# Patient Record
Sex: FEMALE | Race: OTHER | NOT HISPANIC OR LATINO
[De-identification: names, ages, dates, MRNs, and addresses within clinical notes are randomized per-mention and may not be internally consistent; named-entity substitution may affect disease eponyms.]

---

## 2017-07-14 ENCOUNTER — APPOINTMENT (OUTPATIENT)
Dept: ORTHOPEDIC SURGERY | Facility: CLINIC | Age: 62
End: 2017-07-14

## 2017-07-14 VITALS
WEIGHT: 194 LBS | BODY MASS INDEX: 30.45 KG/M2 | SYSTOLIC BLOOD PRESSURE: 106 MMHG | HEART RATE: 112 BPM | DIASTOLIC BLOOD PRESSURE: 69 MMHG | HEIGHT: 67 IN

## 2017-07-14 DIAGNOSIS — Z78.9 OTHER SPECIFIED HEALTH STATUS: ICD-10-CM

## 2017-07-14 DIAGNOSIS — Z87.39 PERSONAL HISTORY OF OTHER DISEASES OF THE MUSCULOSKELETAL SYSTEM AND CONNECTIVE TISSUE: ICD-10-CM

## 2017-07-14 DIAGNOSIS — Z80.9 FAMILY HISTORY OF MALIGNANT NEOPLASM, UNSPECIFIED: ICD-10-CM

## 2017-07-14 DIAGNOSIS — Z86.39 PERSONAL HISTORY OF OTHER ENDOCRINE, NUTRITIONAL AND METABOLIC DISEASE: ICD-10-CM

## 2017-07-14 DIAGNOSIS — Z90.5 ACQUIRED ABSENCE OF KIDNEY: ICD-10-CM

## 2017-07-14 DIAGNOSIS — Z86.79 PERSONAL HISTORY OF OTHER DISEASES OF THE CIRCULATORY SYSTEM: ICD-10-CM

## 2017-07-14 RX ORDER — ERGOCALCIFEROL 1.25 MG/1
1.25 MG CAPSULE, LIQUID FILLED ORAL
Qty: 4 | Refills: 0 | Status: ACTIVE | COMMUNITY
Start: 2017-03-31

## 2017-07-14 RX ORDER — VALSARTAN AND HYDROCHLOROTHIAZIDE 160; 12.5 MG/1; MG/1
160-12.5 TABLET, FILM COATED ORAL
Qty: 30 | Refills: 0 | Status: ACTIVE | COMMUNITY
Start: 2017-03-28

## 2017-07-14 RX ORDER — METHOCARBAMOL 500 MG/1
500 TABLET, FILM COATED ORAL
Qty: 60 | Refills: 0 | Status: ACTIVE | COMMUNITY
Start: 2017-02-03

## 2017-07-14 RX ORDER — VALSARTAN 40 MG/1
TABLET ORAL
Refills: 0 | Status: ACTIVE | COMMUNITY

## 2017-08-08 ENCOUNTER — OUTPATIENT (OUTPATIENT)
Dept: OUTPATIENT SERVICES | Facility: HOSPITAL | Age: 62
LOS: 1 days | End: 2017-08-08
Payer: MEDICAID

## 2017-08-08 VITALS
SYSTOLIC BLOOD PRESSURE: 128 MMHG | HEIGHT: 67 IN | WEIGHT: 198.42 LBS | RESPIRATION RATE: 16 BRPM | DIASTOLIC BLOOD PRESSURE: 70 MMHG | TEMPERATURE: 98 F | HEART RATE: 84 BPM

## 2017-08-08 DIAGNOSIS — C50.919 MALIGNANT NEOPLASM OF UNSPECIFIED SITE OF UNSPECIFIED FEMALE BREAST: ICD-10-CM

## 2017-08-08 DIAGNOSIS — E78.00 PURE HYPERCHOLESTEROLEMIA, UNSPECIFIED: ICD-10-CM

## 2017-08-08 DIAGNOSIS — Z98.890 OTHER SPECIFIED POSTPROCEDURAL STATES: Chronic | ICD-10-CM

## 2017-08-08 DIAGNOSIS — I10 ESSENTIAL (PRIMARY) HYPERTENSION: ICD-10-CM

## 2017-08-08 DIAGNOSIS — M16.11 UNILATERAL PRIMARY OSTEOARTHRITIS, RIGHT HIP: ICD-10-CM

## 2017-08-08 DIAGNOSIS — D17.9 BENIGN LIPOMATOUS NEOPLASM, UNSPECIFIED: Chronic | ICD-10-CM

## 2017-08-08 DIAGNOSIS — Z90.12 ACQUIRED ABSENCE OF LEFT BREAST AND NIPPLE: Chronic | ICD-10-CM

## 2017-08-08 DIAGNOSIS — Z01.818 ENCOUNTER FOR OTHER PREPROCEDURAL EXAMINATION: ICD-10-CM

## 2017-08-08 LAB
ALBUMIN SERPL ELPH-MCNC: 3.9 G/DL — SIGNIFICANT CHANGE UP (ref 3.3–5.2)
ALP SERPL-CCNC: 131 U/L — HIGH (ref 40–120)
ALT FLD-CCNC: 42 U/L — HIGH
ANION GAP SERPL CALC-SCNC: 13 MMOL/L — SIGNIFICANT CHANGE UP (ref 5–17)
APTT BLD: 27.7 SEC — SIGNIFICANT CHANGE UP (ref 27.5–37.4)
AST SERPL-CCNC: 41 U/L — HIGH
BASOPHILS # BLD AUTO: 0 K/UL — SIGNIFICANT CHANGE UP (ref 0–0.2)
BASOPHILS NFR BLD AUTO: 0.3 % — SIGNIFICANT CHANGE UP (ref 0–2)
BILIRUB SERPL-MCNC: 0.2 MG/DL — LOW (ref 0.4–2)
BLD GP AB SCN SERPL QL: SIGNIFICANT CHANGE UP
BUN SERPL-MCNC: 15 MG/DL — SIGNIFICANT CHANGE UP (ref 8–20)
CALCIUM SERPL-MCNC: 9.2 MG/DL — SIGNIFICANT CHANGE UP (ref 8.6–10.2)
CHLORIDE SERPL-SCNC: 102 MMOL/L — SIGNIFICANT CHANGE UP (ref 98–107)
CO2 SERPL-SCNC: 26 MMOL/L — SIGNIFICANT CHANGE UP (ref 22–29)
CREAT SERPL-MCNC: 0.57 MG/DL — SIGNIFICANT CHANGE UP (ref 0.5–1.3)
EOSINOPHIL # BLD AUTO: 0.4 K/UL — SIGNIFICANT CHANGE UP (ref 0–0.5)
EOSINOPHIL NFR BLD AUTO: 6 % — SIGNIFICANT CHANGE UP (ref 0–6)
GLUCOSE SERPL-MCNC: 110 MG/DL — SIGNIFICANT CHANGE UP (ref 70–115)
HCT VFR BLD CALC: 38 % — SIGNIFICANT CHANGE UP (ref 37–47)
HGB BLD-MCNC: 12.4 G/DL — SIGNIFICANT CHANGE UP (ref 12–16)
INR BLD: 1.17 RATIO — HIGH (ref 0.88–1.16)
LYMPHOCYTES # BLD AUTO: 1.8 K/UL — SIGNIFICANT CHANGE UP (ref 1–4.8)
LYMPHOCYTES # BLD AUTO: 27.6 % — SIGNIFICANT CHANGE UP (ref 20–55)
MCHC RBC-ENTMCNC: 30.7 PG — SIGNIFICANT CHANGE UP (ref 27–31)
MCHC RBC-ENTMCNC: 32.6 G/DL — SIGNIFICANT CHANGE UP (ref 32–36)
MCV RBC AUTO: 94.1 FL — SIGNIFICANT CHANGE UP (ref 81–99)
MONOCYTES # BLD AUTO: 0.6 K/UL — SIGNIFICANT CHANGE UP (ref 0–0.8)
MONOCYTES NFR BLD AUTO: 9.6 % — SIGNIFICANT CHANGE UP (ref 3–10)
MRSA PCR RESULT.: SIGNIFICANT CHANGE UP
NEUTROPHILS # BLD AUTO: 3.6 K/UL — SIGNIFICANT CHANGE UP (ref 1.8–8)
NEUTROPHILS NFR BLD AUTO: 56.3 % — SIGNIFICANT CHANGE UP (ref 37–73)
PLATELET # BLD AUTO: 299 K/UL — SIGNIFICANT CHANGE UP (ref 150–400)
POTASSIUM SERPL-MCNC: 4 MMOL/L — SIGNIFICANT CHANGE UP (ref 3.5–5.3)
POTASSIUM SERPL-SCNC: 4 MMOL/L — SIGNIFICANT CHANGE UP (ref 3.5–5.3)
PROT SERPL-MCNC: 7.1 G/DL — SIGNIFICANT CHANGE UP (ref 6.6–8.7)
PROTHROM AB SERPL-ACNC: 12.9 SEC — HIGH (ref 9.8–12.7)
RBC # BLD: 4.04 M/UL — LOW (ref 4.4–5.2)
RBC # FLD: 13.7 % — SIGNIFICANT CHANGE UP (ref 11–15.6)
S AUREUS DNA NOSE QL NAA+PROBE: SIGNIFICANT CHANGE UP
SODIUM SERPL-SCNC: 141 MMOL/L — SIGNIFICANT CHANGE UP (ref 135–145)
TYPE + AB SCN PNL BLD: SIGNIFICANT CHANGE UP
WBC # BLD: 6.5 K/UL — SIGNIFICANT CHANGE UP (ref 4.8–10.8)
WBC # FLD AUTO: 6.5 K/UL — SIGNIFICANT CHANGE UP (ref 4.8–10.8)

## 2017-08-08 PROCEDURE — 93005 ELECTROCARDIOGRAM TRACING: CPT

## 2017-08-08 PROCEDURE — 86900 BLOOD TYPING SEROLOGIC ABO: CPT

## 2017-08-08 PROCEDURE — G0463: CPT

## 2017-08-08 PROCEDURE — 93010 ELECTROCARDIOGRAM REPORT: CPT

## 2017-08-08 PROCEDURE — 85610 PROTHROMBIN TIME: CPT

## 2017-08-08 PROCEDURE — 36415 COLL VENOUS BLD VENIPUNCTURE: CPT

## 2017-08-08 PROCEDURE — 87640 STAPH A DNA AMP PROBE: CPT

## 2017-08-08 PROCEDURE — 87641 MR-STAPH DNA AMP PROBE: CPT

## 2017-08-08 PROCEDURE — 86850 RBC ANTIBODY SCREEN: CPT

## 2017-08-08 PROCEDURE — 86901 BLOOD TYPING SEROLOGIC RH(D): CPT

## 2017-08-08 PROCEDURE — 85730 THROMBOPLASTIN TIME PARTIAL: CPT

## 2017-08-08 PROCEDURE — 80053 COMPREHEN METABOLIC PANEL: CPT

## 2017-08-08 PROCEDURE — 85027 COMPLETE CBC AUTOMATED: CPT

## 2017-08-08 NOTE — H&P PST ADULT - NSANTHOSAYNRD_GEN_A_CORE
No. WILI screening performed.  STOP BANG Legend: 0-2 = LOW Risk; 3-4 = INTERMEDIATE Risk; 5-8 = HIGH Risk

## 2017-08-08 NOTE — H&P PST ADULT - HISTORY OF PRESENT ILLNESS
62 year old female who states that she has right hip pain for the last few years but for the last one year its really unbearable

## 2017-08-08 NOTE — H&P PST ADULT - PSH
Atypical lipoma of soft tissue  in left groin 2008  H/O foot surgery  ankle left 2004  History of lumpectomy of left breast  2017

## 2017-08-08 NOTE — PATIENT PROFILE ADULT. - FAMILY HISTORY
<<-----Click on this checkbox to enter Family History Family history of hypertension in mother     Sibling  Still living? Yes, Estimated age: Age Unknown  Family history of breast cancer in sister, Age at diagnosis: Age Unknown

## 2017-08-29 ENCOUNTER — APPOINTMENT (OUTPATIENT)
Dept: ORTHOPEDIC SURGERY | Facility: HOSPITAL | Age: 62
End: 2017-08-29

## 2017-09-13 ENCOUNTER — APPOINTMENT (OUTPATIENT)
Dept: ORTHOPEDIC SURGERY | Facility: CLINIC | Age: 62
End: 2017-09-13
Payer: MEDICAID

## 2017-09-13 VITALS
HEIGHT: 67 IN | WEIGHT: 194 LBS | SYSTOLIC BLOOD PRESSURE: 135 MMHG | DIASTOLIC BLOOD PRESSURE: 75 MMHG | HEART RATE: 94 BPM | BODY MASS INDEX: 30.45 KG/M2

## 2017-09-13 DIAGNOSIS — M16.11 UNILATERAL PRIMARY OSTEOARTHRITIS, RIGHT HIP: ICD-10-CM

## 2017-09-13 PROCEDURE — 99214 OFFICE O/P EST MOD 30 MIN: CPT

## 2017-09-13 RX ORDER — FENOPROFEN CALCIUM 400 MG/1
400 CAPSULE ORAL
Qty: 90 | Refills: 0 | Status: DISCONTINUED | COMMUNITY
Start: 2017-04-26 | End: 2017-09-13

## 2017-09-13 RX ORDER — ATORVASTATIN CALCIUM 20 MG/1
20 TABLET, FILM COATED ORAL
Qty: 30 | Refills: 0 | Status: DISCONTINUED | COMMUNITY
Start: 2017-03-28 | End: 2017-09-13

## 2017-09-13 RX ORDER — TAMOXIFEN CITRATE 20 MG/1
20 TABLET, FILM COATED ORAL
Qty: 30 | Refills: 0 | Status: ACTIVE | COMMUNITY
Start: 2017-08-02

## 2017-10-17 ENCOUNTER — APPOINTMENT (OUTPATIENT)
Dept: ORTHOPEDIC SURGERY | Facility: CLINIC | Age: 62
End: 2017-10-17

## 2017-11-08 ENCOUNTER — OUTPATIENT (OUTPATIENT)
Dept: OUTPATIENT SERVICES | Facility: HOSPITAL | Age: 62
LOS: 1 days | End: 2017-11-08
Payer: MEDICAID

## 2017-11-08 VITALS
HEART RATE: 96 BPM | TEMPERATURE: 99 F | WEIGHT: 191.8 LBS | HEIGHT: 67 IN | DIASTOLIC BLOOD PRESSURE: 68 MMHG | SYSTOLIC BLOOD PRESSURE: 132 MMHG | RESPIRATION RATE: 16 BRPM

## 2017-11-08 DIAGNOSIS — I10 ESSENTIAL (PRIMARY) HYPERTENSION: ICD-10-CM

## 2017-11-08 DIAGNOSIS — M16.11 UNILATERAL PRIMARY OSTEOARTHRITIS, RIGHT HIP: ICD-10-CM

## 2017-11-08 DIAGNOSIS — Z01.818 ENCOUNTER FOR OTHER PREPROCEDURAL EXAMINATION: ICD-10-CM

## 2017-11-08 DIAGNOSIS — Z90.12 ACQUIRED ABSENCE OF LEFT BREAST AND NIPPLE: Chronic | ICD-10-CM

## 2017-11-08 DIAGNOSIS — D17.9 BENIGN LIPOMATOUS NEOPLASM, UNSPECIFIED: Chronic | ICD-10-CM

## 2017-11-08 DIAGNOSIS — Z98.890 OTHER SPECIFIED POSTPROCEDURAL STATES: Chronic | ICD-10-CM

## 2017-11-08 LAB
ALBUMIN SERPL ELPH-MCNC: 3.7 G/DL — SIGNIFICANT CHANGE UP (ref 3.3–5.2)
ALP SERPL-CCNC: 72 U/L — SIGNIFICANT CHANGE UP (ref 40–120)
ALT FLD-CCNC: 21 U/L — SIGNIFICANT CHANGE UP
ANION GAP SERPL CALC-SCNC: 13 MMOL/L — SIGNIFICANT CHANGE UP (ref 5–17)
APTT BLD: 26.2 SEC — LOW (ref 27.5–37.4)
AST SERPL-CCNC: 26 U/L — SIGNIFICANT CHANGE UP
BASOPHILS # BLD AUTO: 0 K/UL — SIGNIFICANT CHANGE UP (ref 0–0.2)
BASOPHILS NFR BLD AUTO: 0.1 % — SIGNIFICANT CHANGE UP (ref 0–2)
BILIRUB SERPL-MCNC: 0.4 MG/DL — SIGNIFICANT CHANGE UP (ref 0.4–2)
BLD GP AB SCN SERPL QL: SIGNIFICANT CHANGE UP
BUN SERPL-MCNC: 13 MG/DL — SIGNIFICANT CHANGE UP (ref 8–20)
CALCIUM SERPL-MCNC: 9.2 MG/DL — SIGNIFICANT CHANGE UP (ref 8.6–10.2)
CHLORIDE SERPL-SCNC: 102 MMOL/L — SIGNIFICANT CHANGE UP (ref 98–107)
CO2 SERPL-SCNC: 26 MMOL/L — SIGNIFICANT CHANGE UP (ref 22–29)
CREAT SERPL-MCNC: 0.66 MG/DL — SIGNIFICANT CHANGE UP (ref 0.5–1.3)
EOSINOPHIL # BLD AUTO: 0.1 K/UL — SIGNIFICANT CHANGE UP (ref 0–0.5)
EOSINOPHIL NFR BLD AUTO: 1.8 % — SIGNIFICANT CHANGE UP (ref 0–6)
GLUCOSE SERPL-MCNC: 96 MG/DL — SIGNIFICANT CHANGE UP (ref 70–115)
HCT VFR BLD CALC: 37 % — SIGNIFICANT CHANGE UP (ref 37–47)
HGB BLD-MCNC: 12.3 G/DL — SIGNIFICANT CHANGE UP (ref 12–16)
INR BLD: 1.09 RATIO — SIGNIFICANT CHANGE UP (ref 0.88–1.16)
LYMPHOCYTES # BLD AUTO: 2.7 K/UL — SIGNIFICANT CHANGE UP (ref 1–4.8)
LYMPHOCYTES # BLD AUTO: 39.7 % — SIGNIFICANT CHANGE UP (ref 20–55)
MCHC RBC-ENTMCNC: 31.6 PG — HIGH (ref 27–31)
MCHC RBC-ENTMCNC: 33.2 G/DL — SIGNIFICANT CHANGE UP (ref 32–36)
MCV RBC AUTO: 95.1 FL — SIGNIFICANT CHANGE UP (ref 81–99)
MONOCYTES # BLD AUTO: 0.6 K/UL — SIGNIFICANT CHANGE UP (ref 0–0.8)
MONOCYTES NFR BLD AUTO: 9.4 % — SIGNIFICANT CHANGE UP (ref 3–10)
MRSA PCR RESULT.: SIGNIFICANT CHANGE UP
NEUTROPHILS # BLD AUTO: 3.3 K/UL — SIGNIFICANT CHANGE UP (ref 1.8–8)
NEUTROPHILS NFR BLD AUTO: 48.7 % — SIGNIFICANT CHANGE UP (ref 37–73)
PLATELET # BLD AUTO: 265 K/UL — SIGNIFICANT CHANGE UP (ref 150–400)
POTASSIUM SERPL-MCNC: 4.3 MMOL/L — SIGNIFICANT CHANGE UP (ref 3.5–5.3)
POTASSIUM SERPL-SCNC: 4.3 MMOL/L — SIGNIFICANT CHANGE UP (ref 3.5–5.3)
PROT SERPL-MCNC: 7.4 G/DL — SIGNIFICANT CHANGE UP (ref 6.6–8.7)
PROTHROM AB SERPL-ACNC: 12 SEC — SIGNIFICANT CHANGE UP (ref 9.8–12.7)
RBC # BLD: 3.89 M/UL — LOW (ref 4.4–5.2)
RBC # FLD: 13.7 % — SIGNIFICANT CHANGE UP (ref 11–15.6)
S AUREUS DNA NOSE QL NAA+PROBE: SIGNIFICANT CHANGE UP
SODIUM SERPL-SCNC: 141 MMOL/L — SIGNIFICANT CHANGE UP (ref 135–145)
TYPE + AB SCN PNL BLD: SIGNIFICANT CHANGE UP
WBC # BLD: 6.8 K/UL — SIGNIFICANT CHANGE UP (ref 4.8–10.8)
WBC # FLD AUTO: 6.8 K/UL — SIGNIFICANT CHANGE UP (ref 4.8–10.8)

## 2017-11-08 PROCEDURE — 86850 RBC ANTIBODY SCREEN: CPT

## 2017-11-08 PROCEDURE — 80053 COMPREHEN METABOLIC PANEL: CPT

## 2017-11-08 PROCEDURE — 87640 STAPH A DNA AMP PROBE: CPT

## 2017-11-08 PROCEDURE — G0463: CPT

## 2017-11-08 PROCEDURE — 85027 COMPLETE CBC AUTOMATED: CPT

## 2017-11-08 PROCEDURE — 86901 BLOOD TYPING SEROLOGIC RH(D): CPT

## 2017-11-08 PROCEDURE — 85730 THROMBOPLASTIN TIME PARTIAL: CPT

## 2017-11-08 PROCEDURE — 93005 ELECTROCARDIOGRAM TRACING: CPT

## 2017-11-08 PROCEDURE — 85610 PROTHROMBIN TIME: CPT

## 2017-11-08 PROCEDURE — 86900 BLOOD TYPING SEROLOGIC ABO: CPT

## 2017-11-08 PROCEDURE — 93010 ELECTROCARDIOGRAM REPORT: CPT

## 2017-11-08 PROCEDURE — 36415 COLL VENOUS BLD VENIPUNCTURE: CPT

## 2017-11-08 RX ORDER — MULTIVIT-MIN/FERROUS GLUCONATE 9 MG/15 ML
1 LIQUID (ML) ORAL
Qty: 0 | Refills: 0 | COMMUNITY

## 2017-11-08 NOTE — H&P PST ADULT - FAMILY HISTORY
Family history of hypertension in mother     Sibling  Still living? Yes, Estimated age: Age Unknown  Family history of breast cancer in sister, Age at diagnosis: Age Unknown

## 2017-11-22 ENCOUNTER — APPOINTMENT (OUTPATIENT)
Dept: ORTHOPEDIC SURGERY | Facility: CLINIC | Age: 62
End: 2017-11-22

## 2017-11-28 RX ORDER — GABAPENTIN 400 MG/1
600 CAPSULE ORAL ONCE
Qty: 0 | Refills: 0 | Status: COMPLETED | OUTPATIENT
Start: 2017-11-30 | End: 2017-11-30

## 2017-11-28 RX ORDER — CELECOXIB 200 MG/1
400 CAPSULE ORAL ONCE
Qty: 0 | Refills: 0 | Status: COMPLETED | OUTPATIENT
Start: 2017-11-30 | End: 2017-11-30

## 2017-11-28 RX ORDER — OXYCODONE HYDROCHLORIDE 5 MG/1
20 TABLET ORAL ONCE
Qty: 0 | Refills: 0 | Status: DISCONTINUED | OUTPATIENT
Start: 2017-11-30 | End: 2017-11-30

## 2017-11-30 ENCOUNTER — RESULT REVIEW (OUTPATIENT)
Age: 62
End: 2017-11-30

## 2017-11-30 ENCOUNTER — TRANSCRIPTION ENCOUNTER (OUTPATIENT)
Age: 62
End: 2017-11-30

## 2017-11-30 ENCOUNTER — APPOINTMENT (OUTPATIENT)
Dept: ORTHOPEDIC SURGERY | Facility: HOSPITAL | Age: 62
End: 2017-11-30

## 2017-11-30 ENCOUNTER — INPATIENT (INPATIENT)
Facility: HOSPITAL | Age: 62
LOS: 3 days | Discharge: HOME | DRG: 470 | End: 2017-12-04
Attending: ORTHOPAEDIC SURGERY | Admitting: ORTHOPAEDIC SURGERY
Payer: MEDICAID

## 2017-11-30 VITALS
HEIGHT: 67 IN | RESPIRATION RATE: 16 BRPM | OXYGEN SATURATION: 97 % | WEIGHT: 191.8 LBS | TEMPERATURE: 98 F | DIASTOLIC BLOOD PRESSURE: 64 MMHG | SYSTOLIC BLOOD PRESSURE: 103 MMHG | HEART RATE: 94 BPM

## 2017-11-30 DIAGNOSIS — D17.9 BENIGN LIPOMATOUS NEOPLASM, UNSPECIFIED: Chronic | ICD-10-CM

## 2017-11-30 DIAGNOSIS — M16.11 UNILATERAL PRIMARY OSTEOARTHRITIS, RIGHT HIP: ICD-10-CM

## 2017-11-30 DIAGNOSIS — Z90.12 ACQUIRED ABSENCE OF LEFT BREAST AND NIPPLE: Chronic | ICD-10-CM

## 2017-11-30 DIAGNOSIS — Z98.890 OTHER SPECIFIED POSTPROCEDURAL STATES: Chronic | ICD-10-CM

## 2017-11-30 LAB
BLD GP AB SCN SERPL QL: SIGNIFICANT CHANGE UP
TYPE + AB SCN PNL BLD: SIGNIFICANT CHANGE UP

## 2017-11-30 PROCEDURE — 88305 TISSUE EXAM BY PATHOLOGIST: CPT | Mod: 26

## 2017-11-30 PROCEDURE — 27130 TOTAL HIP ARTHROPLASTY: CPT | Mod: AS,RT

## 2017-11-30 PROCEDURE — 99222 1ST HOSP IP/OBS MODERATE 55: CPT

## 2017-11-30 PROCEDURE — 27130 TOTAL HIP ARTHROPLASTY: CPT | Mod: RT

## 2017-11-30 PROCEDURE — 73501 X-RAY EXAM HIP UNI 1 VIEW: CPT | Mod: 26,RT

## 2017-11-30 PROCEDURE — 88311 DECALCIFY TISSUE: CPT | Mod: 26

## 2017-11-30 RX ORDER — HYDROMORPHONE HYDROCHLORIDE 2 MG/ML
2 INJECTION INTRAMUSCULAR; INTRAVENOUS; SUBCUTANEOUS
Qty: 0 | Refills: 0 | Status: DISCONTINUED | OUTPATIENT
Start: 2017-11-30 | End: 2017-12-04

## 2017-11-30 RX ORDER — ACETAMINOPHEN 500 MG
650 TABLET ORAL EVERY 6 HOURS
Qty: 0 | Refills: 0 | Status: DISCONTINUED | OUTPATIENT
Start: 2017-11-30 | End: 2017-12-04

## 2017-11-30 RX ORDER — KETOROLAC TROMETHAMINE 30 MG/ML
15 SYRINGE (ML) INJECTION EVERY 6 HOURS
Qty: 0 | Refills: 0 | Status: DISCONTINUED | OUTPATIENT
Start: 2017-11-30 | End: 2017-12-01

## 2017-11-30 RX ORDER — TRANEXAMIC ACID 100 MG/ML
850 INJECTION, SOLUTION INTRAVENOUS ONCE
Qty: 0 | Refills: 0 | Status: DISCONTINUED | OUTPATIENT
Start: 2017-11-30 | End: 2017-11-30

## 2017-11-30 RX ORDER — CELECOXIB 200 MG/1
200 CAPSULE ORAL
Qty: 0 | Refills: 0 | Status: DISCONTINUED | OUTPATIENT
Start: 2017-11-30 | End: 2017-12-04

## 2017-11-30 RX ORDER — DOCUSATE SODIUM 100 MG
100 CAPSULE ORAL THREE TIMES A DAY
Qty: 0 | Refills: 0 | Status: DISCONTINUED | OUTPATIENT
Start: 2017-11-30 | End: 2017-12-04

## 2017-11-30 RX ORDER — VANCOMYCIN HCL 1 G
1250 VIAL (EA) INTRAVENOUS
Qty: 0 | Refills: 0 | Status: COMPLETED | OUTPATIENT
Start: 2017-11-30 | End: 2017-11-30

## 2017-11-30 RX ORDER — FENTANYL CITRATE 50 UG/ML
50 INJECTION INTRAVENOUS
Qty: 0 | Refills: 0 | Status: DISCONTINUED | OUTPATIENT
Start: 2017-11-30 | End: 2017-11-30

## 2017-11-30 RX ORDER — SODIUM CHLORIDE 9 MG/ML
1000 INJECTION, SOLUTION INTRAVENOUS
Qty: 0 | Refills: 0 | Status: DISCONTINUED | OUTPATIENT
Start: 2017-11-30 | End: 2017-11-30

## 2017-11-30 RX ORDER — CEFAZOLIN SODIUM 1 G
2000 VIAL (EA) INJECTION
Qty: 0 | Refills: 0 | Status: COMPLETED | OUTPATIENT
Start: 2017-11-30 | End: 2017-11-30

## 2017-11-30 RX ORDER — SODIUM CHLORIDE 9 MG/ML
1000 INJECTION, SOLUTION INTRAVENOUS
Qty: 0 | Refills: 0 | Status: DISCONTINUED | OUTPATIENT
Start: 2017-11-30 | End: 2017-12-01

## 2017-11-30 RX ORDER — SENNA PLUS 8.6 MG/1
2 TABLET ORAL AT BEDTIME
Qty: 0 | Refills: 0 | Status: DISCONTINUED | OUTPATIENT
Start: 2017-11-30 | End: 2017-12-04

## 2017-11-30 RX ORDER — ACETAMINOPHEN 500 MG
975 TABLET ORAL EVERY 8 HOURS
Qty: 0 | Refills: 0 | Status: COMPLETED | OUTPATIENT
Start: 2017-11-30 | End: 2017-12-03

## 2017-11-30 RX ORDER — TAMOXIFEN CITRATE 20 MG/1
20 TABLET, FILM COATED ORAL DAILY
Qty: 0 | Refills: 0 | Status: DISCONTINUED | OUTPATIENT
Start: 2017-11-30 | End: 2017-12-04

## 2017-11-30 RX ORDER — OXYCODONE HYDROCHLORIDE 5 MG/1
5 TABLET ORAL
Qty: 0 | Refills: 0 | Status: DISCONTINUED | OUTPATIENT
Start: 2017-11-30 | End: 2017-12-04

## 2017-11-30 RX ORDER — HYDROCHLOROTHIAZIDE 25 MG
12.5 TABLET ORAL DAILY
Qty: 0 | Refills: 0 | Status: DISCONTINUED | OUTPATIENT
Start: 2017-12-01 | End: 2017-12-01

## 2017-11-30 RX ORDER — SODIUM CHLORIDE 9 MG/ML
3 INJECTION INTRAMUSCULAR; INTRAVENOUS; SUBCUTANEOUS EVERY 8 HOURS
Qty: 0 | Refills: 0 | Status: DISCONTINUED | OUTPATIENT
Start: 2017-11-30 | End: 2017-11-30

## 2017-11-30 RX ORDER — ENOXAPARIN SODIUM 100 MG/ML
40 INJECTION SUBCUTANEOUS DAILY
Qty: 0 | Refills: 0 | Status: DISCONTINUED | OUTPATIENT
Start: 2017-12-01 | End: 2017-12-04

## 2017-11-30 RX ORDER — ONDANSETRON 8 MG/1
4 TABLET, FILM COATED ORAL ONCE
Qty: 0 | Refills: 0 | Status: COMPLETED | OUTPATIENT
Start: 2017-11-30 | End: 2017-11-30

## 2017-11-30 RX ORDER — VANCOMYCIN HCL 1 G
1250 VIAL (EA) INTRAVENOUS ONCE
Qty: 0 | Refills: 0 | Status: COMPLETED | OUTPATIENT
Start: 2017-11-30 | End: 2017-11-30

## 2017-11-30 RX ORDER — ACETAMINOPHEN 500 MG
1000 TABLET ORAL ONCE
Qty: 0 | Refills: 0 | Status: DISCONTINUED | OUTPATIENT
Start: 2017-11-30 | End: 2017-11-30

## 2017-11-30 RX ORDER — MAGNESIUM HYDROXIDE 400 MG/1
30 TABLET, CHEWABLE ORAL DAILY
Qty: 0 | Refills: 0 | Status: DISCONTINUED | OUTPATIENT
Start: 2017-11-30 | End: 2017-12-04

## 2017-11-30 RX ORDER — OXYCODONE HYDROCHLORIDE 5 MG/1
10 TABLET ORAL
Qty: 0 | Refills: 0 | Status: DISCONTINUED | OUTPATIENT
Start: 2017-11-30 | End: 2017-12-04

## 2017-11-30 RX ORDER — OXYCODONE HYDROCHLORIDE 5 MG/1
20 TABLET ORAL EVERY 12 HOURS
Qty: 0 | Refills: 0 | Status: DISCONTINUED | OUTPATIENT
Start: 2017-11-30 | End: 2017-12-04

## 2017-11-30 RX ORDER — VALSARTAN 80 MG/1
160 TABLET ORAL DAILY
Qty: 0 | Refills: 0 | Status: DISCONTINUED | OUTPATIENT
Start: 2017-12-01 | End: 2017-12-04

## 2017-11-30 RX ORDER — HYDROMORPHONE HYDROCHLORIDE 2 MG/ML
0.5 INJECTION INTRAMUSCULAR; INTRAVENOUS; SUBCUTANEOUS EVERY 4 HOURS
Qty: 0 | Refills: 0 | Status: DISCONTINUED | OUTPATIENT
Start: 2017-11-30 | End: 2017-12-04

## 2017-11-30 RX ORDER — POLYETHYLENE GLYCOL 3350 17 G/17G
17 POWDER, FOR SOLUTION ORAL DAILY
Qty: 0 | Refills: 0 | Status: DISCONTINUED | OUTPATIENT
Start: 2017-11-30 | End: 2017-12-04

## 2017-11-30 RX ORDER — ONDANSETRON 8 MG/1
4 TABLET, FILM COATED ORAL EVERY 6 HOURS
Qty: 0 | Refills: 0 | Status: DISCONTINUED | OUTPATIENT
Start: 2017-11-30 | End: 2017-12-04

## 2017-11-30 RX ORDER — ATORVASTATIN CALCIUM 80 MG/1
20 TABLET, FILM COATED ORAL AT BEDTIME
Qty: 0 | Refills: 0 | Status: DISCONTINUED | OUTPATIENT
Start: 2017-11-30 | End: 2017-12-04

## 2017-11-30 RX ORDER — CEFAZOLIN SODIUM 1 G
2000 VIAL (EA) INJECTION ONCE
Qty: 0 | Refills: 0 | Status: DISCONTINUED | OUTPATIENT
Start: 2017-11-30 | End: 2017-11-30

## 2017-11-30 RX ADMIN — Medication 166.67 MILLIGRAM(S): at 23:31

## 2017-11-30 RX ADMIN — OXYCODONE HYDROCHLORIDE 20 MILLIGRAM(S): 5 TABLET ORAL at 11:04

## 2017-11-30 RX ADMIN — OXYCODONE HYDROCHLORIDE 20 MILLIGRAM(S): 5 TABLET ORAL at 11:05

## 2017-11-30 RX ADMIN — ONDANSETRON 4 MILLIGRAM(S): 8 TABLET, FILM COATED ORAL at 17:27

## 2017-11-30 RX ADMIN — Medication 6.25 MILLIGRAM(S): at 17:57

## 2017-11-30 RX ADMIN — CELECOXIB 400 MILLIGRAM(S): 200 CAPSULE ORAL at 11:04

## 2017-11-30 RX ADMIN — Medication 975 MILLIGRAM(S): at 21:41

## 2017-11-30 RX ADMIN — Medication 15 MILLIGRAM(S): at 23:32

## 2017-11-30 RX ADMIN — Medication 166.67 MILLIGRAM(S): at 12:05

## 2017-11-30 RX ADMIN — Medication 100 MILLIGRAM(S): at 21:41

## 2017-11-30 RX ADMIN — CELECOXIB 400 MILLIGRAM(S): 200 CAPSULE ORAL at 11:05

## 2017-11-30 RX ADMIN — ATORVASTATIN CALCIUM 20 MILLIGRAM(S): 80 TABLET, FILM COATED ORAL at 21:40

## 2017-11-30 RX ADMIN — GABAPENTIN 600 MILLIGRAM(S): 400 CAPSULE ORAL at 11:04

## 2017-11-30 NOTE — DISCHARGE NOTE ADULT - CARE PLAN
Principal Discharge DX:	Primary osteoarthritis of left hip  Goal:	Decrease Pain, Improve Ambulation and Activities of Daily Living  Instructions for follow-up, activity and diet:	The patient will be seen in the office between 2-3 weeks for wound check. Tape will be removed at that time. Patient may shower after post-op day #5. The dressing is to be removed on day #10 (12/11/2017).  The patient will contact the office if the wound becomes red, has increasing pain, develops bleeding or discharge, an injury occurs, or has other concerns. The patient will continue PT consistent with posterior total hip replacement protocol. The patient will continue to practice posterior total hip precautions for a minimum of 6 week. The patient will continue LOVENOX for 2 weeks and then begin ASPIRIN for DVTP. The patient will take OXYCODONE and TYLENOL for pain control and titrate according to prescription and patient needs. The patient is FULL weight bearing. Principal Discharge DX:	Primary osteoarthritis of left hip  Goal:	Decrease Pain, Improve Ambulation and Activities of Daily Living  Instructions for follow-up, activity and diet:	The patient will be seen in the office between 2-3 weeks for wound check. Tape will be removed at that time. Patient may shower after post-op day #5. The dressing is to be removed on day #10 (12/11/2017).  The patient will contact the office if the wound becomes red, has increasing pain, develops bleeding or discharge, an injury occurs, or has other concerns. The patient will continue PT consistent with posterior total hip replacement protocol. The patient will continue to practice posterior total hip precautions for a minimum of 6 week. The patient will continue LOVENOX for 2 weeks and then begin ASPIRIN 81 mg twice daily x 4 weeks for DVTP. The patient will take OXYCODONE and TYLENOL for pain control and titrate according to prescription and patient needs. The patient is FULL weight bearing.

## 2017-11-30 NOTE — PHYSICAL THERAPY INITIAL EVALUATION ADULT - CRITERIA FOR SKILLED THERAPEUTIC INTERVENTIONS
Home with home PT, RW/anticipated equipment needs at discharge/impairments found/anticipated discharge recommendation

## 2017-11-30 NOTE — PHYSICAL THERAPY INITIAL EVALUATION ADULT - RANGE OF MOTION EXAMINATION, REHAB EVAL
Right hip flexion not tested > 90 degrees flexion/bilateral lower extremity ROM was WFL (within functional limits)/bilateral upper extremity ROM was WFL (within functional limits)

## 2017-11-30 NOTE — DISCHARGE NOTE ADULT - HOSPITAL COURSE
The patient underwent a RIGHT POSTERIOR TOTAL HIP REPLACEMENT on 11/30/2017. The patient received antibiotics consistent with SCIP guidelines. The patient underwent the procedure and had no intra-operative complications. Post-operatively, the patient was seen by medicine and PT. The patient received LOVENOX for DVTP. The patient received pain medications per orthopedic pain management protocol and the pain was appropriately controlled. Patient was instructed on posterior total hip precautions by PT. The patient did not have any post-operative medical complications. The patient was discharged in stable condition.

## 2017-11-30 NOTE — DISCHARGE NOTE ADULT - PLAN OF CARE
Decrease Pain, Improve Ambulation and Activities of Daily Living The patient will be seen in the office between 2-3 weeks for wound check. Tape will be removed at that time. Patient may shower after post-op day #5. The dressing is to be removed on day #10 (12/11/2017).  The patient will contact the office if the wound becomes red, has increasing pain, develops bleeding or discharge, an injury occurs, or has other concerns. The patient will continue PT consistent with posterior total hip replacement protocol. The patient will continue to practice posterior total hip precautions for a minimum of 6 week. The patient will continue LOVENOX for 2 weeks and then begin ASPIRIN for DVTP. The patient will take OXYCODONE and TYLENOL for pain control and titrate according to prescription and patient needs. The patient is FULL weight bearing. The patient will be seen in the office between 2-3 weeks for wound check. Tape will be removed at that time. Patient may shower after post-op day #5. The dressing is to be removed on day #10 (12/11/2017).  The patient will contact the office if the wound becomes red, has increasing pain, develops bleeding or discharge, an injury occurs, or has other concerns. The patient will continue PT consistent with posterior total hip replacement protocol. The patient will continue to practice posterior total hip precautions for a minimum of 6 week. The patient will continue LOVENOX for 2 weeks and then begin ASPIRIN 81 mg twice daily x 4 weeks for DVTP. The patient will take OXYCODONE and TYLENOL for pain control and titrate according to prescription and patient needs. The patient is FULL weight bearing.

## 2017-11-30 NOTE — PHYSICAL THERAPY INITIAL EVALUATION ADULT - ADDITIONAL COMMENTS
Pt lives in a private home with her sons, 6 steps to enter with handrails, 12 steps inside with handrails. Pt was independent PTA with RW. Pt owns RW, Pt lives in a private home with her 2 sons, both work day time hours, 6 steps to enter with handrails, 12 steps inside with handrails. Pt was independent PTA with RW. Pt owns RW, SAC, Shower chair and commode.

## 2017-11-30 NOTE — PHYSICAL THERAPY INITIAL EVALUATION ADULT - GENERAL OBSERVATIONS, REHAB EVAL
Pt received supine on stretcher in PACU, + telemetry + IV + hip abduction wedge, + Venous Compression Boots, no c/o pain, pt agreeable to PT

## 2017-11-30 NOTE — DISCHARGE NOTE ADULT - PATIENT PORTAL LINK FT
“You can access the [facility] Patient Portal, offered by [facility], by registering with the following website: [email or web address]”

## 2017-11-30 NOTE — DISCHARGE NOTE ADULT - MEDICATION SUMMARY - MEDICATIONS TO TAKE
I will START or STAY ON the medications listed below when I get home from the hospital:    oxyCODONE 10 mg oral tablet  -- 0.5-1 tab(s) by mouth every 4 to 6 hours, As Needed MDD:6  -- Indication: For Pain    Aspirin Enteric Coated 81 mg oral delayed release tablet  -- 1 tab(s) by mouth 2 times a day x 4 weeks, begin after completion of lovenox  -- Swallow whole.  Do not crush.  Take with food or milk.    -- Indication: For DVTP    Lovenox 40 mg/0.4 mL injectable solution  -- 40 milligram(s) injectable once a day  -- Indication: For DVTP    atorvastatin 20 mg oral tablet  -- 1 tab(s) by mouth once a day (at bedtime)  -- Indication: For Home med    valsartan-hydrochlorothiazide 160mg-12.5mg oral tablet  -- 1 tab(s) by mouth once a day  -- Indication: For Home med    tamoxifen 20 mg oral tablet  -- 1 tab(s) by mouth once a day  -- Indication: For Home med    Senna S 50 mg-8.6 mg oral tablet  -- 2 tab(s) by mouth once a day (at bedtime)   -- Medication should be taken with plenty of water.    -- Indication: For constipation

## 2017-11-30 NOTE — DISCHARGE NOTE ADULT - CARE PROVIDER_API CALL
Jerman Wilson), Orthopaedic Surgery  [address] [place] [address]  Phone: [phone]  Fax: [phone]

## 2017-11-30 NOTE — DISCHARGE NOTE ADULT - NS AS ACTIVITY OBS
Do not make important decisions/Do not drive or operate machinery/Stairs allowed/Showering allowed/No Heavy lifting/straining/Walking-Outdoors allowed/Walking-Indoors allowed

## 2017-12-01 ENCOUNTER — TRANSCRIPTION ENCOUNTER (OUTPATIENT)
Age: 62
End: 2017-12-01

## 2017-12-01 DIAGNOSIS — Z29.9 ENCOUNTER FOR PROPHYLACTIC MEASURES, UNSPECIFIED: ICD-10-CM

## 2017-12-01 DIAGNOSIS — I10 ESSENTIAL (PRIMARY) HYPERTENSION: ICD-10-CM

## 2017-12-01 DIAGNOSIS — C50.919 MALIGNANT NEOPLASM OF UNSPECIFIED SITE OF UNSPECIFIED FEMALE BREAST: ICD-10-CM

## 2017-12-01 DIAGNOSIS — M16.11 UNILATERAL PRIMARY OSTEOARTHRITIS, RIGHT HIP: ICD-10-CM

## 2017-12-01 DIAGNOSIS — E78.00 PURE HYPERCHOLESTEROLEMIA, UNSPECIFIED: ICD-10-CM

## 2017-12-01 DIAGNOSIS — D62 ACUTE POSTHEMORRHAGIC ANEMIA: ICD-10-CM

## 2017-12-01 LAB
ANION GAP SERPL CALC-SCNC: 11 MMOL/L — SIGNIFICANT CHANGE UP (ref 5–17)
BUN SERPL-MCNC: 18 MG/DL — SIGNIFICANT CHANGE UP (ref 8–20)
CALCIUM SERPL-MCNC: 8.4 MG/DL — LOW (ref 8.6–10.2)
CHLORIDE SERPL-SCNC: 100 MMOL/L — SIGNIFICANT CHANGE UP (ref 98–107)
CO2 SERPL-SCNC: 25 MMOL/L — SIGNIFICANT CHANGE UP (ref 22–29)
CREAT SERPL-MCNC: 0.68 MG/DL — SIGNIFICANT CHANGE UP (ref 0.5–1.3)
GLUCOSE SERPL-MCNC: 127 MG/DL — HIGH (ref 70–115)
HCT VFR BLD CALC: 25.7 % — LOW (ref 37–47)
HCT VFR BLD CALC: 25.9 % — LOW (ref 37–47)
HGB BLD-MCNC: 8.1 G/DL — LOW (ref 12–16)
HGB BLD-MCNC: 8.6 G/DL — LOW (ref 12–16)
MCHC RBC-ENTMCNC: 32.1 PG — HIGH (ref 27–31)
MCHC RBC-ENTMCNC: 33.5 G/DL — SIGNIFICANT CHANGE UP (ref 32–36)
MCV RBC AUTO: 95.9 FL — SIGNIFICANT CHANGE UP (ref 81–99)
PLATELET # BLD AUTO: 202 K/UL — SIGNIFICANT CHANGE UP (ref 150–400)
POTASSIUM SERPL-MCNC: 4.4 MMOL/L — SIGNIFICANT CHANGE UP (ref 3.5–5.3)
POTASSIUM SERPL-SCNC: 4.4 MMOL/L — SIGNIFICANT CHANGE UP (ref 3.5–5.3)
RBC # BLD: 2.68 M/UL — LOW (ref 4.4–5.2)
RBC # FLD: 13.7 % — SIGNIFICANT CHANGE UP (ref 11–15.6)
SODIUM SERPL-SCNC: 136 MMOL/L — SIGNIFICANT CHANGE UP (ref 135–145)
WBC # BLD: 8.5 K/UL — SIGNIFICANT CHANGE UP (ref 4.8–10.8)
WBC # FLD AUTO: 8.5 K/UL — SIGNIFICANT CHANGE UP (ref 4.8–10.8)

## 2017-12-01 PROCEDURE — 93010 ELECTROCARDIOGRAM REPORT: CPT

## 2017-12-01 PROCEDURE — 99233 SBSQ HOSP IP/OBS HIGH 50: CPT

## 2017-12-01 RX ORDER — SODIUM CHLORIDE 9 MG/ML
1000 INJECTION INTRAMUSCULAR; INTRAVENOUS; SUBCUTANEOUS ONCE
Qty: 0 | Refills: 0 | Status: COMPLETED | OUTPATIENT
Start: 2017-12-01 | End: 2017-12-01

## 2017-12-01 RX ORDER — SODIUM CHLORIDE 9 MG/ML
1000 INJECTION, SOLUTION INTRAVENOUS
Qty: 0 | Refills: 0 | Status: DISCONTINUED | OUTPATIENT
Start: 2017-12-01 | End: 2017-12-01

## 2017-12-01 RX ORDER — ACETAMINOPHEN 500 MG
3 TABLET ORAL
Qty: 0 | Refills: 0 | COMMUNITY
Start: 2017-12-01

## 2017-12-01 RX ORDER — METHOCARBAMOL 500 MG/1
2 TABLET, FILM COATED ORAL
Qty: 0 | Refills: 0 | COMMUNITY

## 2017-12-01 RX ORDER — TAMOXIFEN CITRATE 20 MG/1
1 TABLET, FILM COATED ORAL
Qty: 0 | Refills: 0 | COMMUNITY

## 2017-12-01 RX ORDER — HYDROCHLOROTHIAZIDE 25 MG
12.5 TABLET ORAL DAILY
Qty: 0 | Refills: 0 | Status: DISCONTINUED | OUTPATIENT
Start: 2017-12-02 | End: 2017-12-04

## 2017-12-01 RX ORDER — SODIUM CHLORIDE 9 MG/ML
500 INJECTION INTRAMUSCULAR; INTRAVENOUS; SUBCUTANEOUS ONCE
Qty: 0 | Refills: 0 | Status: COMPLETED | OUTPATIENT
Start: 2017-12-01 | End: 2017-12-01

## 2017-12-01 RX ORDER — ENOXAPARIN SODIUM 100 MG/ML
40 INJECTION SUBCUTANEOUS
Qty: 13 | Refills: 0 | OUTPATIENT
Start: 2017-12-01 | End: 2017-12-14

## 2017-12-01 RX ORDER — SODIUM CHLORIDE 9 MG/ML
1000 INJECTION INTRAMUSCULAR; INTRAVENOUS; SUBCUTANEOUS
Qty: 0 | Refills: 0 | Status: DISCONTINUED | OUTPATIENT
Start: 2017-12-01 | End: 2017-12-04

## 2017-12-01 RX ADMIN — CELECOXIB 200 MILLIGRAM(S): 200 CAPSULE ORAL at 09:42

## 2017-12-01 RX ADMIN — CELECOXIB 200 MILLIGRAM(S): 200 CAPSULE ORAL at 17:18

## 2017-12-01 RX ADMIN — Medication 100 MILLIGRAM(S): at 13:15

## 2017-12-01 RX ADMIN — Medication 100 MILLIGRAM(S): at 21:25

## 2017-12-01 RX ADMIN — Medication 100 MILLIGRAM(S): at 06:01

## 2017-12-01 RX ADMIN — SODIUM CHLORIDE 125 MILLILITER(S): 9 INJECTION INTRAMUSCULAR; INTRAVENOUS; SUBCUTANEOUS at 17:17

## 2017-12-01 RX ADMIN — Medication 15 MILLIGRAM(S): at 06:02

## 2017-12-01 RX ADMIN — SODIUM CHLORIDE 1000 MILLILITER(S): 9 INJECTION INTRAMUSCULAR; INTRAVENOUS; SUBCUTANEOUS at 10:00

## 2017-12-01 RX ADMIN — TAMOXIFEN CITRATE 20 MILLIGRAM(S): 20 TABLET, FILM COATED ORAL at 17:18

## 2017-12-01 RX ADMIN — SODIUM CHLORIDE 500 MILLILITER(S): 9 INJECTION INTRAMUSCULAR; INTRAVENOUS; SUBCUTANEOUS at 17:17

## 2017-12-01 RX ADMIN — Medication 975 MILLIGRAM(S): at 06:01

## 2017-12-01 RX ADMIN — SODIUM CHLORIDE 1000 MILLILITER(S): 9 INJECTION INTRAMUSCULAR; INTRAVENOUS; SUBCUTANEOUS at 22:58

## 2017-12-01 RX ADMIN — ENOXAPARIN SODIUM 40 MILLIGRAM(S): 100 INJECTION SUBCUTANEOUS at 13:13

## 2017-12-01 RX ADMIN — Medication 975 MILLIGRAM(S): at 21:25

## 2017-12-01 RX ADMIN — CELECOXIB 200 MILLIGRAM(S): 200 CAPSULE ORAL at 18:02

## 2017-12-01 RX ADMIN — POLYETHYLENE GLYCOL 3350 17 GRAM(S): 17 POWDER, FOR SOLUTION ORAL at 13:14

## 2017-12-01 RX ADMIN — ATORVASTATIN CALCIUM 20 MILLIGRAM(S): 80 TABLET, FILM COATED ORAL at 21:25

## 2017-12-01 RX ADMIN — CELECOXIB 200 MILLIGRAM(S): 200 CAPSULE ORAL at 09:12

## 2017-12-01 RX ADMIN — SODIUM CHLORIDE 100 MILLILITER(S): 9 INJECTION, SOLUTION INTRAVENOUS at 09:10

## 2017-12-01 RX ADMIN — Medication 15 MILLIGRAM(S): at 00:02

## 2017-12-01 RX ADMIN — Medication 15 MILLIGRAM(S): at 06:01

## 2017-12-01 RX ADMIN — Medication 975 MILLIGRAM(S): at 13:14

## 2017-12-01 NOTE — CONSULT NOTE ADULT - PROBLEM SELECTOR RECOMMENDATION 2
BP on the lower side this am.  Asymptomatic while lying in bed.   Diovan and HCTZ held this am.  Will continue to hold HCTZ for now.    Continue IVF.  Monitor CBC given post-op anemia. Now with hypotensive episode - asymptomatic while laying in the bed , will give NS bolus , will increase ivf , will repeat CBC and transfuse if needed   Diovan and HCTZ on holsd for SBP < 110   Will continue to hold HCTZ for now.    Continue IVF.  Monitor CBC given post-op anemia.

## 2017-12-01 NOTE — CONSULT NOTE ADULT - PROBLEM SELECTOR RECOMMENDATION 5
Asymptomatic while lying in bed.  Will monitor CBC and send Type and screen in am. Asymptomatic while lying in bed.  350cc blood loss during surgery.  Will monitor CBC and send Type and screen in am. Asymptomatic while lying in bed. Will repeat CBC , transfuse if needed   Will monitor CBC

## 2017-12-01 NOTE — CONSULT NOTE ADULT - PROBLEM SELECTOR RECOMMENDATION 9
s/p right DEMARIO POD #1  Pain control.  PT eval.  Antibiotics, wound care, and DVT prophylaxis as per Ortho.  Incentive spirometry.  Avoid opioid induced constipation.

## 2017-12-01 NOTE — CONSULT NOTE ADULT - ASSESSMENT
63y/o female with PMH HTN, HLD, breast cancer s/p left lumpectomy and RT, iron deficiency anemia, spinal stenosis, presents with worsening bilateral hip pain x1year R>L, now on disability secondary to difficulty ambulating, now s/p right DEMARIO POD#1.  Patient seen and examined on medical floor.  Patient reports that she had nausea and vomiting post-op yesterday, now resolved.

## 2017-12-01 NOTE — CONSULT NOTE ADULT - SUBJECTIVE AND OBJECTIVE BOX
PMD: Dr. Avalos    CC: Right hip pain    HPI:      PAST MEDICAL & SURGICAL HISTORY:  Breast cancer: left 2017, had surgery and radiation  High cholesterol  High blood pressure  History of lumpectomy of left breast: 2017  Atypical lipoma of soft tissue: in left groin 2008  H/O foot surgery: ankle left 2004    FAMILY HISTORY:  Family history of breast cancer in sister (Sibling)  Family history of hypertension in mother    SOCIAL HISTORY:  Lives with  Tobacco -   ETOH -   Drug use -     ALLERGIES:  Shellfish (swelling, Rash)    HOME MEDICATIONS:  Valsartan/HCTZ 160/12.5mg PO daily  Lipitor 20mg PO daily  Methocarbamol 1000mg PO BID  Tamoxifen 20mg PO daily    MEDICATIONS  (STANDING):  acetaminophen   Tablet 975 milliGRAM(s) Oral every 8 hours  atorvastatin 20 milliGRAM(s) Oral at bedtime  celecoxib 200 milliGRAM(s) Oral two times a day with meals  docusate sodium 100 milliGRAM(s) Oral three times a day  enoxaparin Injectable 40 milliGRAM(s) SubCutaneous daily  hydrochlorothiazide 12.5 milliGRAM(s) Oral daily  lactated ringers. 1000 milliLiter(s) (100 mL/Hr) IV Continuous <Continuous>  oxyCODONE  ER Tablet 20 milliGRAM(s) Oral every 12 hours  polyethylene glycol 3350 17 Gram(s) Oral daily  tamoxifen 20 milliGRAM(s) Oral daily  valsartan 160 milliGRAM(s) Oral daily    MEDICATIONS  (PRN):  acetaminophen   Tablet 650 milliGRAM(s) Oral every 6 hours PRN For Temp over 38.3 C (100.94 F)  aluminum hydroxide/magnesium hydroxide/simethicone Suspension 30 milliLiter(s) Oral four times a day PRN Indigestion  HYDROmorphone   Tablet 2 milliGRAM(s) Oral every 3 hours PRN Severe Pain (7 - 10)  HYDROmorphone  Injectable 0.5 milliGRAM(s) IV Push every 4 hours PRN breakthrough  magnesium hydroxide Suspension 30 milliLiter(s) Oral daily PRN Constipation  ondansetron Injectable 4 milliGRAM(s) IV Push every 6 hours PRN Nausea and/or Vomiting  oxyCODONE    IR 5 milliGRAM(s) Oral every 3 hours PRN Mild Pain  oxyCODONE    IR 10 milliGRAM(s) Oral every 3 hours PRN Moderate Pain  senna 2 Tablet(s) Oral at bedtime PRN Constipation      REVIEW OF SYSTEMS      General:	    Skin/Breast:  	  Ophthalmologic:  	  ENMT:	    Respiratory and Thorax:  	  Cardiovascular:	    Gastrointestinal:	    Genitourinary:	    Musculoskeletal:	    Neurological:	    Psychiatric:	    Hematology/Lymphatics:	    Endocrine:	    Allergic/Immunologic:	    Vital Signs Last 24 Hrs  T(C): 37.7 (01 Dec 2017 05:15), Max: 37.7 (01 Dec 2017 05:15)  T(F): 99.9 (01 Dec 2017 05:15), Max: 99.9 (01 Dec 2017 05:15)  HR: 104 (01 Dec 2017 06:09) (79 - 106)  BP: 91/48 (01 Dec 2017 06:09) (91/48 - 128/69)  BP(mean): --  RR: 18 (01 Dec 2017 05:15) (12 - 18)  SpO2: 93% (01 Dec 2017 05:15) (93% - 100%)    PHYSICAL EXAM:      Constitutional:    Eyes:    ENMT:    Neck:    Breasts:    Back:    Respiratory:    Cardiovascular:    Gastrointestinal:    Genitourinary:    Rectal:    Extremities:    Vascular:    Neurological:    Skin:    Lymph Nodes:    Musculoskeletal:    Psychiatric:        LABS:                        8.6    8.5   )-----------( 202      ( 01 Dec 2017 06:31 )             25.7     12-01    136  |  100  |  18.0  ----------------------------<  127<H>  4.4   |  25.0  |  0.68    Ca    8.4<L>      01 Dec 2017 06:31            RADIOLOGY & ADDITIONAL STUDIES:  < from: Xray Hip w/ Pelvis 1 View, Right (11.30.17 @ 16:29) >   EXAM:  XR HIP WITH PELV 1V RT                          PROCEDURE DATE:  11/30/2017          INTERPRETATION:  TECHNIQUE: Two views right hip and pelvis    COMPARISON: None    CLINICAL HISTORY: Or case    FINDINGS:    AP and frogleg views of the right hip shows no evidence for acute   fracture, subluxation or dislocation. No appreciable soft tissue   abnormality. The patient is status post total right hip arthroplasty.   Mild postoperative soft tissue air and edema. Hardware is in satisfactory   position. Moderate to severe degenerative changes of the left hip.   Osseous structures are osteopenic.    IMPRESSION: Total right hip arthroplasty.                VANNESSA EMERSON M.D., ATTENDING RADIOLOGIST  This document has been electronically signed. Nov 30 2017  5:15PM              < end of copied text > PMD: Dr. Avalos    CC: Right hip pain    HPI: 63y/o female with PMH HTN, HLD, breast cancer s/p left lumpectomy and RT, iron deficiency anemia, spinal stenosis, presents with worsening bilateral hip pain x1year R>L, now on disability secondary to difficulty ambulating, now s/p right DEMARIO POD#1.  Patient seen and examined on medical floor.  Patient reports that she had nausea and vomiting post-op yesterday, now resolved.       PAST MEDICAL & SURGICAL HISTORY:  Breast cancer: left 2017, had surgery and radiation  High cholesterol  High blood pressure  History of lumpectomy of left breast: 2017  Atypical lipoma of soft tissue: in left groin 2008  H/O foot surgery: ankle left 2004    FAMILY HISTORY:  Family history of breast cancer in sister (Sibling)  Family history of hypertension in mother    SOCIAL HISTORY:  Lives with son  Tobacco - Denies  ETOH - Denies  Drug use - Denies    ALLERGIES:  Shellfish (Hives)    HOME MEDICATIONS:  Valsartan/HCTZ 160/12.5mg PO daily  Lipitor 20mg PO daily  Methocarbamol 1000mg PO BID  Tamoxifen 20mg PO daily  Tylenol prn    MEDICATIONS  (STANDING):  acetaminophen   Tablet 975 milliGRAM(s) Oral every 8 hours  atorvastatin 20 milliGRAM(s) Oral at bedtime  celecoxib 200 milliGRAM(s) Oral two times a day with meals  docusate sodium 100 milliGRAM(s) Oral three times a day  enoxaparin Injectable 40 milliGRAM(s) SubCutaneous daily  hydrochlorothiazide 12.5 milliGRAM(s) Oral daily  lactated ringers. 1000 milliLiter(s) (100 mL/Hr) IV Continuous <Continuous>  oxyCODONE  ER Tablet 20 milliGRAM(s) Oral every 12 hours  polyethylene glycol 3350 17 Gram(s) Oral daily  tamoxifen 20 milliGRAM(s) Oral daily  valsartan 160 milliGRAM(s) Oral daily    MEDICATIONS  (PRN):  acetaminophen   Tablet 650 milliGRAM(s) Oral every 6 hours PRN For Temp over 38.3 C (100.94 F)  aluminum hydroxide/magnesium hydroxide/simethicone Suspension 30 milliLiter(s) Oral four times a day PRN Indigestion  HYDROmorphone   Tablet 2 milliGRAM(s) Oral every 3 hours PRN Severe Pain (7 - 10)  HYDROmorphone  Injectable 0.5 milliGRAM(s) IV Push every 4 hours PRN breakthrough  magnesium hydroxide Suspension 30 milliLiter(s) Oral daily PRN Constipation  ondansetron Injectable 4 milliGRAM(s) IV Push every 6 hours PRN Nausea and/or Vomiting  oxyCODONE    IR 5 milliGRAM(s) Oral every 3 hours PRN Mild Pain  oxyCODONE    IR 10 milliGRAM(s) Oral every 3 hours PRN Moderate Pain  senna 2 Tablet(s) Oral at bedtime PRN Constipation    REVIEW OF SYSTEMS:  CONSTITUTIONAL: No fever, weight loss, or fatigue  RESPIRATORY: No cough, wheezing, or chills; No shortness of breath  CARDIOVASCULAR: No chest pain, palpitations, dizziness, or leg swelling  GASTROINTESTINAL: No abdominal or epigastric pain. (+) Nausea and vomiting post-op now resolved; No diarrhea or constipation.   GENITOURINARY: No dysuria, frequency, hematuria, or incontinence  NEUROLOGICAL: No headaches, memory loss, loss of strength, numbness, or tremors  SKIN: No itching, burning, rashes, or lesions   MUSCULOSKELETAL: No joint swelling; No muscle or back pain; Right hip pain  PSYCHIATRIC: No depression, anxiety, mood swings, or difficulty sleeping        Vital Signs Last 24 Hrs  T(C): 37.7 (01 Dec 2017 05:15), Max: 37.7 (01 Dec 2017 05:15)  T(F): 99.9 (01 Dec 2017 05:15), Max: 99.9 (01 Dec 2017 05:15)  HR: 104 (01 Dec 2017 06:09) (79 - 106)  BP: 91/48 (01 Dec 2017 06:09) (91/48 - 128/69)  BP(mean): --  RR: 18 (01 Dec 2017 05:15) (12 - 18)  SpO2: 93% (01 Dec 2017 05:15) (93% - 100%)    PHYSICAL EXAM:  GENERAL: NAD, well-groomed, well-developed  HEAD:  Atraumatic, Normocephalic  NECK: Supple, No JVD, Normal thyroid  NERVOUS SYSTEM:  Alert & Oriented X3, Good concentration; Motor Strength 5/5 B/L upper and lower extremities  CHEST/LUNG: Clear to auscultation bilaterally; No rales, rhonchi, wheezing, or rubs  HEART: Regular rate and rhythm; No murmurs, rubs, or gallops  ABDOMEN: Soft, Nontender, Nondistended; Bowel sounds present  EXTREMITIES:  2+ Peripheral Pulses, No clubbing, cyanosis, or edema; Right hip with clean dressing          LABS:                        8.6    8.5   )-----------( 202      ( 01 Dec 2017 06:31 )             25.7     12-01    136  |  100  |  18.0  ----------------------------<  127<H>  4.4   |  25.0  |  0.68    Ca    8.4<L>      01 Dec 2017 06:31            RADIOLOGY & ADDITIONAL STUDIES:  < from: Xray Hip w/ Pelvis 1 View, Right (11.30.17 @ 16:29) >   EXAM:  XR HIP WITH PELV 1V RT                          PROCEDURE DATE:  11/30/2017          INTERPRETATION:  TECHNIQUE: Two views right hip and pelvis    COMPARISON: None    CLINICAL HISTORY: Or case    FINDINGS:    AP and frogleg views of the right hip shows no evidence for acute   fracture, subluxation or dislocation. No appreciable soft tissue   abnormality. The patient is status post total right hip arthroplasty.   Mild postoperative soft tissue air and edema. Hardware is in satisfactory   position. Moderate to severe degenerative changes of the left hip.   Osseous structures are osteopenic.    IMPRESSION: Total right hip arthroplasty.                VANNESSA EMERSON M.D., ATTENDING RADIOLOGIST  This document has been electronically signed. Nov 30 2017  5:15PM              < end of copied text > PMD: Dr. Avalos    CC: Right hip pain    HPI: 63y/o female with PMH HTN, HLD, breast cancer s/p left lumpectomy and RT, iron deficiency anemia, spinal stenosis, presents with worsening bilateral hip pain x1year R>L, now on disability secondary to difficulty ambulating, now s/p right DEMARIO POD#1.  Patient seen and examined on medical floor.  Patient reports that she had nausea and vomiting post-op yesterday, now resolved.       PAST MEDICAL & SURGICAL HISTORY:  Breast cancer: left 2017, had surgery and radiation  High cholesterol  High blood pressure  History of lumpectomy of left breast: 2017  Atypical lipoma of soft tissue: in left groin 2008  H/O foot surgery: ankle left 2004    FAMILY HISTORY:  Family history of breast cancer in sister (Sibling)  Family history of hypertension in mother    SOCIAL HISTORY:  Lives with son  Tobacco - Denies  ETOH - Denies  Drug use - Denies    ALLERGIES:  Shellfish (Hives)    HOME MEDICATIONS:  Valsartan/HCTZ 160/12.5mg PO daily  Lipitor 20mg PO daily  Methocarbamol 1000mg PO BID  Tamoxifen 20mg PO daily  Tylenol prn    MEDICATIONS  (STANDING):  acetaminophen   Tablet 975 milliGRAM(s) Oral every 8 hours  atorvastatin 20 milliGRAM(s) Oral at bedtime  celecoxib 200 milliGRAM(s) Oral two times a day with meals  docusate sodium 100 milliGRAM(s) Oral three times a day  enoxaparin Injectable 40 milliGRAM(s) SubCutaneous daily  hydrochlorothiazide 12.5 milliGRAM(s) Oral daily  lactated ringers. 1000 milliLiter(s) (100 mL/Hr) IV Continuous <Continuous>  oxyCODONE  ER Tablet 20 milliGRAM(s) Oral every 12 hours  polyethylene glycol 3350 17 Gram(s) Oral daily  tamoxifen 20 milliGRAM(s) Oral daily  valsartan 160 milliGRAM(s) Oral daily    MEDICATIONS  (PRN):  acetaminophen   Tablet 650 milliGRAM(s) Oral every 6 hours PRN For Temp over 38.3 C (100.94 F)  aluminum hydroxide/magnesium hydroxide/simethicone Suspension 30 milliLiter(s) Oral four times a day PRN Indigestion  HYDROmorphone   Tablet 2 milliGRAM(s) Oral every 3 hours PRN Severe Pain (7 - 10)  HYDROmorphone  Injectable 0.5 milliGRAM(s) IV Push every 4 hours PRN breakthrough  magnesium hydroxide Suspension 30 milliLiter(s) Oral daily PRN Constipation  ondansetron Injectable 4 milliGRAM(s) IV Push every 6 hours PRN Nausea and/or Vomiting  oxyCODONE    IR 5 milliGRAM(s) Oral every 3 hours PRN Mild Pain  oxyCODONE    IR 10 milliGRAM(s) Oral every 3 hours PRN Moderate Pain  senna 2 Tablet(s) Oral at bedtime PRN Constipation    REVIEW OF SYSTEMS:  CONSTITUTIONAL: No fever, weight loss, or fatigue  RESPIRATORY: No cough, wheezing, or chills; No shortness of breath  CARDIOVASCULAR: No chest pain, palpitations, dizziness, or leg swelling  GASTROINTESTINAL: No abdominal or epigastric pain. (+) Nausea and vomiting post-op now resolved; No diarrhea or constipation.   GENITOURINARY: No dysuria, frequency, hematuria, or incontinence  NEUROLOGICAL: No headaches, memory loss, loss of strength, numbness, or tremors  SKIN: No itching, burning, rashes, or lesions   MUSCULOSKELETAL: No joint swelling; No muscle or back pain; Right hip pain  PSYCHIATRIC: No depression, anxiety, mood swings, or difficulty sleeping        Vital Signs Last 24 Hrs  T(C): 37.7 (01 Dec 2017 05:15), Max: 37.7 (01 Dec 2017 05:15)  T(F): 99.9 (01 Dec 2017 05:15), Max: 99.9 (01 Dec 2017 05:15)  HR: 104 (01 Dec 2017 06:09) (79 - 106)  BP: 91/48 (01 Dec 2017 06:09) (91/48 - 128/69) , now SBP - 76   BP(mean): --  RR: 18 (01 Dec 2017 05:15) (12 - 18)  SpO2: 93% (01 Dec 2017 05:15) (93% - 100%)    PHYSICAL EXAM:  GENERAL: NAD, well-groomed, well-developed  HEAD:  Atraumatic, Normocephalic  NECK: Supple, No JVD, Normal thyroid  NERVOUS SYSTEM:  Alert & Oriented X3, Good concentration; Motor Strength 5/5 B/L upper and lower extremities  CHEST/LUNG: Clear to auscultation bilaterally; No rales, rhonchi, wheezing, or rubs  HEART: Regular rate and rhythm; No murmurs, rubs, or gallops  ABDOMEN: Soft, Nontender, Nondistended; Bowel sounds present  EXTREMITIES:  2+ Peripheral Pulses, No clubbing, cyanosis, or edema; Right hip with clean dressing          LABS:                        8.6    8.5   )-----------( 202      ( 01 Dec 2017 06:31 )             25.7     12-01    136  |  100  |  18.0  ----------------------------<  127<H>  4.4   |  25.0  |  0.68    Ca    8.4<L>      01 Dec 2017 06:31            RADIOLOGY & ADDITIONAL STUDIES:  < from: Xray Hip w/ Pelvis 1 View, Right (11.30.17 @ 16:29) >   EXAM:  XR HIP WITH PELV 1V RT                          PROCEDURE DATE:  11/30/2017          INTERPRETATION:  TECHNIQUE: Two views right hip and pelvis    COMPARISON: None    CLINICAL HISTORY: Or case    FINDINGS:    AP and frogleg views of the right hip shows no evidence for acute   fracture, subluxation or dislocation. No appreciable soft tissue   abnormality. The patient is status post total right hip arthroplasty.   Mild postoperative soft tissue air and edema. Hardware is in satisfactory   position. Moderate to severe degenerative changes of the left hip.   Osseous structures are osteopenic.    IMPRESSION: Total right hip arthroplasty.                VANNESSA EMERSON M.D., ATTENDING RADIOLOGIST  This document has been electronically signed. Nov 30 2017  5:15PM              < end of copied text >

## 2017-12-02 DIAGNOSIS — I95.81 POSTPROCEDURAL HYPOTENSION: ICD-10-CM

## 2017-12-02 LAB
ANION GAP SERPL CALC-SCNC: 11 MMOL/L — SIGNIFICANT CHANGE UP (ref 5–17)
BUN SERPL-MCNC: 17 MG/DL — SIGNIFICANT CHANGE UP (ref 8–20)
CALCIUM SERPL-MCNC: 8.1 MG/DL — LOW (ref 8.6–10.2)
CHLORIDE SERPL-SCNC: 105 MMOL/L — SIGNIFICANT CHANGE UP (ref 98–107)
CO2 SERPL-SCNC: 23 MMOL/L — SIGNIFICANT CHANGE UP (ref 22–29)
CREAT SERPL-MCNC: 0.65 MG/DL — SIGNIFICANT CHANGE UP (ref 0.5–1.3)
GLUCOSE SERPL-MCNC: 111 MG/DL — SIGNIFICANT CHANGE UP (ref 70–115)
HCT VFR BLD CALC: 28.5 % — LOW (ref 37–47)
HCT VFR BLD CALC: 29.9 % — LOW (ref 37–47)
HGB BLD-MCNC: 9.5 G/DL — LOW (ref 12–16)
HGB BLD-MCNC: 9.8 G/DL — LOW (ref 12–16)
MCHC RBC-ENTMCNC: 30.6 PG — SIGNIFICANT CHANGE UP (ref 27–31)
MCHC RBC-ENTMCNC: 30.9 PG — SIGNIFICANT CHANGE UP (ref 27–31)
MCHC RBC-ENTMCNC: 32.8 G/DL — SIGNIFICANT CHANGE UP (ref 32–36)
MCHC RBC-ENTMCNC: 33.3 G/DL — SIGNIFICANT CHANGE UP (ref 32–36)
MCV RBC AUTO: 92.8 FL — SIGNIFICANT CHANGE UP (ref 81–99)
MCV RBC AUTO: 93.4 FL — SIGNIFICANT CHANGE UP (ref 81–99)
PLATELET # BLD AUTO: 177 K/UL — SIGNIFICANT CHANGE UP (ref 150–400)
PLATELET # BLD AUTO: 180 K/UL — SIGNIFICANT CHANGE UP (ref 150–400)
POTASSIUM SERPL-MCNC: 4.8 MMOL/L — SIGNIFICANT CHANGE UP (ref 3.5–5.3)
POTASSIUM SERPL-SCNC: 4.8 MMOL/L — SIGNIFICANT CHANGE UP (ref 3.5–5.3)
RBC # BLD: 3.07 M/UL — LOW (ref 4.4–5.2)
RBC # BLD: 3.2 M/UL — LOW (ref 4.4–5.2)
RBC # FLD: 16.5 % — HIGH (ref 11–15.6)
RBC # FLD: 16.5 % — HIGH (ref 11–15.6)
SODIUM SERPL-SCNC: 139 MMOL/L — SIGNIFICANT CHANGE UP (ref 135–145)
WBC # BLD: 9.8 K/UL — SIGNIFICANT CHANGE UP (ref 4.8–10.8)
WBC # BLD: 9.9 K/UL — SIGNIFICANT CHANGE UP (ref 4.8–10.8)
WBC # FLD AUTO: 9.8 K/UL — SIGNIFICANT CHANGE UP (ref 4.8–10.8)
WBC # FLD AUTO: 9.9 K/UL — SIGNIFICANT CHANGE UP (ref 4.8–10.8)

## 2017-12-02 PROCEDURE — 99233 SBSQ HOSP IP/OBS HIGH 50: CPT

## 2017-12-02 RX ORDER — IRON SUCROSE 20 MG/ML
250 INJECTION, SOLUTION INTRAVENOUS DAILY
Qty: 0 | Refills: 0 | Status: COMPLETED | OUTPATIENT
Start: 2017-12-02 | End: 2017-12-03

## 2017-12-02 RX ADMIN — Medication 975 MILLIGRAM(S): at 20:28

## 2017-12-02 RX ADMIN — Medication 975 MILLIGRAM(S): at 13:05

## 2017-12-02 RX ADMIN — ENOXAPARIN SODIUM 40 MILLIGRAM(S): 100 INJECTION SUBCUTANEOUS at 12:21

## 2017-12-02 RX ADMIN — Medication 100 MILLIGRAM(S): at 13:05

## 2017-12-02 RX ADMIN — POLYETHYLENE GLYCOL 3350 17 GRAM(S): 17 POWDER, FOR SOLUTION ORAL at 12:21

## 2017-12-02 RX ADMIN — SODIUM CHLORIDE 125 MILLILITER(S): 9 INJECTION INTRAMUSCULAR; INTRAVENOUS; SUBCUTANEOUS at 20:30

## 2017-12-02 RX ADMIN — CELECOXIB 200 MILLIGRAM(S): 200 CAPSULE ORAL at 18:13

## 2017-12-02 RX ADMIN — ATORVASTATIN CALCIUM 20 MILLIGRAM(S): 80 TABLET, FILM COATED ORAL at 20:28

## 2017-12-02 RX ADMIN — CELECOXIB 200 MILLIGRAM(S): 200 CAPSULE ORAL at 09:50

## 2017-12-02 RX ADMIN — CELECOXIB 200 MILLIGRAM(S): 200 CAPSULE ORAL at 08:50

## 2017-12-02 RX ADMIN — Medication 975 MILLIGRAM(S): at 05:21

## 2017-12-02 RX ADMIN — IRON SUCROSE 112.5 MILLIGRAM(S): 20 INJECTION, SOLUTION INTRAVENOUS at 18:13

## 2017-12-02 RX ADMIN — Medication 100 MILLIGRAM(S): at 20:28

## 2017-12-02 RX ADMIN — Medication 100 MILLIGRAM(S): at 05:20

## 2017-12-02 RX ADMIN — TAMOXIFEN CITRATE 20 MILLIGRAM(S): 20 TABLET, FILM COATED ORAL at 12:21

## 2017-12-03 LAB — TSH SERPL-MCNC: 3.84 UIU/ML — SIGNIFICANT CHANGE UP (ref 0.27–4.2)

## 2017-12-03 PROCEDURE — 99233 SBSQ HOSP IP/OBS HIGH 50: CPT

## 2017-12-03 RX ORDER — ASPIRIN/CALCIUM CARB/MAGNESIUM 324 MG
1 TABLET ORAL
Qty: 60 | Refills: 0 | OUTPATIENT
Start: 2017-12-03

## 2017-12-03 RX ORDER — TAMOXIFEN CITRATE 20 MG/1
1 TABLET, FILM COATED ORAL
Qty: 0 | Refills: 0 | COMMUNITY
Start: 2017-12-03

## 2017-12-03 RX ORDER — ATORVASTATIN CALCIUM 80 MG/1
1 TABLET, FILM COATED ORAL
Qty: 0 | Refills: 0 | COMMUNITY
Start: 2017-12-03

## 2017-12-03 RX ORDER — OXYCODONE HYDROCHLORIDE 5 MG/1
1 TABLET ORAL
Qty: 42 | Refills: 0 | OUTPATIENT
Start: 2017-12-03

## 2017-12-03 RX ORDER — SENNOSIDES/DOCUSATE SODIUM 8.6MG-50MG
2 TABLET ORAL
Qty: 14 | Refills: 0 | OUTPATIENT
Start: 2017-12-03

## 2017-12-03 RX ADMIN — Medication 100 MILLIGRAM(S): at 13:22

## 2017-12-03 RX ADMIN — TAMOXIFEN CITRATE 20 MILLIGRAM(S): 20 TABLET, FILM COATED ORAL at 13:23

## 2017-12-03 RX ADMIN — Medication 100 MILLIGRAM(S): at 05:01

## 2017-12-03 RX ADMIN — CELECOXIB 200 MILLIGRAM(S): 200 CAPSULE ORAL at 09:21

## 2017-12-03 RX ADMIN — CELECOXIB 200 MILLIGRAM(S): 200 CAPSULE ORAL at 17:39

## 2017-12-03 RX ADMIN — IRON SUCROSE 112.5 MILLIGRAM(S): 20 INJECTION, SOLUTION INTRAVENOUS at 13:23

## 2017-12-03 RX ADMIN — Medication 100 MILLIGRAM(S): at 22:11

## 2017-12-03 RX ADMIN — SODIUM CHLORIDE 125 MILLILITER(S): 9 INJECTION INTRAMUSCULAR; INTRAVENOUS; SUBCUTANEOUS at 05:01

## 2017-12-03 RX ADMIN — ATORVASTATIN CALCIUM 20 MILLIGRAM(S): 80 TABLET, FILM COATED ORAL at 22:11

## 2017-12-03 RX ADMIN — CELECOXIB 200 MILLIGRAM(S): 200 CAPSULE ORAL at 18:58

## 2017-12-03 RX ADMIN — Medication 975 MILLIGRAM(S): at 05:01

## 2017-12-03 RX ADMIN — ENOXAPARIN SODIUM 40 MILLIGRAM(S): 100 INJECTION SUBCUTANEOUS at 13:23

## 2017-12-03 RX ADMIN — Medication 975 MILLIGRAM(S): at 13:23

## 2017-12-03 RX ADMIN — CELECOXIB 200 MILLIGRAM(S): 200 CAPSULE ORAL at 11:52

## 2017-12-03 NOTE — PROGRESS NOTE ADULT - PROBLEM SELECTOR PLAN 1
improved repeat PT evaluation if tolerates may go home   DC per orthopedics  stop iv fluid when discharged

## 2017-12-04 VITALS
RESPIRATION RATE: 18 BRPM | DIASTOLIC BLOOD PRESSURE: 64 MMHG | TEMPERATURE: 99 F | SYSTOLIC BLOOD PRESSURE: 117 MMHG | HEART RATE: 101 BPM | OXYGEN SATURATION: 97 %

## 2017-12-04 LAB
ANION GAP SERPL CALC-SCNC: 11 MMOL/L — SIGNIFICANT CHANGE UP (ref 5–17)
BUN SERPL-MCNC: 11 MG/DL — SIGNIFICANT CHANGE UP (ref 8–20)
CALCIUM SERPL-MCNC: 8.3 MG/DL — LOW (ref 8.6–10.2)
CHLORIDE SERPL-SCNC: 107 MMOL/L — SIGNIFICANT CHANGE UP (ref 98–107)
CO2 SERPL-SCNC: 24 MMOL/L — SIGNIFICANT CHANGE UP (ref 22–29)
CREAT SERPL-MCNC: 0.63 MG/DL — SIGNIFICANT CHANGE UP (ref 0.5–1.3)
GLUCOSE SERPL-MCNC: 103 MG/DL — SIGNIFICANT CHANGE UP (ref 70–115)
POTASSIUM SERPL-MCNC: 4 MMOL/L — SIGNIFICANT CHANGE UP (ref 3.5–5.3)
POTASSIUM SERPL-SCNC: 4 MMOL/L — SIGNIFICANT CHANGE UP (ref 3.5–5.3)
SODIUM SERPL-SCNC: 142 MMOL/L — SIGNIFICANT CHANGE UP (ref 135–145)

## 2017-12-04 PROCEDURE — 86901 BLOOD TYPING SEROLOGIC RH(D): CPT

## 2017-12-04 PROCEDURE — 36430 TRANSFUSION BLD/BLD COMPNT: CPT

## 2017-12-04 PROCEDURE — 73501 X-RAY EXAM HIP UNI 1 VIEW: CPT

## 2017-12-04 PROCEDURE — 86900 BLOOD TYPING SEROLOGIC ABO: CPT

## 2017-12-04 PROCEDURE — 88311 DECALCIFY TISSUE: CPT

## 2017-12-04 PROCEDURE — 36415 COLL VENOUS BLD VENIPUNCTURE: CPT

## 2017-12-04 PROCEDURE — P9016: CPT

## 2017-12-04 PROCEDURE — 97163 PT EVAL HIGH COMPLEX 45 MIN: CPT

## 2017-12-04 PROCEDURE — 93005 ELECTROCARDIOGRAM TRACING: CPT

## 2017-12-04 PROCEDURE — 85027 COMPLETE CBC AUTOMATED: CPT

## 2017-12-04 PROCEDURE — 88305 TISSUE EXAM BY PATHOLOGIST: CPT

## 2017-12-04 PROCEDURE — 86850 RBC ANTIBODY SCREEN: CPT

## 2017-12-04 PROCEDURE — 84443 ASSAY THYROID STIM HORMONE: CPT

## 2017-12-04 PROCEDURE — 97116 GAIT TRAINING THERAPY: CPT

## 2017-12-04 PROCEDURE — 80048 BASIC METABOLIC PNL TOTAL CA: CPT

## 2017-12-04 PROCEDURE — 97110 THERAPEUTIC EXERCISES: CPT

## 2017-12-04 PROCEDURE — 86920 COMPATIBILITY TEST SPIN: CPT

## 2017-12-04 PROCEDURE — 99232 SBSQ HOSP IP/OBS MODERATE 35: CPT

## 2017-12-04 PROCEDURE — 85018 HEMOGLOBIN: CPT

## 2017-12-04 PROCEDURE — C1713: CPT

## 2017-12-04 PROCEDURE — C1776: CPT

## 2017-12-04 RX ORDER — HYDROCHLOROTHIAZIDE 25 MG
12.5 TABLET ORAL ONCE
Qty: 0 | Refills: 0 | Status: COMPLETED | OUTPATIENT
Start: 2017-12-04 | End: 2017-12-04

## 2017-12-04 RX ADMIN — Medication 650 MILLIGRAM(S): at 05:40

## 2017-12-04 RX ADMIN — Medication 12.5 MILLIGRAM(S): at 11:07

## 2017-12-04 RX ADMIN — TAMOXIFEN CITRATE 20 MILLIGRAM(S): 20 TABLET, FILM COATED ORAL at 14:20

## 2017-12-04 RX ADMIN — VALSARTAN 160 MILLIGRAM(S): 80 TABLET ORAL at 05:40

## 2017-12-04 RX ADMIN — CELECOXIB 200 MILLIGRAM(S): 200 CAPSULE ORAL at 11:06

## 2017-12-04 RX ADMIN — ENOXAPARIN SODIUM 40 MILLIGRAM(S): 100 INJECTION SUBCUTANEOUS at 14:20

## 2017-12-04 RX ADMIN — Medication 12.5 MILLIGRAM(S): at 05:40

## 2017-12-04 RX ADMIN — Medication 100 MILLIGRAM(S): at 05:40

## 2017-12-04 RX ADMIN — CELECOXIB 200 MILLIGRAM(S): 200 CAPSULE ORAL at 12:06

## 2017-12-04 NOTE — PROGRESS NOTE ADULT - ASSESSMENT
s/p right hip arthroplasty post op day # 4 , post op hypotension , improved , s/p 1 unit PRBC infusion for  blood loss anemia      Problem/Plan - 1:  ·  Problem: Postprocedural hypotension.  Plan: improved with ivf     Problem/Plan - 2:  ·  Problem: Acute blood loss as cause of postoperative anemia.  Plan: s/p blood tx ,      Problem/Plan - 3:  ·  Problem: Primary osteoarthritis of right hip.  Plan: s/p hip arthoplasty, pain control, PT   incentive spirometry.      Problem/Plan - 4:  ·  Problem: Essential hypertension.  Plan: BP meds with holding parameters , will restart HCTZ secondary to b/l LE mild edema      Problem/Plan - 5:  ·  Problem: Prophylactic measure.  Plan: aspirin per orthopedics   stool softener for opiate induced constipation prophylaxis.     Problem / Plan - 6: Sinus tachy - asymptomatic , EKG reviewed - no acute changes - likely secondary to pain.    Problem / Plan - Mild b/l LE edema - will restart diuretics .
s/p right hip arthroplasty post op day # 2 , post op hypotension , blood loss anemia
s/p right hip arthroplasty post op day # 3 , post op hypotension , improvied , s/p 1 unit PRBC infusion for  blood loss anemia

## 2017-12-04 NOTE — OCCUPATIONAL THERAPY INITIAL EVALUATION ADULT - PLANNED THERAPY INTERVENTIONS, OT EVAL
ADL retraining/bed mobility training/IADL retraining/strengthening/transfer training/balance training

## 2017-12-04 NOTE — PROGRESS NOTE ADULT - PROVIDER SPECIALTY LIST ADULT
Hospitalist
Hospitalist
Orthopedics
Hospitalist

## 2017-12-04 NOTE — PROGRESS NOTE ADULT - SUBJECTIVE AND OBJECTIVE BOX
Patient seen and examined . S/p R DEMARIO  , POD # 4    CC : R Hip pain better controlled this am , no other complaints , no sob , no dizziness , no cp .      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 1000 milliLiter(s) (125 mL/Hr) IV Continuous <Continuous>  tamoxifen 20 milliGRAM(s) Oral daily  valsartan 160 milliGRAM(s) Oral daily    MEDICATIONS  (PRN):  acetaminophen   Tablet 650 milliGRAM(s) Oral every 6 hours PRN For Temp over 38.3 C (100.94 F)  aluminum hydroxide/magnesium hydroxide/simethicone Suspension 30 milliLiter(s) Oral four times a day PRN Indigestion  bisacodyl Suppository 10 milliGRAM(s) Rectal daily PRN If no bowel movement by POD#2  HYDROmorphone   Tablet 2 milliGRAM(s) Oral every 3 hours PRN Severe Pain (7 - 10)  HYDROmorphone  Injectable 0.5 milliGRAM(s) IV Push every 4 hours PRN breakthrough  magnesium hydroxide Suspension 30 milliLiter(s) Oral daily PRN Constipation  ondansetron Injectable 4 milliGRAM(s) IV Push every 6 hours PRN Nausea and/or Vomiting  oxyCODONE    IR 5 milliGRAM(s) Oral every 3 hours PRN Mild Pain  oxyCODONE    IR 10 milliGRAM(s) Oral every 3 hours PRN Moderate Pain  senna 2 Tablet(s) Oral at bedtime PRN Constipation      LABS:      12-04    142  |  107  |  11.0  ----------------------------<  103  4.0   |  24.0  |  0.63    Ca    8.3<L>      04 Dec 2017 05:5      REVIEW OF SYSTEMS:    CONSTITUTIONAL: No fever, weight loss, or fatigue  EYES: No eye pain, visual disturbances, or discharge  ENMT:  No difficulty hearing, tinnitus, vertigo; No sinus or throat pain  NECK: No pain or stiffness  RESPIRATORY: No cough, wheezing, chills or hemoptysis; No shortness of breath  CARDIOVASCULAR: No chest pain, palpitations, dizziness, or leg swelling  GASTROINTESTINAL: No abdominal or epigastric pain. No nausea, vomiting, or hematemesis; No diarrhea or constipation. No melena or hematochezia.  GENITOURINARY: No dysuria, frequency, hematuria, or incontinence  NEUROLOGICAL: No headaches, memory loss, loss of strength, numbness, or tremors  SKIN: No itching, burning, rashes, or lesions   LYMPH NODES: No enlarged glands  ENDOCRINE: No heat or cold intolerance; No hair loss  MUSCULOSKELETAL: R hip pain well controlled   PSYCHIATRIC: No depression, anxiety, mood swings, or difficulty sleeping  HEME/LYMPH: No easy bruising, or bleeding gums  ALLERGY AND IMMUNOLOGIC: No hives or eczema    Vital Signs Last 24 Hrs  T(C): 37.1 (04 Dec 2017 08:32), Max: 37.3 (03 Dec 2017 19:44)  T(F): 98.8 (04 Dec 2017 08:32), Max: 99.2 (03 Dec 2017 19:44)  HR: 101 (04 Dec 2017 08:32) (80 - 106) , 92 checked by me   BP: 117/64 (04 Dec 2017 08:32) (117/64 - 152/77)  BP(mean): --  RR: 18 (04 Dec 2017 08:32) (18 - 18)  SpO2: 97% (04 Dec 2017 08:32) (94% - 100%)  PHYSICAL EXAM:    GENERAL: NAD, well-groomed, well-developed  HEAD:  Atraumatic, Normocephalic  EYES: EOMI, PERRLA, conjunctiva and sclera clear  NECK: Supple, No JVD, Normal thyroid  NERVOUS SYSTEM:  Alert & Oriented X3, no focal deficit  CHEST/LUNG: CTA b/l ,  no  rales, rhonchi, wheezing, or rubs  HEART: Regular rate and rhythm; No murmurs, rubs, or gallops  ABDOMEN: Soft, Nontender, Nondistended; Bowel sounds present  EXTREMITIES:  2+ Peripheral Pulses, No clubbing, cyanosis, b/l edema 1 +   LYMPH: No lymphadenopathy noted  SKIN: No rashes or lesions    EKG ( 12/1/17 ) - sinus tachy at 104
Called By Shiv QIU - states that pt completed 1 unit blood transfusion, BP=88/49 and pt is non-symptomatic at this time.    D/w Dr. Zambrano- recommends giving 1000cc NaCl bolus, recheck h/h and reassess pt.
Hip pain controlled, hypotension better, vitals reviewed + tachycardia  , denies feeling dizzy, no shortness of breath or chest pain  tolerated PT yesterday afternoon limited     Allergies    No Known Drug Allergies  shellfish (Swelling; Rash)    Intolerances        REVIEW OF SYSTEMS:    as above otherwise negative     Vital Signs Last 24 Hrs  T(C): 36.7 (03 Dec 2017 09:21), Max: 37.2 (02 Dec 2017 19:48)  T(F): 98.1 (03 Dec 2017 09:21), Max: 99 (02 Dec 2017 19:48)  HR: 106 (03 Dec 2017 09:21) (93 - 106)  BP: 127/71 (03 Dec 2017 09:21) (102/63 - 128/76)  BP(mean): --  RR: 18 (03 Dec 2017 09:21) (18 - 18)  SpO2: 94% (03 Dec 2017 09:21) (94% - 98%)  PHYSICAL EXAM:    GENERAL: NAD, well-groomed, well-developed  CHEST/LUNG: Clear to percussion bilaterally; No rales, rhonchi, wheezing, or rubs  HEART: Regular rate and rhythm; No murmurs, rubs, or gallops  ABDOMEN: Soft, Nontender, Nondistended; Bowel sounds present  EXTREMITIES:  2+ Peripheral Pulses, No clubbing, cyanosis, or edema  SKIN: No rashes or lesions, color pale       LABS:                        9.8    9.8   )-----------( 180      ( 02 Dec 2017 06:49 )             29.9     12-02    139  |  105  |  17.0  ----------------------------<  111  4.8   |  23.0  |  0.65    Ca    8.1<L>      02 Dec 2017 06:49    no labs today         RADIOLOGY & ADDITIONAL TESTS:
JOSEY SAMAYOA    [id number]    History: 62y Female is status post R total hip arthroplasty POD # 1. Patient is doing well and is comfortable. The patient's pain is controlled using the prescribed pain medications. The patient is participating in physical therapy. Denies nausea, vomiting, chest pain, shortness of breath, abdominal pain or fever. No new complaints. The patient is hypotensive this morning but the patient is asymptomatic.                            8.6    8.5   )-----------( 202      ( 01 Dec 2017 06:31 )             25.7     12-01    136  |  100  |  18.0  ----------------------------<  127<H>  4.4   |  25.0  |  0.68    Ca    8.4<L>      01 Dec 2017 06:31        MEDICATIONS  (STANDING):  acetaminophen   Tablet 975 milliGRAM(s) Oral every 8 hours  atorvastatin 20 milliGRAM(s) Oral at bedtime  celecoxib 200 milliGRAM(s) Oral two times a day with meals  docusate sodium 100 milliGRAM(s) Oral three times a day  enoxaparin Injectable 40 milliGRAM(s) SubCutaneous daily  oxyCODONE  ER Tablet 20 milliGRAM(s) Oral every 12 hours  polyethylene glycol 3350 17 Gram(s) Oral daily  sodium chloride 0.9% Bolus 1000 milliLiter(s) IV Bolus once  sodium chloride 0.9%. 1000 milliLiter(s) (125 mL/Hr) IV Continuous <Continuous>  tamoxifen 20 milliGRAM(s) Oral daily  valsartan 160 milliGRAM(s) Oral daily    MEDICATIONS  (PRN):  acetaminophen   Tablet 650 milliGRAM(s) Oral every 6 hours PRN For Temp over 38.3 C (100.94 F)  aluminum hydroxide/magnesium hydroxide/simethicone Suspension 30 milliLiter(s) Oral four times a day PRN Indigestion  HYDROmorphone   Tablet 2 milliGRAM(s) Oral every 3 hours PRN Severe Pain (7 - 10)  HYDROmorphone  Injectable 0.5 milliGRAM(s) IV Push every 4 hours PRN breakthrough  magnesium hydroxide Suspension 30 milliLiter(s) Oral daily PRN Constipation  ondansetron Injectable 4 milliGRAM(s) IV Push every 6 hours PRN Nausea and/or Vomiting  oxyCODONE    IR 5 milliGRAM(s) Oral every 3 hours PRN Mild Pain  oxyCODONE    IR 10 milliGRAM(s) Oral every 3 hours PRN Moderate Pain  senna 2 Tablet(s) Oral at bedtime PRN Constipation      Physical exam: The dressing is clean, dry and intact. No drainage or discharge. No erythema is noted. No blistering. No ecchymosis. The calf is supple nontender. Passive range of motion is acceptable to due postoperative pain. Sensation to light touch is grossly intact distally. Extensor hallucis longus and flexor hallucis longus are intact. No foot drop. 2+ dorsalis pedis pulse. Capillary refill is less than 2 seconds. No cyanosis.    Primary Orthopedic Assessment:  •  62y Female is status post R total hip arthroplasty POD # 1    Secondary  Orthopedic Assessment(s):   •     Secondary  Medical Assessment(s):   •     Plan:   • DVT prophylaxis as prescribed, including use of compression devices and ankle pumps  • Continue physical therapy  • Weightbearing as tolerated of the right lower extremity with assistance of a walker  • Incentive spirometry encouraged  • Pain control as clinically indicated  • Hypotension: bolus as per Medicine  • Discharge planning for home
JOSEY SAMAYOA    [id number]    Patient seen and examined status post right posterior total hip arthroplasty POD# 2. Patient is doing well. The patient's pain is controlled using the prescribed pain medications. Has not been ambulating with physical therapy due to hypotension. Denies nausea, vomiting, chest pain, shortness of breath, abdominal pain, LE N/T, Dizziness. No new complaints.    ICU Vital Signs Last 24 Hrs  T(C): 37.1 (02 Dec 2017 07:43), Max: 37.3 (01 Dec 2017 15:40)  T(F): 98.8 (02 Dec 2017 07:43), Max: 99.2 (01 Dec 2017 15:40)  HR: 96 (02 Dec 2017 07:43) (85 - 100)  BP: 110/61 (02 Dec 2017 07:43) (80/48 - 110/61)  BP(mean): --  ABP: --  ABP(mean): --  RR: 18 (02 Dec 2017 07:43) (18 - 18)  SpO2: 96% (02 Dec 2017 07:43) (96% - 98%)                                9.8    9.8   )-----------( 180      ( 02 Dec 2017 06:49 )             29.9     12-02    139  |  105  |  17.0  ----------------------------<  111  4.8   |  23.0  |  0.65    Ca    8.1<L>      02 Dec 2017 06:49        MEDICATIONS  (STANDING):  acetaminophen   Tablet 975 milliGRAM(s) Oral every 8 hours  atorvastatin 20 milliGRAM(s) Oral at bedtime  celecoxib 200 milliGRAM(s) Oral two times a day with meals  docusate sodium 100 milliGRAM(s) Oral three times a day  enoxaparin Injectable 40 milliGRAM(s) SubCutaneous daily  hydrochlorothiazide 12.5 milliGRAM(s) Oral daily  oxyCODONE  ER Tablet 20 milliGRAM(s) Oral every 12 hours  polyethylene glycol 3350 17 Gram(s) Oral daily  sodium chloride 0.9%. 1000 milliLiter(s) (125 mL/Hr) IV Continuous <Continuous>  tamoxifen 20 milliGRAM(s) Oral daily  valsartan 160 milliGRAM(s) Oral daily    MEDICATIONS  (PRN):  acetaminophen   Tablet 650 milliGRAM(s) Oral every 6 hours PRN For Temp over 38.3 C (100.94 F)  aluminum hydroxide/magnesium hydroxide/simethicone Suspension 30 milliLiter(s) Oral four times a day PRN Indigestion  bisacodyl Suppository 10 milliGRAM(s) Rectal daily PRN If no bowel movement by POD#2  HYDROmorphone   Tablet 2 milliGRAM(s) Oral every 3 hours PRN Severe Pain (7 - 10)  HYDROmorphone  Injectable 0.5 milliGRAM(s) IV Push every 4 hours PRN breakthrough  magnesium hydroxide Suspension 30 milliLiter(s) Oral daily PRN Constipation  ondansetron Injectable 4 milliGRAM(s) IV Push every 6 hours PRN Nausea and/or Vomiting  oxyCODONE    IR 5 milliGRAM(s) Oral every 3 hours PRN Mild Pain  oxyCODONE    IR 10 milliGRAM(s) Oral every 3 hours PRN Moderate Pain  senna 2 Tablet(s) Oral at bedtime PRN Constipation      Physical exam: The right hip dressing changed, incision is clean, dry and intact. No drainage or discharge. No erythema is noted. No blistering. No ecchymosis. The calf is supple nontender. Passive range of motion is acceptable to due postoperative pain. No calf tenderness. Sensation to light touch is grossly intact distally. Motor function distally is 5/5. No foot drop. 2+ dorsalis pedis pulse. Capillary refill is less than 2 seconds. No cyanosis.    Primary Orthopedic Assessment:  • s/p RIGHT POSTERIOR total hip replacement. post-op hypotension improved    Secondary  Orthopedic Assessment(s):   •     Secondary  Medical Assessment(s):   •     Plan:   • DVT prophylaxis: lovenox 40 qd,  use of compression devices and ankle pumps  • Continue physical therapy, encourage OOB, ambulation  • Weightbearing as tolerated of the right lower extremity with assistance of a walker  • Incentive spirometry encouraged  • Pain control as clinically indicated  • Posterior hip precautions reviewed with patient  • Discharge planning – anticipated discharge is Home when clears PT
PA - Note    Ortho Post Op Check    Name: JOSEY SAMAYOA    MR #: [id number]    Procedure: Right Total Hip Arthroplasty  Surgeon: Jerman Wilson    Pt comfortable without complaints, pain controlled, found asleep with family member at bedside, Able to wake without difficulty  Denies CP, SOB, N/V, numbness/tingling     General Exam:  Vital Signs Last 24 Hrs  T(C): 35.9 (11-30-17 @ 18:31), Max: 36.2 (11-30-17 @ 16:25)  T(F): 96.6 (11-30-17 @ 18:31), Max: 97.2 (11-30-17 @ 16:25)  HR: 87 (11-30-17 @ 18:31) (79 - 88)  BP: 128/69 (11-30-17 @ 18:31) (94/57 - 128/69)  BP(mean): --  RR: 18 (11-30-17 @ 18:31) (12 - 18)  SpO2: 98% (11-30-17 @ 18:31) (98% - 100%)    General: Pt Alert and oriented, NAD, controlled pain.  Dressings C/D/I. No bleeding.  Pulses: 2+ dorsalis pedis pulse. Cap refill < 2 sec.  Sensation: Grossly intact to light touch without deficit.  Motor: + ROM of toes, + Dorsal/Plantar Flexion    Post-op X-Ray:  EXAM:  XR HIP WITH PELV 1V RT                          PROCEDURE DATE:  11/30/2017          INTERPRETATION:  TECHNIQUE: Two views right hip and pelvis    COMPARISON: None    CLINICAL HISTORY: Or case    FINDINGS:    AP and frogleg views of the right hip shows no evidence for acute   fracture, subluxation or dislocation. No appreciable soft tissue   abnormality. The patient is status post total right hip arthroplasty.   Mild postoperative soft tissue air and edema. Hardware is in satisfactory   position. Moderate to severe degenerative changes of the left hip.   Osseous structures are osteopenic.    IMPRESSION: Total right hip arthroplasty.                AVNNESSA EMERSON M.D., ATTENDING RADIOLOGIST  This document has been electronically signed. Nov 30 2017  5:15PM      A/P: 62yFemale POD#0 s/p Right Total Hip Arthroplasty  - Stable  - Pain Control  - DVT ppx: Lovenox 40mg, SCDs  - Post op abx: Ancef, Vancomycin  - PT eval pending  - Weight bearing status: WBAT  - D/C Planning (Home)
Patient is a 62y old  Female who presents with a chief complaint of hip pain due to DJD s/p hip arthroplasty  seen examined , chart reviewed   Hip pain controlled, had  symptomatic hypotension post op, lying in the bed feels well no dizziness at rest   She denies chest pain , palpitation or headache       Allergies    No Known Drug Allergies  shellfish (Swelling; Rash)    Intolerances        REVIEW OF SYSTEMS:    as above otherwise negative     Vital Signs Last 24 Hrs  T(C): 37.1 (02 Dec 2017 07:43), Max: 37.3 (01 Dec 2017 15:40)  T(F): 98.8 (02 Dec 2017 07:43), Max: 99.2 (01 Dec 2017 15:40)  HR: 96 (02 Dec 2017 07:43) (85 - 100)  BP: 110/61 (02 Dec 2017 07:43) (80/48 - 110/61)  BP(mean): --  RR: 18 (02 Dec 2017 07:43) (18 - 18)  SpO2: 96% (02 Dec 2017 07:43) (96% - 98%)    PHYSICAL EXAM:    GENERAL: NAD, well-groomed, well-developed  CHEST/LUNG: Clear to percussion bilaterally; No rales, rhonchi, wheezing, or rubs  HEART: Regular rate and rhythm; No murmurs, rubs, or gallops  ABDOMEN: Soft, Nontender, Nondistended; Bowel sounds present  EXTREMITIES:  2+ Peripheral Pulses, No clubbing, cyanosis, or edema  SKIN: No rashes or lesions      LABS:                        9.8    9.8   )-----------( 180      ( 02 Dec 2017 06:49 )             29.9     12-02    139  |  105  |  17.0  ----------------------------<  111  4.8   |  23.0  |  0.65    Ca    8.1<L>      02 Dec 2017 06:49            RADIOLOGY & ADDITIONAL TESTS:
Patient seen and examined at bedside. States she is doing well, pain controlled. Scheduled for discharge home today. Denies fever/chills, SOB/chest pain, abdominal pain, numbness/tingling. no complaints.    Vital Signs Last 24 Hrs  T(C): 37.1 (04 Dec 2017 08:32), Max: 37.3 (03 Dec 2017 19:44)  T(F): 98.8 (04 Dec 2017 08:32), Max: 99.2 (03 Dec 2017 19:44)  HR: 101 (04 Dec 2017 08:32) (80 - 104)  BP: 117/64 (04 Dec 2017 08:32) (117/64 - 152/77)  BP(mean): --  RR: 18 (04 Dec 2017 08:32) (18 - 18)  SpO2: 97% (04 Dec 2017 08:32) (97% - 100%)    RLE: Dressing C/D/I. Dressing removed. Prineo off. Incision C/D/I- healing well, small wound healing well to anterior proximal aspect of wound.  No active drainage, erythema, or wound dehiscence. Dermabond/ op site dressing applied. Sensation intact to light touch. + dorsi/ plantar flexion. DP 2+. Calf soft NT B/L.    A/P: 62 y.o F s/p right DEMARIO POD #4  - pain control as clinically indicated  - WBAT, posterior hip precautions  - DVTP as prescribed with use of compression devices  - DC planning - home today once cleared by PT/ Medicine
Patient seen and examined at bedside. comfortable in bed, pain controlled. denies fever/chills, SOB/chest pain, abdominal pain, numbness/tingling. no complaints.    Vital Signs Last 24 Hrs  T(C): 36.8 (03 Dec 2017 05:05), Max: 37.2 (02 Dec 2017 19:48)  T(F): 98.3 (03 Dec 2017 05:05), Max: 99 (02 Dec 2017 19:48)  HR: 102 (03 Dec 2017 05:05) (93 - 105)  BP: 128/76 (03 Dec 2017 05:05) (102/56 - 128/76)  BP(mean): --  RR: 18 (03 Dec 2017 05:05) (18 - 18)  SpO2: 98% (03 Dec 2017 05:05) (96% - 98%)    RLE: Dressing C/D/I. Sensation in tact to light touch. + dorsi/plantarflexion. DP 2+. Calf soft NT B/L.                          9.8    9.8   )-----------( 180      ( 02 Dec 2017 06:49 )             29.9   12-02    139  |  105  |  17.0  ----------------------------<  111  4.8   |  23.0  |  0.65    Ca    8.1<L>      02 Dec 2017 06:49    A/P: 62 y.o F s/p right DEMARIO POD #3  - WBAT, posterior hip precautions  - DVTP  - d/c planning - home today if cleared by PT/medicine
post op xray reviewed. patient may WBAT with posterior hip precautions

## 2017-12-05 ENCOUNTER — INPATIENT (INPATIENT)
Facility: HOSPITAL | Age: 62
LOS: 7 days | Discharge: HOME | DRG: 189 | End: 2017-12-13
Attending: INTERNAL MEDICINE | Admitting: INTERNAL MEDICINE
Payer: MEDICAID

## 2017-12-05 VITALS
WEIGHT: 195.11 LBS | DIASTOLIC BLOOD PRESSURE: 75 MMHG | HEART RATE: 93 BPM | OXYGEN SATURATION: 99 % | RESPIRATION RATE: 18 BRPM | HEIGHT: 67 IN | SYSTOLIC BLOOD PRESSURE: 142 MMHG | TEMPERATURE: 99 F

## 2017-12-05 DIAGNOSIS — Z85.3 PERSONAL HISTORY OF MALIGNANT NEOPLASM OF BREAST: ICD-10-CM

## 2017-12-05 DIAGNOSIS — I21.4 NON-ST ELEVATION (NSTEMI) MYOCARDIAL INFARCTION: ICD-10-CM

## 2017-12-05 DIAGNOSIS — Z96.641 PRESENCE OF RIGHT ARTIFICIAL HIP JOINT: Chronic | ICD-10-CM

## 2017-12-05 DIAGNOSIS — I10 ESSENTIAL (PRIMARY) HYPERTENSION: ICD-10-CM

## 2017-12-05 DIAGNOSIS — Z90.12 ACQUIRED ABSENCE OF LEFT BREAST AND NIPPLE: Chronic | ICD-10-CM

## 2017-12-05 DIAGNOSIS — R06.09 OTHER FORMS OF DYSPNEA: ICD-10-CM

## 2017-12-05 DIAGNOSIS — Z98.890 OTHER SPECIFIED POSTPROCEDURAL STATES: Chronic | ICD-10-CM

## 2017-12-05 DIAGNOSIS — D17.9 BENIGN LIPOMATOUS NEOPLASM, UNSPECIFIED: Chronic | ICD-10-CM

## 2017-12-05 LAB
ALBUMIN SERPL ELPH-MCNC: 2.6 G/DL — LOW (ref 3.3–5.2)
ALP SERPL-CCNC: 107 U/L — SIGNIFICANT CHANGE UP (ref 40–120)
ALT FLD-CCNC: 38 U/L — HIGH
ANION GAP SERPL CALC-SCNC: 12 MMOL/L — SIGNIFICANT CHANGE UP (ref 5–17)
APTT BLD: 22.6 SEC — LOW (ref 27.5–37.4)
AST SERPL-CCNC: 48 U/L — HIGH
BASOPHILS # BLD AUTO: 0 K/UL — SIGNIFICANT CHANGE UP (ref 0–0.2)
BASOPHILS NFR BLD AUTO: 0.1 % — SIGNIFICANT CHANGE UP (ref 0–2)
BILIRUB SERPL-MCNC: 0.4 MG/DL — SIGNIFICANT CHANGE UP (ref 0.4–2)
BUN SERPL-MCNC: 10 MG/DL — SIGNIFICANT CHANGE UP (ref 8–20)
CALCIUM SERPL-MCNC: 8.5 MG/DL — LOW (ref 8.6–10.2)
CHLORIDE SERPL-SCNC: 105 MMOL/L — SIGNIFICANT CHANGE UP (ref 98–107)
CO2 SERPL-SCNC: 24 MMOL/L — SIGNIFICANT CHANGE UP (ref 22–29)
CREAT SERPL-MCNC: 0.59 MG/DL — SIGNIFICANT CHANGE UP (ref 0.5–1.3)
EOSINOPHIL # BLD AUTO: 0.3 K/UL — SIGNIFICANT CHANGE UP (ref 0–0.5)
EOSINOPHIL NFR BLD AUTO: 3.7 % — SIGNIFICANT CHANGE UP (ref 0–6)
GLUCOSE SERPL-MCNC: 113 MG/DL — SIGNIFICANT CHANGE UP (ref 70–115)
HCT VFR BLD CALC: 32.2 % — LOW (ref 37–47)
HGB BLD-MCNC: 10.4 G/DL — LOW (ref 12–16)
INR BLD: 1.08 RATIO — SIGNIFICANT CHANGE UP (ref 0.88–1.16)
LYMPHOCYTES # BLD AUTO: 1.9 K/UL — SIGNIFICANT CHANGE UP (ref 1–4.8)
LYMPHOCYTES # BLD AUTO: 21.9 % — SIGNIFICANT CHANGE UP (ref 20–55)
MCHC RBC-ENTMCNC: 30.7 PG — SIGNIFICANT CHANGE UP (ref 27–31)
MCHC RBC-ENTMCNC: 32.3 G/DL — SIGNIFICANT CHANGE UP (ref 32–36)
MCV RBC AUTO: 95 FL — SIGNIFICANT CHANGE UP (ref 81–99)
MONOCYTES # BLD AUTO: 1 K/UL — HIGH (ref 0–0.8)
MONOCYTES NFR BLD AUTO: 11.5 % — HIGH (ref 3–10)
NEUTROPHILS # BLD AUTO: 5.4 K/UL — SIGNIFICANT CHANGE UP (ref 1.8–8)
NEUTROPHILS NFR BLD AUTO: 62.3 % — SIGNIFICANT CHANGE UP (ref 37–73)
NT-PROBNP SERPL-SCNC: 12 PG/ML — SIGNIFICANT CHANGE UP (ref 0–300)
PLATELET # BLD AUTO: 254 K/UL — SIGNIFICANT CHANGE UP (ref 150–400)
POTASSIUM SERPL-MCNC: 4.4 MMOL/L — SIGNIFICANT CHANGE UP (ref 3.5–5.3)
POTASSIUM SERPL-SCNC: 4.4 MMOL/L — SIGNIFICANT CHANGE UP (ref 3.5–5.3)
PROT SERPL-MCNC: 6.3 G/DL — LOW (ref 6.6–8.7)
PROTHROM AB SERPL-ACNC: 11.9 SEC — SIGNIFICANT CHANGE UP (ref 9.8–12.7)
RBC # BLD: 3.39 M/UL — LOW (ref 4.4–5.2)
RBC # FLD: 15.2 % — SIGNIFICANT CHANGE UP (ref 11–15.6)
SODIUM SERPL-SCNC: 141 MMOL/L — SIGNIFICANT CHANGE UP (ref 135–145)
TROPONIN T SERPL-MCNC: 0.23 NG/ML — HIGH (ref 0–0.06)
WBC # BLD: 8.6 K/UL — SIGNIFICANT CHANGE UP (ref 4.8–10.8)
WBC # FLD AUTO: 8.6 K/UL — SIGNIFICANT CHANGE UP (ref 4.8–10.8)

## 2017-12-05 PROCEDURE — 71275 CT ANGIOGRAPHY CHEST: CPT | Mod: 26

## 2017-12-05 PROCEDURE — 99223 1ST HOSP IP/OBS HIGH 75: CPT

## 2017-12-05 PROCEDURE — 93970 EXTREMITY STUDY: CPT | Mod: 26

## 2017-12-05 PROCEDURE — 71010: CPT | Mod: 26

## 2017-12-05 PROCEDURE — 99285 EMERGENCY DEPT VISIT HI MDM: CPT

## 2017-12-05 PROCEDURE — 93010 ELECTROCARDIOGRAM REPORT: CPT

## 2017-12-05 PROCEDURE — 93306 TTE W/DOPPLER COMPLETE: CPT | Mod: 26

## 2017-12-05 RX ORDER — ASPIRIN/CALCIUM CARB/MAGNESIUM 324 MG
325 TABLET ORAL DAILY
Qty: 0 | Refills: 0 | Status: DISCONTINUED | OUTPATIENT
Start: 2017-12-06 | End: 2017-12-07

## 2017-12-05 RX ORDER — ENOXAPARIN SODIUM 100 MG/ML
90 INJECTION SUBCUTANEOUS ONCE
Qty: 0 | Refills: 0 | Status: COMPLETED | OUTPATIENT
Start: 2017-12-05 | End: 2017-12-05

## 2017-12-05 RX ORDER — FUROSEMIDE 40 MG
20 TABLET ORAL DAILY
Qty: 0 | Refills: 0 | Status: DISCONTINUED | OUTPATIENT
Start: 2017-12-05 | End: 2017-12-13

## 2017-12-05 RX ORDER — NITROGLYCERIN 6.5 MG
0.4 CAPSULE, EXTENDED RELEASE ORAL
Qty: 0 | Refills: 0 | Status: DISCONTINUED | OUTPATIENT
Start: 2017-12-05 | End: 2017-12-13

## 2017-12-05 RX ORDER — METOPROLOL TARTRATE 50 MG
12.5 TABLET ORAL
Qty: 0 | Refills: 0 | Status: DISCONTINUED | OUTPATIENT
Start: 2017-12-05 | End: 2017-12-07

## 2017-12-05 RX ORDER — ATORVASTATIN CALCIUM 80 MG/1
20 TABLET, FILM COATED ORAL AT BEDTIME
Qty: 0 | Refills: 0 | Status: DISCONTINUED | OUTPATIENT
Start: 2017-12-05 | End: 2017-12-13

## 2017-12-05 RX ORDER — IPRATROPIUM/ALBUTEROL SULFATE 18-103MCG
3 AEROSOL WITH ADAPTER (GRAM) INHALATION EVERY 6 HOURS
Qty: 0 | Refills: 0 | Status: DISCONTINUED | OUTPATIENT
Start: 2017-12-05 | End: 2017-12-13

## 2017-12-05 RX ORDER — TAMOXIFEN CITRATE 20 MG/1
20 TABLET, FILM COATED ORAL DAILY
Qty: 0 | Refills: 0 | Status: DISCONTINUED | OUTPATIENT
Start: 2017-12-05 | End: 2017-12-13

## 2017-12-05 RX ORDER — ALBUTEROL 90 UG/1
1 AEROSOL, METERED ORAL EVERY 4 HOURS
Qty: 0 | Refills: 0 | Status: DISCONTINUED | OUTPATIENT
Start: 2017-12-05 | End: 2017-12-13

## 2017-12-05 RX ORDER — NITROGLYCERIN 6.5 MG
1 CAPSULE, EXTENDED RELEASE ORAL ONCE
Qty: 0 | Refills: 0 | Status: COMPLETED | OUTPATIENT
Start: 2017-12-05 | End: 2017-12-05

## 2017-12-05 RX ORDER — TIOTROPIUM BROMIDE 18 UG/1
1 CAPSULE ORAL; RESPIRATORY (INHALATION) DAILY
Qty: 0 | Refills: 0 | Status: DISCONTINUED | OUTPATIENT
Start: 2017-12-05 | End: 2017-12-13

## 2017-12-05 RX ORDER — ASPIRIN/CALCIUM CARB/MAGNESIUM 324 MG
325 TABLET ORAL ONCE
Qty: 0 | Refills: 0 | Status: COMPLETED | OUTPATIENT
Start: 2017-12-05 | End: 2017-12-05

## 2017-12-05 RX ADMIN — Medication 1 INCH(S): at 20:34

## 2017-12-05 RX ADMIN — ATORVASTATIN CALCIUM 20 MILLIGRAM(S): 80 TABLET, FILM COATED ORAL at 22:21

## 2017-12-05 RX ADMIN — ENOXAPARIN SODIUM 90 MILLIGRAM(S): 100 INJECTION SUBCUTANEOUS at 22:21

## 2017-12-05 RX ADMIN — Medication 325 MILLIGRAM(S): at 20:34

## 2017-12-05 NOTE — ED PROVIDER NOTE - CHPI ED SYMPTOMS NEG
no wheezing/no body aches/no headache/no cough/no hemoptysis/no edema/no chills/no fever/no chest pain/no diaphoresis

## 2017-12-05 NOTE — H&P ADULT - NSHPLABSRESULTS_GEN_ALL_CORE
ekg is sinus tach 101, low voltage qrs noted, no st elevation noted.   cxr reviewed and also read by radiologist, no acute findings are reported.

## 2017-12-05 NOTE — ED ADULT NURSE NOTE - PSH
Atypical lipoma of soft tissue  in left groin 2008  H/O foot surgery  ankle left 2004  History of hip replacement, total, right  12/2017  History of lumpectomy of left breast  2017

## 2017-12-05 NOTE — ED ADULT TRIAGE NOTE - CHIEF COMPLAINT QUOTE
BIBA, recent right hip replacement 5 days ago here at [facility]. patient reports d/c yesterday and feels SOB when ambulating, NOT at rest. denies chest pain.

## 2017-12-05 NOTE — ED ADULT NURSE NOTE - OBJECTIVE STATEMENT
pt states that she had a right hip replacement 5 days ago. pt was discharged last night woke up this am very sob. pt has right leg swelling present dsd intact to surgical area. good pulses felt.

## 2017-12-05 NOTE — H&P ADULT - PROBLEM SELECTOR PLAN 2
pt's dyspnea might be related to demand ischemia, had low bp and Hb. dropped during recent hospital stay. pt. maintaining o2. will keep on prn neb. tx. echo to follow. close f/u of Hb.

## 2017-12-05 NOTE — H&P ADULT - SKIN
detailed exam warm and dry/rt. hip post -op area is intact. no soaking or oozing of any discharge or blood noted in the involved area.  mild pain in the involved area.

## 2017-12-05 NOTE — ED PROVIDER NOTE - MEDICAL DECISION MAKING DETAILS
The patient presents with SOB after hip surgery, CT angio done no central PE, but PE cannot be excluded. Troponin. Will admit for further evaluation

## 2017-12-05 NOTE — H&P ADULT - HISTORY OF PRESENT ILLNESS
63 y/o female who had RT. DEMARIO 5 days ago and was discharged home yesterday came to ER as she was getting sob with minimal exertion. Rest improved sob. Pt. reports no cp or chest pressure. pt. has mild rt. hip area pain at surgical site. no cough. no fever. no abd. pain. no n/v. no dizziness reported.  pt. reports that she had noted water in her legs during the hospital stay. Pos -opeartive pt's bp and Hb dropped which got better with iv fluids and got one unit of prbc. At the time of admission pt. has no sig. complaints and her o2 sat is 98 to 99 % on RA.   In ER today pt's doppler of lower ext is nrg. for dvt. pts ct angio chest is limited study but no PE is reported in main pulmonary artery. pt. seen by cardiologist dr. Moise who has recommended trend trop ( first one mildly elevated ), full dose of lovenox for now,  echo and V/Q scan as ct angio chest is limited study. 63 y/o female who had RT. DEMARIO 5 days ago and was discharged home yesterday came to ER as she was getting sob with minimal exertion. Rest improves sob. Pt. reports no cp or chest pressure. pt. has mild rt. hip area pain at surgical site. no cough. no fever. no abd. pain. no n/v. no dizziness reported.  pt. reports that she had noted water in her legs during the hospital stay. Pos -opeartive pt's bp and Hb dropped which got better with iv fluids and got one unit of prbc. At the time of admission pt. has no sig. complaints and her o2 sat is 98 to 99 % on RA. pt. was sent home on lovenox 40 mg SQ daily and aspirin 81 mg daily.   In ER today pt's doppler of lower ext is nrg. for dvt. pts ct angio chest is limited study but no PE is reported in main pulmonary artery. pt. seen by cardiologist dr. Moise who has recommended trend trop ( first one mildly elevated ), full dose of lovenox for now,  echo and V/Q scan as ct angio chest is limited study.

## 2017-12-05 NOTE — ED ADULT NURSE NOTE - CHIEF COMPLAINT QUOTE
BIBA, recent right hip replacement 5 days ago here at [facility]. patient reports d/c yesterday and feels SOB when ambulating, NOT at rest. denies chest pain.

## 2017-12-05 NOTE — ED PROVIDER NOTE - OBJECTIVE STATEMENT
The patient is a 62 year old female presents with SOB.  The patient recently had hip surgery 5 days ago by Dr Wilson here by our orthopedic surgeon.  No fever, no abd pain

## 2017-12-05 NOTE — PROGRESS NOTE ADULT - SUBJECTIVE AND OBJECTIVE BOX
full consult dictated.  pt s/p right hip surgery  d/c home yesterday with leg edema and sob.  Pt came to ED with SOB  CT chest not conclusive  needs v/q scan. EKG with diffuse T wave inversion, new.   mild elevation of troponin, CPK added.  Recycle enzymes  Echo stat  Lovenox x1 for now.

## 2017-12-06 LAB
ANION GAP SERPL CALC-SCNC: 14 MMOL/L — SIGNIFICANT CHANGE UP (ref 5–17)
BASOPHILS # BLD AUTO: 0 K/UL — SIGNIFICANT CHANGE UP (ref 0–0.2)
BASOPHILS NFR BLD AUTO: 0.3 % — SIGNIFICANT CHANGE UP (ref 0–2)
BUN SERPL-MCNC: 10 MG/DL — SIGNIFICANT CHANGE UP (ref 8–20)
CALCIUM SERPL-MCNC: 8.7 MG/DL — SIGNIFICANT CHANGE UP (ref 8.6–10.2)
CHLORIDE SERPL-SCNC: 105 MMOL/L — SIGNIFICANT CHANGE UP (ref 98–107)
CK MB CFR SERPL CALC: 2.4 NG/ML — SIGNIFICANT CHANGE UP (ref 0–6.7)
CK MB CFR SERPL CALC: 4.6 NG/ML — SIGNIFICANT CHANGE UP (ref 0–6.7)
CK SERPL-CCNC: 186 U/L — HIGH (ref 25–170)
CK SERPL-CCNC: 408 U/L — HIGH (ref 25–170)
CO2 SERPL-SCNC: 23 MMOL/L — SIGNIFICANT CHANGE UP (ref 22–29)
CREAT SERPL-MCNC: 0.54 MG/DL — SIGNIFICANT CHANGE UP (ref 0.5–1.3)
EOSINOPHIL # BLD AUTO: 0.2 K/UL — SIGNIFICANT CHANGE UP (ref 0–0.5)
EOSINOPHIL NFR BLD AUTO: 2.8 % — SIGNIFICANT CHANGE UP (ref 0–6)
GLUCOSE SERPL-MCNC: 98 MG/DL — SIGNIFICANT CHANGE UP (ref 70–115)
HCT VFR BLD CALC: 27.9 % — LOW (ref 37–47)
HGB BLD-MCNC: 9.2 G/DL — LOW (ref 12–16)
LYMPHOCYTES # BLD AUTO: 2 K/UL — SIGNIFICANT CHANGE UP (ref 1–4.8)
LYMPHOCYTES # BLD AUTO: 23.3 % — SIGNIFICANT CHANGE UP (ref 20–55)
MCHC RBC-ENTMCNC: 31 PG — SIGNIFICANT CHANGE UP (ref 27–31)
MCHC RBC-ENTMCNC: 33 G/DL — SIGNIFICANT CHANGE UP (ref 32–36)
MCV RBC AUTO: 93.9 FL — SIGNIFICANT CHANGE UP (ref 81–99)
MONOCYTES # BLD AUTO: 1 K/UL — HIGH (ref 0–0.8)
MONOCYTES NFR BLD AUTO: 11.7 % — HIGH (ref 3–10)
NEUTROPHILS # BLD AUTO: 5.3 K/UL — SIGNIFICANT CHANGE UP (ref 1.8–8)
NEUTROPHILS NFR BLD AUTO: 61.6 % — SIGNIFICANT CHANGE UP (ref 37–73)
PLATELET # BLD AUTO: 263 K/UL — SIGNIFICANT CHANGE UP (ref 150–400)
POTASSIUM SERPL-MCNC: 4.2 MMOL/L — SIGNIFICANT CHANGE UP (ref 3.5–5.3)
POTASSIUM SERPL-SCNC: 4.2 MMOL/L — SIGNIFICANT CHANGE UP (ref 3.5–5.3)
RBC # BLD: 2.97 M/UL — LOW (ref 4.4–5.2)
RBC # FLD: 14.9 % — SIGNIFICANT CHANGE UP (ref 11–15.6)
SODIUM SERPL-SCNC: 142 MMOL/L — SIGNIFICANT CHANGE UP (ref 135–145)
SURGICAL PATHOLOGY FINAL REPORT - CH: SIGNIFICANT CHANGE UP
TROPONIN T SERPL-MCNC: 0.15 NG/ML — HIGH (ref 0–0.06)
TROPONIN T SERPL-MCNC: 0.15 NG/ML — HIGH (ref 0–0.06)
WBC # BLD: 8.6 K/UL — SIGNIFICANT CHANGE UP (ref 4.8–10.8)
WBC # FLD AUTO: 8.6 K/UL — SIGNIFICANT CHANGE UP (ref 4.8–10.8)

## 2017-12-06 PROCEDURE — 78582 LUNG VENTILAT&PERFUS IMAGING: CPT | Mod: 26

## 2017-12-06 PROCEDURE — 99233 SBSQ HOSP IP/OBS HIGH 50: CPT

## 2017-12-06 PROCEDURE — 99232 SBSQ HOSP IP/OBS MODERATE 35: CPT

## 2017-12-06 RX ORDER — ACETAMINOPHEN 500 MG
650 TABLET ORAL ONCE
Qty: 0 | Refills: 0 | Status: COMPLETED | OUTPATIENT
Start: 2017-12-06 | End: 2017-12-06

## 2017-12-06 RX ORDER — ENOXAPARIN SODIUM 100 MG/ML
40 INJECTION SUBCUTANEOUS DAILY
Qty: 0 | Refills: 0 | Status: DISCONTINUED | OUTPATIENT
Start: 2017-12-06 | End: 2017-12-13

## 2017-12-06 RX ORDER — INFLUENZA VIRUS VACCINE 15; 15; 15; 15 UG/.5ML; UG/.5ML; UG/.5ML; UG/.5ML
0.5 SUSPENSION INTRAMUSCULAR ONCE
Qty: 0 | Refills: 0 | Status: COMPLETED | OUTPATIENT
Start: 2017-12-06 | End: 2017-12-11

## 2017-12-06 RX ORDER — ACETAMINOPHEN 500 MG
650 TABLET ORAL EVERY 6 HOURS
Qty: 0 | Refills: 0 | Status: DISCONTINUED | OUTPATIENT
Start: 2017-12-06 | End: 2017-12-13

## 2017-12-06 RX ADMIN — Medication 325 MILLIGRAM(S): at 13:08

## 2017-12-06 RX ADMIN — TAMOXIFEN CITRATE 20 MILLIGRAM(S): 20 TABLET, FILM COATED ORAL at 13:08

## 2017-12-06 RX ADMIN — Medication 650 MILLIGRAM(S): at 08:56

## 2017-12-06 RX ADMIN — Medication 650 MILLIGRAM(S): at 23:32

## 2017-12-06 RX ADMIN — ATORVASTATIN CALCIUM 20 MILLIGRAM(S): 80 TABLET, FILM COATED ORAL at 23:17

## 2017-12-06 RX ADMIN — ENOXAPARIN SODIUM 40 MILLIGRAM(S): 100 INJECTION SUBCUTANEOUS at 23:18

## 2017-12-06 RX ADMIN — Medication 12.5 MILLIGRAM(S): at 17:16

## 2017-12-06 RX ADMIN — Medication 20 MILLIGRAM(S): at 05:44

## 2017-12-06 RX ADMIN — Medication 1 INCH(S): at 08:53

## 2017-12-06 RX ADMIN — Medication 12.5 MILLIGRAM(S): at 05:44

## 2017-12-06 NOTE — PROGRESS NOTE ADULT - PROBLEM SELECTOR PLAN 2
continue furosemide 20 Mg oral daily.   continue metoprolol 12.5 oral two times daily.  low Na, low cholesterol diet. Exercise daily as tolerated.

## 2017-12-06 NOTE — PROGRESS NOTE ADULT - ASSESSMENT
This is a 62 year old female patient who was found lying in her bed eating breakfast in [facility], Patient states she no acute problems compared to  when she first was seen in [facility] ED. Pt denies headache, chest pain, shortness of breath, dypsnea, orthopnea, arm pain, neck pain or back pain. Patient is expecting to be discharged to home today.

## 2017-12-06 NOTE — PROGRESS NOTE ADULT - SUBJECTIVE AND OBJECTIVE BOX
CHIEF COMPLAINT:Patient is a 62y old  Female who presents with a chief complaint of SOB.  Feeling better, does need O2.   Pox 98%, no events on tele  No CP.   VQ scan with low probability for PE    Allergies  No Known Drug Allergies  shellfish (Swelling; Rash)  	  MEDICATIONS:  furosemide    Tablet 20 milliGRAM(s) Oral daily  metoprolol     tartrate 12.5 milliGRAM(s) Oral two times a day  nitroglycerin     SubLingual 0.4 milliGRAM(s) SubLingual every 5 minutes PRN  ALBUTerol    90 MICROgram(s) HFA Inhaler 1 Puff(s) Inhalation every 4 hours  ALBUTerol/ipratropium for Nebulization 3 milliLiter(s) Nebulizer every 6 hours PRN  tiotropium 18 MICROgram(s) Capsule 1 Capsule(s) Inhalation daily  atorvastatin 20 milliGRAM(s) Oral at bedtime  aspirin enteric coated 325 milliGRAM(s) Oral daily  tamoxifen 20 milliGRAM(s) Oral daily    PHYSICAL EXAM:  T(C): 36.7 (12-06-17 @ 08:15), Max: 36.8 (12-06-17 @ 02:23)  HR: 96 (12-06-17 @ 08:15) (95 - 106)  BP: 119/59 (12-06-17 @ 08:15) (112/55 - 138/60)  RR: 20 (12-06-17 @ 08:15) (2 - 20)  SpO2: 99% (12-06-17 @ 08:15) (98% - 100%)  Appearance: Normal	  HEENT:   NC/AT  Eye: Pink Conjunctiva  Lungs: CTA B/L  CVS: RRR, Normal S1 and S2, 1+ right > left leg edema   Pulses: Normal distal pulses.  Neuro: A&O x3    LABS:	 	                       9.2    8.6   )-----------( 263      ( 06 Dec 2017 07:59 )             27.9     12-06    142  |  105  |  10.0  ----------------------------<  98  4.2   |  23.0  |  0.54    Ca    8.7      06 Dec 2017 07:59    TPro  6.3<L>  /  Alb  2.6<L>  /  TBili  0.4  /  DBili  x   /  AST  48<H>  /  ALT  38<H>  /  AlkPhos  107  12-05    proBNP: Serum Pro-Brain Natriuretic Peptide: 12 pg/mL (12-05 @ 18:32)    Lipid Profile:   HgA1c:   TSH:     ASSESSMENT/PLAN:

## 2017-12-06 NOTE — ED ADULT NURSE REASSESSMENT NOTE - NS ED NURSE REASSESS COMMENT FT1
pt returned from ct scan awake and alert resp even and unlabored a rest CAMPUZANO present. awaiting ct scan results.
unable to do CTA secondary to non functioning iv. unable to find any access. Dr Alfonso
Pt resting calmly on monitor. Coffee given.
pt resting in bed, monitor on, no distress noted. pt denies pain/sob at this time, pt given box lunch. will continue to monitor.

## 2017-12-06 NOTE — PROGRESS NOTE ADULT - SUBJECTIVE AND OBJECTIVE BOX
JOSEY SAMAYOA    [id number]    History: Patient is status post right posterior total hip arthroplasty on 11/30/17. Patient was discharged home on 12/4/17. Admitted to Medicine for Dyspnea on exertion 12/5/17. Cardiology following- Stress test ordered for AM. The patient's pain is controlled using the prescribed pain medications. Patient states shortness of breath has improved. Denies nausea, vomiting, chest pain, abdominal pain or fever. No Orthopedic complaints.                          9.2    8.6   )-----------( 263      ( 06 Dec 2017 07:59 )             27.9     12-06    142  |  105  |  10.0  ----------------------------<  98  4.2   |  23.0  |  0.54    Ca    8.7      06 Dec 2017 07:59    TPro  6.3<L>  /  Alb  2.6<L>  /  TBili  0.4  /  DBili  x   /  AST  48<H>  /  ALT  38<H>  /  AlkPhos  107  12-05      MEDICATIONS  (STANDING):  ALBUTerol    90 MICROgram(s) HFA Inhaler 1 Puff(s) Inhalation every 4 hours  aspirin enteric coated 325 milliGRAM(s) Oral daily  atorvastatin 20 milliGRAM(s) Oral at bedtime  enoxaparin Injectable 40 milliGRAM(s) SubCutaneous daily  furosemide    Tablet 20 milliGRAM(s) Oral daily  metoprolol     tartrate 12.5 milliGRAM(s) Oral two times a day  tamoxifen 20 milliGRAM(s) Oral daily  tiotropium 18 MICROgram(s) Capsule 1 Capsule(s) Inhalation daily    MEDICATIONS  (PRN):  ALBUTerol/ipratropium for Nebulization 3 milliLiter(s) Nebulizer every 6 hours PRN Shortness of Breath and/or Wheezing  nitroglycerin     SubLingual 0.4 milliGRAM(s) SubLingual every 5 minutes PRN Chest Pain    Physical exam: The right hip dressing is clean, dry and intact. No drainage or discharge. No erythema is noted. No blistering. No ecchymosis. The calf is supple nontender. Passive range of motion is acceptable to due postoperative pain. Sensation to light touch is grossly intact distally. Motor function distally is 5/5. No foot drop. 2+ dorsalis pedis pulse. Capillary refill is less than 2 seconds. No cyanosis.    Primary Orthopedic Assessment:  • s/p RIGHT POSTERIOR total hip replacement 11/30/17    Secondary  Medical Assessment(s):   • SOB    Plan:   • DVT prophylaxis as prescribed- Lovenox 40 QD, including use of compression devices and ankle pumps  • Medicine/ Cardiology notes appreciated  • Weightbearing as tolerated of the right lower extremity with assistance of a walker  • Pain control as clinically indicated  • Posterior hip precautions   • Care per primary team

## 2017-12-06 NOTE — PROGRESS NOTE ADULT - ASSESSMENT
1. SOB improved.   2. Elevated troponin, CPK-MD is normal. possible rhabdo from surgery.   3. Plan stress test in am  4. Echo result d/w pt  5. spoke to orthopedic PA to give us guidance for AC post surgery  6. Use lovenox for now for DVT prophylaxis  7. NPO post midnight except for meds

## 2017-12-06 NOTE — PROGRESS NOTE ADULT - PROBLEM SELECTOR PLAN 3
Pt was seen and evaluated by cardiologist. EKG, chest X-ray, and all other lab tests reviewed for abnormalities. Troponin slightly elevated on first exam. repeat troponin and CK also done. results 186U/L

## 2017-12-06 NOTE — PROGRESS NOTE ADULT - SUBJECTIVE AND OBJECTIVE BOX
JOSEY SAMAYOA:  Patient is a 62y old  Female who presents with a chief complaint of SOB (05 Dec 2017 21:36)     HPI:  61 y/o female who had RT. DEMARIO 5 days ago and was discharged home yesterday came to ER as she was getting sob with minimal exertion. Rest improves sob. Pt. reports no cp or chest pressure. pt. has mild rt. hip area pain at surgical site. no cough. no fever. no abd. pain. no n/v. no dizziness reported.  pt. reports that she had noted water in her legs during the hospital stay. Pos -opeartive pt's bp and Hb dropped which got better with iv fluids and got one unit of prbc. At the time of admission pt. has no sig. complaints and her o2 sat is 98 to 99 % on RA. pt. was sent home on lovenox 40 mg SQ daily and aspirin 81 mg daily.   In ER yesterday pt's doppler of lower ext is negative for DVT.  Pts CT angio chest is limited study but no PE is reported in main pulmonary artery. pt. seen by cardiologist Dr. Moise who has recommended trend trop ( first one mildly elevated ), full dose of lovenox for now,  echo and V/Q scan as ct angio chest is limited study. (05 Dec 2017 21:36)    The patient was seen and evaluated   The patient is in no acute distress.  Denied any fever chest pain, palpitations, shortness of breath, abdominal pain, fever, dysuria, cough, edema             HEALTH ISSUES - PROBLEM Dx:  History of breast cancer: History of breast cancer  Essential hypertension: Essential hypertension  Dyspnea on exertion: Dyspnea on exertion  NSTEMI (non-ST elevated myocardial infarction): NSTEMI (non-ST elevated myocardial infarction)        PAST MEDICAL & SURGICAL HISTORY:  Breast cancer: left 2017, had surgery and radiation  High cholesterol  High blood pressure  History of hip replacement, total, right: 12/2017  History of lumpectomy of left breast: 2017  Atypical lipoma of soft tissue: in left groin 2008  H/O foot surgery: ankle left 2004          Vital Signs Last 24 Hrs  T(C): 36.7 (06 Dec 2017 08:15), Max: 36.8 (06 Dec 2017 02:23)  T(F): 98 (06 Dec 2017 08:15), Max: 98.2 (06 Dec 2017 02:23)  HR: 96 (06 Dec 2017 08:15) (91 - 106)  BP: 119/59 (06 Dec 2017 08:15) (112/55 - 138/60)  BP(mean): --  RR: 20 (06 Dec 2017 08:15) (2 - 20)  SpO2: 99% (06 Dec 2017 08:15) (98% - 100%)T(C): 36.7 (12-06-17 @ 08:15), Max: 36.8 (12-06-17 @ 02:23)  HR: 96 (12-06-17 @ 08:15) (91 - 106)  BP: 119/59 (12-06-17 @ 08:15) (112/55 - 138/60)  RR: 20 (12-06-17 @ 08:15) (2 - 20)  SpO2: 99% (12-06-17 @ 08:15) (98% - 100%)  Wt(kg): --    PHYSICAL EXAM:    GENERAL: NAD, well-groomed, well-developed. Pt is alert and oriented X4  HEAD:  Atraumatic, Normocephalic  EYES: EOMI, PERRLA, conjunctiva and sclera clear  ENMT:  Moist mucous membranes,  No lesions  NECK: Supple, No JVD, Normal thyroid  NERVOUS SYSTEM:  Alert & Oriented X3,  Moves upper and lower extremities; DTRs 2+ intact and symmetric  CHEST/LUNG: Clear to auscultation bilaterally; No rales, rhonchi, wheezing,   HEART: Regular rate and rhythm; No murmurs,   ABDOMEN: Soft, Nontender, Nondistended; Bowel sounds present  EXTREMITIES:  Peripheral Pulses, No  cyanosis, or edema  SKIN: No rashes or lesions        LABS:                          9.2    8.6   )-----------( 263      ( 06 Dec 2017 07:59 )             27.9     12-06    142  |  105  |  10.0  ----------------------------<  98  4.2   |  23.0  |  0.54    Ca    8.7      06 Dec 2017 07:59    TPro  6.3<L>  /  Alb  2.6<L>  /  TBili  0.4  /  DBili  x   /  AST  48<H>  /  ALT  38<H>  /  AlkPhos  107  12-05    LIVER FUNCTIONS - ( 05 Dec 2017 09:50 )  Alb: 2.6 g/dL / Pro: 6.3 g/dL / ALK PHOS: 107 U/L / ALT: 38 U/L / AST: 48 U/L / GGT: x           PT/INR - ( 05 Dec 2017 09:50 )   PT: 11.9 sec;   INR: 1.08 ratio         PTT - ( 05 Dec 2017 09:50 )  PTT:22.6 sec  CARDIAC MARKERS ( 06 Dec 2017 07:59 )  x     / 0.15 ng/mL / 186 U/L / x     / 2.4 ng/mL  CARDIAC MARKERS ( 05 Dec 2017 20:19 )  x     / x     / 219 U/L / x     / 2.6 ng/mL  CARDIAC MARKERS ( 05 Dec 2017 18:32 )  x     / 0.23 ng/mL / x     / x     / x JOSEY SAMAYOA:  Patient is a 62y old  Female who presents with a chief complaint of SOB (05 Dec 2017 21:36)     HPI:  63 y/o female who had RT. DEMARIO 5 days ago and was discharged home yesterday came to ER as she was getting sob with minimal exertion. Rest improves sob. Pt. reports no cp or chest pressure. pt. has mild rt. hip area pain at surgical site. no cough. no fever. no abd. pain. no n/v. no dizziness reported.  pt. reports that she had noted water in her legs during the hospital stay. Pos -opeartive pt's bp and Hb dropped which got better with iv fluids and got one unit of prbc. At the time of admission pt. has no sig. complaints and her o2 sat is 98 to 99 % on RA. pt. was sent home on lovenox 40 mg SQ daily and aspirin 81 mg daily.   In ER yesterday pt's doppler of lower ext is negative for DVT.  Pts CT angio chest is limited study but no PE is reported in main pulmonary artery. pt. seen by cardiologist Dr. Moise who has recommended trend trop ( first one mildly elevated ), full dose of lovenox for now,  echo and V/Q scan as ct angio chest is limited study. (05 Dec 2017 21:36)      The patient was seen and evaluated   The patient is in no acute distress.  Denied any fever chest pain, palpitations, shortness of breath, abdominal pain, fever, dysuria, cough, edema         HEALTH ISSUES - PROBLEM Dx:  History of breast cancer: History of breast cancer  Essential hypertension: Essential hypertension  Dyspnea on exertion: Dyspnea on exertion  NSTEMI (non-ST elevated myocardial infarction): NSTEMI (non-ST elevated myocardial infarction)        PAST MEDICAL & SURGICAL HISTORY:  Breast cancer: left 2017, had surgery and radiation  High cholesterol  High blood pressure  History of hip replacement, total, right: 12/2017  History of lumpectomy of left breast: 2017  Atypical lipoma of soft tissue: in left groin 2008  H/O foot surgery: ankle left 2004          Vital Signs Last 24 Hrs  T(C): 36.7 (06 Dec 2017 08:15), Max: 36.8 (06 Dec 2017 02:23)  T(F): 98 (06 Dec 2017 08:15), Max: 98.2 (06 Dec 2017 02:23)  HR: 96 (06 Dec 2017 08:15) (91 - 106)  BP: 119/59 (06 Dec 2017 08:15) (112/55 - 138/60)  BP(mean): --  RR: 20 (06 Dec 2017 08:15) (2 - 20)  SpO2: 99% (06 Dec 2017 08:15) (98% - 100%)T(C): 36.7 (12-06-17 @ 08:15), Max: 36.8 (12-06-17 @ 02:23)  HR: 96 (12-06-17 @ 08:15) (91 - 106)  BP: 119/59 (12-06-17 @ 08:15) (112/55 - 138/60)  RR: 20 (12-06-17 @ 08:15) (2 - 20)  SpO2: 99% (12-06-17 @ 08:15) (98% - 100%)  Wt(kg): --    PHYSICAL EXAM:    GENERAL: NAD, well-groomed, well-developed. Pt is alert and oriented X4  HEAD:  Atraumatic, Normocephalic  EYES: EOMI, PERRLA, conjunctiva and sclera clear  ENMT:  Moist mucous membranes,  No lesions  NECK: Supple, No JVD, Normal thyroid  NERVOUS SYSTEM:  Alert & Oriented X3,  Moves upper and lower extremities; DTRs 2+ intact and symmetric  CHEST/LUNG: Clear to auscultation bilaterally; No rales, rhonchi, wheezing,   HEART: Regular rate and rhythm; No murmurs,   ABDOMEN: Soft, Nontender, Nondistended; Bowel sounds present  EXTREMITIES:  Peripheral Pulses, No  cyanosis, or edema  SKIN: No rashes or lesions        LABS:                          9.2    8.6   )-----------( 263      ( 06 Dec 2017 07:59 )             27.9     12-06    142  |  105  |  10.0  ----------------------------<  98  4.2   |  23.0  |  0.54    Ca    8.7      06 Dec 2017 07:59    TPro  6.3<L>  /  Alb  2.6<L>  /  TBili  0.4  /  DBili  x   /  AST  48<H>  /  ALT  38<H>  /  AlkPhos  107  12-05    LIVER FUNCTIONS - ( 05 Dec 2017 09:50 )  Alb: 2.6 g/dL / Pro: 6.3 g/dL / ALK PHOS: 107 U/L / ALT: 38 U/L / AST: 48 U/L / GGT: x           PT/INR - ( 05 Dec 2017 09:50 )   PT: 11.9 sec;   INR: 1.08 ratio         PTT - ( 05 Dec 2017 09:50 )  PTT:22.6 sec  CARDIAC MARKERS ( 06 Dec 2017 07:59 )  x     / 0.15 ng/mL / 186 U/L / x     / 2.4 ng/mL  CARDIAC MARKERS ( 05 Dec 2017 20:19 )  x     / x     / 219 U/L / x     / 2.6 ng/mL  CARDIAC MARKERS ( 05 Dec 2017 18:32 )  x     / 0.23 ng/mL / x     / x     / x

## 2017-12-07 PROCEDURE — 99232 SBSQ HOSP IP/OBS MODERATE 35: CPT

## 2017-12-07 PROCEDURE — 99233 SBSQ HOSP IP/OBS HIGH 50: CPT

## 2017-12-07 PROCEDURE — 93308 TTE F-UP OR LMTD: CPT | Mod: 26

## 2017-12-07 RX ORDER — ASPIRIN/CALCIUM CARB/MAGNESIUM 324 MG
81 TABLET ORAL DAILY
Qty: 0 | Refills: 0 | Status: DISCONTINUED | OUTPATIENT
Start: 2017-12-07 | End: 2017-12-13

## 2017-12-07 RX ORDER — CLOPIDOGREL BISULFATE 75 MG/1
75 TABLET, FILM COATED ORAL DAILY
Qty: 0 | Refills: 0 | Status: DISCONTINUED | OUTPATIENT
Start: 2017-12-07 | End: 2017-12-08

## 2017-12-07 RX ORDER — METOPROLOL TARTRATE 50 MG
25 TABLET ORAL
Qty: 0 | Refills: 0 | Status: DISCONTINUED | OUTPATIENT
Start: 2017-12-07 | End: 2017-12-13

## 2017-12-07 RX ADMIN — Medication 650 MILLIGRAM(S): at 06:25

## 2017-12-07 RX ADMIN — ENOXAPARIN SODIUM 40 MILLIGRAM(S): 100 INJECTION SUBCUTANEOUS at 13:07

## 2017-12-07 RX ADMIN — Medication 650 MILLIGRAM(S): at 07:39

## 2017-12-07 RX ADMIN — Medication 650 MILLIGRAM(S): at 00:25

## 2017-12-07 RX ADMIN — TAMOXIFEN CITRATE 20 MILLIGRAM(S): 20 TABLET, FILM COATED ORAL at 13:07

## 2017-12-07 RX ADMIN — Medication 25 MILLIGRAM(S): at 18:19

## 2017-12-07 RX ADMIN — Medication 650 MILLIGRAM(S): at 22:20

## 2017-12-07 RX ADMIN — Medication 650 MILLIGRAM(S): at 23:11

## 2017-12-07 RX ADMIN — Medication 20 MILLIGRAM(S): at 06:23

## 2017-12-07 RX ADMIN — ATORVASTATIN CALCIUM 20 MILLIGRAM(S): 80 TABLET, FILM COATED ORAL at 22:20

## 2017-12-07 RX ADMIN — CLOPIDOGREL BISULFATE 75 MILLIGRAM(S): 75 TABLET, FILM COATED ORAL at 14:29

## 2017-12-07 RX ADMIN — Medication 81 MILLIGRAM(S): at 13:07

## 2017-12-07 RX ADMIN — Medication 12.5 MILLIGRAM(S): at 06:23

## 2017-12-07 NOTE — PROGRESS NOTE ADULT - SUBJECTIVE AND OBJECTIVE BOX
CHIEF COMPLAINT:Patient is a 62y old  Female who presents with a chief complaint of SOB.   She feels better,   v/q scan low prob for pe  Pt has elevated cpk, from hip surgery, healing, mild elevation of troponin   no cp    Allergies  No Known Drug Allergies  shellfish (Swelling; Rash)  	  MEDICATIONS:  furosemide    Tablet 20 milliGRAM(s) Oral daily  metoprolol     tartrate 12.5 milliGRAM(s) Oral two times a day  nitroglycerin     SubLingual 0.4 milliGRAM(s) SubLingual every 5 minutes PRN  ALBUTerol    90 MICROgram(s) HFA Inhaler 1 Puff(s) Inhalation every 4 hours  ALBUTerol/ipratropium for Nebulization 3 milliLiter(s) Nebulizer every 6 hours PRN  tiotropium 18 MICROgram(s) Capsule 1 Capsule(s) Inhalation daily  acetaminophen   Tablet. 650 milliGRAM(s) Oral every 6 hours PRN  atorvastatin 20 milliGRAM(s) Oral at bedtime  aspirin enteric coated 81 milliGRAM(s) Oral daily  clopidogrel Tablet 75 milliGRAM(s) Oral daily  enoxaparin Injectable 40 milliGRAM(s) SubCutaneous daily  influenza   Vaccine 0.5 milliLiter(s) IntraMuscular once  tamoxifen 20 milliGRAM(s) Oral daily    PHYSICAL EXAM:  T(C): 37.3 (12-07-17 @ 06:08), Max: 37.5 (12-06-17 @ 20:09)  HR: 96 (12-07-17 @ 06:08) (90 - 96)  BP: 118/66 (12-07-17 @ 06:08) (118/66 - 130/62)  RR: 18 (12-07-17 @ 06:08) (18 - 20)  SpO2: 96% (12-07-17 @ 06:08) (96% - 100%)  Appearance: Normal	  HEENT:   NC/AT  Eye: Pink Conjunctiva  Lungs: CTA B/L  CVS: RRR, Normal S1 and S2, No Edema  Pulses: Normal distal pulses.  Neuro: A&O x3    LABS:	   CARDIAC MARKERS:                        9.2    8.6   )-----------( 263      ( 06 Dec 2017 07:59 )             27.9     12-06    142  |  105  |  10.0  ----------------------------<  98  4.2   |  23.0  |  0.54    Ca    8.7      06 Dec 2017 07:59    TPro  6.3<L>  /  Alb  2.6<L>  /  TBili  0.4  /  DBili  x   /  AST  48<H>  /  ALT  38<H>  /  AlkPhos  107  12-05    proBNP:   Lipid Profile:   HgA1c:   TSH:     ASSESSMENT/PLAN:

## 2017-12-07 NOTE — PROGRESS NOTE ADULT - SUBJECTIVE AND OBJECTIVE BOX
CC:   HPI:  61 y/o female who had RT. DEMARIO 5 days ago and was discharged home yesterday came to ER as she was getting sob with minimal exertion. Rest improves sob. Pt. reports no cp or chest pressure. pt. has mild rt. hip area pain at surgical site. no cough. no fever. no abd. pain. no n/v. no dizziness reported.  pt. reports that she had noted water in her legs during the hospital stay. Pos -opeartive pt's bp and Hb dropped which got better with iv fluids and got one unit of prbc. At the time of admission pt. has no sig. complaints and her o2 sat is 98 to 99 % on RA. pt. was sent home on lovenox 40 mg SQ daily and aspirin 81 mg daily.   In ER today pt's doppler of lower ext is nrg. for dvt. pts ct angio chest is limited study but no PE is reported in main pulmonary artery. pt. seen by cardiologist dr. Moise who has recommended trend trop ( first one mildly elevated ), full dose of lovenox for now,  echo and V/Q scan as ct angio chest is limited study. (05 Dec 2017 21:36)    REVIEW OF SYSTEMS:    Patient denied fever, chills, abdominal pain, nausea, vomiting, cough, shortness of breath, chest pain or palpitations    Vital Signs Last 24 Hrs  T(C): 36.6 (07 Dec 2017 10:09), Max: 37.5 (06 Dec 2017 20:09)  T(F): 97.9 (07 Dec 2017 10:09), Max: 99.5 (06 Dec 2017 20:09)  HR: 78 (07 Dec 2017 10:09) (78 - 96)  BP: 127/57 (07 Dec 2017 10:09) (118/66 - 130/62)  BP(mean): --  RR: 18 (07 Dec 2017 10:09) (18 - 18)  SpO2: 96% (07 Dec 2017 06:08) (96% - 98%)I&O's Summary    07 Dec 2017 07:01  -  07 Dec 2017 13:02  --------------------------------------------------------  IN: 240 mL / OUT: 200 mL / NET: 40 mL      PHYSICAL EXAM:  GENERAL: NAD, Extreme obese  HEENT: PERRL, +EOMI, anicteric, no [place]  NECK: Supple, No JVD   CHEST/LUNG: CTA bilaterally; Normal effort  HEART: S1S2 Normal intensity, no murmurs, gallops or rubs noted  ABDOMEN: Soft, BS Normoactive, NT, ND, no HSM noted  EXTREMITIES:  2+ radial and DP pulses noted, no clubbing, cyanosis.  Pedal edema noted. Limitation of movement.    SKIN: No rashes or lesions noted  NEURO: A&Ox3, no focal deficits noted, CN II-XII intact  PSYCH: normal mood and affect; insight/judgement appropriate  LABS:                        9.2    8.6   )-----------( 263      ( 06 Dec 2017 07:59 )             27.9     12-06    142  |  105  |  10.0  ----------------------------<  98  4.2   |  23.0  |  0.54    Ca    8.7      06 Dec 2017 07:59          RADIOLOGY & ADDITIONAL TESTS:    MEDICATIONS:  MEDICATIONS  (STANDING):  ALBUTerol    90 MICROgram(s) HFA Inhaler 1 Puff(s) Inhalation every 4 hours  aspirin enteric coated 81 milliGRAM(s) Oral daily  atorvastatin 20 milliGRAM(s) Oral at bedtime  clopidogrel Tablet 75 milliGRAM(s) Oral daily  enoxaparin Injectable 40 milliGRAM(s) SubCutaneous daily  furosemide    Tablet 20 milliGRAM(s) Oral daily  influenza   Vaccine 0.5 milliLiter(s) IntraMuscular once  metoprolol     tartrate 25 milliGRAM(s) Oral two times a day  tamoxifen 20 milliGRAM(s) Oral daily  tiotropium 18 MICROgram(s) Capsule 1 Capsule(s) Inhalation daily    MEDICATIONS  (PRN):  acetaminophen   Tablet. 650 milliGRAM(s) Oral every 6 hours PRN Moderate Pain (4 - 6)  ALBUTerol/ipratropium for Nebulization 3 milliLiter(s) Nebulizer every 6 hours PRN Shortness of Breath and/or Wheezing  nitroglycerin     SubLingual 0.4 milliGRAM(s) SubLingual every 5 minutes PRN Chest Pain

## 2017-12-07 NOTE — PROGRESS NOTE ADULT - SUBJECTIVE AND OBJECTIVE BOX
Orthopedic PA Note  Patient was admitted for NSTEMI, scheduled for Stress test today  Patient is s/p   Right total hip arthroplasty on 11/30/17    right leg   Dressing C/D/I, no discharge  calf soft NT   Dorsi/Plantar flexion intact     Vital Signs Last 24 Hrs  T(C): 37.3 (07 Dec 2017 06:08), Max: 37.5 (06 Dec 2017 20:09)  T(F): 99.2 (07 Dec 2017 06:08), Max: 99.5 (06 Dec 2017 20:09)  HR: 96 (07 Dec 2017 06:08) (90 - 96)  BP: 118/66 (07 Dec 2017 06:08) (118/66 - 130/62)  BP(mean): --  RR: 18 (07 Dec 2017 06:08) (18 - 20)  SpO2: 96% (07 Dec 2017 06:08) (96% - 100%)                          9.2    8.6   )-----------( 263      ( 06 Dec 2017 07:59 )             27.9   12-06    142  |  105  |  10.0  ----------------------------<  98  4.2   |  23.0  |  0.54    Ca    8.7      06 Dec 2017 07:59    TPro  6.3<L>  /  Alb  2.6<L>  /  TBili  0.4  /  DBili  x   /  AST  48<H>  /  ALT  38<H>  /  AlkPhos  107  12-05    A/P S/P Right total hip arthroplasty, admitted for NSTEMI  1. Will continue to follow patient   2. Orthopedically stable at this point

## 2017-12-07 NOTE — PROGRESS NOTE ADULT - ASSESSMENT
1. s/p radiation with breast cancer  2. sob is improved, cont with low dose lasix  3. Increase metoprolol   4. repeat echo to evaluate out flow tract obstruction   5. stress test tomorrow.   6. plavix added.   7. DVT prophylaxis

## 2017-12-07 NOTE — PROGRESS NOTE ADULT - SUBJECTIVE AND OBJECTIVE BOX
Pt Name: JOSEY SAMAYOA    MRN: [id number]      Patient is a being followed for s/p right posterior total hip arthroplasty admitted for cardiac work-up. She reports that her hip is doing well.         PAST MEDICAL & SURGICAL HISTORY:  Breast cancer: left , had surgery and radiation  High cholesterol  High blood pressure  History of hip replacement, total, right: 2017  History of lumpectomy of left breast:   Atypical lipoma of soft tissue: in left groin   H/O foot surgery: ankle left       Allergies: No Known Drug Allergies  shellfish (Swelling; Rash)      Medications: acetaminophen   Tablet. 650 milliGRAM(s) Oral every 6 hours PRN  ALBUTerol    90 MICROgram(s) HFA Inhaler 1 Puff(s) Inhalation every 4 hours  ALBUTerol/ipratropium for Nebulization 3 milliLiter(s) Nebulizer every 6 hours PRN  aspirin enteric coated 325 milliGRAM(s) Oral daily  atorvastatin 20 milliGRAM(s) Oral at bedtime  enoxaparin Injectable 40 milliGRAM(s) SubCutaneous daily  furosemide    Tablet 20 milliGRAM(s) Oral daily  influenza   Vaccine 0.5 milliLiter(s) IntraMuscular once  metoprolol     tartrate 12.5 milliGRAM(s) Oral two times a day  nitroglycerin     SubLingual 0.4 milliGRAM(s) SubLingual every 5 minutes PRN  tamoxifen 20 milliGRAM(s) Oral daily  tiotropium 18 MICROgram(s) Capsule 1 Capsule(s) Inhalation daily        Ambulation: Walking independently [ ] With Cane [ ] With Walker [x]  Bedbound [ ]                           9.2    8.6   )-----------( 263      ( 06 Dec 2017 07:59 )             27.9     12    142  |  105  |  10.0  ----------------------------<  98  4.2   |  23.0  |  0.54    Ca    8.7      06 Dec 2017 07:59    TPro  6.3<L>  /  Alb  2.6<L>  /  TBili  0.4  /  DBili  x   /  AST  48<H>  /  ALT  38<H>  /  AlkPhos  107  12-05      PHYSICAL EXAM:    Vital Signs Last 24 Hrs  T(C): 37.3 (07 Dec 2017 06:08), Max: 37.5 (06 Dec 2017 20:09)  T(F): 99.2 (07 Dec 2017 06:08), Max: 99.5 (06 Dec 2017 20:09)  HR: 96 (07 Dec 2017 06:08) (90 - 96)  BP: 118/66 (07 Dec 2017 06:08) (118/66 - 130/62)  BP(mean): --  RR: 18 (07 Dec 2017 06:08) (18 - 20)  SpO2: 96% (07 Dec 2017 06:08) (96% - 100%)  Daily     Daily Weight in k.6 (07 Dec 2017 06:08)        Appearance: Alert, responsive, in no acute distress.    Neurological: Sensation is grossly intact to light touch. 5/5 motor function of all extremities. No focal deficits or weaknesses found.    Skin: no rash on visible skin. Skin is clean, dry and intact. No bleeding. No abrasions. No ulcerations.    Vascular: 2+ distal pulses. Cap refill < 2 sec. No signs of venous   insufficiency   or stasis. No extremity ulcerations. No cyanosis.    Musculoskeletal:         Left Upper Extremity:       Right Upper Extremity:       Left Lower Extremity:       Right Lower Extremity:      A/P:  Pt is a  62y Female with s/p right posterior total hip arthroplasty on 2017  admitted for cardiac work-up     PLAN:   * continue current dressing.  * posterior hip precautions.  * WBAT of the right LE  * activity as permitted by medical / cardiac team

## 2017-12-07 NOTE — PROGRESS NOTE ADULT - ASSESSMENT
This is a 62 year old female patient who was found lying in her bed eating breakfast in [facility], Patient states she no acute problems compared to  when she first was seen in [facility] ED. Pt denies headache, chest pain, shortness of breath, dyspnea, orthopnea, arm pain, neck pain or back pain.      Problem/Plan - 1:  ·  Problem: Dyspnea on exertion.  Plan: Shortness of breath is resolving. Considered angina equivalent for positive troponin trending down.  Echo to eval outflow track obstruction per cardiology.      Problem/Plan - 2:  ·  Problem: Essential hypertension.  Plan: continue furosemide 20 Mg oral daily.   continue metoprolol 25mg oral two times daily.     Problem/Plan - 3:  ·  Problem: NSTEMI (non-ST elevated myocardial infarction).  Plan: Positive troponin. Cardiology plan stress testing tomorrow. Continue plavix, aspirin b-blocker.       Problem/Plan - 4:  ·  Problem: History of breast cancer.  Plan: continue tamoxifen as ordered. 20Mg orally daily.

## 2017-12-08 LAB
ANION GAP SERPL CALC-SCNC: 11 MMOL/L — SIGNIFICANT CHANGE UP (ref 5–17)
BUN SERPL-MCNC: 8 MG/DL — SIGNIFICANT CHANGE UP (ref 8–20)
CALCIUM SERPL-MCNC: 8.3 MG/DL — LOW (ref 8.6–10.2)
CHLORIDE SERPL-SCNC: 105 MMOL/L — SIGNIFICANT CHANGE UP (ref 98–107)
CO2 SERPL-SCNC: 25 MMOL/L — SIGNIFICANT CHANGE UP (ref 22–29)
CREAT SERPL-MCNC: 0.53 MG/DL — SIGNIFICANT CHANGE UP (ref 0.5–1.3)
GLUCOSE SERPL-MCNC: 106 MG/DL — SIGNIFICANT CHANGE UP (ref 70–115)
HCT VFR BLD CALC: 27.9 % — LOW (ref 37–47)
HGB BLD-MCNC: 9.2 G/DL — LOW (ref 12–16)
MCHC RBC-ENTMCNC: 31.4 PG — HIGH (ref 27–31)
MCHC RBC-ENTMCNC: 33 G/DL — SIGNIFICANT CHANGE UP (ref 32–36)
MCV RBC AUTO: 95.2 FL — SIGNIFICANT CHANGE UP (ref 81–99)
PLATELET # BLD AUTO: 255 K/UL — SIGNIFICANT CHANGE UP (ref 150–400)
POTASSIUM SERPL-MCNC: 4 MMOL/L — SIGNIFICANT CHANGE UP (ref 3.5–5.3)
POTASSIUM SERPL-SCNC: 4 MMOL/L — SIGNIFICANT CHANGE UP (ref 3.5–5.3)
RBC # BLD: 2.93 M/UL — LOW (ref 4.4–5.2)
RBC # FLD: 14.8 % — SIGNIFICANT CHANGE UP (ref 11–15.6)
SODIUM SERPL-SCNC: 141 MMOL/L — SIGNIFICANT CHANGE UP (ref 135–145)
WBC # BLD: 8.5 K/UL — SIGNIFICANT CHANGE UP (ref 4.8–10.8)
WBC # FLD AUTO: 8.5 K/UL — SIGNIFICANT CHANGE UP (ref 4.8–10.8)

## 2017-12-08 PROCEDURE — 99233 SBSQ HOSP IP/OBS HIGH 50: CPT

## 2017-12-08 PROCEDURE — 93018 CV STRESS TEST I&R ONLY: CPT

## 2017-12-08 PROCEDURE — 93016 CV STRESS TEST SUPVJ ONLY: CPT

## 2017-12-08 PROCEDURE — 78452 HT MUSCLE IMAGE SPECT MULT: CPT | Mod: 26

## 2017-12-08 PROCEDURE — 99232 SBSQ HOSP IP/OBS MODERATE 35: CPT | Mod: 25

## 2017-12-08 RX ADMIN — Medication 81 MILLIGRAM(S): at 12:21

## 2017-12-08 RX ADMIN — CLOPIDOGREL BISULFATE 75 MILLIGRAM(S): 75 TABLET, FILM COATED ORAL at 12:21

## 2017-12-08 RX ADMIN — Medication 650 MILLIGRAM(S): at 13:20

## 2017-12-08 RX ADMIN — Medication 650 MILLIGRAM(S): at 12:24

## 2017-12-08 RX ADMIN — Medication 25 MILLIGRAM(S): at 06:17

## 2017-12-08 RX ADMIN — ENOXAPARIN SODIUM 40 MILLIGRAM(S): 100 INJECTION SUBCUTANEOUS at 12:21

## 2017-12-08 RX ADMIN — ATORVASTATIN CALCIUM 20 MILLIGRAM(S): 80 TABLET, FILM COATED ORAL at 22:20

## 2017-12-08 RX ADMIN — Medication 25 MILLIGRAM(S): at 17:44

## 2017-12-08 RX ADMIN — Medication 650 MILLIGRAM(S): at 06:17

## 2017-12-08 RX ADMIN — Medication 650 MILLIGRAM(S): at 22:45

## 2017-12-08 RX ADMIN — Medication 650 MILLIGRAM(S): at 22:22

## 2017-12-08 RX ADMIN — Medication 20 MILLIGRAM(S): at 06:17

## 2017-12-08 RX ADMIN — TAMOXIFEN CITRATE 20 MILLIGRAM(S): 20 TABLET, FILM COATED ORAL at 14:32

## 2017-12-08 NOTE — PROGRESS NOTE ADULT - SUBJECTIVE AND OBJECTIVE BOX
CC: SOB with positive troponin.  S/p nuclear stress test with normal myocardial perfusion, no infarction, no evident inducible ischemia.  Recent,  8 days ago,  left hip arthroplasty for advanced degenerative arthritis. Patient said she will like to go to rehab as not able to cope with ADLs alone at home in present state.   HPI:  61 y/o female who had RT. DEMARIO 5 days ago and was discharged home yesterday came to ER as she was getting sob with minimal exertion. Rest improves sob. Pt. reports no cp or chest pressure. pt. has mild rt. hip area pain at surgical site. no cough. no fever. no abd. pain. no n/v. no dizziness reported.  pt. reports that she had noted water in her legs during the hospital stay. Pos -opeartive pt's bp and Hb dropped which got better with iv fluids and got one unit of prbc. At the time of admission pt. has no sig. complaints and her o2 sat is 98 to 99 % on RA. pt. was sent home on lovenox 40 mg SQ daily and aspirin 81 mg daily.   In ER today pt's doppler of lower ext is nrg. for dvt. pts ct angio chest is limited study but no PE is reported in main pulmonary artery. pt. seen by cardiologist dr. Moise who has recommended trend trop ( first one mildly elevated ), full dose of lovenox for now,  echo and V/Q scan as ct angio chest is limited study. (05 Dec 2017 21:36)    REVIEW OF SYSTEMS:    Patient denied fever, chills, abdominal pain, nausea, vomiting, cough, shortness of breath, chest pain or palpitations    Vital Signs Last 24 Hrs  T(C): 36.7 (08 Dec 2017 13:04), Max: 37.3 (08 Dec 2017 06:12)  T(F): 98 (08 Dec 2017 13:04), Max: 99.1 (08 Dec 2017 06:12)  HR: 98 (08 Dec 2017 13:04) (84 - 104)  BP: 122/63 (08 Dec 2017 13:04) (122/60 - 130/58)  BP(mean): --  RR: 18 (08 Dec 2017 13:04) (17 - 18)  SpO2: 97% (08 Dec 2017 13:04) (97% - 99%)I&O's Summary    07 Dec 2017 07:01  -  08 Dec 2017 07:00  --------------------------------------------------------  IN: 480 mL / OUT: 500 mL / NET: -20 mL    08 Dec 2017 07:01  -  08 Dec 2017 14:02  --------------------------------------------------------  IN: 240 mL / OUT: 0 mL / NET: 240 mL      PHYSICAL EXAM:  GENERAL: Extreme obese  HEENT: PERRL, +EOMI, anicteric, no [place]  NECK: Supple, No JVD   CHEST/LUNG: CTA bilaterally; Normal effort  HEART: S1S2 Normal intensity, no murmurs, gallops or rubs noted  ABDOMEN: Soft, BS Normoactive, NT, ND, no HSM noted  EXTREMITIES:  2+ radial and DP pulses noted, no clubbing, cyanosis, or edema noted. Limitation of movement  SKIN: No rashes or lesions noted  NEURO: A&Ox3, no focal deficits noted, CN II-XII intact  PSYCH: depressed mood and affect; insight/judgement appropriate  LABS:                        9.2    8.5   )-----------( 255      ( 08 Dec 2017 11:17 )             27.9     12-08    141  |  105  |  8.0  ----------------------------<  106  4.0   |  25.0  |  0.53    Ca    8.3<L>      08 Dec 2017 11:17          RADIOLOGY & ADDITIONAL TESTS:    MEDICATIONS:  MEDICATIONS  (STANDING):  ALBUTerol    90 MICROgram(s) HFA Inhaler 1 Puff(s) Inhalation every 4 hours  aspirin enteric coated 81 milliGRAM(s) Oral daily  atorvastatin 20 milliGRAM(s) Oral at bedtime  clopidogrel Tablet 75 milliGRAM(s) Oral daily  enoxaparin Injectable 40 milliGRAM(s) SubCutaneous daily  furosemide    Tablet 20 milliGRAM(s) Oral daily  influenza   Vaccine 0.5 milliLiter(s) IntraMuscular once  metoprolol     tartrate 25 milliGRAM(s) Oral two times a day  tamoxifen 20 milliGRAM(s) Oral daily  tiotropium 18 MICROgram(s) Capsule 1 Capsule(s) Inhalation daily    MEDICATIONS  (PRN):  acetaminophen   Tablet. 650 milliGRAM(s) Oral every 6 hours PRN Moderate Pain (4 - 6)  ALBUTerol/ipratropium for Nebulization 3 milliLiter(s) Nebulizer every 6 hours PRN Shortness of Breath and/or Wheezing  nitroglycerin     SubLingual 0.4 milliGRAM(s) SubLingual every 5 minutes PRN Chest Pain

## 2017-12-08 NOTE — PROGRESS NOTE ADULT - ASSESSMENT
This is a 62 year old female patient who was found lying in her bed eating breakfast in [facility], Patient states she no acute problems compared to  when she first was seen in [facility] ED.       Problem/Plan - 1:  ·  Problem: Dyspnea on exertion.  Plan: Shortness of breath is resolving. Considered angina equivalent for positive troponin trending down.  Echo to eval outflow track obstruction showing LVEF >75% no valvular abnormality.        Problem/Plan - 2:  ·  Problem: Essential hypertension.  Plan: continue furosemide 20 Mg oral daily.   continue metoprolol 25mg oral two times daily.     Problem/Plan - 3:  ·  Problem: NSTEMI (non-ST elevated myocardial infarction).  Plan: Positive troponin.  Normal nuclear stress test. Continue plavix, aspirin, metoprolol.       Problem/Plan - 4:  ·  Problem: History of breast cancer.  Plan: continue tamoxifen as ordered. 20Mg orally daily.     Disposition: Rehab eval.  PT

## 2017-12-08 NOTE — PROGRESS NOTE ADULT - SUBJECTIVE AND OBJECTIVE BOX
CHIEF COMPLAINT: Patient is a 62y old  Female who presents with a chief complaint of SOB.   Low probability for PE  No more SOB.     Allergies  No Known Drug Allergies  shellfish (Swelling; Rash)    MEDICATIONS:  furosemide    Tablet 20 milliGRAM(s) Oral daily  metoprolol     tartrate 25 milliGRAM(s) Oral two times a day  nitroglycerin     SubLingual 0.4 milliGRAM(s) SubLingual every 5 minutes PRN  ALBUTerol    90 MICROgram(s) HFA Inhaler 1 Puff(s) Inhalation every 4 hours  ALBUTerol/ipratropium for Nebulization 3 milliLiter(s) Nebulizer every 6 hours PRN  tiotropium 18 MICROgram(s) Capsule 1 Capsule(s) Inhalation daily  acetaminophen   Tablet. 650 milliGRAM(s) Oral every 6 hours PRN  atorvastatin 20 milliGRAM(s) Oral at bedtime  aspirin enteric coated 81 milliGRAM(s) Oral daily  clopidogrel Tablet 75 milliGRAM(s) Oral daily  enoxaparin Injectable 40 milliGRAM(s) SubCutaneous daily  influenza   Vaccine 0.5 milliLiter(s) IntraMuscular once  tamoxifen 20 milliGRAM(s) Oral daily    PHYSICAL EXAM:  T(C): 36.7 (12-08-17 @ 13:04), Max: 37.3 (12-08-17 @ 06:12)  HR: 98 (12-08-17 @ 13:04) (84 - 104)  BP: 122/63 (12-08-17 @ 13:04) (122/60 - 130/58)  RR: 18 (12-08-17 @ 13:04) (17 - 18)  SpO2: 97% (12-08-17 @ 13:04) (97% - 99%)  I&O's Summary    07 Dec 2017 07:01  -  08 Dec 2017 07:00  --------------------------------------------------------  IN: 480 mL / OUT: 500 mL / NET: -20 mL    08 Dec 2017 07:01  -  08 Dec 2017 14:19  --------------------------------------------------------  IN: 240 mL / OUT: 0 mL / NET: 240 mL    Appearance: Normal	  HEENT:   NC/AT  Eye: Pink Conjunctiva  Lungs: CTA B/L  CVS: RRR, Normal S1 and S2, No Edema  Pulses: Normal distal pulses.  Neuro: A&O x3    TELEMETRY: No events    LABS:	 	                    9.2    8.5   )-----------( 255      ( 08 Dec 2017 11:17 )             27.9     12-08    141  |  105  |  8.0  ----------------------------<  106  4.0   |  25.0  |  0.53    Ca    8.3<L>      08 Dec 2017 11:17      ASSESSMENT/PLAN:

## 2017-12-08 NOTE — PROGRESS NOTE ADULT - ASSESSMENT
1. SOB resolved.  2. Stress test negative, no cp  3. Elevated Trop secondary to rhabdo  4. DC plavix  5. Cont with ASA  6. Ortho input appreciated. Lovenox for DVT prophylaxis.

## 2017-12-09 PROCEDURE — 99222 1ST HOSP IP/OBS MODERATE 55: CPT | Mod: GC

## 2017-12-09 PROCEDURE — 99233 SBSQ HOSP IP/OBS HIGH 50: CPT

## 2017-12-09 RX ADMIN — ENOXAPARIN SODIUM 40 MILLIGRAM(S): 100 INJECTION SUBCUTANEOUS at 11:27

## 2017-12-09 RX ADMIN — Medication 650 MILLIGRAM(S): at 22:45

## 2017-12-09 RX ADMIN — Medication 25 MILLIGRAM(S): at 05:55

## 2017-12-09 RX ADMIN — ATORVASTATIN CALCIUM 20 MILLIGRAM(S): 80 TABLET, FILM COATED ORAL at 22:45

## 2017-12-09 RX ADMIN — TAMOXIFEN CITRATE 20 MILLIGRAM(S): 20 TABLET, FILM COATED ORAL at 11:27

## 2017-12-09 RX ADMIN — Medication 25 MILLIGRAM(S): at 17:43

## 2017-12-09 RX ADMIN — Medication 20 MILLIGRAM(S): at 05:55

## 2017-12-09 RX ADMIN — Medication 650 MILLIGRAM(S): at 23:35

## 2017-12-09 RX ADMIN — Medication 81 MILLIGRAM(S): at 11:27

## 2017-12-09 NOTE — CONSULT NOTE ADULT - SUBJECTIVE AND OBJECTIVE BOX
HPI:  61 y/o female who had RT. DEMARIO 5 days ago and was discharged home yesterday came to ER as she was getting sob with minimal exertion. Rest improves sob. Pt. reports no cp or chest pressure. pt. has mild rt. hip area pain at surgical site. no cough. no fever. no abd. pain. no n/v. no dizziness reported.  pt. reports that she had noted water in her legs during the hospital stay. Pos -opeartive pt's bp and Hb dropped which got better with iv fluids and got one unit of prbc. At the time of admission pt. has no sig. complaints and her o2 sat is 98 to 99 % on RA. pt. was sent home on lovenox 40 mg SQ daily and aspirin 81 mg daily.   In ER today pt's doppler of lower ext is nrg. for dvt. pts ct angio chest is limited study but no PE is reported in main pulmonary artery. pt. seen by cardiologist dr. Moise who has recommended trend trop ( first one mildly elevated ), full dose of lovenox for now,  echo and V/Q scan as ct angio chest is limited study. (05 Dec 2017 21:36)      REVIEW OF SYSTEMS  Constitutional - No fever, No fatigue  HEENT - No visual disturbances, No difficulty hearing,  No neck pain  Respiratory - No cough, No wheezing, No shortness of breath  Cardiovascular - No chest pain, No palpitations  Gastrointestinal - No abdominal pain, No nausea, No vomiting, No diarrhea, No constipation  Genitourinary - No dysuria, No frequency, No hematuria, No incontinence  Neurological - No headaches, No loss of strength, No numbness  Skin - No itching, No rashes  Endocrine - No temperature intolerance  Musculoskeletal - No joint pain, No joint swelling, No muscle pain  Psychiatric - No depression, No anxiety  All other review of systems negative    PAST MEDICAL & SURGICAL HISTORY  Non-ST elevation (NSTEMI) myocardial infarction  No h/o HF  Family history of breast cancer in sister (Sibling)  Family history of hypertension in mother  Handoff  MEWS Score  Breast cancer  High cholesterol  High blood pressure  NSTEMI (non-ST elevated myocardial infarction)  History of breast cancer  Essential hypertension  Dyspnea on exertion  NSTEMI (non-ST elevated myocardial infarction)  History of hip replacement, total, right  History of lumpectomy of left breast  Atypical lipoma of soft tissue  H/O foot surgery  No significant past surgical history  SOB  0      SOCIAL HISTORY  Smoking - Denied  EtOH - Denied   Drugs - Denied    FUNCTIONAL HISTORY  _______ hand dominant  Lives with ______ in a ______ with ___ DIMITRI + HR and ___ STI + HR  Independent in ambulation, ADL's, transfers prior to hospitalization    CURRENT FUNCTIONAL STATUS  Bed mobility -   Transfers -   Gait -   ADL's -    FAMILY HISTORY   Reviewed and non-contributory    ALLERGIES  No Known Drug Allergies  shellfish (Swelling; Rash)    VITALS  T(C): 37.3 (12-09-17 @ 05:46)  T(F): 99.2 (12-09-17 @ 05:46), Max: 99.2 (12-09-17 @ 05:46)  HR: 97 (12-09-17 @ 05:46) (80 - 98)  BP: 110/62 (12-09-17 @ 05:46) (110/62 - 130/66)  RR:  (18 - 20)  SpO2:  (97% - 98%)  Wt(kg): --    PHYSICAL EXAM  Constitutional - NAD, Comfortable  HEENT - NCAT, EOMI  Neck - Supple  Chest - CTA bilaterally  Cardiovascular - RRR, S1S2  Abdomen - BS+, Soft, NTND  Extremities - No C/C/E, No calf tenderness   Neurologic Exam -                    Cognitive - Awake, Alert, AAO to self, place, date, year, situation     Communication - Fluent, No dysarthria     Attention - Intact, able to recite days of week backwards     Memory - Intact, able to recall 3/3 items after 3 minutes     Cranial Nerves - CN 2-12 grossly intact     Motor -                     LEFT    UE - ShAB 5/5, EF 5/5, EE 5/5, WE 5/5,  5/5                    RIGHT UE - ShAB 5/5, EF 5/5, EE 5/5, WE 5/5,  5/5                    LEFT    LE - HF 5/5, KE 5/5, DF 5/5, PF 5/5                    RIGHT LE - HF 5/5, KE 5/5, DF 5/5, PF 5/5        Sensory - Intact to light touch     Reflexes - DTR intact and symmetrical, Negative Guthrie's bilaterally, Negative Babinski's bilaterally      Coordination - Finger-to-nose intact bilaterally   Psychiatric - Affect WNL    RECENT LABS/IMAGING                        9.2    8.5   )-----------( 255      ( 08 Dec 2017 11:17 )             27.9     12-08    141  |  105  |  8.0  ----------------------------<  106  4.0   |  25.0  |  0.53    Ca    8.3<L>      08 Dec 2017 11:17            HbA1C -   TSH -   CHL -     MEDICATIONS   MEDICATIONS  (STANDING):  ALBUTerol    90 MICROgram(s) HFA Inhaler 1 Puff(s) Inhalation every 4 hours  aspirin enteric coated 81 milliGRAM(s) Oral daily  atorvastatin 20 milliGRAM(s) Oral at bedtime  enoxaparin Injectable 40 milliGRAM(s) SubCutaneous daily  furosemide    Tablet 20 milliGRAM(s) Oral daily  influenza   Vaccine 0.5 milliLiter(s) IntraMuscular once  metoprolol     tartrate 25 milliGRAM(s) Oral two times a day  tamoxifen 20 milliGRAM(s) Oral daily  tiotropium 18 MICROgram(s) Capsule 1 Capsule(s) Inhalation daily    MEDICATIONS  (PRN):  acetaminophen   Tablet. 650 milliGRAM(s) Oral every 6 hours PRN Moderate Pain (4 - 6)  ALBUTerol/ipratropium for Nebulization 3 milliLiter(s) Nebulizer every 6 hours PRN Shortness of Breath and/or Wheezing  nitroglycerin     SubLingual 0.4 milliGRAM(s) SubLingual every 5 minutes PRN Chest Pain      ASSESSMENT/PLAN  62F s/p right DEMARIO with CP with functional, gait, ADL impairments.    Disposition -  PT - ROM, Bed mobility, Transfers, Ambulation with assistive device  OT - ADLs, ROM  SLP - Dysphagia eval and treat  Precautions - Falls, Cardiac    DVT Prophylaxis -  Weight bearing status -  Skin - Turn Q2hrs  Diet - HPI:  62F PMHx HTN, breast CA (on tamoxifen), OA, elective right DEMARIO 11/30 and discharged home without complication on 12/4, presented to [facility] 12/5 with dyspnea with exertion. She was admitted for cardiac work-up, as troponins were mildly elevated on admission, and to rule out PE. Lower extremity dopplers showed no evidence of DVT. CTA of the chest was a limited study, but showed no evidence of central or upper lobe PE. VQ scan showed very low probability of PE. TTE was a normal study with EF of 75%, nuclear stress test was also normal. Cardiology has been following and elevated troponins on admission have since trended down and were thought to be secondary to rhabdomyolysis post-op.       REVIEW OF SYSTEMS  Constitutional - No fever, No fatigue  HEENT - No visual disturbances, No difficulty hearing,  No neck pain  Respiratory - No cough, No wheezing, No shortness of breath  Cardiovascular - No chest pain, No palpitations  Gastrointestinal - No abdominal pain, No nausea, No vomiting, No diarrhea, No constipation  Genitourinary - No dysuria, No frequency, No hematuria, No incontinence  Neurological - No headaches, No loss of strength, No numbness  Skin - No itching, No rashes  Endocrine - No temperature intolerance  Musculoskeletal - No joint pain, No joint swelling, No muscle pain  Psychiatric - No depression, No anxiety  All other review of systems negative    PAST MEDICAL & SURGICAL HISTORY  Breast cancer  High cholesterol  High blood pressure  Atypical lipoma of soft tissue  OA  History of hip replacement, total, right  History of lumpectomy of left breast  H/O foot surgery      SOCIAL HISTORY  Smoking - Denied  EtOH - Denied   Drugs - Denied    FUNCTIONAL HISTORY  _______ hand dominant  [place] with ___ DIMITRI + HR and 0 STI  Independent in ambulation, ADL's, transfers prior to hospitalization    CURRENT FUNCTIONAL STATUS  Bed mobility - min assist  Transfers - contact guard  Gait - Amb 50' RW contact guard    FAMILY HISTORY   Family history of breast cancer in sister (Sibling)  Family history of hypertension in mother      ALLERGIES  No Known Drug Allergies  shellfish (Swelling; Rash)    VITALS  T(C): 37.3 (09 Dec 2017 05:46), Max: 37.3 (09 Dec 2017 05:46)  T(F): 99.2 (09 Dec 2017 05:46), Max: 99.2 (09 Dec 2017 05:46)  HR: 97 (09 Dec 2017 05:46) (80 - 98)  BP: 110/62 (09 Dec 2017 05:46) (110/62 - 130/66)  RR: 20 (09 Dec 2017 08:00) (18 - 20)  SpO2: 98% (09 Dec 2017 08:00) (97% - 98%)      PHYSICAL EXAM  Constitutional - NAD, Comfortable  HEENT - NCAT, EOMI  Neck - Supple  Chest - CTA bilaterally  Cardiovascular - RRR, S1S2  Abdomen - BS+, Soft, NTND  Extremities - No C/C/E, No calf tenderness   Neurologic Exam -                    Cognitive - Awake, Alert, AAO to self, place, date, year, situation     Communication - Fluent, No dysarthria     Attention - Intact, able to recite days of week backwards     Memory - Intact, able to recall 3/3 items after 3 minutes     Cranial Nerves - CN 2-12 grossly intact     Motor -                     LEFT    UE - ShAB 5/5, EF 5/5, EE 5/5, WE 5/5,  5/5                    RIGHT UE - ShAB 5/5, EF 5/5, EE 5/5, WE 5/5,  5/5                    LEFT    LE - HF 5/5, KE 5/5, DF 5/5, PF 5/5                    RIGHT LE - HF 5/5, KE 5/5, DF 5/5, PF 5/5        Sensory - Intact to light touch     Reflexes - DTR intact and symmetrical, Negative Guthrie's bilaterally, Negative Babinski's bilaterally      Coordination - Finger-to-nose intact bilaterally   Psychiatric - Affect WNL    RECENT LABS/IMAGING                        9.2    8.5   )-----------( 255      ( 08 Dec 2017 11:17 )             27.9     12-08    141  |  105  |  8.0  ----------------------------<  106  4.0   |  25.0  |  0.53    Ca    8.3<L>      08 Dec 2017 11:17        MEDICATIONS   MEDICATIONS  (STANDING):  ALBUTerol    90 MICROgram(s) HFA Inhaler 1 Puff(s) Inhalation every 4 hours  aspirin enteric coated 81 milliGRAM(s) Oral daily  atorvastatin 20 milliGRAM(s) Oral at bedtime  enoxaparin Injectable 40 milliGRAM(s) SubCutaneous daily  furosemide    Tablet 20 milliGRAM(s) Oral daily  metoprolol     tartrate 25 milliGRAM(s) Oral two times a day  tamoxifen 20 milliGRAM(s) Oral daily  tiotropium 18 MICROgram(s) Capsule 1 Capsule(s) Inhalation daily    MEDICATIONS  (PRN):  acetaminophen   Tablet. 650 milliGRAM(s) Oral every 6 hours PRN Moderate Pain (4 - 6)  ALBUTerol/ipratropium for Nebulization 3 milliLiter(s) Nebulizer every 6 hours PRN Shortness of Breath and/or Wheezing  nitroglycerin     SubLingual 0.4 milliGRAM(s) SubLingual every 5 minutes PRN Chest Pain      ASSESSMENT/PLAN  62F s/p right DEMARIO secondary to OA complicated dyspnea on exertion, now resolved and negative cardiac work-up, now with functional, gait, ADL impairments.    Disposition -  PT - ROM, Bed mobility, Transfers, Ambulation with assistive device  OT - ADLs, ROM  Precautions - Falls, Cardiac,Right posterior hip   DVT Prophylaxis - Lovenox  Weight bearing status - WBAT right LE  Skin - Turn Q2hrs  Diet -DASH/TLC HPI:  62F PMHx HTN, breast CA (on tamoxifen), OA, elective right DEMARIO 11/30 and discharged home without complication on 12/4, presented to [facility] 12/5 with dyspnea with exertion. She was admitted for cardiac work-up, as troponins were mildly elevated on admission, and to rule out PE. Lower extremity dopplers showed no evidence of DVT. CTA of the chest was a limited study, but showed no evidence of central or upper lobe PE. VQ scan showed very low probability of PE. TTE was a normal study with EF of 75%, nuclear stress test was also normal. Cardiology has been following and elevated troponins on admission have since trended down and were thought to be secondary to rhabdomyolysis post-op. Dyspnea with exertion has greatly improved since admission.      REVIEW OF SYSTEMS  Constitutional - No fever, No fatigue  HEENT - No visual disturbances, No difficulty hearing,  No neck pain  Respiratory - Mild SOB while working with PT, but improved since admission  Cardiovascular - No chest pain, No palpitations  Gastrointestinal - No abdominal pain, No nausea, No vomiting, No diarrhea, No constipation  Genitourinary - No dysuria, No frequency, No hematuria, No incontinence  Neurological - No headaches, No loss of strength, No numbness  Skin - No itching, No rashes  Endocrine - No temperature intolerance  Musculoskeletal - Chronic lower back and left hip pain secondary to OA and spinal stenosis, right hip pain controlled with Tylenol  Psychiatric - Anxious about going home  All other review of systems negative    PAST MEDICAL & SURGICAL HISTORY  Breast cancer  High cholesterol  High blood pressure  Atypical lipoma of soft tissue  OA  Spinal stenosis  History of hip replacement, total, right  History of lumpectomy of left breast  H/O foot surgery      SOCIAL HISTORY  Smoking - Denied  EtOH - Denied   Drugs - Denied    FUNCTIONAL HISTORY  Right hand dominant  [place], no steps inside  Independent in ambulation with RW, Required assist for IADLs prior to hospitalization  Does not drive, on disability secondary to chronic back and hip pain    CURRENT FUNCTIONAL STATUS  Bed mobility - min assist  Transfers - contact guard  Gait - Amb 50' RW contact guard    FAMILY HISTORY   Family history of breast cancer in sister (Sibling)  Family history of hypertension in mother      ALLERGIES  No Known Drug Allergies  shellfish (Swelling; Rash)    VITALS  T(C): 37.3 (09 Dec 2017 05:46), Max: 37.3 (09 Dec 2017 05:46)  T(F): 99.2 (09 Dec 2017 05:46), Max: 99.2 (09 Dec 2017 05:46)  HR: 97 (09 Dec 2017 05:46) (80 - 98)  BP: 110/62 (09 Dec 2017 05:46) (110/62 - 130/66)  RR: 20 (09 Dec 2017 08:00) (18 - 20)  SpO2: 98% (09 Dec 2017 08:00) (97% - 98%)      PHYSICAL EXAM  Constitutional - NAD, Comfortable resting in bed  HEENT - NCAT, EOMI  Neck - Supple  Chest - CTA bilaterally  Cardiovascular - RRR, S1S2  Abdomen - BS+, Soft, NTND  Extremities - No C/C/E, No calf tenderness   Skin - Dressing right hip c/d/i  Neurologic Exam -                    Cognitive - Awake, Alert, AAO to self, place, date, year, situation     Cranial Nerves - CN 2-12 grossly intact     Motor -                     LEFT    UE - ShAB 5/5, EF 5/5, EE 5/5, WE 5/5,  5/5                    RIGHT UE - ShAB 5/5, EF 5/5, EE 5/5, WE 5/5,  5/5                    LEFT    LE - HF 5/5, KE 5/5, DF 5/5, PF 5/5                    RIGHT LE - HF 3/5, KE 3/5, DF 5/5, PF 5/5 (limited secondary to pain)        Sensory - Intact to light touch     Reflexes - DTR intact and symmetrical     Psychiatric - Affect WNL    RECENT LABS/IMAGING                        9.2    8.5   )-----------( 255      ( 08 Dec 2017 11:17 )             27.9     12-08    141  |  105  |  8.0  ----------------------------<  106  4.0   |  25.0  |  0.53    Ca    8.3<L>      08 Dec 2017 11:17        MEDICATIONS   MEDICATIONS  (STANDING):  ALBUTerol    90 MICROgram(s) HFA Inhaler 1 Puff(s) Inhalation every 4 hours  aspirin enteric coated 81 milliGRAM(s) Oral daily  atorvastatin 20 milliGRAM(s) Oral at bedtime  enoxaparin Injectable 40 milliGRAM(s) SubCutaneous daily  furosemide    Tablet 20 milliGRAM(s) Oral daily  metoprolol     tartrate 25 milliGRAM(s) Oral two times a day  tamoxifen 20 milliGRAM(s) Oral daily  tiotropium 18 MICROgram(s) Capsule 1 Capsule(s) Inhalation daily    MEDICATIONS  (PRN):  acetaminophen   Tablet. 650 milliGRAM(s) Oral every 6 hours PRN Moderate Pain (4 - 6)  ALBUTerol/ipratropium for Nebulization 3 milliLiter(s) Nebulizer every 6 hours PRN Shortness of Breath and/or Wheezing  nitroglycerin     SubLingual 0.4 milliGRAM(s) SubLingual every 5 minutes PRN Chest Pain      ASSESSMENT/PLAN  62F s/p right DEMARIO secondary to OA complicated dyspnea on exertion, now resolved and negative cardiac work-up, now with functional, gait, ADL impairments.    Disposition - Recommend subacute rehab  PT - ROM, Bed mobility, Transfers, Ambulation with assistive device  OT - ADLs, ROM  Precautions - Falls, Cardiac, Right posterior hip   DVT Prophylaxis - Lovenox  Weight bearing status - WBAT right LE  Skin - Turn Q2hrs  Diet -DASH/TLC HPI:  62F PMHx HTN, breast CA (on tamoxifen), OA, elective right DEMARIO 11/30 and discharged home without complication on 12/4, presented to [facility] 12/5 with dyspnea with exertion. She was admitted for cardiac work-up, as troponins were mildly elevated on admission, and to rule out PE. Lower extremity dopplers showed no evidence of DVT. CTA of the chest was a limited study, but showed no evidence of central or upper lobe PE. VQ scan showed very low probability of PE. TTE was a normal study with EF of 75%, nuclear stress test was also normal. Cardiology has been following and elevated troponins on admission have since trended down and were thought to be secondary to rhabdomyolysis post-op. Dyspnea with exertion has greatly improved since admission.      REVIEW OF SYSTEMS  Constitutional - No fever, No fatigue  HEENT - No visual disturbances, No difficulty hearing,  No neck pain  Respiratory - Mild SOB while working with PT, but improved since admission  Cardiovascular - No chest pain, No palpitations  Gastrointestinal - No abdominal pain, No nausea, No vomiting, No diarrhea, No constipation  Genitourinary - No dysuria, No frequency, No hematuria, No incontinence  Neurological - No headaches, No loss of strength, No numbness  Skin - No itching, No rashes  Endocrine - No temperature intolerance  Musculoskeletal - Chronic lower back and left hip pain secondary to OA and spinal stenosis, right hip pain controlled with Tylenol  Psychiatric - Anxious about going home  All other review of systems negative    PAST MEDICAL & SURGICAL HISTORY  Breast cancer  High cholesterol  High blood pressure  Atypical lipoma of soft tissue  OA  Spinal stenosis  History of hip replacement, total, right  History of lumpectomy of left breast  H/O foot surgery      SOCIAL HISTORY  Smoking - Denied  EtOH - Denied   Drugs - Denied    FUNCTIONAL HISTORY  Right hand dominant  Lives with [place], no steps inside  Independent in ambulation with RW, Required assist for IADLs prior to hospitalization  Does not drive, on disability secondary to chronic back and hip pain    CURRENT FUNCTIONAL STATUS  Bed mobility - min assist  Transfers - contact guard  Gait - Amb 50' RW contact guard    FAMILY HISTORY   Family history of breast cancer in sister (Sibling)  Family history of hypertension in mother      ALLERGIES  No Known Drug Allergies  shellfish (Swelling; Rash)    VITALS  T(C): 37.3 (09 Dec 2017 05:46), Max: 37.3 (09 Dec 2017 05:46)  T(F): 99.2 (09 Dec 2017 05:46), Max: 99.2 (09 Dec 2017 05:46)  HR: 97 (09 Dec 2017 05:46) (80 - 98)  BP: 110/62 (09 Dec 2017 05:46) (110/62 - 130/66)  RR: 20 (09 Dec 2017 08:00) (18 - 20)  SpO2: 98% (09 Dec 2017 08:00) (97% - 98%)      PHYSICAL EXAM  Constitutional - NAD, Comfortable resting in bed  HEENT - NCAT, EOMI  Neck - Supple  Chest - CTA bilaterally  Cardiovascular - RRR, S1S2  Abdomen - BS+, Soft, NTND  Extremities - Trace edema right LE, No calf tenderness   Skin - Dressing right hip c/d/i  Neurologic Exam -                    Cognitive - Awake, Alert, AAO to self, place, date, year, situation     Cranial Nerves - CN 2-12 grossly intact     Motor -                     LEFT    UE - ShAB 5/5, EF 5/5, EE 5/5, WE 5/5,  5/5                    RIGHT UE - ShAB 5/5, EF 5/5, EE 5/5, WE 5/5,  5/5                    LEFT    LE - HF 5/5, KE 5/5, DF 5/5, PF 5/5                    RIGHT LE - HF 3/5, KE 3/5, DF 5/5, PF 5/5 (limited secondary to pain)        Sensory - Intact to light touch     Reflexes - DTR intact and symmetrical     Psychiatric - Affect WNL    RECENT LABS/IMAGING                        9.2    8.5   )-----------( 255      ( 08 Dec 2017 11:17 )             27.9     12-08    141  |  105  |  8.0  ----------------------------<  106  4.0   |  25.0  |  0.53    Ca    8.3<L>      08 Dec 2017 11:17        MEDICATIONS   MEDICATIONS  (STANDING):  ALBUTerol    90 MICROgram(s) HFA Inhaler 1 Puff(s) Inhalation every 4 hours  aspirin enteric coated 81 milliGRAM(s) Oral daily  atorvastatin 20 milliGRAM(s) Oral at bedtime  enoxaparin Injectable 40 milliGRAM(s) SubCutaneous daily  furosemide    Tablet 20 milliGRAM(s) Oral daily  metoprolol     tartrate 25 milliGRAM(s) Oral two times a day  tamoxifen 20 milliGRAM(s) Oral daily  tiotropium 18 MICROgram(s) Capsule 1 Capsule(s) Inhalation daily    MEDICATIONS  (PRN):  acetaminophen   Tablet. 650 milliGRAM(s) Oral every 6 hours PRN Moderate Pain (4 - 6)  ALBUTerol/ipratropium for Nebulization 3 milliLiter(s) Nebulizer every 6 hours PRN Shortness of Breath and/or Wheezing  nitroglycerin     SubLingual 0.4 milliGRAM(s) SubLingual every 5 minutes PRN Chest Pain      ASSESSMENT/PLAN  62F s/p right DEMARIO secondary to OA complicated dyspnea on exertion, now resolved and negative cardiac work-up, now with functional, gait, ADL impairments.    Disposition - Recommend subacute rehab  PT - ROM, Bed mobility, Transfers, Ambulation with assistive device  OT - ADLs, ROM  Precautions - Falls, Cardiac, Right posterior hip   Right LE edema - May consider BAM stockings for LE edema  DVT Prophylaxis - Lovenox  Weight bearing status - WBAT right LE  Skin - Turn Q2hrs  Diet -DASH/TLC

## 2017-12-09 NOTE — PROGRESS NOTE ADULT - SUBJECTIVE AND OBJECTIVE BOX
JOSEY SAMAYOA    [id number]    Patient being followed status post right posterior total hip arthroplasty on 11/30/17. readmitted for SOB, undergoing cardiac workup. Orthopedically Patient is doing well. The patient's pain is controlled using the prescribed pain medications.  Denies nausea, vomiting, chest pain, shortness of breath, abdominal pain, LE N/T. No new complaints.                              9.2    8.5   )-----------( 255      ( 08 Dec 2017 11:17 )             27.9     12-08    141  |  105  |  8.0  ----------------------------<  106  4.0   |  25.0  |  0.53    Ca    8.3<L>      08 Dec 2017 11:17        MEDICATIONS  (STANDING):  ALBUTerol    90 MICROgram(s) HFA Inhaler 1 Puff(s) Inhalation every 4 hours  aspirin enteric coated 81 milliGRAM(s) Oral daily  atorvastatin 20 milliGRAM(s) Oral at bedtime  enoxaparin Injectable 40 milliGRAM(s) SubCutaneous daily  furosemide    Tablet 20 milliGRAM(s) Oral daily  influenza   Vaccine 0.5 milliLiter(s) IntraMuscular once  metoprolol     tartrate 25 milliGRAM(s) Oral two times a day  tamoxifen 20 milliGRAM(s) Oral daily  tiotropium 18 MICROgram(s) Capsule 1 Capsule(s) Inhalation daily    MEDICATIONS  (PRN):  acetaminophen   Tablet. 650 milliGRAM(s) Oral every 6 hours PRN Moderate Pain (4 - 6)  ALBUTerol/ipratropium for Nebulization 3 milliLiter(s) Nebulizer every 6 hours PRN Shortness of Breath and/or Wheezing  nitroglycerin     SubLingual 0.4 milliGRAM(s) SubLingual every 5 minutes PRN Chest Pain      Physical exam: The right hip dressing is clean, dry and intact. No drainage or discharge. No erythema is noted. No blistering. No ecchymosis. The calf is supple nontender. Passive range of motion is acceptable to due postoperative pain. No calf tenderness. Sensation to light touch is grossly intact distally. Motor function distally is 5/5. No foot drop. 2+ dorsalis pedis pulse. Capillary refill is less than 2 seconds. No cyanosis.    Primary Orthopedic Assessment:  • s/p RIGHT POSTERIOR total hip replacement 11/30/17.  ortho stable. readmitted to medicine for dyspnea on exertions, undergoing cardio workup, neg for PE, SOB now resolved    Secondary  Orthopedic Assessment(s):   •     Secondary  Medical Assessment(s):   •     Plan:   • Continue DVT prophylaxis: Lovenox 40 qd for 2 weeks post op, then ASA 325mg BID X 4 weeks,, including use of compression devices and ankle pumps  • Continue physical therapy  • Weightbearing as tolerated of the right lower extremity with assistance of a walker  • Incentive spirometry encouraged  • Pain control as clinically indicated  • Posterior hip precautions again reviewed with patient  • Discharge planning – as per medicine team  Ortho stable  continue care with  primary team, f/u with Dr. Wilson 3 weeks post operative

## 2017-12-09 NOTE — PROGRESS NOTE ADULT - SUBJECTIVE AND OBJECTIVE BOX
JOSEY SAMAYOA  ----------------------------------------  The patient was seen and evaluated for   The patient is in no acute distress.  Denied any chest pain, palpitations, dyspnea, or abdominal pain.    Vital Signs Last 24 Hrs  T(C): 37.3 (09 Dec 2017 05:46), Max: 37.3 (09 Dec 2017 05:46)  T(F): 99.2 (09 Dec 2017 05:46), Max: 99.2 (09 Dec 2017 05:46)  HR: 97 (09 Dec 2017 05:46) (80 - 98)  BP: 110/62 (09 Dec 2017 05:46) (110/62 - 130/66)  BP(mean): --  RR: 18 (09 Dec 2017 05:46) (18 - 19)  SpO2: 98% (09 Dec 2017 05:46) (97% - 98%)    CAPILLARY BLOOD GLUCOSE        PHYSICAL EXAMINATION:  ----------------------------------------    General appearance: NAD, Awake, Alert  HEENT: NCAT, Conjunctiva clear, EOMI  Neck: Supple, No JVD  Lungs: Clear to auscultation, Breath sound equal bilaterally  Cardiovascular: S1S2, Regular rhythm  Abdomen: Soft, Nontender, Positive bowel sounds  Extremities: No clubbing, No cyanosis, No edema  CNS: alert      LABORATORY STUDIES:  ----------------------------------------                        9.2    8.5   )-----------( 255      ( 08 Dec 2017 11:17 )             27.9     12-08    141  |  105  |  8.0  ----------------------------<  106  4.0   |  25.0  |  0.53    Ca    8.3<L>      08 Dec 2017 11:17        MEDICATIONS  (STANDING):    ALBUTerol    90 MICROgram(s) HFA Inhaler 1 Puff(s) Inhalation every 4 hours  aspirin enteric coated 81 milliGRAM(s) Oral daily  atorvastatin 20 milliGRAM(s) Oral at bedtime  enoxaparin Injectable 40 milliGRAM(s) SubCutaneous daily  furosemide    Tablet 20 milliGRAM(s) Oral daily  influenza   Vaccine 0.5 milliLiter(s) IntraMuscular once  metoprolol     tartrate 25 milliGRAM(s) Oral two times a day  tamoxifen 20 milliGRAM(s) Oral daily  tiotropium 18 MICROgram(s) Capsule 1 Capsule(s) Inhalation daily    MEDICATIONS  (PRN):  acetaminophen   Tablet. 650 milliGRAM(s) Oral every 6 hours PRN Moderate Pain (4 - 6)  ALBUTerol/ipratropium for Nebulization 3 milliLiter(s) Nebulizer every 6 hours PRN Shortness of Breath and/or Wheezing  nitroglycerin     SubLingual 0.4 milliGRAM(s) SubLingual every 5 minutes PRN Chest Pain      ASSESSMENT / PLAN:  ---------------------------------------- JOSEY ASMAYOA  ----------------------------------------    CC :  Follow up visit for shortness of breath with recent Hip surgery    Pain well controlled  No SOB  PT saw this morning, and she did good.   No CP    Vital Signs Last 24 Hrs    T(C): 37.3 (09 Dec 2017 05:46), Max: 37.3 (09 Dec 2017 05:46)  T(F): 99.2 (09 Dec 2017 05:46), Max: 99.2 (09 Dec 2017 05:46)  HR: 97 (09 Dec 2017 05:46) (80 - 98)  BP: 110/62 (09 Dec 2017 05:46) (110/62 - 130/66)  BP(mean): --  RR: 18 (09 Dec 2017 05:46) (18 - 19)  SpO2: 98% (09 Dec 2017 05:46) (97% - 98%)    CAPILLARY BLOOD GLUCOSE        PHYSICAL EXAMINATION:  ----------------------------------------    General appearance: NAD, Awake, Alert  HEENT: NCAT, Conjunctiva clear, EOMI  Neck: Supple, No JVD  Lungs: decreased  Breath sound equal bilaterally  Cardiovascular: S1S2, Regular rhythm  Abdomen: Soft, Nontender, Positive bowel sounds  Extremities: No clubbing, No cyanosis,   + edema feet both legs  + Rt Hip surgery covered with dressing. No oozing  CNS: alert and oriented times 3      LABORATORY STUDIES:  ----------------------------------------                        9.2    8.5   )-----------( 255      ( 08 Dec 2017 11:17 )             27.9     12-08    141  |  105  |  8.0  ----------------------------<  106  4.0   |  25.0  |  0.53    Ca    8.3<L>      08 Dec 2017 11:17      MEDICATIONS  (STANDING):    ALBUTerol    90 MICROgram(s) HFA Inhaler 1 Puff(s) Inhalation every 4 hours  aspirin enteric coated 81 milliGRAM(s) Oral daily  atorvastatin 20 milliGRAM(s) Oral at bedtime  enoxaparin Injectable 40 milliGRAM(s) SubCutaneous daily  furosemide    Tablet 20 milliGRAM(s) Oral daily  influenza   Vaccine 0.5 milliLiter(s) IntraMuscular once  metoprolol     tartrate 25 milliGRAM(s) Oral two times a day  tamoxifen 20 milliGRAM(s) Oral daily  tiotropium 18 MICROgram(s) Capsule 1 Capsule(s) Inhalation daily    MEDICATIONS  (PRN):  acetaminophen   Tablet. 650 milliGRAM(s) Oral every 6 hours PRN Moderate Pain (4 - 6)  ALBUTerol/ipratropium for Nebulization 3 milliLiter(s) Nebulizer every 6 hours PRN Shortness of Breath and/or Wheezing  nitroglycerin     SubLingual 0.4 milliGRAM(s) SubLingual every 5 minutes PRN Chest Pain      ASSESSMENT / PLAN:  ----------------------------------------

## 2017-12-09 NOTE — PROGRESS NOTE ADULT - ASSESSMENT
61 y/o female who had RT. DEMARIO 5 days ago and was discharged home yesterday came to ER as she was getting sob with minimal exertion. Rest improves sob. Pt. reports no cp or chest pressure. pt. has mild rt. hip area pain at surgical site. no cough. no fever. no abd. pain. no n/v. no dizziness reported.  pt. reports that she had noted water in her legs during the hospital stay. Pos -opeartive pt's bp and Hb dropped which got better with iv fluids and got one unit of prbc. At the time of admission pt. has no sig. complaints and her o2 sat is 98 to 99 % on RA. pt. was sent home on lovenox 40 mg SQ daily and aspirin 81 mg daily.   In ER today pt's doppler of lower ext is nrg. for dvt. pts ct angio chest is limited study but no PE is reported in main pulmonary artery. pt. seen by cardiologist dr. Moise who has recommended trend trop ( first one mildly elevated ),    ·  Problem: Dyspnea on exertion.  Plan: Shortness of breath is resolving. Considered angina equivalent for positive troponin trending down.  Echo to eval outflow track obstruction showing LVEF >75% no valvular abnormality.       ·  Problem: Essential hypertension.  Plan: continue furosemide 20 Mg oral daily.   continue metoprolol 25mg oral two times daily.    ·  Problem: NSTEMI (non-ST elevated myocardial infarction).  Plan: Positive troponin.  Normal nuclear stress test. Continue plavix, aspirin, metoprolol.       Problem/Plan - 4:  ·  Problem: History of breast cancer.  Plan: continue tamoxifen as ordered. 20Mg orally daily.     Disposition: Rehab eval.  PT 63 y/o female who had Rt sided Total Hip Arthroplasty 5 days ago and was discharged home  came to ER as she was getting sob with minimal exertion. Rest improves sob.  pt. reports that she had noted water in her legs during the hospital stay. Pos -opeartive pt's bp and Hb dropped which got better with iv fluids and got one unit of prbc. At the time of admission pt. has no sig. complaints and her o2 sat is 98 to 99 % on RA. pt. was sent home on lovenox 40 mg SQ daily and aspirin 81 mg daily.   In ER today pt's doppler of lower ext is nrg. for dvt. pts ct angio chest is limited study but no PE is reported in main pulmonary artery. pt. seen by cardiologist dr. Moise who has recommended trend trop ( first one mildly elevated ),     Dyspnea on exertion.   CT chest neg for PE and venous doppler neg for DVT   Echo showing LVEF >75% no valvular abnormality.     Mild elevated troponin: trended down  currently saturating well on RA  Cardiology following    ? NSTEMI (non-ST elevated myocardial infarction).   Normal nuclear stress test. Continue aspirin, metoprolol.    Cardiology following    Echo showing LVEF >75% no valvular abnormality.    Pulmonary edema : from recent aggressive fluid resuscitation blood transfusion from recent hip surgery  continue furosemide 20 Mg oral daily.    Echo showing LVEF >75% no valvular abnormality.        History of breast cancer.  continue tamoxifen as ordered. 20Mg orally daily.      Essential hypertension.   continue metoprolol 25mg oral two times daily.    DVT Prophylaxis : continue SQ Lovenox    Disposition: Rehab evaluated.  Recommended sub acute Rehab. consulted social work and case management 61 y/o female who had Rt sided Total Hip Arthroplasty 5 days ago and was discharged home  came to ER as she was getting sob with minimal exertion. Rest improves sob.  pt. reports that she had noted water in her legs during the hospital stay. Pos -opeartive pt's bp and Hb dropped which got better with iv fluids and got one unit of prbc. At the time of admission pt. has no sig. complaints and her o2 sat is 98 to 99 % on RA. pt. was sent home on lovenox 40 mg SQ daily and aspirin 81 mg daily.   In ER today pt's doppler of lower ext is nrg. for dvt. pts ct angio chest is limited study but no PE is reported in main pulmonary artery. pt. seen by cardiologist dr. Moise who has recommended trend trop ( first one mildly elevated ),     Dyspnea on exertion. likely from pulmonary edema  V/Q Scan low probability for PE and venous doppler neg for DVT   Echo showing LVEF >75% no valvular abnormality.     Mild elevated troponin: trended down, [facility], felt secondary to mild rhabdomyolysis and not from NSTEMI  currently saturating well on RA  Cardiology following  continue lasix 20 mg po daily    ? NSTEMI (non-ST elevated myocardial infarction).   Normal nuclear stress test. Continue aspirin, metoprolol.    Cardiology following    Echo showing LVEF >75% no valvular abnormality.    Pulmonary edema : from recent aggressive fluid resuscitation blood transfusion from recent hip surgery  continue furosemide 20 Mg oral daily.    Echo showing LVEF >75% no valvular abnormality.        History of breast cancer.  continue tamoxifen as ordered. 20Mg orally daily.     Rt Hip Arthroplasty 11/30/17 :  DVT prophylaxis: Lovenox 40 qd for 2 weeks post op, then ASA 325mg BID X 4 weeks,, including use of compression devices and ankle pumps  • Weightbearing as tolerated of the right lower extremity with assistance of a walker  f/u with Dr. Wilson 3 weeks post operative     Essential hypertension.   continue metoprolol 25mg oral two times daily.    DVT Prophylaxis : continue SQ Lovenox    Disposition: Rehab evaluated.  Recommended sub acute Rehab. consulted social work and case management

## 2017-12-09 NOTE — CONSULT NOTE ADULT - ATTENDING COMMENTS
Agree with resident. Patient does not meet acute rehab criteria.   Although patient was at home prior, she will be distressed with return to home.  Recommend [facility].

## 2017-12-10 PROCEDURE — 99232 SBSQ HOSP IP/OBS MODERATE 35: CPT

## 2017-12-10 RX ADMIN — Medication 81 MILLIGRAM(S): at 12:18

## 2017-12-10 RX ADMIN — ATORVASTATIN CALCIUM 20 MILLIGRAM(S): 80 TABLET, FILM COATED ORAL at 23:38

## 2017-12-10 RX ADMIN — ENOXAPARIN SODIUM 40 MILLIGRAM(S): 100 INJECTION SUBCUTANEOUS at 23:37

## 2017-12-10 RX ADMIN — Medication 20 MILLIGRAM(S): at 06:37

## 2017-12-10 RX ADMIN — Medication 25 MILLIGRAM(S): at 17:33

## 2017-12-10 RX ADMIN — Medication 650 MILLIGRAM(S): at 13:05

## 2017-12-10 RX ADMIN — Medication 25 MILLIGRAM(S): at 06:37

## 2017-12-10 RX ADMIN — Medication 650 MILLIGRAM(S): at 12:32

## 2017-12-10 RX ADMIN — TAMOXIFEN CITRATE 20 MILLIGRAM(S): 20 TABLET, FILM COATED ORAL at 12:18

## 2017-12-10 NOTE — PROGRESS NOTE ADULT - SUBJECTIVE AND OBJECTIVE BOX
JOSEY SAMAYOA  ----------------------------------------  The patient was seen and evaluated for   The patient is in no acute distress.  Denied any chest pain, palpitations, dyspnea, or abdominal pain.    Vital Signs Last 24 Hrs  T(C): 37.1 (10 Dec 2017 06:33), Max: 37.3 (09 Dec 2017 10:09)  T(F): 98.8 (10 Dec 2017 06:33), Max: 99.2 (09 Dec 2017 10:09)  HR: 88 (10 Dec 2017 06:33) (74 - 148)  BP: 108/58 (10 Dec 2017 06:33) (108/58 - 148/57)  BP(mean): --  RR: 18 (10 Dec 2017 06:33) (18 - 20)  SpO2: 100% (10 Dec 2017 06:33) (97% - 100%)    CAPILLARY BLOOD GLUCOSE        PHYSICAL EXAMINATION:  ----------------------------------------      LABORATORY STUDIES:  ----------------------------------------                        9.2    8.5   )-----------( 255      ( 08 Dec 2017 11:17 )             27.9     12-08    141  |  105  |  8.0  ----------------------------<  106  4.0   |  25.0  |  0.53    Ca    8.3<L>      08 Dec 2017 11:17                        MEDICATIONS  (STANDING):  ALBUTerol    90 MICROgram(s) HFA Inhaler 1 Puff(s) Inhalation every 4 hours  aspirin enteric coated 81 milliGRAM(s) Oral daily  atorvastatin 20 milliGRAM(s) Oral at bedtime  enoxaparin Injectable 40 milliGRAM(s) SubCutaneous daily  furosemide    Tablet 20 milliGRAM(s) Oral daily  influenza   Vaccine 0.5 milliLiter(s) IntraMuscular once  metoprolol     tartrate 25 milliGRAM(s) Oral two times a day  tamoxifen 20 milliGRAM(s) Oral daily  tiotropium 18 MICROgram(s) Capsule 1 Capsule(s) Inhalation daily    MEDICATIONS  (PRN):  acetaminophen   Tablet. 650 milliGRAM(s) Oral every 6 hours PRN Moderate Pain (4 - 6)  ALBUTerol/ipratropium for Nebulization 3 milliLiter(s) Nebulizer every 6 hours PRN Shortness of Breath and/or Wheezing  nitroglycerin     SubLingual 0.4 milliGRAM(s) SubLingual every 5 minutes PRN Chest Pain      ASSESSMENT / PLAN:  ---------------------------------------- JOSEY SAMAYOA  ----------------------------------------    CC :  Follow up visit for shortness of breath with recent Hip surgery    Pain well controlled  No SOB  waiting for [facility] placement    Vital Signs Last 24 Hrs    T(C): 37.1 (10 Dec 2017 06:33), Max: 37.3 (09 Dec 2017 10:09)  T(F): 98.8 (10 Dec 2017 06:33), Max: 99.2 (09 Dec 2017 10:09)  HR: 88 (10 Dec 2017 06:33) (74 - 148)  BP: 108/58 (10 Dec 2017 06:33) (108/58 - 148/57)  BP(mean): --  RR: 18 (10 Dec 2017 06:33) (18 - 20)  SpO2: 100% (10 Dec 2017 06:33) (97% - 100%)    CAPILLARY BLOOD GLUCOSE      PHYSICAL EXAMINATION:  ----------------------------------------      HEENT: NCAT, Conjunctiva clear, EOMI  Neck: Supple, No JVD  Lungs: decreased  Breath sound equal bilaterally  Cardiovascular: S1S2, Regular rhythm  Abdomen: Soft, Nontender, Positive bowel sounds  Extremities: No clubbing, No cyanosis,   + edema feet both legs  + Rt Hip surgery covered with dressing. No oozing      LABORATORY STUDIES:  ----------------------------------------                        9.2    8.5   )-----------( 255      ( 08 Dec 2017 11:17 )             27.9     12-08    141  |  105  |  8.0  ----------------------------<  106  4.0   |  25.0  |  0.53    Ca    8.3<L>      08 Dec 2017 11:17                        MEDICATIONS  (STANDING):  ALBUTerol    90 MICROgram(s) HFA Inhaler 1 Puff(s) Inhalation every 4 hours  aspirin enteric coated 81 milliGRAM(s) Oral daily  atorvastatin 20 milliGRAM(s) Oral at bedtime  enoxaparin Injectable 40 milliGRAM(s) SubCutaneous daily  furosemide    Tablet 20 milliGRAM(s) Oral daily  influenza   Vaccine 0.5 milliLiter(s) IntraMuscular once  metoprolol     tartrate 25 milliGRAM(s) Oral two times a day  tamoxifen 20 milliGRAM(s) Oral daily  tiotropium 18 MICROgram(s) Capsule 1 Capsule(s) Inhalation daily    MEDICATIONS  (PRN):  acetaminophen   Tablet. 650 milliGRAM(s) Oral every 6 hours PRN Moderate Pain (4 - 6)  ALBUTerol/ipratropium for Nebulization 3 milliLiter(s) Nebulizer every 6 hours PRN Shortness of Breath and/or Wheezing  nitroglycerin     SubLingual 0.4 milliGRAM(s) SubLingual every 5 minutes PRN Chest Pain      ASSESSMENT / PLAN:  ----------------------------------------

## 2017-12-10 NOTE — PROGRESS NOTE ADULT - ASSESSMENT
61 y/o female who had Rt sided Total Hip Arthroplasty 5 days ago and was discharged home  came to ER as she was getting sob with minimal exertion. Rest improves sob.  pt. reports that she had noted water in her legs during the hospital stay. Pos -opeartive pt's bp and Hb dropped which got better with iv fluids and got one unit of prbc. At the time of admission pt. has no sig. complaints and her o2 sat is 98 to 99 % on RA. pt. was sent home on lovenox 40 mg SQ daily and aspirin 81 mg daily.   In ER today pt's doppler of lower ext is nrg. for dvt. pts ct angio chest is limited study but no PE is reported in main pulmonary artery. pt. seen by cardiologist dr. Moise who has recommended trend trop ( first one mildly elevated ),     Dyspnea on exertion. likely from pulmonary edema  V/Q Scan low probability for PE and venous doppler neg for DVT   Echo showing LVEF >75% no valvular abnormality.     Mild elevated troponin: trended down, [facility] was [facility], felt secondary to mild rhabdomyolysis and not from NSTEMI  currently saturating well on RA  Cardiology following  continue lasix 20 mg po daily    ? NSTEMI (non-ST elevated myocardial infarction).   Normal nuclear stress test. Continue aspirin, metoprolol.    Cardiology following    Echo showing LVEF >75% no valvular abnormality.    Pulmonary edema : from recent aggressive fluid resuscitation blood transfusion from recent hip surgery  continue furosemide 20 Mg oral daily.    Echo showing LVEF >75% no valvular abnormality.        History of breast cancer.  continue tamoxifen as ordered. 20Mg orally daily.     Rt Hip Arthroplasty 11/30/17 :  DVT prophylaxis: Lovenox 40 qd for 2 weeks post op, then ASA 325mg BID X 4 weeks,, including use of compression devices and ankle pumps  • Weightbearing as tolerated of the right lower extremity with assistance of a walker  f/u with Dr. Wilson 3 weeks post operative     Essential hypertension.   continue metoprolol 25mg oral two times daily.    DVT Prophylaxis : continue SQ Lovenox    Disposition: Rehab evaluated.  Recommended sub acute Rehab. consulted social work and case management 63 y/o female who had Rt sided Total Hip Arthroplasty 5 days ago and was discharged home  came to ER as she was getting sob with minimal exertion. Rest improves sob.  pt. reports that she had noted water in her legs during the hospital stay. Pos -opeartive pt's bp and Hb dropped which got better with iv fluids and got one unit of prbc. At the time of admission pt. has no sig. complaints and her o2 sat is 98 to 99 % on RA. pt. was sent home on lovenox 40 mg SQ daily and aspirin 81 mg daily.   In ER today pt's doppler of lower ext is nrg. for dvt. pts ct angio chest is limited study but no PE is reported in main pulmonary artery. pt. seen by cardiologist dr. Moise who has recommended trend trop ( first one mildly elevated ),     Dyspnea on exertion. likely from pulmonary edema  V/Q Scan low probability for PE and venous doppler neg for DVT   Echo showing LVEF >75% no valvular abnormality.     Mild elevated troponin: trended down, [facility], felt secondary to mild rhabdomyolysis and not from NSTEMI  currently saturating well on RA  Cardiology following  continue lasix 20 mg po daily    Mild elevated troponin: from Mild Rhabdomyolysis and not NSTEMI    Normal nuclear stress test. Continue aspirin, metoprolol.    Cardiology following. Appreciate recommendations   Echo showing LVEF >75% no valvular abnormality.    Pulmonary edema : from recent aggressive fluid resuscitation blood transfusion from recent hip surgery  continue furosemide 20 Mg oral daily.    Echo showing LVEF >75% no valvular abnormality.        History of breast cancer.  continue tamoxifen as ordered. 20Mg orally daily.     Rt Hip Arthroplasty 11/30/17 :  DVT prophylaxis: Lovenox 40 qd for 2 weeks post op, then ASA 325mg BID X 4 weeks,, including use of compression devices and ankle pumps  • Weightbearing as tolerated of the right lower extremity with assistance of a walker  f/u with Dr. Wilson 3 weeks post operative     Essential hypertension.   continue metoprolol 25mg oral two times daily.    DVT Prophylaxis : continue SQ Lovenox    Disposition: Rehab evaluated.  Recommended sub acute Rehab. consulted social work and case management

## 2017-12-11 PROCEDURE — 99232 SBSQ HOSP IP/OBS MODERATE 35: CPT

## 2017-12-11 RX ADMIN — Medication 650 MILLIGRAM(S): at 05:15

## 2017-12-11 RX ADMIN — INFLUENZA VIRUS VACCINE 0.5 MILLILITER(S): 15; 15; 15; 15 SUSPENSION INTRAMUSCULAR at 14:41

## 2017-12-11 RX ADMIN — Medication 20 MILLIGRAM(S): at 04:44

## 2017-12-11 RX ADMIN — Medication 25 MILLIGRAM(S): at 17:06

## 2017-12-11 RX ADMIN — ATORVASTATIN CALCIUM 20 MILLIGRAM(S): 80 TABLET, FILM COATED ORAL at 21:28

## 2017-12-11 RX ADMIN — Medication 25 MILLIGRAM(S): at 04:44

## 2017-12-11 RX ADMIN — ENOXAPARIN SODIUM 40 MILLIGRAM(S): 100 INJECTION SUBCUTANEOUS at 11:17

## 2017-12-11 RX ADMIN — TAMOXIFEN CITRATE 20 MILLIGRAM(S): 20 TABLET, FILM COATED ORAL at 11:16

## 2017-12-11 RX ADMIN — Medication 81 MILLIGRAM(S): at 11:16

## 2017-12-11 RX ADMIN — Medication 650 MILLIGRAM(S): at 04:44

## 2017-12-11 NOTE — PROGRESS NOTE ADULT - SUBJECTIVE AND OBJECTIVE BOX
JOSEY SAMAYOA    [id number]    History: 62y Female is status post R total hip arthroplasty 11/30/17, admitted with dyspnea on exertion. Patient is doing well and is comfortable. The patient's pain is controlled using the prescribed pain medications. The patient is participating in physical therapy. Denies nausea, vomiting, chest pain, shortness of breath, abdominal pain or fever. No new complaints.      MEDICATIONS  (STANDING):  ALBUTerol    90 MICROgram(s) HFA Inhaler 1 Puff(s) Inhalation every 4 hours  aspirin enteric coated 81 milliGRAM(s) Oral daily  atorvastatin 20 milliGRAM(s) Oral at bedtime  enoxaparin Injectable 40 milliGRAM(s) SubCutaneous daily  furosemide    Tablet 20 milliGRAM(s) Oral daily  influenza   Vaccine 0.5 milliLiter(s) IntraMuscular once  metoprolol     tartrate 25 milliGRAM(s) Oral two times a day  tamoxifen 20 milliGRAM(s) Oral daily  tiotropium 18 MICROgram(s) Capsule 1 Capsule(s) Inhalation daily    MEDICATIONS  (PRN):  acetaminophen   Tablet. 650 milliGRAM(s) Oral every 6 hours PRN Moderate Pain (4 - 6)  ALBUTerol/ipratropium for Nebulization 3 milliLiter(s) Nebulizer every 6 hours PRN Shortness of Breath and/or Wheezing  nitroglycerin     SubLingual 0.4 milliGRAM(s) SubLingual every 5 minutes PRN Chest Pain      Physical exam: The dressing is clean, dry and intact. No drainage or discharge. No erythema is noted. No blistering. No ecchymosis. The calf is supple nontender. Passive range of motion is acceptable to due postoperative pain. Sensation to light touch is grossly intact distally. Extensor hallucis longus and flexor hallucis longus are intact. No foot drop. 2+ dorsalis pedis pulse. Capillary refill is less than 2 seconds. No cyanosis.    Primary Orthopedic Assessment:  • 62y Female is status post R total hip arthroplasty 11/30/17, admitted with dyspnea on exertion    Secondary  Orthopedic Assessment(s):   •     Secondary  Medical Assessment(s):   •     Plan:   • continue medical management  • DVT prophylaxis as prescribed, including use of compression devices and ankle pumps  • Continue physical therapy  • Weightbearing as tolerated of the right lower extremity with assistance of a walker  • Incentive spirometry encouraged  • Pain control as clinically indicated  • Discharge planning – anticipated discharge subacute rehabilitation when medically stable

## 2017-12-11 NOTE — PROGRESS NOTE ADULT - SUBJECTIVE AND OBJECTIVE BOX
Patient doing well this AM.   Having some mild discomfort in the hip with associated swelling.  Awaiting dispo planning.  Patient having BMs and is needing assistance to use bedside commode.     FUNCTIONAL PROGRESS  Cassie 50 feet RW    REVIEW OF SYSTEMS  Constitutional - No fever,  +fatigue  HEENT - No vertigo, No neck pain  Neurological - +loss of strength, No numbness, No tremors  Skin - No rashes, No lesions   Musculoskeletal - +joint pain, +joint swelling, +muscle pain  Psychiatric - No depression, No anxiety    VITALS  T(C): 36.8 (12-11-17 @ 00:34), Max: 37.3 (12-10-17 @ 12:16)  HR: 85 (12-11-17 @ 00:34) (85 - 94)  BP: 128/75 (12-11-17 @ 00:34) (108/48 - 130/68)  RR: 18 (12-11-17 @ 00:34) (18 - 20)  SpO2: 98% (12-11-17 @ 00:34) (98% - 100%)  Wt(kg): --    MEDICATIONS   acetaminophen   Tablet. 650 milliGRAM(s) every 6 hours PRN  ALBUTerol    90 MICROgram(s) HFA Inhaler 1 Puff(s) every 4 hours  ALBUTerol/ipratropium for Nebulization 3 milliLiter(s) every 6 hours PRN  aspirin enteric coated 81 milliGRAM(s) daily  atorvastatin 20 milliGRAM(s) at bedtime  enoxaparin Injectable 40 milliGRAM(s) daily  furosemide    Tablet 20 milliGRAM(s) daily  influenza   Vaccine 0.5 milliLiter(s) once  metoprolol     tartrate 25 milliGRAM(s) two times a day  nitroglycerin     SubLingual 0.4 milliGRAM(s) every 5 minutes PRN  tamoxifen 20 milliGRAM(s) daily  tiotropium 18 MICROgram(s) Capsule 1 Capsule(s) daily    PHYSICAL EXAM  Constitutional - NAD, Comfortable  Extremities - Right LE edema - nonpitting, No calf tenderness   Neurologic Exam -                    Cognitive - Awake, Alert, AAO to self, place, date, year, situation     Motor -                    RIGHT LE - HF 2/5, KE 2/5, DF 5/5, PF 5/5 (limited secondary to pain)        Sensory - Intact to light touch     Psychiatric - Affect WNL      ASSESSMENT/PLAN  62F s/p right DEMARIO secondary to OA complicated dyspnea on exertion, now resolved and negative cardiac work-up, now with functional, gait, ADL impairments.    Disposition - Patient overly anxious regarding return home. Patient would best be served with supervised program in [facility]. Continue bedside therapy as well as OOB thorughout the day with mobilization throughout the day with staff to maintain cardiopulmonary function and prevention of secondary complications related to debility.   Precautions - Falls, Cardiac, Right posterior hip   Right LE edema - Recommend TEDS  DVT Prophylaxis - Lovenox  Weight bearing status - WBAT right LE  Skin - Turn Q2hrs  Diet -DASH/TLC

## 2017-12-11 NOTE — PROGRESS NOTE ADULT - ASSESSMENT
61 y/o female who had Rt sided Total Hip Arthroplasty 5 days ago and was discharged home  came to ER as she was getting sob with minimal exertion. Rest improves sob.  pt. reports that she had noted water in her legs during the hospital stay. Pos -opeartive pt's bp and Hb dropped which got better with iv fluids and got one unit of prbc. At the time of admission pt. has no sig. complaints and her o2 sat is 98 to 99 % on RA. pt. was sent home on lovenox 40 mg SQ daily and aspirin 81 mg daily.   In ER today pt's doppler of lower ext is nrg. for dvt. pts ct angio chest is limited study but no PE is reported in main pulmonary artery. pt. seen by cardiologist dr. Moise who has recommended trend trop ( first one mildly elevated ),     Dyspnea on exertion. likely from pulmonary edema  V/Q Scan low probability for PE and venous doppler neg for DVT   Echo showing LVEF >75% no valvular abnormality.     Mild elevated troponin: trended down, [facility], felt secondary to mild rhabdomyolysis and not from NSTEMI  currently saturating well on RA  Cardiology following  continue lasix 20 mg po daily for 3 more days and then stop    Mild elevated troponin: from Mild Rhabdomyolysis and not NSTEMI    Normal nuclear stress test. Continue aspirin, metoprolol.    Cardiology following. Appreciate recommendations   Echo showing LVEF >75% no valvular abnormality.    Pulmonary edema : from recent aggressive fluid resuscitation blood transfusion from recent hip surgery  continue furosemide 20 Mg oral daily.    Echo showing LVEF >75% no valvular abnormality.        History of breast cancer.  continue tamoxifen as ordered. 20Mg orally daily.     Rt Hip Arthroplasty 11/30/17 :  DVT prophylaxis: Lovenox 40 qd for 2 weeks post op, then ASA 325mg BID X 4 weeks,, including use of compression devices and ankle pumps  • Weightbearing as tolerated of the right lower extremity with assistance of a walker  f/u with Dr. Wilson 3 weeks post operative     Essential hypertension.   continue metoprolol 25mg oral two times daily.    DVT Prophylaxis : continue SQ Lovenox    Disposition: Rehab evaluated.  Recommended sub acute Rehab. consulted social work and case management, and currently placement pending

## 2017-12-11 NOTE — PROGRESS NOTE ADULT - SUBJECTIVE AND OBJECTIVE BOX
JOSEY SAMAYOA  ----------------------------------------    CC :  Follow up visit for shortness of breath with recent Hip surgery    Urinating very frequently all night   waiting for [facility] placement  no new complaints       Vital Signs Last 24 Hrs    T(C): 37.3 (11 Dec 2017 08:30), Max: 37.3 (11 Dec 2017 08:30)  T(F): 99.2 (11 Dec 2017 08:30), Max: 99.2 (11 Dec 2017 08:30)  HR: 85 (11 Dec 2017 08:30) (85 - 94)  BP: 109/56 (11 Dec 2017 08:30) (109/56 - 130/68)  BP(mean): --  RR: 18 (11 Dec 2017 08:30) (18 - 20)  SpO2: 99% (11 Dec 2017 08:30) (98% - 100%)    CAPILLARY BLOOD GLUCOSE        PHYSICAL EXAMINATION:  ----------------------------------------    HEENT: NCAT, Conjunctiva clear, EOMI  Neck: Supple, No JVD  Lungs: decreased  Breath sound equal bilaterally  Cardiovascular: S1S2, Regular rhythm  Abdomen: Soft, Nontender, Positive bowel sounds  Extremities: No clubbing, No cyanosis,   + edema feet both legs  + Rt Hip surgery covered with dressing. No oozing      LABORATORY STUDIES:  --------------------------------------    MEDICATIONS  (STANDING):  ALBUTerol    90 MICROgram(s) HFA Inhaler 1 Puff(s) Inhalation every 4 hours  aspirin enteric coated 81 milliGRAM(s) Oral daily  atorvastatin 20 milliGRAM(s) Oral at bedtime  enoxaparin Injectable 40 milliGRAM(s) SubCutaneous daily  furosemide    Tablet 20 milliGRAM(s) Oral daily  influenza   Vaccine 0.5 milliLiter(s) IntraMuscular once  metoprolol     tartrate 25 milliGRAM(s) Oral two times a day  tamoxifen 20 milliGRAM(s) Oral daily  tiotropium 18 MICROgram(s) Capsule 1 Capsule(s) Inhalation daily    MEDICATIONS  (PRN):  acetaminophen   Tablet. 650 milliGRAM(s) Oral every 6 hours PRN Moderate Pain (4 - 6)  ALBUTerol/ipratropium for Nebulization 3 milliLiter(s) Nebulizer every 6 hours PRN Shortness of Breath and/or Wheezing  nitroglycerin     SubLingual 0.4 milliGRAM(s) SubLingual every 5 minutes PRN Chest Pain      ASSESSMENT / PLAN:  ----------------------------------------

## 2017-12-11 NOTE — DIETITIAN INITIAL EVALUATION ADULT. - PROBLEM SELECTOR PLAN 1
clinically pt. has no active cp. will trend trop, echo, one full dose of lovenox ordered. pt. got aspirin 325 mg which will be continued. Dr. Moise has ordered V/Q scan. SL NTG prn.

## 2017-12-12 LAB
ANION GAP SERPL CALC-SCNC: 12 MMOL/L — SIGNIFICANT CHANGE UP (ref 5–17)
BUN SERPL-MCNC: 8 MG/DL — SIGNIFICANT CHANGE UP (ref 8–20)
CALCIUM SERPL-MCNC: 8.7 MG/DL — SIGNIFICANT CHANGE UP (ref 8.6–10.2)
CHLORIDE SERPL-SCNC: 104 MMOL/L — SIGNIFICANT CHANGE UP (ref 98–107)
CO2 SERPL-SCNC: 26 MMOL/L — SIGNIFICANT CHANGE UP (ref 22–29)
CREAT SERPL-MCNC: 0.54 MG/DL — SIGNIFICANT CHANGE UP (ref 0.5–1.3)
GLUCOSE SERPL-MCNC: 111 MG/DL — SIGNIFICANT CHANGE UP (ref 70–115)
POTASSIUM SERPL-MCNC: 4.2 MMOL/L — SIGNIFICANT CHANGE UP (ref 3.5–5.3)
POTASSIUM SERPL-SCNC: 4.2 MMOL/L — SIGNIFICANT CHANGE UP (ref 3.5–5.3)
SODIUM SERPL-SCNC: 142 MMOL/L — SIGNIFICANT CHANGE UP (ref 135–145)

## 2017-12-12 PROCEDURE — 99232 SBSQ HOSP IP/OBS MODERATE 35: CPT

## 2017-12-12 RX ADMIN — ATORVASTATIN CALCIUM 20 MILLIGRAM(S): 80 TABLET, FILM COATED ORAL at 21:09

## 2017-12-12 RX ADMIN — Medication 20 MILLIGRAM(S): at 06:00

## 2017-12-12 RX ADMIN — Medication 25 MILLIGRAM(S): at 06:00

## 2017-12-12 RX ADMIN — Medication 650 MILLIGRAM(S): at 17:54

## 2017-12-12 RX ADMIN — Medication 650 MILLIGRAM(S): at 18:30

## 2017-12-12 RX ADMIN — TAMOXIFEN CITRATE 20 MILLIGRAM(S): 20 TABLET, FILM COATED ORAL at 10:49

## 2017-12-12 RX ADMIN — Medication 25 MILLIGRAM(S): at 17:53

## 2017-12-12 RX ADMIN — Medication 81 MILLIGRAM(S): at 10:49

## 2017-12-12 RX ADMIN — ENOXAPARIN SODIUM 40 MILLIGRAM(S): 100 INJECTION SUBCUTANEOUS at 10:50

## 2017-12-12 NOTE — PROGRESS NOTE ADULT - SUBJECTIVE AND OBJECTIVE BOX
JOSEY SAMAYOA  ----------------------------------------    CC :  Follow up visit for shortness of breath with recent Hip surgery    waiting for [facility] placement  no new complaints   Normal electrolytes       Vital Signs Last 24 Hrs  T(C): 37.4 (12 Dec 2017 12:36), Max: 37.4 (12 Dec 2017 12:36)  T(F): 99.4 (12 Dec 2017 12:36), Max: 99.4 (12 Dec 2017 12:36)  HR: 92 (12 Dec 2017 12:36) (87 - 103)  BP: 115/59 (12 Dec 2017 12:36) (113/67 - 125/64)  BP(mean): --  RR: 18 (12 Dec 2017 12:36) (18 - 18)  SpO2: 96% (12 Dec 2017 12:36) (96% - 97%)    CAPILLARY BLOOD GLUCOSE        PHYSICAL EXAMINATION:  ----------------------------------------    General appearance: NAD, Awake, Alert  HEENT: NCAT, Conjunctiva clear, EOMI  Neck: Supple, No JVD  Lungs: Clear to auscultation, Breath sound equal bilaterally  Cardiovascular: S1S2, Regular rhythm  Abdomen: Soft, Nontender, Positive bowel sounds  Extremities: 1+ pedal edema B/L Lower limbs   CNS: alert and oriented times 3    LABORATORY STUDIES:  ----------------------------------------    12-12    142  |  104  |  8.0  ----------------------------<  111  4.2   |  26.0  |  0.54    Ca    8.7      12 Dec 2017 08:38      MEDICATIONS  (STANDING):  ALBUTerol    90 MICROgram(s) HFA Inhaler 1 Puff(s) Inhalation every 4 hours  aspirin enteric coated 81 milliGRAM(s) Oral daily  atorvastatin 20 milliGRAM(s) Oral at bedtime  enoxaparin Injectable 40 milliGRAM(s) SubCutaneous daily  furosemide    Tablet 20 milliGRAM(s) Oral daily  metoprolol     tartrate 25 milliGRAM(s) Oral two times a day  tamoxifen 20 milliGRAM(s) Oral daily  tiotropium 18 MICROgram(s) Capsule 1 Capsule(s) Inhalation daily    MEDICATIONS  (PRN):  acetaminophen   Tablet. 650 milliGRAM(s) Oral every 6 hours PRN Moderate Pain (4 - 6)  ALBUTerol/ipratropium for Nebulization 3 milliLiter(s) Nebulizer every 6 hours PRN Shortness of Breath and/or Wheezing  nitroglycerin     SubLingual 0.4 milliGRAM(s) SubLingual every 5 minutes PRN Chest Pain      ASSESSMENT / PLAN:  ----------------------------------------

## 2017-12-12 NOTE — PROGRESS NOTE ADULT - ASSESSMENT
63 y/o female who had Rt sided Total Hip Arthroplasty 5 days ago and was discharged home  came to ER as she was getting sob with minimal exertion. Rest improves sob.  pt. reports that she had noted water in her legs during the hospital stay. Pos -opeartive pt's bp and Hb dropped which got better with iv fluids and got one unit of prbc. At the time of admission pt. has no sig. complaints and her o2 sat is 98 to 99 % on RA. pt. was sent home on lovenox 40 mg SQ daily and aspirin 81 mg daily.   In ER today pt's doppler of lower ext is nrg. for dvt. pts ct angio chest is limited study but no PE is reported in main pulmonary artery. pt. seen by cardiologist dr. Moise who has recommended trend trop ( first one mildly elevated ),     Dyspnea on exertion. likely from pulmonary edema  V/Q Scan low probability for PE and venous doppler neg for DVT   Echo showing LVEF >75% no valvular abnormality.     Mild elevated troponin: trended down, [facility] was [facility], felt secondary to mild rhabdomyolysis and not from NSTEMI  currently saturating well on RA  Cardiology following  continue lasix 20 mg po daily for 2 more days and then stop    Mild elevated troponin: from Mild Rhabdomyolysis and not NSTEMI    Normal nuclear stress test. Continue aspirin, metoprolol.    Cardiology following. Appreciate recommendations   Echo showing LVEF >75% no valvular abnormality.    Pulmonary edema : from recent aggressive fluid resuscitation blood transfusion from recent hip surgery  continue furosemide 20 Mg oral daily.    Echo showing LVEF >75% no valvular abnormality.        History of breast cancer.  continue tamoxifen as ordered. 20Mg orally daily.     Rt Hip Arthroplasty 11/30/17 :  DVT prophylaxis: Lovenox 40 qd for 2 weeks post op, then ASA 325mg BID X 4 weeks,, including use of compression devices and ankle pumps  • Weightbearing as tolerated of the right lower extremity with assistance of a walker  f/u with Dr. Wilson 3 weeks post operative     Essential hypertension.   continue metoprolol 25mg oral two times daily.    DVT Prophylaxis : continue SQ Lovenox    Disposition: Rehab evaluated.  Recommended sub acute Rehab. consulted social work and case management, and currently placement pending

## 2017-12-12 NOTE — PROGRESS NOTE ADULT - NSHPATTENDINGPLANDISCUSS_GEN_ALL_CORE
nursing staff, social work, case management, and patient.
social work and case management and nursing staff
patient, and Nursing staff
patient, Nursing staff, and physical therapy

## 2017-12-12 NOTE — PROGRESS NOTE ADULT - SUBJECTIVE AND OBJECTIVE BOX
Patient dong well.  Swelling is improved.  Currently has no pain.  Was able to mobilize OOB yesterday with PT.     FUNCTIONAL PROGRESS  12/11  Bed Mobility  Bed Mobility Training Supine-to-Sit: supervsion;  supervision;  bed rails  Bed Mobility Training Limitations: decreased ability to use legs for bridging/pushing;  decreased ROM;  pain    Sit-Stand Transfer Training  Transfer Training Sit-to-Stand Transfer: contact guard;  verbal cues;  weight-bearing as tolerated   1 person assist;  rolling walker  Transfer Training Stand-to-Sit Transfer: supervsion;  supervision;  weight-bearing as tolerated   rolling walker  Sit-to-Stand Transfer Training Transfer Safety Analysis: decreased step length;  decreased weight-shifting ability;  decreased flexibility;  decreased ROM;  pain;  rolling walker    Gait Training  Gait Training: supervsion;  verbal cues;  weight-bearing as tolerated   rolling walker;  25 feet;  Pt ambulates with flexed posture and with limited heel strike bilaterally.  Gait Analysis: 3-point gait   increased time in double stance;  decreased hip/knee flexion;  decreased step length;  decreased weight-shifting ability;  decreased ROM;  pain;  decreased flexibility;  25 feet;  rolling walker    REVIEW OF SYSTEMS  Constitutional - No fever,  No fatigue  HEENT - No vertigo, No neck pain  Neurological - No headaches, No memory loss, +loss of strength, No numbness, No tremors  Skin - No rashes, No lesions   Musculoskeletal - +joint pain, +joint swelling, +muscle pain  Psychiatric - No depression, No anxiety    VITALS  T(C): 37.4 (12-12-17 @ 15:20), Max: 37.4 (12-12-17 @ 12:36)  HR: 92 (12-12-17 @ 15:20) (87 - 103)  BP: 111/63 (12-12-17 @ 15:20) (111/63 - 125/64)  RR: 18 (12-12-17 @ 15:20) (18 - 18)  SpO2: 97% (12-12-17 @ 15:20) (96% - 97%)  Wt(kg): --    MEDICATIONS   acetaminophen   Tablet. 650 milliGRAM(s) every 6 hours PRN  ALBUTerol    90 MICROgram(s) HFA Inhaler 1 Puff(s) every 4 hours  ALBUTerol/ipratropium for Nebulization 3 milliLiter(s) every 6 hours PRN  aspirin enteric coated 81 milliGRAM(s) daily  atorvastatin 20 milliGRAM(s) at bedtime  enoxaparin Injectable 40 milliGRAM(s) daily  furosemide    Tablet 20 milliGRAM(s) daily  metoprolol     tartrate 25 milliGRAM(s) two times a day  nitroglycerin     SubLingual 0.4 milliGRAM(s) every 5 minutes PRN  tamoxifen 20 milliGRAM(s) daily  tiotropium 18 MICROgram(s) Capsule 1 Capsule(s) daily      RECENT LABS/IMAGING    12-12    142  |  104  |  8.0  ----------------------------<  111  4.2   |  26.0  |  0.54    Ca    8.7      12 Dec 2017 08:38      PHYSICAL EXAM  Constitutional - NAD, Comfortable  Extremities - Right LE edema - nonpitting, No calf tenderness   Neurologic Exam -                    Cognitive - Awake, Alert, AAO to self, place, date, year, situation     Motor -                    RIGHT LE - HF 2/5, KE 2/5, DF 5/5, PF 5/5 (limited secondary to pain)        Sensory - Intact to light touch     Psychiatric - Affect WNL      ASSESSMENT/PLAN  62F s/p right DEMARIO secondary to OA complicated dyspnea on exertion, now resolved and negative cardiac work-up, now with functional, gait, ADL impairments.    Rehab - Patient overly anxious regarding return home. Patient would best be served with supervised program in [facility]. Continue bedside therapy as well as OOB throughout the day with mobilization throughout the day with staff to maintain cardiopulmonary function and prevention of secondary complications related to debility.   Pain - Tylenol   Right LE edema - Recommend TEDS  DVT Prophylaxis - Lovenox

## 2017-12-13 ENCOUNTER — TRANSCRIPTION ENCOUNTER (OUTPATIENT)
Age: 62
End: 2017-12-13

## 2017-12-13 VITALS
HEART RATE: 95 BPM | RESPIRATION RATE: 18 BRPM | DIASTOLIC BLOOD PRESSURE: 59 MMHG | SYSTOLIC BLOOD PRESSURE: 114 MMHG | TEMPERATURE: 98 F | OXYGEN SATURATION: 97 %

## 2017-12-13 PROCEDURE — 97530 THERAPEUTIC ACTIVITIES: CPT

## 2017-12-13 PROCEDURE — 85027 COMPLETE CBC AUTOMATED: CPT

## 2017-12-13 PROCEDURE — 84484 ASSAY OF TROPONIN QUANT: CPT

## 2017-12-13 PROCEDURE — 85610 PROTHROMBIN TIME: CPT

## 2017-12-13 PROCEDURE — 99285 EMERGENCY DEPT VISIT HI MDM: CPT | Mod: 25

## 2017-12-13 PROCEDURE — 82550 ASSAY OF CK (CPK): CPT

## 2017-12-13 PROCEDURE — 71275 CT ANGIOGRAPHY CHEST: CPT

## 2017-12-13 PROCEDURE — 36415 COLL VENOUS BLD VENIPUNCTURE: CPT

## 2017-12-13 PROCEDURE — 78582 LUNG VENTILAT&PERFUS IMAGING: CPT

## 2017-12-13 PROCEDURE — 71045 X-RAY EXAM CHEST 1 VIEW: CPT

## 2017-12-13 PROCEDURE — 90686 IIV4 VACC NO PRSV 0.5 ML IM: CPT

## 2017-12-13 PROCEDURE — 99239 HOSP IP/OBS DSCHRG MGMT >30: CPT

## 2017-12-13 PROCEDURE — 82553 CREATINE MB FRACTION: CPT

## 2017-12-13 PROCEDURE — 78452 HT MUSCLE IMAGE SPECT MULT: CPT

## 2017-12-13 PROCEDURE — A9540: CPT

## 2017-12-13 PROCEDURE — A9500: CPT

## 2017-12-13 PROCEDURE — 93306 TTE W/DOPPLER COMPLETE: CPT

## 2017-12-13 PROCEDURE — 97116 GAIT TRAINING THERAPY: CPT

## 2017-12-13 PROCEDURE — 99232 SBSQ HOSP IP/OBS MODERATE 35: CPT

## 2017-12-13 PROCEDURE — 85730 THROMBOPLASTIN TIME PARTIAL: CPT

## 2017-12-13 PROCEDURE — 80048 BASIC METABOLIC PNL TOTAL CA: CPT

## 2017-12-13 PROCEDURE — A9567: CPT

## 2017-12-13 PROCEDURE — 93017 CV STRESS TEST TRACING ONLY: CPT

## 2017-12-13 PROCEDURE — 93005 ELECTROCARDIOGRAM TRACING: CPT

## 2017-12-13 PROCEDURE — 80053 COMPREHEN METABOLIC PANEL: CPT

## 2017-12-13 PROCEDURE — 83880 ASSAY OF NATRIURETIC PEPTIDE: CPT

## 2017-12-13 PROCEDURE — 93970 EXTREMITY STUDY: CPT

## 2017-12-13 PROCEDURE — 93308 TTE F-UP OR LMTD: CPT

## 2017-12-13 PROCEDURE — 97163 PT EVAL HIGH COMPLEX 45 MIN: CPT

## 2017-12-13 RX ORDER — FUROSEMIDE 40 MG
1 TABLET ORAL
Qty: 7 | Refills: 0 | OUTPATIENT
Start: 2017-12-13 | End: 2017-12-19

## 2017-12-13 RX ORDER — ASPIRIN/CALCIUM CARB/MAGNESIUM 324 MG
1 TABLET ORAL
Qty: 60 | Refills: 0 | OUTPATIENT
Start: 2017-12-13 | End: 2018-01-11

## 2017-12-13 RX ORDER — IPRATROPIUM/ALBUTEROL SULFATE 18-103MCG
3 AEROSOL WITH ADAPTER (GRAM) INHALATION
Qty: 0 | Refills: 0 | COMMUNITY
Start: 2017-12-13

## 2017-12-13 RX ORDER — METOPROLOL TARTRATE 50 MG
1 TABLET ORAL
Qty: 0 | Refills: 0 | COMMUNITY
Start: 2017-12-13

## 2017-12-13 RX ORDER — OXYCODONE HYDROCHLORIDE 5 MG/1
1 TABLET ORAL
Qty: 28 | Refills: 0 | OUTPATIENT
Start: 2017-12-13 | End: 2017-12-19

## 2017-12-13 RX ADMIN — Medication 25 MILLIGRAM(S): at 05:02

## 2017-12-13 RX ADMIN — Medication 20 MILLIGRAM(S): at 05:02

## 2017-12-13 RX ADMIN — ENOXAPARIN SODIUM 40 MILLIGRAM(S): 100 INJECTION SUBCUTANEOUS at 11:24

## 2017-12-13 RX ADMIN — Medication 81 MILLIGRAM(S): at 11:24

## 2017-12-13 NOTE — DISCHARGE NOTE ADULT - PLAN OF CARE
DECREASE LEG EDEMA CONTINUE lasix once a day for 1 more week and then stop follow up with Orthopedics, Dr Wilson in 2 weeks. continue ASPIRIN 325 MG PO BID for 4 weeks hold lipitor for 2 weeks. Check CPK levels in 2 weeks and if they are completely normal, resume statin

## 2017-12-13 NOTE — DISCHARGE NOTE ADULT - MEDICATION SUMMARY - MEDICATIONS TO STOP TAKING
I will STOP taking the medications listed below when I get home from the hospital:    Lovenox 40 mg/0.4 mL injectable solution  -- 40 milligram(s) injectable once a day

## 2017-12-13 NOTE — DISCHARGE NOTE ADULT - MEDICATION SUMMARY - MEDICATIONS TO TAKE
I will START or STAY ON the medications listed below when I get home from the hospital:    oxyCODONE 10 mg oral tablet  -- 1 tab(s) by mouth every 6 hours, As Needed -for moderate pain  0.5- MDD:4   -- Indication: For pain    aspirin 325 mg oral tablet  -- 1 tab(s) by mouth 2 times a day   -- Take with food or milk.    -- Indication: For prophylaxis     atorvastatin 20 mg oral tablet  -- 1 tab(s) by mouth once a day (at bedtime)  -- Indication: For Dyslipidemia    tamoxifen 20 mg oral tablet  -- 1 tab(s) by mouth once a day  -- Indication: For H/O BREAST CANCER     metoprolol tartrate 25 mg oral tablet  -- 1 tab(s) by mouth 2 times a day  -- Indication: For HTN    ipratropium-albuterol 0.5 mg-2.5 mg/3 mLinhalation solution  -- 3 milliliter(s) inhaled every 6 hours, As needed, Shortness of Breath and/or Wheezing  -- Indication: For PULMONARY EDEMA    furosemide 20 mg oral tablet  -- 1 tab(s) by mouth once a day  -- Indication: For PULMONARY EDEMA    Senna S 50 mg-8.6 mg oral tablet  -- 2 tab(s) by mouth once a day (at bedtime)   -- Medication should be taken with plenty of water.    -- Indication: For CONSTIPATION

## 2017-12-13 NOTE — DISCHARGE NOTE ADULT - OTHER SIGNIFICANT FINDINGS
Total time spent 45 minutes in discharge. more than 50 % time spent in counseling, education and coordination of patient's care

## 2017-12-13 NOTE — PROGRESS NOTE ADULT - SUBJECTIVE AND OBJECTIVE BOX
Patient OOB on commode.   Has not been able to make it to the bathroom as she has not been provided a walker for the room.  Patient has ongoing decreased edema of the right leg and hip.  Patient is also not complaining of pain.   Has mild discomfort.    FUNCTIONAL PROGRESS  Supervision/Min A for ambulation/transfers    REVIEW OF SYSTEMS  Constitutional - No fever,  No fatigue  HEENT - No vertigo, No neck pain  Neurological - No headaches, No memory loss, +loss of strength, No numbness, No tremors  Skin - No rashes, No lesions   Musculoskeletal - No joint pain, No joint swelling, +muscle pain  Psychiatric - No depression, No anxiety    VITALS  T(C): 37.1 (12-13-17 @ 07:56), Max: 37.4 (12-12-17 @ 12:36)  HR: 90 (12-13-17 @ 07:56) (90 - 92)  BP: 121/63 (12-13-17 @ 07:56) (102/63 - 121/63)  RR: 18 (12-13-17 @ 07:56) (18 - 18)  SpO2: 96% (12-13-17 @ 07:56) (96% - 97%)  Wt(kg): --    MEDICATIONS   acetaminophen   Tablet. 650 milliGRAM(s) every 6 hours PRN  ALBUTerol    90 MICROgram(s) HFA Inhaler 1 Puff(s) every 4 hours  ALBUTerol/ipratropium for Nebulization 3 milliLiter(s) every 6 hours PRN  aspirin enteric coated 81 milliGRAM(s) daily  atorvastatin 20 milliGRAM(s) at bedtime  enoxaparin Injectable 40 milliGRAM(s) daily  furosemide    Tablet 20 milliGRAM(s) daily  metoprolol     tartrate 25 milliGRAM(s) two times a day  nitroglycerin     SubLingual 0.4 milliGRAM(s) every 5 minutes PRN  tamoxifen 20 milliGRAM(s) daily  tiotropium 18 MICROgram(s) Capsule 1 Capsule(s) daily      RECENT LABS/IMAGING    12-12    142  |  104  |  8.0  ----------------------------<  111  4.2   |  26.0  |  0.54    Ca    8.7      12 Dec 2017 08:38            PHYSICAL EXAM  Constitutional - NAD, Comfortable  Extremities - Right LE edema - significantly improved, No calf tenderness   Neurologic Exam -                    Cognitive - Awake, Alert, AAO to self, place, date, year, situation     Motor -                    RIGHT LE - HF 2/5, KE 3/5, DF 5/5, PF 5/5 (limited secondary to pain)        Sensory - Intact to light touch     Psychiatric - Affect WNL      ASSESSMENT/PLAN  62F s/p right DEMARIO secondary to OA complicated dyspnea on exertion, now resolved and negative cardiac work-up, now with functional, gait, ADL impairments.  Rehab - Continue bedside therapy as well as OOB throughout the day with mobilization throughout the day with staff to maintain cardiopulmonary function and prevention of secondary complications related to debility. Patient does not meet criteria for acute rehab. Recommend [facility]AY.   Pain - Tylenol   DVT Prophylaxis - Lovenox

## 2017-12-13 NOTE — DISCHARGE NOTE ADULT - CARE PROVIDERS DIRECT ADDRESSES
[email or web address],[email or web address]_Unknown

## 2017-12-13 NOTE — PROGRESS NOTE ADULT - PROVIDER SPECIALTY LIST ADULT
Cardiology
Hospitalist
Orthopedics
Rehab Medicine
Hospitalist
Hospitalist

## 2017-12-13 NOTE — DISCHARGE NOTE ADULT - ADDITIONAL INSTRUCTIONS
1) follow up with Orthopedics, Dr Wilson in 2 weeks. continue ASPIRIN 325 MG PO BID for 4 weeks  2) Hold Lipitor for 2 weeks. Check CPK levels in 2 weeks and if they are completely normal, resume statin in consultation with your Cardiologist  3) Please check CBC, CPK and BMP in 1 week at rehab facility where you are staying. Please follow up with Primary Physician in 1 week  4)

## 2017-12-13 NOTE — DISCHARGE NOTE ADULT - CARE PLAN
Principal Discharge DX:	Pulmonary edema without heart failure  Goal:	DECREASE LEG EDEMA  Instructions for follow-up, activity and diet:	CONTINUE lasix once a day for 1 more week and then stop  Secondary Diagnosis:	History of hip replacement, total, right  Instructions for follow-up, activity and diet:	follow up with Orthopedics, Dr Wilson in 2 weeks. continue ASPIRIN 325 MG PO BID for 4 weeks  Secondary Diagnosis:	Essential hypertension  Secondary Diagnosis:	Dyspnea on exertion  Secondary Diagnosis:	Non-traumatic rhabdomyolysis  Instructions for follow-up, activity and diet:	hold lipitor for 2 weeks. Check CPK levels in 2 weeks and if they are completely normal, resume statin  Secondary Diagnosis:	History of breast cancer

## 2017-12-13 NOTE — DISCHARGE NOTE ADULT - MEDICATION SUMMARY - MEDICATIONS TO CHANGE
I will SWITCH the dose or number of times a day I take the medications listed below when I get home from the hospital:    Aspirin Enteric Coated 81 mg oral delayed release tablet  -- 1 tab(s) by mouth 2 times a day x 4 weeks, begin after completion of lovenox  -- Swallow whole.  Do not crush.  Take with food or milk.

## 2017-12-13 NOTE — DISCHARGE NOTE ADULT - SECONDARY DIAGNOSIS.
History of hip replacement, total, right Essential hypertension Dyspnea on exertion Non-traumatic rhabdomyolysis History of breast cancer

## 2017-12-13 NOTE — DISCHARGE NOTE ADULT - PROVIDER TOKENS
TOKEN:'[id number]',TOKEN:'[id number]',FREE:[LAST:[PCP],PHONE:[(   )    -],FAX:[(   )    -],ADDRESS:[1 WEEK. Check CBC, CMP and CPK levels]]

## 2017-12-13 NOTE — DISCHARGE NOTE ADULT - CARE PROVIDER_API CALL
Jerman Wilson), Orthopaedic Surgery  [address] [place] [address]  Phone: [phone]  Fax: [phone]    Vern Moise), Cardiovascular Disease  [address]  [place] [address]  Phone: [phone]  Fax: [phone]    PCP,   1 WEEK. Check CBC, CMP and CPK levels  Phone: (   )    -  Fax: (   )    -

## 2017-12-13 NOTE — DISCHARGE NOTE ADULT - HOSPITAL COURSE
63 y/o female who had Rt sided Total Hip Arthroplasty 5 days ago and was discharged home  came to ER as she was getting sob with minimal exertion. Rest improves sob.  pt. reports that she had noted water in her legs during the hospital stay. Pos operative pt's bp and Hb dropped which got better with iv fluids and got one unit of prbc. At the time of admission pt. has no sig. complaints and her o2 sat is 98 to 99 % on RA. pt. was sent home on Lovenox 40 mg SQ daily and aspirin 81 mg daily.   In ER today pt's doppler of lower ext is Neg for dvt. pts ct angio chest is limited study but no PE is reported in main pulmonary artery. pt. seen by cardiologist dr. Moise who has recommended trend trop ( first one mildly elevated ), 0.15     Dyspnea on exertion. likely from pulmonary edema with out CHF   V/Q Scan low probability for PE and venous doppler neg for DVT   Echo showing LVEF >75% no valvular abnormality.     Mild elevated troponin: trended down, [facility], felt secondary to mild rhabdomyolysis and not from NSTEMI  currently saturating well on RA  RECOMMENDED follow up with [facility] in 2 weeks post discharge  continue lasix 20 mg po daily for 7 more days and then stop  Her Lower extremity edema improved significantly     Mild elevated troponin: from Mild Rhabdomyolysis and not NSTEMI    Normal nuclear stress test. Continue aspirin, metoprolol.    [facility] consulted,  Appreciate recommendations   Echo showing LVEF >75% no valvular abnormality.  recommended hold statin for 2 weeks, check CPK levels and accordingly resume statin in consultation with Cardiologist    Pulmonary edema with out CHF: from recent aggressive fluid resuscitation and blood transfusion from recent hip surgery  continue furosemide 20 Mg oral daily, for 7 more days    Echo showing LVEF >75% no valvular abnormality.        History of breast cancer. Currently in remission.   continue tamoxifen as ordered. 20Mg orally daily.     Rt Hip Arthroplasty 11/30/17 :  DVT prophylaxis: Lovenox 40 qd for 2 weeks post op, then ASA 325mg BID X 4 weeks,, including use of compression devices and ankle pumps  • Weightbearing as tolerated of the right lower extremity with assistance of a walker  f/u with Dr. Wilson 3 weeks post operative  she has completed Lovenox 14 days and so D/C ED on ASA regimen as above per ortho recommendations     Essential hypertension.   continue metoprolol 25mg oral two times daily.    DVT Prophylaxis : continue SQ Lovenox    Disposition: Rehab evaluated.  Recommended sub acute Rehab. consulted social work and case management, and accordingly with their assistance, Discharged to sub acute rehab [place]

## 2017-12-22 ENCOUNTER — APPOINTMENT (OUTPATIENT)
Dept: ORTHOPEDIC SURGERY | Facility: CLINIC | Age: 62
End: 2017-12-22
Payer: MEDICAID

## 2017-12-22 VITALS
BODY MASS INDEX: 30.45 KG/M2 | DIASTOLIC BLOOD PRESSURE: 71 MMHG | WEIGHT: 194 LBS | HEART RATE: 98 BPM | HEIGHT: 67 IN | TEMPERATURE: 97.4 F | SYSTOLIC BLOOD PRESSURE: 133 MMHG

## 2017-12-22 PROCEDURE — 73502 X-RAY EXAM HIP UNI 2-3 VIEWS: CPT | Mod: RT

## 2017-12-22 PROCEDURE — 99024 POSTOP FOLLOW-UP VISIT: CPT

## 2017-12-22 RX ORDER — ENOXAPARIN SODIUM 100 MG/ML
40 INJECTION SUBCUTANEOUS
Qty: 5 | Refills: 0 | Status: ACTIVE | COMMUNITY
Start: 2017-12-01

## 2017-12-22 RX ORDER — IPRATROPIUM BROMIDE AND ALBUTEROL SULFATE 2.5; .5 MG/3ML; MG/3ML
0.5-2.5 (3) SOLUTION RESPIRATORY (INHALATION)
Qty: 180 | Refills: 0 | Status: ACTIVE | COMMUNITY
Start: 2017-12-13

## 2017-12-22 RX ORDER — CEFADROXIL 500 MG/1
500 CAPSULE ORAL
Qty: 22 | Refills: 0 | Status: ACTIVE | COMMUNITY
Start: 2017-07-19

## 2017-12-22 RX ORDER — METOPROLOL TARTRATE 25 MG/1
25 TABLET, FILM COATED ORAL
Qty: 60 | Refills: 0 | Status: ACTIVE | COMMUNITY
Start: 2017-12-13

## 2017-12-22 RX ORDER — FUROSEMIDE 20 MG/1
20 TABLET ORAL
Qty: 7 | Refills: 0 | Status: ACTIVE | COMMUNITY
Start: 2017-12-13

## 2017-12-22 RX ORDER — OXYCODONE AND ACETAMINOPHEN 5; 325 MG/1; MG/1
5-325 TABLET ORAL
Qty: 15 | Refills: 0 | Status: ACTIVE | COMMUNITY
Start: 2017-07-19

## 2017-12-22 RX ORDER — OXYCODONE 10 MG/1
10 TABLET ORAL
Qty: 40 | Refills: 0 | Status: ACTIVE | COMMUNITY
Start: 2017-12-13

## 2017-12-22 RX ORDER — SILVER SULFADIAZINE 10 MG/G
1 CREAM TOPICAL
Qty: 85 | Refills: 0 | Status: ACTIVE | COMMUNITY
Start: 2017-12-14

## 2018-01-16 ENCOUNTER — APPOINTMENT (OUTPATIENT)
Dept: ORTHOPEDIC SURGERY | Facility: CLINIC | Age: 63
End: 2018-01-16
Payer: MEDICAID

## 2018-01-16 VITALS
HEART RATE: 92 BPM | WEIGHT: 194 LBS | BODY MASS INDEX: 30.45 KG/M2 | SYSTOLIC BLOOD PRESSURE: 146 MMHG | DIASTOLIC BLOOD PRESSURE: 63 MMHG | HEIGHT: 67 IN

## 2018-01-16 PROCEDURE — 73502 X-RAY EXAM HIP UNI 2-3 VIEWS: CPT | Mod: RT

## 2018-01-16 PROCEDURE — 99214 OFFICE O/P EST MOD 30 MIN: CPT | Mod: 24

## 2018-01-22 ENCOUNTER — OUTPATIENT (OUTPATIENT)
Dept: OUTPATIENT SERVICES | Facility: HOSPITAL | Age: 63
LOS: 1 days | End: 2018-01-22
Payer: MEDICAID

## 2018-01-22 DIAGNOSIS — Z98.890 OTHER SPECIFIED POSTPROCEDURAL STATES: Chronic | ICD-10-CM

## 2018-01-22 DIAGNOSIS — D17.9 BENIGN LIPOMATOUS NEOPLASM, UNSPECIFIED: Chronic | ICD-10-CM

## 2018-01-22 DIAGNOSIS — Z51.89 ENCOUNTER FOR OTHER SPECIFIED AFTERCARE: ICD-10-CM

## 2018-01-22 DIAGNOSIS — Z96.641 PRESENCE OF RIGHT ARTIFICIAL HIP JOINT: ICD-10-CM

## 2018-01-22 DIAGNOSIS — Z96.641 PRESENCE OF RIGHT ARTIFICIAL HIP JOINT: Chronic | ICD-10-CM

## 2018-01-22 DIAGNOSIS — Z90.12 ACQUIRED ABSENCE OF LEFT BREAST AND NIPPLE: Chronic | ICD-10-CM

## 2018-02-12 PROCEDURE — 97010 HOT OR COLD PACKS THERAPY: CPT

## 2018-02-12 PROCEDURE — 97110 THERAPEUTIC EXERCISES: CPT

## 2018-02-12 PROCEDURE — 97163 PT EVAL HIGH COMPLEX 45 MIN: CPT

## 2018-02-12 PROCEDURE — 97140 MANUAL THERAPY 1/> REGIONS: CPT

## 2018-02-23 ENCOUNTER — APPOINTMENT (OUTPATIENT)
Dept: ORTHOPEDIC SURGERY | Facility: CLINIC | Age: 63
End: 2018-02-23
Payer: MEDICAID

## 2018-02-23 VITALS
BODY MASS INDEX: 30.45 KG/M2 | HEART RATE: 88 BPM | DIASTOLIC BLOOD PRESSURE: 73 MMHG | HEIGHT: 67 IN | WEIGHT: 194 LBS | SYSTOLIC BLOOD PRESSURE: 120 MMHG

## 2018-02-23 DIAGNOSIS — Z47.1 AFTERCARE FOLLOWING JOINT REPLACEMENT SURGERY: ICD-10-CM

## 2018-02-23 DIAGNOSIS — Z96.641 PRESENCE OF RIGHT ARTIFICIAL HIP JOINT: ICD-10-CM

## 2018-02-23 DIAGNOSIS — Z96.641 AFTERCARE FOLLOWING JOINT REPLACEMENT SURGERY: ICD-10-CM

## 2018-02-23 DIAGNOSIS — M16.12 UNILATERAL PRIMARY OSTEOARTHRITIS, LEFT HIP: ICD-10-CM

## 2018-02-23 PROCEDURE — 99214 OFFICE O/P EST MOD 30 MIN: CPT | Mod: 24

## 2018-02-23 PROCEDURE — 73502 X-RAY EXAM HIP UNI 2-3 VIEWS: CPT | Mod: RT

## 2018-02-23 RX ORDER — CHLORHEXIDINE GLUCONATE 4 %
325 (65 FE) LIQUID (ML) TOPICAL
Qty: 90 | Refills: 0 | Status: ACTIVE | COMMUNITY
Start: 2018-01-18

## 2018-02-23 RX ORDER — ADHESIVE TAPE 3"X 2.3 YD
50 MCG TAPE, NON-MEDICATED TOPICAL
Qty: 30 | Refills: 0 | Status: ACTIVE | COMMUNITY
Start: 2018-01-19

## 2018-03-12 ENCOUNTER — OUTPATIENT (OUTPATIENT)
Dept: OUTPATIENT SERVICES | Facility: HOSPITAL | Age: 63
LOS: 1 days | End: 2018-03-12
Payer: MEDICARE

## 2018-03-12 VITALS
SYSTOLIC BLOOD PRESSURE: 107 MMHG | HEART RATE: 81 BPM | DIASTOLIC BLOOD PRESSURE: 68 MMHG | TEMPERATURE: 98 F | RESPIRATION RATE: 18 BRPM | WEIGHT: 182.98 LBS | HEIGHT: 67 IN

## 2018-03-12 DIAGNOSIS — M16.12 UNILATERAL PRIMARY OSTEOARTHRITIS, LEFT HIP: ICD-10-CM

## 2018-03-12 DIAGNOSIS — Z01.818 ENCOUNTER FOR OTHER PREPROCEDURAL EXAMINATION: ICD-10-CM

## 2018-03-12 DIAGNOSIS — Z98.890 OTHER SPECIFIED POSTPROCEDURAL STATES: Chronic | ICD-10-CM

## 2018-03-12 DIAGNOSIS — D17.9 BENIGN LIPOMATOUS NEOPLASM, UNSPECIFIED: Chronic | ICD-10-CM

## 2018-03-12 DIAGNOSIS — Z90.12 ACQUIRED ABSENCE OF LEFT BREAST AND NIPPLE: Chronic | ICD-10-CM

## 2018-03-12 DIAGNOSIS — Z96.641 PRESENCE OF RIGHT ARTIFICIAL HIP JOINT: Chronic | ICD-10-CM

## 2018-03-12 LAB
ALBUMIN SERPL ELPH-MCNC: 3.6 G/DL — SIGNIFICANT CHANGE UP (ref 3.3–5.2)
ALP SERPL-CCNC: 68 U/L — SIGNIFICANT CHANGE UP (ref 40–120)
ALT FLD-CCNC: 14 U/L — SIGNIFICANT CHANGE UP
ANION GAP SERPL CALC-SCNC: 14 MMOL/L — SIGNIFICANT CHANGE UP (ref 5–17)
APTT BLD: 28.6 SEC — SIGNIFICANT CHANGE UP (ref 27.5–37.4)
AST SERPL-CCNC: 18 U/L — SIGNIFICANT CHANGE UP
BILIRUB SERPL-MCNC: 0.6 MG/DL — SIGNIFICANT CHANGE UP (ref 0.4–2)
BLD GP AB SCN SERPL QL: SIGNIFICANT CHANGE UP
BUN SERPL-MCNC: 10 MG/DL — SIGNIFICANT CHANGE UP (ref 8–20)
CALCIUM SERPL-MCNC: 9.3 MG/DL — SIGNIFICANT CHANGE UP (ref 8.6–10.2)
CHLORIDE SERPL-SCNC: 104 MMOL/L — SIGNIFICANT CHANGE UP (ref 98–107)
CO2 SERPL-SCNC: 24 MMOL/L — SIGNIFICANT CHANGE UP (ref 22–29)
COMMENT - BLOOD BANK: SIGNIFICANT CHANGE UP
CREAT SERPL-MCNC: 0.6 MG/DL — SIGNIFICANT CHANGE UP (ref 0.5–1.3)
GLUCOSE SERPL-MCNC: 105 MG/DL — SIGNIFICANT CHANGE UP (ref 70–115)
HCT VFR BLD CALC: 41.5 % — SIGNIFICANT CHANGE UP (ref 37–47)
HGB BLD-MCNC: 13.4 G/DL — SIGNIFICANT CHANGE UP (ref 12–16)
INR BLD: 1.22 RATIO — HIGH (ref 0.88–1.16)
MCHC RBC-ENTMCNC: 31.2 PG — HIGH (ref 27–31)
MCHC RBC-ENTMCNC: 32.3 G/DL — SIGNIFICANT CHANGE UP (ref 32–36)
MCV RBC AUTO: 96.5 FL — SIGNIFICANT CHANGE UP (ref 81–99)
MRSA PCR RESULT.: SIGNIFICANT CHANGE UP
PLATELET # BLD AUTO: 295 K/UL — SIGNIFICANT CHANGE UP (ref 150–400)
POTASSIUM SERPL-MCNC: 4.4 MMOL/L — SIGNIFICANT CHANGE UP (ref 3.5–5.3)
POTASSIUM SERPL-SCNC: 4.4 MMOL/L — SIGNIFICANT CHANGE UP (ref 3.5–5.3)
PROT SERPL-MCNC: 7.4 G/DL — SIGNIFICANT CHANGE UP (ref 6.6–8.7)
PROTHROM AB SERPL-ACNC: 13.5 SEC — HIGH (ref 9.8–12.7)
RBC # BLD: 4.3 M/UL — LOW (ref 4.4–5.2)
RBC # FLD: 13.8 % — SIGNIFICANT CHANGE UP (ref 11–15.6)
S AUREUS DNA NOSE QL NAA+PROBE: SIGNIFICANT CHANGE UP
SODIUM SERPL-SCNC: 142 MMOL/L — SIGNIFICANT CHANGE UP (ref 135–145)
TYPE + AB SCN PNL BLD: SIGNIFICANT CHANGE UP
WBC # BLD: 6.8 K/UL — SIGNIFICANT CHANGE UP (ref 4.8–10.8)
WBC # FLD AUTO: 6.8 K/UL — SIGNIFICANT CHANGE UP (ref 4.8–10.8)

## 2018-03-12 PROCEDURE — 86900 BLOOD TYPING SEROLOGIC ABO: CPT

## 2018-03-12 PROCEDURE — 93005 ELECTROCARDIOGRAM TRACING: CPT

## 2018-03-12 PROCEDURE — G0463: CPT

## 2018-03-12 PROCEDURE — 93010 ELECTROCARDIOGRAM REPORT: CPT

## 2018-03-12 PROCEDURE — 85610 PROTHROMBIN TIME: CPT

## 2018-03-12 PROCEDURE — 85730 THROMBOPLASTIN TIME PARTIAL: CPT

## 2018-03-12 PROCEDURE — 86850 RBC ANTIBODY SCREEN: CPT

## 2018-03-12 PROCEDURE — 85027 COMPLETE CBC AUTOMATED: CPT

## 2018-03-12 PROCEDURE — 80053 COMPREHEN METABOLIC PANEL: CPT

## 2018-03-12 PROCEDURE — 86901 BLOOD TYPING SEROLOGIC RH(D): CPT

## 2018-03-12 PROCEDURE — 87640 STAPH A DNA AMP PROBE: CPT

## 2018-03-12 PROCEDURE — 36415 COLL VENOUS BLD VENIPUNCTURE: CPT

## 2018-03-12 NOTE — H&P PST ADULT - GASTROINTESTINAL DETAILS
no rebound tenderness/no masses palpable/no organomegaly/nontender/no distention/soft/no guarding/normal/bowel sounds normal/no rigidity/no bruit

## 2018-03-12 NOTE — H&P PST ADULT - NEGATIVE MUSCULOSKELETAL SYMPTOMS
no arm pain L/no arm pain R/no leg pain R/no muscle cramps/no myalgia/no joint swelling/no neck pain/no arthralgia/no muscle weakness/no back pain

## 2018-03-12 NOTE — H&P PST ADULT - NEUROLOGICAL DETAILS
alert and oriented x 3/superficial reflexes intact/no spontaneous movement/responds to pain/responds to verbal commands/deep reflexes intact/cranial nerves intact/normal strength/sensation intact

## 2018-03-12 NOTE — H&P PST ADULT - PMH
Breast cancer  left 2017, had surgery and radiation  CHF (congestive heart failure)  12/2017 resolved  High blood pressure    High cholesterol

## 2018-03-12 NOTE — H&P PST ADULT - MUSCULOSKELETAL
details… detailed exam ROM intact/no calf tenderness/no joint erythema/no joint warmth/normal strength/normal/no joint swelling

## 2018-03-12 NOTE — H&P PST ADULT - NEGATIVE PSYCHIATRIC SYMPTOMS
no visual hallucinations/no hyperactivity/no depression/no memory loss/no anxiety/no agitation/no auditory hallucinations/no suicidal ideation/no paranoia/no mood swings

## 2018-03-12 NOTE — H&P PST ADULT - NEGATIVE FEMALE-SPECIFIC SYMPTOMS
no amenorrhea/no abnormal vaginal bleeding/no spotting/no dysmenorrhea/no menorrhagia/no pelvic pain/no irregular menses/no vaginal discharge

## 2018-03-12 NOTE — H&P PST ADULT - NEGATIVE ENMT SYMPTOMS
no nose bleeds/no recurrent cold sores/no nasal obstruction/no vertigo/no sinus symptoms/no nasal congestion/no gum bleeding/no throat pain/no ear pain/no tinnitus/no post-nasal discharge/no abnormal taste sensation/no dry mouth/no dysphagia/no hearing difficulty/no nasal discharge

## 2018-03-12 NOTE — H&P PST ADULT - NEGATIVE GENERAL GENITOURINARY SYMPTOMS
no renal colic/no urinary hesitancy/normal urinary frequency/no flank pain R/no urine discoloration/no gas in urine/no incontinence/no nocturia/no dysuria/no hematuria/normal libido/no bladder infections/no flank pain L

## 2018-03-12 NOTE — H&P PST ADULT - NEGATIVE CARDIOVASCULAR SYMPTOMS
no paroxysmal nocturnal dyspnea/no palpitations/no peripheral edema/no dyspnea on exertion/no orthopnea/no chest pain/no claudication

## 2018-03-12 NOTE — H&P PST ADULT - NEGATIVE OPHTHALMOLOGIC SYMPTOMS
no irritation R/no loss of vision L/no scleral injection L/no diplopia/no lacrimation R/no blurred vision L/no pain R/no discharge L/no lacrimation L/no blurred vision R/no photophobia/no irritation L/no loss of vision R/no scleral injection R/no discharge R/no pain L

## 2018-03-12 NOTE — H&P PST ADULT - FAMILY HISTORY
Family history of hypertension in mother     Sibling  Still living? Yes, Estimated age: Age Unknown  Family history of breast cancer in sister, Age at diagnosis: Age Unknown     Mother  Still living? Yes, Estimated age: 81-90  Family history of stroke, Age at diagnosis: Age Unknown

## 2018-03-12 NOTE — H&P PST ADULT - HISTORY OF PRESENT ILLNESS
64 y/o female with h/o left hip OA had mri done which showed DJD patient now presents for left hip total joint replacement .

## 2018-03-12 NOTE — H&P PST ADULT - RS GEN PE MLT RESP DETAILS PC
no rhonchi/no subcutaneous emphysema/no chest wall tenderness/no intercostal retractions/respirations non-labored/airway patent/breath sounds equal/no rales/no wheezes/good air movement/normal/clear to auscultation bilaterally

## 2018-03-12 NOTE — H&P PST ADULT - NEGATIVE NEUROLOGICAL SYMPTOMS
no loss of consciousness/no loss of sensation/no hemiparesis/no facial palsy/no transient paralysis/no confusion/no generalized seizures/no headache/no weakness/no vertigo/no syncope/no tremors/no paresthesias/no focal seizures

## 2018-03-12 NOTE — H&P PST ADULT - NEGATIVE GENERAL SYMPTOMS
no fever/no weight gain/no polydipsia/no anorexia/no fatigue/no weight loss/no polyphagia/no polyuria/no malaise/no sweating/no chills

## 2018-03-12 NOTE — H&P PST ADULT - NEGATIVE SKIN SYMPTOMS
no itching/no dryness/no brittle nails/no hair loss/no rash/no change in size/color of mole/no tumor/no pitted nails

## 2018-03-12 NOTE — H&P PST ADULT - NEGATIVE GASTROINTESTINAL SYMPTOMS
no change in bowel habits/no steatorrhea/no flatulence/no abdominal pain/no constipation/no vomiting/no diarrhea/no hiccoughs/no nausea/no hematochezia/no melena/no jaundice

## 2018-03-28 ENCOUNTER — TRANSCRIPTION ENCOUNTER (OUTPATIENT)
Age: 63
End: 2018-03-28

## 2018-03-29 ENCOUNTER — TRANSCRIPTION ENCOUNTER (OUTPATIENT)
Age: 63
End: 2018-03-29

## 2018-03-29 ENCOUNTER — RESULT REVIEW (OUTPATIENT)
Age: 63
End: 2018-03-29

## 2018-03-29 ENCOUNTER — INPATIENT (INPATIENT)
Facility: HOSPITAL | Age: 63
LOS: 1 days | Discharge: NURSING, REHAB OR LONG-TERM CARE | DRG: 470 | End: 2018-03-31
Attending: ORTHOPAEDIC SURGERY | Admitting: ORTHOPAEDIC SURGERY
Payer: MEDICARE

## 2018-03-29 ENCOUNTER — APPOINTMENT (OUTPATIENT)
Dept: ORTHOPEDIC SURGERY | Facility: HOSPITAL | Age: 63
End: 2018-03-29

## 2018-03-29 VITALS
HEIGHT: 67 IN | HEART RATE: 70 BPM | OXYGEN SATURATION: 99 % | TEMPERATURE: 97 F | WEIGHT: 184.09 LBS | RESPIRATION RATE: 16 BRPM | SYSTOLIC BLOOD PRESSURE: 99 MMHG | DIASTOLIC BLOOD PRESSURE: 57 MMHG

## 2018-03-29 DIAGNOSIS — Z85.3 PERSONAL HISTORY OF MALIGNANT NEOPLASM OF BREAST: ICD-10-CM

## 2018-03-29 DIAGNOSIS — M16.12 UNILATERAL PRIMARY OSTEOARTHRITIS, LEFT HIP: ICD-10-CM

## 2018-03-29 DIAGNOSIS — D17.9 BENIGN LIPOMATOUS NEOPLASM, UNSPECIFIED: Chronic | ICD-10-CM

## 2018-03-29 DIAGNOSIS — Z96.642 PRESENCE OF LEFT ARTIFICIAL HIP JOINT: ICD-10-CM

## 2018-03-29 DIAGNOSIS — E78.00 PURE HYPERCHOLESTEROLEMIA, UNSPECIFIED: ICD-10-CM

## 2018-03-29 DIAGNOSIS — Z00.00 ENCOUNTER FOR GENERAL ADULT MEDICAL EXAMINATION WITHOUT ABNORMAL FINDINGS: ICD-10-CM

## 2018-03-29 DIAGNOSIS — Z90.12 ACQUIRED ABSENCE OF LEFT BREAST AND NIPPLE: Chronic | ICD-10-CM

## 2018-03-29 DIAGNOSIS — I10 ESSENTIAL (PRIMARY) HYPERTENSION: ICD-10-CM

## 2018-03-29 DIAGNOSIS — Z86.79 PERSONAL HISTORY OF OTHER DISEASES OF THE CIRCULATORY SYSTEM: ICD-10-CM

## 2018-03-29 DIAGNOSIS — Z96.641 PRESENCE OF RIGHT ARTIFICIAL HIP JOINT: Chronic | ICD-10-CM

## 2018-03-29 DIAGNOSIS — Z29.9 ENCOUNTER FOR PROPHYLACTIC MEASURES, UNSPECIFIED: ICD-10-CM

## 2018-03-29 DIAGNOSIS — Z98.890 OTHER SPECIFIED POSTPROCEDURAL STATES: Chronic | ICD-10-CM

## 2018-03-29 PROCEDURE — 73501 X-RAY EXAM HIP UNI 1 VIEW: CPT | Mod: 26,LT

## 2018-03-29 PROCEDURE — 88311 DECALCIFY TISSUE: CPT | Mod: 26

## 2018-03-29 PROCEDURE — 99223 1ST HOSP IP/OBS HIGH 75: CPT

## 2018-03-29 PROCEDURE — 88305 TISSUE EXAM BY PATHOLOGIST: CPT | Mod: 26

## 2018-03-29 PROCEDURE — 27130 TOTAL HIP ARTHROPLASTY: CPT | Mod: LT

## 2018-03-29 PROCEDURE — 27130 TOTAL HIP ARTHROPLASTY: CPT | Mod: AS,LT

## 2018-03-29 RX ORDER — ONDANSETRON 8 MG/1
4 TABLET, FILM COATED ORAL EVERY 6 HOURS
Qty: 0 | Refills: 0 | Status: DISCONTINUED | OUTPATIENT
Start: 2018-03-29 | End: 2018-03-31

## 2018-03-29 RX ORDER — OXYCODONE HYDROCHLORIDE 5 MG/1
10 TABLET ORAL
Qty: 0 | Refills: 0 | Status: DISCONTINUED | OUTPATIENT
Start: 2018-03-29 | End: 2018-03-30

## 2018-03-29 RX ORDER — CELECOXIB 200 MG/1
400 CAPSULE ORAL ONCE
Qty: 0 | Refills: 0 | Status: COMPLETED | OUTPATIENT
Start: 2018-03-29 | End: 2018-03-29

## 2018-03-29 RX ORDER — OXYCODONE HYDROCHLORIDE 5 MG/1
10 TABLET ORAL ONCE
Qty: 0 | Refills: 0 | Status: DISCONTINUED | OUTPATIENT
Start: 2018-03-29 | End: 2018-03-29

## 2018-03-29 RX ORDER — ACETAMINOPHEN 500 MG
975 TABLET ORAL EVERY 8 HOURS
Qty: 0 | Refills: 0 | Status: DISCONTINUED | OUTPATIENT
Start: 2018-03-29 | End: 2018-03-31

## 2018-03-29 RX ORDER — SODIUM CHLORIDE 9 MG/ML
1000 INJECTION, SOLUTION INTRAVENOUS
Qty: 0 | Refills: 0 | Status: DISCONTINUED | OUTPATIENT
Start: 2018-03-29 | End: 2018-03-30

## 2018-03-29 RX ORDER — METOPROLOL TARTRATE 50 MG
25 TABLET ORAL
Qty: 0 | Refills: 0 | Status: DISCONTINUED | OUTPATIENT
Start: 2018-03-30 | End: 2018-03-31

## 2018-03-29 RX ORDER — ACETAMINOPHEN 500 MG
650 TABLET ORAL EVERY 6 HOURS
Qty: 0 | Refills: 0 | Status: DISCONTINUED | OUTPATIENT
Start: 2018-03-29 | End: 2018-03-31

## 2018-03-29 RX ORDER — POLYETHYLENE GLYCOL 3350 17 G/17G
17 POWDER, FOR SOLUTION ORAL DAILY
Qty: 0 | Refills: 0 | Status: DISCONTINUED | OUTPATIENT
Start: 2018-03-29 | End: 2018-03-31

## 2018-03-29 RX ORDER — HYDROMORPHONE HYDROCHLORIDE 2 MG/ML
0.5 INJECTION INTRAMUSCULAR; INTRAVENOUS; SUBCUTANEOUS
Qty: 0 | Refills: 0 | Status: DISCONTINUED | OUTPATIENT
Start: 2018-03-29 | End: 2018-03-29

## 2018-03-29 RX ORDER — SENNA PLUS 8.6 MG/1
2 TABLET ORAL AT BEDTIME
Qty: 0 | Refills: 0 | Status: DISCONTINUED | OUTPATIENT
Start: 2018-03-29 | End: 2018-03-31

## 2018-03-29 RX ORDER — DOCUSATE SODIUM 100 MG
100 CAPSULE ORAL THREE TIMES A DAY
Qty: 0 | Refills: 0 | Status: DISCONTINUED | OUTPATIENT
Start: 2018-03-29 | End: 2018-03-31

## 2018-03-29 RX ORDER — CEFAZOLIN SODIUM 1 G
2000 VIAL (EA) INJECTION ONCE
Qty: 0 | Refills: 0 | Status: DISCONTINUED | OUTPATIENT
Start: 2018-03-29 | End: 2018-03-29

## 2018-03-29 RX ORDER — OXYCODONE HYDROCHLORIDE 5 MG/1
5 TABLET ORAL
Qty: 0 | Refills: 0 | Status: DISCONTINUED | OUTPATIENT
Start: 2018-03-29 | End: 2018-03-30

## 2018-03-29 RX ORDER — SODIUM CHLORIDE 9 MG/ML
1000 INJECTION, SOLUTION INTRAVENOUS
Qty: 0 | Refills: 0 | Status: DISCONTINUED | OUTPATIENT
Start: 2018-03-29 | End: 2018-03-29

## 2018-03-29 RX ORDER — ACETAMINOPHEN 500 MG
1000 TABLET ORAL ONCE
Qty: 0 | Refills: 0 | Status: DISCONTINUED | OUTPATIENT
Start: 2018-03-29 | End: 2018-03-29

## 2018-03-29 RX ORDER — CEFAZOLIN SODIUM 1 G
2000 VIAL (EA) INJECTION
Qty: 0 | Refills: 0 | Status: COMPLETED | OUTPATIENT
Start: 2018-03-29 | End: 2018-03-30

## 2018-03-29 RX ORDER — FENTANYL CITRATE 50 UG/ML
50 INJECTION INTRAVENOUS
Qty: 0 | Refills: 0 | Status: DISCONTINUED | OUTPATIENT
Start: 2018-03-29 | End: 2018-03-29

## 2018-03-29 RX ORDER — HYDROMORPHONE HYDROCHLORIDE 2 MG/ML
2 INJECTION INTRAMUSCULAR; INTRAVENOUS; SUBCUTANEOUS
Qty: 0 | Refills: 0 | Status: DISCONTINUED | OUTPATIENT
Start: 2018-03-29 | End: 2018-03-30

## 2018-03-29 RX ORDER — MAGNESIUM HYDROXIDE 400 MG/1
30 TABLET, CHEWABLE ORAL DAILY
Qty: 0 | Refills: 0 | Status: DISCONTINUED | OUTPATIENT
Start: 2018-03-29 | End: 2018-03-31

## 2018-03-29 RX ORDER — ONDANSETRON 8 MG/1
4 TABLET, FILM COATED ORAL ONCE
Qty: 0 | Refills: 0 | Status: COMPLETED | OUTPATIENT
Start: 2018-03-29 | End: 2018-03-29

## 2018-03-29 RX ORDER — ATORVASTATIN CALCIUM 80 MG/1
20 TABLET, FILM COATED ORAL AT BEDTIME
Qty: 0 | Refills: 0 | Status: DISCONTINUED | OUTPATIENT
Start: 2018-03-29 | End: 2018-03-31

## 2018-03-29 RX ORDER — MORPHINE SULFATE 50 MG/1
2 CAPSULE, EXTENDED RELEASE ORAL EVERY 4 HOURS
Qty: 0 | Refills: 0 | Status: DISCONTINUED | OUTPATIENT
Start: 2018-03-29 | End: 2018-03-30

## 2018-03-29 RX ORDER — SODIUM CHLORIDE 9 MG/ML
3 INJECTION INTRAMUSCULAR; INTRAVENOUS; SUBCUTANEOUS EVERY 8 HOURS
Qty: 0 | Refills: 0 | Status: DISCONTINUED | OUTPATIENT
Start: 2018-03-29 | End: 2018-03-29

## 2018-03-29 RX ORDER — VANCOMYCIN HCL 1 G
1250 VIAL (EA) INTRAVENOUS
Qty: 0 | Refills: 0 | Status: COMPLETED | OUTPATIENT
Start: 2018-03-29 | End: 2018-03-29

## 2018-03-29 RX ORDER — VANCOMYCIN HCL 1 G
1250 VIAL (EA) INTRAVENOUS ONCE
Qty: 0 | Refills: 0 | Status: COMPLETED | OUTPATIENT
Start: 2018-03-29 | End: 2018-03-29

## 2018-03-29 RX ORDER — CELECOXIB 200 MG/1
200 CAPSULE ORAL
Qty: 0 | Refills: 0 | Status: DISCONTINUED | OUTPATIENT
Start: 2018-03-29 | End: 2018-03-31

## 2018-03-29 RX ORDER — TRANEXAMIC ACID 100 MG/ML
850 INJECTION, SOLUTION INTRAVENOUS ONCE
Qty: 0 | Refills: 0 | Status: DISCONTINUED | OUTPATIENT
Start: 2018-03-29 | End: 2018-03-29

## 2018-03-29 RX ORDER — ENOXAPARIN SODIUM 100 MG/ML
40 INJECTION SUBCUTANEOUS EVERY 24 HOURS
Qty: 0 | Refills: 0 | Status: DISCONTINUED | OUTPATIENT
Start: 2018-03-30 | End: 2018-03-31

## 2018-03-29 RX ORDER — GABAPENTIN 400 MG/1
600 CAPSULE ORAL ONCE
Qty: 0 | Refills: 0 | Status: DISCONTINUED | OUTPATIENT
Start: 2018-03-29 | End: 2018-03-29

## 2018-03-29 RX ORDER — TAMOXIFEN CITRATE 20 MG/1
20 TABLET, FILM COATED ORAL DAILY
Qty: 0 | Refills: 0 | Status: DISCONTINUED | OUTPATIENT
Start: 2018-03-29 | End: 2018-03-31

## 2018-03-29 RX ORDER — OXYCODONE HYDROCHLORIDE 5 MG/1
10 TABLET ORAL EVERY 12 HOURS
Qty: 0 | Refills: 0 | Status: DISCONTINUED | OUTPATIENT
Start: 2018-03-29 | End: 2018-03-31

## 2018-03-29 RX ORDER — KETOROLAC TROMETHAMINE 30 MG/ML
15 SYRINGE (ML) INJECTION EVERY 6 HOURS
Qty: 0 | Refills: 0 | Status: DISCONTINUED | OUTPATIENT
Start: 2018-03-29 | End: 2018-03-30

## 2018-03-29 RX ADMIN — ONDANSETRON 4 MILLIGRAM(S): 8 TABLET, FILM COATED ORAL at 12:55

## 2018-03-29 RX ADMIN — CELECOXIB 400 MILLIGRAM(S): 200 CAPSULE ORAL at 08:02

## 2018-03-29 RX ADMIN — Medication 166.67 MILLIGRAM(S): at 08:13

## 2018-03-29 RX ADMIN — CELECOXIB 400 MILLIGRAM(S): 200 CAPSULE ORAL at 09:00

## 2018-03-29 RX ADMIN — ATORVASTATIN CALCIUM 20 MILLIGRAM(S): 80 TABLET, FILM COATED ORAL at 22:42

## 2018-03-29 RX ADMIN — OXYCODONE HYDROCHLORIDE 5 MILLIGRAM(S): 5 TABLET ORAL at 21:00

## 2018-03-29 RX ADMIN — Medication 100 MILLIGRAM(S): at 16:38

## 2018-03-29 RX ADMIN — Medication 166.67 MILLIGRAM(S): at 20:02

## 2018-03-29 RX ADMIN — Medication 975 MILLIGRAM(S): at 22:42

## 2018-03-29 RX ADMIN — OXYCODONE HYDROCHLORIDE 5 MILLIGRAM(S): 5 TABLET ORAL at 20:02

## 2018-03-29 RX ADMIN — Medication 15 MILLIGRAM(S): at 18:34

## 2018-03-29 RX ADMIN — Medication 15 MILLIGRAM(S): at 19:15

## 2018-03-29 RX ADMIN — HYDROMORPHONE HYDROCHLORIDE 0.5 MILLIGRAM(S): 2 INJECTION INTRAMUSCULAR; INTRAVENOUS; SUBCUTANEOUS at 13:13

## 2018-03-29 RX ADMIN — HYDROMORPHONE HYDROCHLORIDE 0.5 MILLIGRAM(S): 2 INJECTION INTRAMUSCULAR; INTRAVENOUS; SUBCUTANEOUS at 13:30

## 2018-03-29 NOTE — PROGRESS NOTE ADULT - SUBJECTIVE AND OBJECTIVE BOX
Orthopedic PA Postop Note  Patient S/P LEFT DEMARIO  Patient in bed comfortable   LEFT Leg  Dressing C/D/I  Pulse intact  Calf Soft NT  Dorsi/Plantar Flexion/EHL/FHL Intact  Abduction Pillow in place     Vital Signs Last 24 Hrs  T(C): 36.6 (29 Mar 2018 17:03), Max: 36.6 (29 Mar 2018 11:15)  T(F): 97.9 (29 Mar 2018 17:03), Max: 97.9 (29 Mar 2018 11:15)  HR: 80 (29 Mar 2018 17:03) (70 - 88)  BP: 103/66 (29 Mar 2018 17:03) (83/56 - 128/62)  BP(mean): --  RR: 20 (29 Mar 2018 17:03) (12 - 24)  SpO2: 100% (29 Mar 2018 17:03) (99% - 100%)    < from: Xray Hip w/ Pelvis 1 View, Left (03.29.18 @ 15:01) >   EXAM:  XR HIP WITH PELV 1V LT                          PROCEDURE DATE:  03/29/2018          INTERPRETATION:  HISTORY: Total hip replacement    Two views of the left hip are submitted.    Evaluation demonstrates both femoral and acetabular components   well-seated and in good anatomic alignment. There is no evidence of   fracture. The visualized pelvis appears within normal limits. Prior right   total hip prosthesis in place.  Impression:  Hip replacement as described above.                CECIL CUMMINGS M.D., ATTENDING RADIOLOGIST  This document has been electronically signed. Mar 29 2018  3:02PM    < end of copied text >      A/P: 63F S/P LEFT DEMARIO  1. DVTP - LOVENOX  2. PT - Posterior Precautions  3. Pain Control as clinically indicated

## 2018-03-29 NOTE — PHYSICAL THERAPY INITIAL EVALUATION ADULT - ADDITIONAL COMMENTS
Pt lives in a second floor apartment with her sister. 12 steps to enter with handrails, no steps inside. Pt was independent PTA without assist device. Pt owns RW, SAC, Shower chair and commode.

## 2018-03-29 NOTE — PHYSICAL THERAPY INITIAL EVALUATION ADULT - GENERAL OBSERVATIONS, REHAB EVAL
Pt received supine in bed + telemetry + IV + Venous Compression Boots + hip abduction wedge, c/o 8/10 pain, pt agreeable to PT

## 2018-03-29 NOTE — DISCHARGE NOTE ADULT - MEDICATION SUMMARY - MEDICATIONS TO TAKE
I will START or STAY ON the medications listed below when I get home from the hospital:    traMADol 50 mg oral tablet  -- 1 tab(s) by mouth every 4 hours  -- Indication: For Pain    acetaminophen 325 mg oral tablet  -- 3 tab(s) by mouth every 8 hours  -- Indication: For Pain    magnesium hydroxide 8% oral suspension  -- 30 milliliter(s) by mouth once a day, As needed, Constipation  -- Indication: For constipation prevention    enoxaparin  -- 40 milligram(s) injectable once a day  -- Indication: For DVTP    atorvastatin 20 mg oral tablet  -- 1 tab(s) by mouth once a day (at bedtime)  -- Indication: For Home med    tamoxifen 20 mg oral tablet  -- 1 tab(s) by mouth once a day  -- Indication: For Home med    metoprolol tartrate 25 mg oral tablet  -- 1 tab(s) by mouth 2 times a day  -- Indication: For Home med    ferrous sulfate 325 mg (65 mg elemental iron) oral tablet  -- 1 tab(s) by mouth 3 times a day  -- Indication: For Home med    docusate sodium 100 mg oral capsule  -- 1 cap(s) by mouth 3 times a day  -- Indication: For constipation prevention    senna oral tablet  -- 2 tab(s) by mouth once a day (at bedtime), As needed, Constipation  -- Indication: For constipation prevention    Vitamin D3 5000 intl units oral tablet  -- 1 tab(s) by mouth once a day  -- Indication: For Home med

## 2018-03-29 NOTE — DISCHARGE NOTE ADULT - PATIENT PORTAL LINK FT
You can access the "[employer] Patient Portal, offered by [facility], by registering with the following website: [email or web address] Health [email or web address]

## 2018-03-29 NOTE — DISCHARGE NOTE ADULT - CARE PLAN
Principal Discharge DX:	Primary osteoarthritis of left hip  Goal:	Improved function and pain control  Assessment and plan of treatment:	The patient will be seen in the office between 2-3 weeks for wound check. PLEASE CONTACT OFFICE TO ARRANGE FOLLOW-UP APPOINTMENT DATE. Sutures/Staples/Tape will be removed at that time. Patient may shower after post-op day #5 (4/3/18). The dressing is to be removed on post op day #9 (4/7/18) . IF THE DRESSING BECOMES SOILED BEFORE THE REMOVAL DATE, CHANGE WITH A SIMILAR DRESSING. IF THE DRESSING BECOMES STAINED WITH DISCHARGE, CONTACT THE OFFICE FOR FURTHER DIRECTIONS.  The patient will contact the office if the wound becomes red, has increasing pain, develops bleeding or discharge, an injury occurs, or has other concerns. The patient will continue PT consistent with posterior total hip replacement protocol. The patient will continue to practice posterior total hip precautions for a minimum of 6 week. The patient will continue LOVENOX for 2 weeks and then begin ASPIRIN for blood clot prevention. The patient will take OXYCODONE AND TYLENOL for pain control and titrate according to prescription and patient needs. The patient will take Senna-S while taking oxycodone to prevent narcotic associated constipation.  Additionally, increase water intake (drink at least 8 glasses of water daily) and try adding fiber to the diet by eating fruits, vegetables and foods that are rich in grains. If constipation is experienced, contact the medical/primary care provider to discuss further treatment options. The patient is FULL weight bearing. Principal Discharge DX:	Primary osteoarthritis of left hip  Goal:	Improved function and pain control  Assessment and plan of treatment:	The patient will be seen in the office between 2-3 weeks for wound check. PLEASE CONTACT OFFICE TO ARRANGE FOLLOW-UP APPOINTMENT DATE. Tape will be removed at that time. Patient may shower after post-op day #5 (4/3/18). The dressing is to be removed on post op day #9 (4/7/18) . IF THE DRESSING BECOMES SOILED BEFORE THE REMOVAL DATE, CHANGE WITH A SIMILAR DRESSING. IF THE DRESSING BECOMES STAINED WITH DISCHARGE, CONTACT THE OFFICE FOR FURTHER DIRECTIONS.  The patient will contact the office if the wound becomes red, has increasing pain, develops bleeding or discharge, an injury occurs, or has other concerns. The patient will continue PT consistent with posterior total hip replacement protocol. The patient will continue to practice posterior total hip precautions for a minimum of 6 week. The patient will continue LOVENOX for 2 weeks and then begin ASPIRIN 325 BID x 4 weeks for blood clot prevention. The patient will take Tramadol AND TYLENOL for pain control and titrate according to prescription and patient needs. The patient will take Senna-S while taking oxycodone to prevent narcotic associated constipation.  Additionally, increase water intake (drink at least 8 glasses of water daily) and try adding fiber to the diet by eating fruits, vegetables and foods that are rich in grains. If constipation is experienced, contact the medical/primary care provider to discuss further treatment options. The patient is FULL weight bearing.

## 2018-03-29 NOTE — CONSULT NOTE ADULT - SUBJECTIVE AND OBJECTIVE BOX
PMD :  Cardio :     Patient is a 63y old  Female who IS S/P L DEMARIO , POD #0 , seen on PACU .    CC: L hip pain     HPI:62 y/o female with h/o left hip OA had mri done which showed DJD patient . Now patient is s/p L DEMARIO , POD # 0       PAST MEDICAL & SURGICAL HISTORY:  CHF (congestive heart failure): 12/2017 resolved  Breast cancer: left 2017, had surgery and radiation  High cholesterol  High blood pressure  History of hip replacement, total, right: 12/2017  History of lumpectomy of left breast: 2017  Atypical lipoma of soft tissue: in left groin 2008  H/O foot surgery: ankle left 2004      Social History:  Tobacco -   ETOH -   Illicit drug abuse - denies    FAMILY HISTORY:  Family history of stroke (Mother)  Family history of breast cancer in sister (Sibling)  Family history of hypertension in mother      Allergies    Crestor (Muscle Pain)  shellfish (Swelling; Rash)    Intolerances    HOME MEDICATIONS :     · 	metoprolol tartrate 25 mg oral tablet: Last Dose Taken:  , 1 tab(s) orally 2 times a day  · 	tamoxifen 20 mg oral tablet: Last Dose Taken:  , 1 tab(s) orally once a day  · 	atorvastatin 20 mg oral tablet: Last Dose Taken:  , 1 tab(s) orally once a day (at bedtime)  · 	ferrous sulfate 325 mg (65 mg elemental iron) oral tablet: Last Dose Taken:  , 1 tab(s) orally 3 times a day  · 	Vitamin D3 5000 intl units oral tablet: Last Dose Taken:  , 1 tab(s) orally once a day    REVIEW OF SYSTEMS:    CONSTITUTIONAL: No fever, weight loss, or fatigue  EYES: No eye pain, visual disturbances, or discharge  NECK: No pain or stiffness  RESPIRATORY: No cough, wheezing, chills or hemoptysis; No shortness of breath  CARDIOVASCULAR: No chest pain, palpitations, dizziness, or leg swelling  GASTROINTESTINAL: No abdominal or epigastric pain. No nausea, vomiting, or hematemesis; No diarrhea or constipation. No melena or hematochezia.  GENITOURINARY: No dysuria, frequency, hematuria, or incontinence  NEUROLOGICAL: No headaches, memory loss, loss of strength, numbness, or tremors  SKIN: No itching, burning, rashes, or lesions   LYMPH NODES: No enlarged glands  ENDOCRINE: No heat or cold intolerance; No hair loss  MUSCULOSKELETAL: L hip pain   PSYCHIATRIC: No depression, anxiety, mood swings, or difficulty sleeping  HEME/LYMPH: No easy bruising, or bleeding gums  ALLERGY AND IMMUNOLOGIC: No hives or eczema    MEDICATIONS  (STANDING):  lactated ringers. 1000 milliLiter(s) (100 mL/Hr) IV Continuous <Continuous>    MEDICATIONS  (PRN):  fentaNYL    Injectable 50 MICROGram(s) IV Push every 5 minutes PRN Severe Pain  HYDROmorphone  Injectable 0.5 milliGRAM(s) IV Push every 10 minutes PRN Moderate Pain      Vital Signs Last 24 Hrs  T(C): 36.6 (29 Mar 2018 11:15), Max: 36.6 (29 Mar 2018 11:15)  T(F): 97.9 (29 Mar 2018 11:15), Max: 97.9 (29 Mar 2018 11:15)  HR: 78 (29 Mar 2018 13:00) (70 - 88)  BP: 121/60 (29 Mar 2018 13:00) (83/56 - 121/60)  BP(mean): --  RR: 19 (29 Mar 2018 13:00) (12 - 19)  SpO2: 100% (29 Mar 2018 13:00) (99% - 100%)    PHYSICAL EXAM:    GENERAL: NAD, well-groomed, well-developed  HEAD:  Atraumatic, Normocephalic  EYES: EOMI, PERRLA, conjunctiva and sclera clear  NECK: Supple, No JVD, Normal thyroid  NERVOUS SYSTEM:  Alert & Oriented X3, Good concentration; Motor Strength 5/5 B/L upper and lower extremities; DTRs 2+ intact and symmetric  CHEST/LUNG: CTA  b/l,  no rales, rhonchi, wheezing, or rubs  HEART: Regular rate and rhythm; No murmurs, rubs, or gallops  ABDOMEN: Soft, Nontender, Nondistended; Bowel sounds present  EXTREMITIES:  2+ Peripheral Pulses, No clubbing, cyanosis, or edema , L hip dressing + , clean and dry   LYMPH: No lymphadenopathy noted  SKIN: No rashes or lesions    LABS: Pending       RADIOLOGY & ADDITIONAL STUDIES: PMD : Morales   Cardio :     Patient is a 63y old  Female who is S/P L DEMARIO , POD #0 , seen on PACU .    CC: L hip pain     HPI:62 y/o female with h/o left hip pain /OA for last 2 yrs , had x1 cortisone inj , was taking Tylenol with some relief , had mri done which showed DJD patient . Now patient is s/p L DEMARIO , POD # 0       PAST MEDICAL & SURGICAL HISTORY:  CHF (congestive heart failure): 12/2017 resolved  Breast cancer: left 2017, had surgery and radiation  High cholesterol  High blood pressure  History of hip replacement, total, right: 12/2017  History of lumpectomy of left breast: 2017  Atypical lipoma of soft tissue: in left groin 2008  H/O foot surgery: ankle left 2004      Social History:  Tobacco - denies  ETOH - denies   Illicit drug abuse - denies    FAMILY HISTORY:  Family history of stroke (Mother)  Family history of breast cancer in sister (Sibling)  Family history of hypertension in mother      Allergies    Crestor (Muscle Pain)  shellfish (Swelling; Rash)    Intolerances    HOME MEDICATIONS :     · 	metoprolol tartrate 25 mg oral tablet: Last Dose Taken:  , 1 tab(s) orally 2 times a day  · 	tamoxifen 20 mg oral tablet: Last Dose Taken:  , 1 tab(s) orally once a day  · 	atorvastatin 20 mg oral tablet: Last Dose Taken:  , 1 tab(s) orally once a day (at bedtime)  · 	ferrous sulfate 325 mg (65 mg elemental iron) oral tablet: Last Dose Taken:  , 1 tab(s) orally 3 times a day  · 	Vitamin D3 5000 intl units oral tablet: Last Dose Taken:  , 1 tab(s) orally once a day    REVIEW OF SYSTEMS:    CONSTITUTIONAL: No fever, weight loss, or fatigue  EYES: No eye pain, visual disturbances, or discharge  NECK: No pain or stiffness  RESPIRATORY: No cough, wheezing, chills or hemoptysis; No shortness of breath  CARDIOVASCULAR: No chest pain, palpitations, dizziness, or leg swelling  GASTROINTESTINAL: No abdominal or epigastric pain. No nausea, vomiting, or hematemesis; No diarrhea or constipation. No melena or hematochezia.  GENITOURINARY: No dysuria, frequency, hematuria, or incontinence  NEUROLOGICAL: No headaches, memory loss, loss of strength, numbness, or tremors  SKIN: No itching, burning, rashes, or lesions   LYMPH NODES: No enlarged glands  ENDOCRINE: No heat or cold intolerance; No hair loss  MUSCULOSKELETAL: L hip pain   PSYCHIATRIC: No depression, anxiety, mood swings, or difficulty sleeping  HEME/LYMPH: No easy bruising, or bleeding gums  ALLERGY AND IMMUNOLOGIC: No hives or eczema    MEDICATIONS  (STANDING):  lactated ringers. 1000 milliLiter(s) (100 mL/Hr) IV Continuous <Continuous>    MEDICATIONS  (PRN):  fentaNYL    Injectable 50 MICROGram(s) IV Push every 5 minutes PRN Severe Pain  HYDROmorphone  Injectable 0.5 milliGRAM(s) IV Push every 10 minutes PRN Moderate Pain      Vital Signs Last 24 Hrs  T(C): 36.6 (29 Mar 2018 11:15), Max: 36.6 (29 Mar 2018 11:15)  T(F): 97.9 (29 Mar 2018 11:15), Max: 97.9 (29 Mar 2018 11:15)  HR: 78 (29 Mar 2018 13:00) (70 - 88)  BP: 121/60 (29 Mar 2018 13:00) (83/56 - 121/60)  BP(mean): --  RR: 19 (29 Mar 2018 13:00) (12 - 19)  SpO2: 100% (29 Mar 2018 13:00) (99% - 100%)    PHYSICAL EXAM:    GENERAL: NAD, well-groomed, well-developed  HEAD:  Atraumatic, Normocephalic  EYES: EOMI, PERRLA, conjunctiva and sclera clear  NECK: Supple, No JVD, Normal thyroid  NERVOUS SYSTEM:  Alert & Oriented X3, Good concentration; Motor Strength 5/5 B/L upper and lower extremities; DTRs 2+ intact and symmetric  CHEST/LUNG: CTA  b/l,  no rales, rhonchi, wheezing, or rubs  HEART: Regular rate and rhythm; No murmurs, rubs, or gallops  ABDOMEN: Soft, Nontender, Nondistended; Bowel sounds present  EXTREMITIES:  2+ Peripheral Pulses, No clubbing, cyanosis, or edema , L hip dressing + , clean and dry   LYMPH: No lymphadenopathy noted  SKIN: No rashes or lesions    LABS: Pending       RADIOLOGY & ADDITIONAL STUDIES:

## 2018-03-29 NOTE — DISCHARGE NOTE ADULT - CARE PROVIDER_API CALL
Jerman Wilson), Orthopaedic Surgery  [address] [place] [address]  Phone: [phone]  Fax: [phone]

## 2018-03-29 NOTE — DISCHARGE NOTE ADULT - PLAN OF CARE
Improved function and pain control The patient will be seen in the office between 2-3 weeks for wound check. PLEASE CONTACT OFFICE TO ARRANGE FOLLOW-UP APPOINTMENT DATE. Sutures/Staples/Tape will be removed at that time. Patient may shower after post-op day #5 (4/3/18). The dressing is to be removed on post op day #9 (4/7/18) . IF THE DRESSING BECOMES SOILED BEFORE THE REMOVAL DATE, CHANGE WITH A SIMILAR DRESSING. IF THE DRESSING BECOMES STAINED WITH DISCHARGE, CONTACT THE OFFICE FOR FURTHER DIRECTIONS.  The patient will contact the office if the wound becomes red, has increasing pain, develops bleeding or discharge, an injury occurs, or has other concerns. The patient will continue PT consistent with posterior total hip replacement protocol. The patient will continue to practice posterior total hip precautions for a minimum of 6 week. The patient will continue LOVENOX for 2 weeks and then begin ASPIRIN for blood clot prevention. The patient will take OXYCODONE AND TYLENOL for pain control and titrate according to prescription and patient needs. The patient will take Senna-S while taking oxycodone to prevent narcotic associated constipation.  Additionally, increase water intake (drink at least 8 glasses of water daily) and try adding fiber to the diet by eating fruits, vegetables and foods that are rich in grains. If constipation is experienced, contact the medical/primary care provider to discuss further treatment options. The patient is FULL weight bearing. The patient will be seen in the office between 2-3 weeks for wound check. PLEASE CONTACT OFFICE TO ARRANGE FOLLOW-UP APPOINTMENT DATE. Tape will be removed at that time. Patient may shower after post-op day #5 (4/3/18). The dressing is to be removed on post op day #9 (4/7/18) . IF THE DRESSING BECOMES SOILED BEFORE THE REMOVAL DATE, CHANGE WITH A SIMILAR DRESSING. IF THE DRESSING BECOMES STAINED WITH DISCHARGE, CONTACT THE OFFICE FOR FURTHER DIRECTIONS.  The patient will contact the office if the wound becomes red, has increasing pain, develops bleeding or discharge, an injury occurs, or has other concerns. The patient will continue PT consistent with posterior total hip replacement protocol. The patient will continue to practice posterior total hip precautions for a minimum of 6 week. The patient will continue LOVENOX for 2 weeks and then begin ASPIRIN 325 BID x 4 weeks for blood clot prevention. The patient will take Tramadol AND TYLENOL for pain control and titrate according to prescription and patient needs. The patient will take Senna-S while taking oxycodone to prevent narcotic associated constipation.  Additionally, increase water intake (drink at least 8 glasses of water daily) and try adding fiber to the diet by eating fruits, vegetables and foods that are rich in grains. If constipation is experienced, contact the medical/primary care provider to discuss further treatment options. The patient is FULL weight bearing.

## 2018-03-29 NOTE — PHYSICAL THERAPY INITIAL EVALUATION ADULT - RANGE OF MOTION EXAMINATION, REHAB EVAL
left hip tested to 90 degrees flexion only./bilateral lower extremity ROM was WFL (within functional limits)/bilateral upper extremity ROM was WFL (within functional limits)

## 2018-03-29 NOTE — PHYSICAL THERAPY INITIAL EVALUATION ADULT - IMPAIRMENTS CONTRIBUTING TO GAIT DEVIATIONS, PT EVAL
pain/impaired balance/decreased strength/Pt limited by c/o nausea and dizziness. Pt ambulated at E[facility] only for safety

## 2018-03-29 NOTE — DISCHARGE NOTE ADULT - HOSPITAL COURSE
The patient underwent a LEFT POSTERIOR TOTAL HIP REPLACEMENT on 3/29/18. The patient received antibiotics consistent with SCIP guidelines. The patient underwent the procedure and had no intra-operative complications. Post-operatively, the patient was seen by medicine and PT. The patient received LOVENOX for DVTP. The patient received pain medications per orthopedic pain management protocol and the pain was appropriately controlled. Patient was instructed on posterior total hip precautions by PT. The patient did not have any post-operative medical complications. The patient was discharged in stable condition.

## 2018-03-29 NOTE — CONSULT NOTE ADULT - ASSESSMENT
62 y/o female with h/o left hip OA had mri done which showed DJD patient . Now patient is s/p L DEMARIO , POD # 0 64 y/o female with h/o left hip pain /OA had mri done which showed DJD patient . Now patient is s/p L DEMARIO , POD # 0

## 2018-03-30 LAB
ANION GAP SERPL CALC-SCNC: 10 MMOL/L — SIGNIFICANT CHANGE UP (ref 5–17)
BUN SERPL-MCNC: 11 MG/DL — SIGNIFICANT CHANGE UP (ref 8–20)
CALCIUM SERPL-MCNC: 8.4 MG/DL — LOW (ref 8.6–10.2)
CHLORIDE SERPL-SCNC: 105 MMOL/L — SIGNIFICANT CHANGE UP (ref 98–107)
CO2 SERPL-SCNC: 25 MMOL/L — SIGNIFICANT CHANGE UP (ref 22–29)
CREAT SERPL-MCNC: 0.61 MG/DL — SIGNIFICANT CHANGE UP (ref 0.5–1.3)
GLUCOSE SERPL-MCNC: 115 MG/DL — SIGNIFICANT CHANGE UP (ref 70–115)
HCT VFR BLD CALC: 28.5 % — LOW (ref 37–47)
HGB BLD-MCNC: 9.1 G/DL — LOW (ref 12–16)
MCHC RBC-ENTMCNC: 30.8 PG — SIGNIFICANT CHANGE UP (ref 27–31)
MCHC RBC-ENTMCNC: 31.9 G/DL — LOW (ref 32–36)
MCV RBC AUTO: 96.6 FL — SIGNIFICANT CHANGE UP (ref 81–99)
PLATELET # BLD AUTO: 180 K/UL — SIGNIFICANT CHANGE UP (ref 150–400)
POTASSIUM SERPL-MCNC: 4.3 MMOL/L — SIGNIFICANT CHANGE UP (ref 3.5–5.3)
POTASSIUM SERPL-SCNC: 4.3 MMOL/L — SIGNIFICANT CHANGE UP (ref 3.5–5.3)
RBC # BLD: 2.95 M/UL — LOW (ref 4.4–5.2)
RBC # FLD: 13.9 % — SIGNIFICANT CHANGE UP (ref 11–15.6)
SODIUM SERPL-SCNC: 140 MMOL/L — SIGNIFICANT CHANGE UP (ref 135–145)
WBC # BLD: 8 K/UL — SIGNIFICANT CHANGE UP (ref 4.8–10.8)
WBC # FLD AUTO: 8 K/UL — SIGNIFICANT CHANGE UP (ref 4.8–10.8)

## 2018-03-30 PROCEDURE — 99233 SBSQ HOSP IP/OBS HIGH 50: CPT

## 2018-03-30 RX ORDER — TRAMADOL HYDROCHLORIDE 50 MG/1
50 TABLET ORAL EVERY 4 HOURS
Qty: 0 | Refills: 0 | Status: DISCONTINUED | OUTPATIENT
Start: 2018-03-30 | End: 2018-03-31

## 2018-03-30 RX ORDER — SODIUM CHLORIDE 9 MG/ML
1000 INJECTION, SOLUTION INTRAVENOUS
Qty: 0 | Refills: 0 | Status: DISCONTINUED | OUTPATIENT
Start: 2018-03-30 | End: 2018-03-31

## 2018-03-30 RX ADMIN — Medication 975 MILLIGRAM(S): at 11:56

## 2018-03-30 RX ADMIN — ENOXAPARIN SODIUM 40 MILLIGRAM(S): 100 INJECTION SUBCUTANEOUS at 05:59

## 2018-03-30 RX ADMIN — Medication 975 MILLIGRAM(S): at 05:59

## 2018-03-30 RX ADMIN — Medication 100 MILLIGRAM(S): at 00:19

## 2018-03-30 RX ADMIN — Medication 975 MILLIGRAM(S): at 19:31

## 2018-03-30 RX ADMIN — SODIUM CHLORIDE 100 MILLILITER(S): 9 INJECTION, SOLUTION INTRAVENOUS at 11:58

## 2018-03-30 RX ADMIN — ONDANSETRON 4 MILLIGRAM(S): 8 TABLET, FILM COATED ORAL at 10:52

## 2018-03-30 RX ADMIN — SODIUM CHLORIDE 100 MILLILITER(S): 9 INJECTION, SOLUTION INTRAVENOUS at 10:52

## 2018-03-30 RX ADMIN — TAMOXIFEN CITRATE 20 MILLIGRAM(S): 20 TABLET, FILM COATED ORAL at 11:57

## 2018-03-30 RX ADMIN — SODIUM CHLORIDE 100 MILLILITER(S): 9 INJECTION, SOLUTION INTRAVENOUS at 06:00

## 2018-03-30 RX ADMIN — ATORVASTATIN CALCIUM 20 MILLIGRAM(S): 80 TABLET, FILM COATED ORAL at 21:30

## 2018-03-30 NOTE — PROGRESS NOTE ADULT - SUBJECTIVE AND OBJECTIVE BOX
Patient seen and examined . S/p L DEMARIO   , POD # 1    CC : Left hip pain       PAST MEDICAL & SURGICAL HISTORY:  CHF (congestive heart failure): 12/2017 resolved  Breast cancer: left 2017, had surgery and radiation  High cholesterol  High blood pressure  History of hip replacement, total, right: 12/2017  History of lumpectomy of left breast: 2017  Atypical lipoma of soft tissue: in left groin 2008  H/O foot surgery: ankle left 2004      MEDICATIONS  (STANDING):  acetaminophen   Tablet 975 milliGRAM(s) Oral every 8 hours  atorvastatin 20 milliGRAM(s) Oral at bedtime  celecoxib 200 milliGRAM(s) Oral two times a day  docusate sodium 100 milliGRAM(s) Oral three times a day  enoxaparin Injectable 40 milliGRAM(s) SubCutaneous every 24 hours  lactated ringers. 1000 milliLiter(s) (100 mL/Hr) IV Continuous <Continuous>  metoprolol tartrate 25 milliGRAM(s) Oral two times a day  oxyCODONE  ER Tablet 10 milliGRAM(s) Oral every 12 hours  polyethylene glycol 3350 17 Gram(s) Oral daily  tamoxifen 20 milliGRAM(s) Oral daily    MEDICATIONS  (PRN):  acetaminophen   Tablet 650 milliGRAM(s) Oral every 6 hours PRN For Temp over 38.3 C (100.94 F)  aluminum hydroxide/magnesium hydroxide/simethicone Suspension 30 milliLiter(s) Oral four times a day PRN Indigestion  HYDROmorphone   Tablet 2 milliGRAM(s) Oral every 3 hours PRN Severe Pain (7 - 10)  magnesium hydroxide Suspension 30 milliLiter(s) Oral daily PRN Constipation  morphine  - Injectable 2 milliGRAM(s) IV Push every 4 hours PRN breakthrough  ondansetron Injectable 4 milliGRAM(s) IV Push every 6 hours PRN Nausea and/or Vomiting  oxyCODONE    IR 5 milliGRAM(s) Oral every 3 hours PRN Mild Pain  oxyCODONE    IR 10 milliGRAM(s) Oral every 3 hours PRN Moderate Pain  senna 2 Tablet(s) Oral at bedtime PRN Constipation      LABS:                          9.1    8.0   )-----------( 180      ( 30 Mar 2018 06:03 )             28.5     03-30    140  |  105  |  11.0  ----------------------------<  115  4.3   |  25.0  |  0.61    Ca    8.4<L>      30 Mar 2018 06:03      REVIEW OF SYSTEMS:    CONSTITUTIONAL: No fever, weight loss, or fatigue  EYES: No eye pain, visual disturbances, or discharge  ENMT:  No difficulty hearing, tinnitus, vertigo; No sinus or throat pain  NECK: No pain or stiffness  RESPIRATORY: No cough, wheezing, chills or hemoptysis; No shortness of breath  CARDIOVASCULAR: No chest pain, palpitations, dizziness, or leg swelling  GASTROINTESTINAL: No abdominal or epigastric pain. No nausea, vomiting, or hematemesis; No diarrhea or constipation. No melena or hematochezia.  GENITOURINARY: No dysuria, frequency, hematuria, or incontinence  NEUROLOGICAL: No headaches, memory loss, loss of strength, numbness, or tremors  SKIN: No itching, burning, rashes, or lesions   LYMPH NODES: No enlarged glands  ENDOCRINE: No heat or cold intolerance; No hair loss  MUSCULOSKELETAL: L hip pain well controlled   PSYCHIATRIC: No depression, anxiety, mood swings, or difficulty sleeping  HEME/LYMPH: No easy bruising, or bleeding gums  ALLERGY AND IMMUNOLOGIC: No hives or eczema    Vital Signs Last 24 Hrs  T(C): 37.1 (30 Mar 2018 07:33), Max: 37.3 (29 Mar 2018 23:11)  T(F): 98.8 (30 Mar 2018 07:33), Max: 99.1 (29 Mar 2018 23:11)  HR: 107 (30 Mar 2018 07:33) (75 - 107)  BP: 100/55 (30 Mar 2018 07:33) (83/56 - 128/62)  BP(mean): --  RR: 19 (30 Mar 2018 07:33) (12 - 24)  SpO2: 96% (30 Mar 2018 07:33) (95% - 100%)  PHYSICAL EXAM:    GENERAL: NAD, well-groomed, well-developed  HEAD:  Atraumatic, Normocephalic  EYES: EOMI, PERRLA, conjunctiva and sclera clear  NECK: Supple, No JVD, Normal thyroid  NERVOUS SYSTEM:  Alert & Oriented X3, no focal deficit  CHEST/LUNG: CTA b/l ,  no  rales, rhonchi, wheezing, or rubs  HEART: Regular rate and rhythm; No murmurs, rubs, or gallops  ABDOMEN: Soft, Nontender, Nondistended; Bowel sounds present  EXTREMITIES:  2+ Peripheral Pulses, No clubbing, cyanosis, or edema , L hip dressing + , clean and dry   LYMPH: No lymphadenopathy noted  SKIN: No rashes or lesions Patient seen and examined . S/p L DEMARIO   , POD # 1. Pain well controlled , no other complaints     CC : Left hip pain       PAST MEDICAL & SURGICAL HISTORY:  CHF (congestive heart failure): 12/2017 resolved  Breast cancer: left 2017, had surgery and radiation  High cholesterol  High blood pressure  History of hip replacement, total, right: 12/2017  History of lumpectomy of left breast: 2017  Atypical lipoma of soft tissue: in left groin 2008  H/O foot surgery: ankle left 2004      MEDICATIONS  (STANDING):  acetaminophen   Tablet 975 milliGRAM(s) Oral every 8 hours  atorvastatin 20 milliGRAM(s) Oral at bedtime  celecoxib 200 milliGRAM(s) Oral two times a day  docusate sodium 100 milliGRAM(s) Oral three times a day  enoxaparin Injectable 40 milliGRAM(s) SubCutaneous every 24 hours  lactated ringers. 1000 milliLiter(s) (100 mL/Hr) IV Continuous <Continuous>  metoprolol tartrate 25 milliGRAM(s) Oral two times a day  oxyCODONE  ER Tablet 10 milliGRAM(s) Oral every 12 hours  polyethylene glycol 3350 17 Gram(s) Oral daily  tamoxifen 20 milliGRAM(s) Oral daily    MEDICATIONS  (PRN):  acetaminophen   Tablet 650 milliGRAM(s) Oral every 6 hours PRN For Temp over 38.3 C (100.94 F)  aluminum hydroxide/magnesium hydroxide/simethicone Suspension 30 milliLiter(s) Oral four times a day PRN Indigestion  HYDROmorphone   Tablet 2 milliGRAM(s) Oral every 3 hours PRN Severe Pain (7 - 10)  magnesium hydroxide Suspension 30 milliLiter(s) Oral daily PRN Constipation  morphine  - Injectable 2 milliGRAM(s) IV Push every 4 hours PRN breakthrough  ondansetron Injectable 4 milliGRAM(s) IV Push every 6 hours PRN Nausea and/or Vomiting  oxyCODONE    IR 5 milliGRAM(s) Oral every 3 hours PRN Mild Pain  oxyCODONE    IR 10 milliGRAM(s) Oral every 3 hours PRN Moderate Pain  senna 2 Tablet(s) Oral at bedtime PRN Constipation      LABS:                          9.1    8.0   )-----------( 180      ( 30 Mar 2018 06:03 )             28.5     03-30    140  |  105  |  11.0  ----------------------------<  115  4.3   |  25.0  |  0.61    Ca    8.4<L>      30 Mar 2018 06:03      REVIEW OF SYSTEMS:    CONSTITUTIONAL: No fever, weight loss, or fatigue  EYES: No eye pain, visual disturbances, or discharge  ENMT:  No difficulty hearing, tinnitus, vertigo; No sinus or throat pain  NECK: No pain or stiffness  RESPIRATORY: No cough, wheezing, chills or hemoptysis; No shortness of breath  CARDIOVASCULAR: No chest pain, palpitations, dizziness, or leg swelling  GASTROINTESTINAL: No abdominal or epigastric pain. No nausea, vomiting, or hematemesis; No diarrhea or constipation. No melena or hematochezia.  GENITOURINARY: No dysuria, frequency, hematuria, or incontinence  NEUROLOGICAL: No headaches, memory loss, loss of strength, numbness, or tremors  SKIN: No itching, burning, rashes, or lesions   LYMPH NODES: No enlarged glands  ENDOCRINE: No heat or cold intolerance; No hair loss  MUSCULOSKELETAL: L hip pain well controlled   PSYCHIATRIC: No depression, anxiety, mood swings, or difficulty sleeping  HEME/LYMPH: No easy bruising, or bleeding gums  ALLERGY AND IMMUNOLOGIC: No hives or eczema    Vital Signs Last 24 Hrs  T(C): 37.1 (30 Mar 2018 07:33), Max: 37.3 (29 Mar 2018 23:11)  T(F): 98.8 (30 Mar 2018 07:33), Max: 99.1 (29 Mar 2018 23:11)  HR: 107 (30 Mar 2018 07:33) (75 - 107)  BP: 100/55 (30 Mar 2018 07:33) (83/56 - 128/62)  BP(mean): --  RR: 19 (30 Mar 2018 07:33) (12 - 24)  SpO2: 96% (30 Mar 2018 07:33) (95% - 100%)  PHYSICAL EXAM:    GENERAL: NAD, well-groomed, well-developed  HEAD:  Atraumatic, Normocephalic  EYES: EOMI, PERRLA, conjunctiva and sclera clear  NECK: Supple, No JVD, Normal thyroid  NERVOUS SYSTEM:  Alert & Oriented X3, no focal deficit  CHEST/LUNG: CTA b/l ,  no  rales, rhonchi, wheezing, or rubs  HEART: Regular rate and rhythm; No murmurs, rubs, or gallops  ABDOMEN: Soft, Nontender, Nondistended; Bowel sounds present  EXTREMITIES:  2+ Peripheral Pulses, No clubbing, cyanosis, or edema , L hip dressing + , clean and dry   LYMPH: No lymphadenopathy noted  SKIN: No rashes or lesions

## 2018-03-30 NOTE — PROGRESS NOTE ADULT - ASSESSMENT
64 y/o female with h/o left hip pain /OA had mri done which showed DJD patient . Now patient is s/p L DEMARIO , POD # 1     Problem/Recommendation - 1:  Problem: Primary osteoarthritis of left hip. Recommendation: S/P L DEMARIO     Problem/Recommendation - 2:  ·  Problem: Status post left hip replacement.  Recommendation: PT/OT/pain mgmt  DVT prophylaxis- as per ortho  Abx as per SCIP  Incentive spirometry  Prophylaxis of opioid  induced constipation.      Problem/Recommendation - 3:  ·  Problem: History of breast cancer.  Recommendation: continue Tamoxifen.      Problem/Recommendation - 4:  ·  Problem: High cholesterol.  Recommendation: continue statins.      Problem/Recommendation - 5:  ·  Problem: Essential hypertension.  Recommendation: continue home meds with parameters.      Problem/Recommendation - 6:  Problem: History of CHF (congestive heart failure). Recommendation: resolved.     Problem/Recommendation - 7:  Problem: Prophylactic measure. Recommendation: DVT prophylaxis - on Lovenox  , venodyne boots   Opioid induced constipation prophylaxis.     Problem/Recommendation - 8:  Problem: Anemia - likely ABLA - as a cause of postoperative anemia  Recommendation: asymptomatic

## 2018-03-30 NOTE — PROGRESS NOTE ADULT - SUBJECTIVE AND OBJECTIVE BOX
PA - Note    S/P Left Total Hip Arthroplasty.  Patient was found laying in bed comfortable.  Doing well, denies any issues overnight.  Pain is being controlled.  Was able to ambulate out of bed yesterday.  Waiting for PT this morning.      Vital Signs Last 24 Hrs  T(C): 37.1 (30 Mar 2018 07:33), Max: 37.3 (29 Mar 2018 23:11)  T(F): 98.8 (30 Mar 2018 07:33), Max: 99.1 (29 Mar 2018 23:11)  HR: 107 (30 Mar 2018 07:33) (70 - 107)  BP: 100/55 (30 Mar 2018 07:33) (83/56 - 128/62)  BP(mean): --  RR: 19 (30 Mar 2018 07:33) (12 - 24)  SpO2: 96% (30 Mar 2018 07:33) (95% - 100%)    Left Lower Extremity:  Surgical Dressing C/D/I, no signs of active drainage  + ROM of toes, + Dorsal/Plantar Flexion of foot  + Sensation  Calf soft, non-tender  2+ Pedal Pulse, w/ Brisk Capillary refill    Labs:                        9.1    8.0   )-----------( 180      ( 30 Mar 2018 06:03 )             28.5     03-30    140  |  105  |  11.0  ----------------------------<  115  4.3   |  25.0  |  0.61    Ca    8.4<L>      30 Mar 2018 06:03    A/P: Left Total Hip Arthroplasty  - OOB, PT, OT, WBAT  - Pain medication as needed for comfort  - DVT prophylaxis: Lovenox, SCDs  - Continue with medical management   - D/C Planning (Rehab)

## 2018-03-30 NOTE — OCCUPATIONAL THERAPY INITIAL EVALUATION ADULT - ADDITIONAL COMMENTS
Pt lives in private home with 6 DIMITRI and 6 steps up to bedroom/bathroom. Bathroom has tub with curtains. Pt owns RW, commode, shower chair. Pt is right handed. Pt does not drive; pt's sister drives her to appointments, shopping, etc. Pt's son lives on first floor of home.

## 2018-03-31 VITALS
SYSTOLIC BLOOD PRESSURE: 92 MMHG | OXYGEN SATURATION: 98 % | TEMPERATURE: 99 F | DIASTOLIC BLOOD PRESSURE: 60 MMHG | RESPIRATION RATE: 20 BRPM | HEART RATE: 99 BPM

## 2018-03-31 LAB
ANION GAP SERPL CALC-SCNC: 8 MMOL/L — SIGNIFICANT CHANGE UP (ref 5–17)
BUN SERPL-MCNC: 9 MG/DL — SIGNIFICANT CHANGE UP (ref 8–20)
CALCIUM SERPL-MCNC: 8.5 MG/DL — LOW (ref 8.6–10.2)
CHLORIDE SERPL-SCNC: 105 MMOL/L — SIGNIFICANT CHANGE UP (ref 98–107)
CO2 SERPL-SCNC: 27 MMOL/L — SIGNIFICANT CHANGE UP (ref 22–29)
CREAT SERPL-MCNC: 0.54 MG/DL — SIGNIFICANT CHANGE UP (ref 0.5–1.3)
GLUCOSE SERPL-MCNC: 114 MG/DL — SIGNIFICANT CHANGE UP (ref 70–115)
HCT VFR BLD CALC: 29.5 % — LOW (ref 37–47)
HGB BLD-MCNC: 9.3 G/DL — LOW (ref 12–16)
MCHC RBC-ENTMCNC: 30.4 PG — SIGNIFICANT CHANGE UP (ref 27–31)
MCHC RBC-ENTMCNC: 31.5 G/DL — LOW (ref 32–36)
MCV RBC AUTO: 96.4 FL — SIGNIFICANT CHANGE UP (ref 81–99)
PLATELET # BLD AUTO: 173 K/UL — SIGNIFICANT CHANGE UP (ref 150–400)
POTASSIUM SERPL-MCNC: 4.2 MMOL/L — SIGNIFICANT CHANGE UP (ref 3.5–5.3)
POTASSIUM SERPL-SCNC: 4.2 MMOL/L — SIGNIFICANT CHANGE UP (ref 3.5–5.3)
RBC # BLD: 3.06 M/UL — LOW (ref 4.4–5.2)
RBC # FLD: 14.1 % — SIGNIFICANT CHANGE UP (ref 11–15.6)
SODIUM SERPL-SCNC: 140 MMOL/L — SIGNIFICANT CHANGE UP (ref 135–145)
WBC # BLD: 9.2 K/UL — SIGNIFICANT CHANGE UP (ref 4.8–10.8)
WBC # FLD AUTO: 9.2 K/UL — SIGNIFICANT CHANGE UP (ref 4.8–10.8)

## 2018-03-31 PROCEDURE — 97163 PT EVAL HIGH COMPLEX 45 MIN: CPT

## 2018-03-31 PROCEDURE — 97535 SELF CARE MNGMENT TRAINING: CPT

## 2018-03-31 PROCEDURE — 80048 BASIC METABOLIC PNL TOTAL CA: CPT

## 2018-03-31 PROCEDURE — 97530 THERAPEUTIC ACTIVITIES: CPT

## 2018-03-31 PROCEDURE — 88311 DECALCIFY TISSUE: CPT

## 2018-03-31 PROCEDURE — 36415 COLL VENOUS BLD VENIPUNCTURE: CPT

## 2018-03-31 PROCEDURE — 73501 X-RAY EXAM HIP UNI 1 VIEW: CPT

## 2018-03-31 PROCEDURE — 85027 COMPLETE CBC AUTOMATED: CPT

## 2018-03-31 PROCEDURE — C1713: CPT

## 2018-03-31 PROCEDURE — 99232 SBSQ HOSP IP/OBS MODERATE 35: CPT

## 2018-03-31 PROCEDURE — 97116 GAIT TRAINING THERAPY: CPT

## 2018-03-31 PROCEDURE — 88305 TISSUE EXAM BY PATHOLOGIST: CPT

## 2018-03-31 PROCEDURE — 97167 OT EVAL HIGH COMPLEX 60 MIN: CPT

## 2018-03-31 PROCEDURE — C1776: CPT

## 2018-03-31 RX ORDER — ACETAMINOPHEN 500 MG
3 TABLET ORAL
Qty: 0 | Refills: 0 | COMMUNITY
Start: 2018-03-31

## 2018-03-31 RX ORDER — SENNA PLUS 8.6 MG/1
2 TABLET ORAL
Qty: 0 | Refills: 0 | COMMUNITY
Start: 2018-03-31

## 2018-03-31 RX ORDER — DOCUSATE SODIUM 100 MG
1 CAPSULE ORAL
Qty: 0 | Refills: 0 | COMMUNITY
Start: 2018-03-31

## 2018-03-31 RX ORDER — TRAMADOL HYDROCHLORIDE 50 MG/1
1 TABLET ORAL
Qty: 0 | Refills: 0 | COMMUNITY
Start: 2018-03-31

## 2018-03-31 RX ORDER — ENOXAPARIN SODIUM 100 MG/ML
40 INJECTION SUBCUTANEOUS
Qty: 0 | Refills: 0 | COMMUNITY
Start: 2018-03-31 | End: 2018-04-13

## 2018-03-31 RX ORDER — MAGNESIUM HYDROXIDE 400 MG/1
30 TABLET, CHEWABLE ORAL
Qty: 0 | Refills: 0 | COMMUNITY
Start: 2018-03-31

## 2018-03-31 RX ADMIN — Medication 25 MILLIGRAM(S): at 05:12

## 2018-03-31 RX ADMIN — Medication 975 MILLIGRAM(S): at 05:13

## 2018-03-31 RX ADMIN — CELECOXIB 200 MILLIGRAM(S): 200 CAPSULE ORAL at 05:13

## 2018-03-31 RX ADMIN — TAMOXIFEN CITRATE 20 MILLIGRAM(S): 20 TABLET, FILM COATED ORAL at 11:24

## 2018-03-31 RX ADMIN — ENOXAPARIN SODIUM 40 MILLIGRAM(S): 100 INJECTION SUBCUTANEOUS at 05:12

## 2018-03-31 RX ADMIN — Medication 650 MILLIGRAM(S): at 11:25

## 2018-03-31 NOTE — PROGRESS NOTE ADULT - SUBJECTIVE AND OBJECTIVE BOX
JOSEY SAMAYOA    [id number]    History: Patient is status post left posterior total hip arthroplasty on 3/29/18, POD # 2. Patient is doing well. The patient's pain is controlled using the prescribed pain medications. The patient is participating in physical therapy. Denies nausea, vomiting, chest pain, shortness of breath, abdominal pain or fever. No new complaints.                        9.3    9.2   )-----------( 173      ( 31 Mar 2018 05:45 )             29.5     03-31    140  |  105  |  9.0  ----------------------------<  114  4.2   |  27.0  |  0.54    Ca    8.5<L>      31 Mar 2018 05:45    MEDICATIONS  (STANDING):  acetaminophen   Tablet 975 milliGRAM(s) Oral every 8 hours  atorvastatin 20 milliGRAM(s) Oral at bedtime  celecoxib 200 milliGRAM(s) Oral two times a day  docusate sodium 100 milliGRAM(s) Oral three times a day  enoxaparin Injectable 40 milliGRAM(s) SubCutaneous every 24 hours  lactated ringers. 1000 milliLiter(s) (100 mL/Hr) IV Continuous <Continuous>  metoprolol tartrate 25 milliGRAM(s) Oral two times a day  oxyCODONE  ER Tablet 10 milliGRAM(s) Oral every 12 hours  polyethylene glycol 3350 17 Gram(s) Oral daily  tamoxifen 20 milliGRAM(s) Oral daily  traMADol 50 milliGRAM(s) Oral every 4 hours    MEDICATIONS  (PRN):  acetaminophen   Tablet 650 milliGRAM(s) Oral every 6 hours PRN For Temp over 38.3 C (100.94 F)  aluminum hydroxide/magnesium hydroxide/simethicone Suspension 30 milliLiter(s) Oral four times a day PRN Indigestion  bisacodyl Suppository 10 milliGRAM(s) Rectal daily PRN If no bowel movement by POD#2  magnesium hydroxide Suspension 30 milliLiter(s) Oral daily PRN Constipation  ondansetron Injectable 4 milliGRAM(s) IV Push every 6 hours PRN Nausea and/or Vomiting  senna 2 Tablet(s) Oral at bedtime PRN Constipation    Physical exam: The left hip dressing is clean, dry and intact. Dressing removed. Incision healing well. No drainage or discharge. No erythema is noted. No blistering. No ecchymosis. New dressing applied. The calf is supple nontender. Passive range of motion is acceptable to due postoperative pain. Sensation to light touch is grossly intact distally. Motor function distally is 5/5. No foot drop. 2+ dorsalis pedis pulse. Capillary refill is less than 2 seconds. No cyanosis.    Primary Orthopedic Assessment:  • s/p LEFT POSTERIOR total hip replacement    Plan:   • DVT prophylaxis as prescribed, including use of compression devices and ankle pumps  • Continue physical therapy  • Weightbearing as tolerated of the left lower extremity with assistance of a walker  • Incentive spirometry encouraged  • Pain control as clinically indicated  • Posterior hip precautions   • Discharge planning – anticipated discharge is subacute rehabilitation today 3/31/18

## 2018-03-31 NOTE — PROGRESS NOTE ADULT - ASSESSMENT
64 y/o female with h/o left hip pain /OA had mri done which showed DJD patient . Now patient is s/p L DEMARIO , POD # 2      Problem/Recommendation - 1:  Problem: Primary osteoarthritis of left hip. Recommendation: S/P L DEMARIO     Problem/Recommendation - 2:  ·  Problem: Status post left hip replacement.  Recommendation: PT/OT/pain mgmt  DVT prophylaxis- as per ortho  Abx as per SCIP  Incentive spirometry  Prophylaxis of opioid  induced constipation.      Problem/Recommendation - 3:  ·  Problem: History of breast cancer.  Recommendation: continue Tamoxifen.      Problem/Recommendation - 4:  ·  Problem: High cholesterol.  Recommendation: continue statins.      Problem/Recommendation - 5:  ·  Problem: Essential hypertension.  Recommendation: continue home meds with parameters.      Problem/Recommendation - 6:  Problem: History of CHF (congestive heart failure) in the past . Recommendation: resolved.     Problem/Recommendation - 7:  Problem: Prophylactic measure. Recommendation: DVT prophylaxis - on Lovenox  , venodyne boots   Opioid induced constipation prophylaxis.     Problem/Recommendation - 8:  Problem: Anemia - likely ABLA - as a cause of postoperative anemia  Recommendation: asymptomatic

## 2018-03-31 NOTE — PROGRESS NOTE ADULT - ATTENDING COMMENTS
Medically stable  to d/c once cleared by physical therapy .
D/C plan - rehab .  Medically stable for d/c once cleared  by physical therapy

## 2018-03-31 NOTE — PROGRESS NOTE ADULT - SUBJECTIVE AND OBJECTIVE BOX
Patient seen and examined . S/p L DEMARIO   , POD # 2    CC : L hip pain well controlled         PAST MEDICAL & SURGICAL HISTORY:  CHF (congestive heart failure): 12/2017 resolved  Breast cancer: left 2017, had surgery and radiation  High cholesterol  High blood pressure  History of hip replacement, total, right: 12/2017  History of lumpectomy of left breast: 2017  Atypical lipoma of soft tissue: in left groin 2008  H/O foot surgery: ankle left 2004      MEDICATIONS  (STANDING):  acetaminophen   Tablet 975 milliGRAM(s) Oral every 8 hours  atorvastatin 20 milliGRAM(s) Oral at bedtime  celecoxib 200 milliGRAM(s) Oral two times a day  docusate sodium 100 milliGRAM(s) Oral three times a day  enoxaparin Injectable 40 milliGRAM(s) SubCutaneous every 24 hours  lactated ringers. 1000 milliLiter(s) (100 mL/Hr) IV Continuous <Continuous>  metoprolol tartrate 25 milliGRAM(s) Oral two times a day  oxyCODONE  ER Tablet 10 milliGRAM(s) Oral every 12 hours  polyethylene glycol 3350 17 Gram(s) Oral daily  tamoxifen 20 milliGRAM(s) Oral daily  traMADol 50 milliGRAM(s) Oral every 4 hours    MEDICATIONS  (PRN):  acetaminophen   Tablet 650 milliGRAM(s) Oral every 6 hours PRN For Temp over 38.3 C (100.94 F)  aluminum hydroxide/magnesium hydroxide/simethicone Suspension 30 milliLiter(s) Oral four times a day PRN Indigestion  bisacodyl Suppository 10 milliGRAM(s) Rectal daily PRN If no bowel movement by POD#2  magnesium hydroxide Suspension 30 milliLiter(s) Oral daily PRN Constipation  ondansetron Injectable 4 milliGRAM(s) IV Push every 6 hours PRN Nausea and/or Vomiting  senna 2 Tablet(s) Oral at bedtime PRN Constipation      LABS:                          9.3    9.2   )-----------( 173      ( 31 Mar 2018 05:45 )             29.5     03-31    140  |  105  |  9.0  ----------------------------<  114  4.2   |  27.0  |  0.54    Ca    8.5<L>      31 Mar 2018 05:45      REVIEW OF SYSTEMS:    CONSTITUTIONAL: No fever, weight loss, or fatigue  EYES: No eye pain, visual disturbances, or discharge  ENMT:  No difficulty hearing, tinnitus, vertigo; No sinus or throat pain  NECK: No pain or stiffness  RESPIRATORY: No cough, wheezing, chills or hemoptysis; No shortness of breath  CARDIOVASCULAR: No chest pain, palpitations, dizziness, or leg swelling  GASTROINTESTINAL: No abdominal or epigastric pain. No nausea, vomiting, or hematemesis; No diarrhea or constipation. No melena or hematochezia.  GENITOURINARY: No dysuria, frequency, hematuria, or incontinence  NEUROLOGICAL: No headaches, memory loss, loss of strength, numbness, or tremors  SKIN: No itching, burning, rashes, or lesions   LYMPH NODES: No enlarged glands  ENDOCRINE: No heat or cold intolerance; No hair loss  MUSCULOSKELETAL: L hip pain well controlled   PSYCHIATRIC: No depression, anxiety, mood swings, or difficulty sleeping  HEME/LYMPH: No easy bruising, or bleeding gums  ALLERGY AND IMMUNOLOGIC: No hives or eczema    Vital Signs Last 24 Hrs  T(C): 37.1 (31 Mar 2018 04:15), Max: 37.8 (30 Mar 2018 20:00)  T(F): 98.7 (31 Mar 2018 04:15), Max: 100.1 (30 Mar 2018 20:00)  HR: 113 (31 Mar 2018 04:15) (103 - 113)  BP: 117/59 (31 Mar 2018 04:15) (107/56 - 117/59)  BP(mean): --  RR: 18 (31 Mar 2018 04:15) (18 - 19)  SpO2: 97% (31 Mar 2018 04:15) (96% - 97%)  PHYSICAL EXAM:    GENERAL: NAD, well-groomed, well-developed  HEAD:  Atraumatic, Normocephalic  EYES: EOMI, PERRLA, conjunctiva and sclera clear  NECK: Supple, No JVD, Normal thyroid  NERVOUS SYSTEM:  Alert & Oriented X3, no focal deficit  CHEST/LUNG: CTA b/l ,  no  rales, rhonchi, wheezing, or rubs  HEART: Regular rate and rhythm; No murmurs, rubs, or gallops  ABDOMEN: Soft, Nontender, Nondistended; Bowel sounds present  EXTREMITIES:  2+ Peripheral Pulses, No clubbing, cyanosis, or edema , L hip dressing + , clean and dry   LYMPH: No lymphadenopathy noted  SKIN: No rashes or lesions Patient seen and examined . S/p L DEMARIO   , POD # 2.     CC : L hip pain well controlled , no other complaints         PAST MEDICAL & SURGICAL HISTORY:  CHF (congestive heart failure): 12/2017 resolved  Breast cancer: left 2017, had surgery and radiation  High cholesterol  High blood pressure  History of hip replacement, total, right: 12/2017  History of lumpectomy of left breast: 2017  Atypical lipoma of soft tissue: in left groin 2008  H/O foot surgery: ankle left 2004      MEDICATIONS  (STANDING):  acetaminophen   Tablet 975 milliGRAM(s) Oral every 8 hours  atorvastatin 20 milliGRAM(s) Oral at bedtime  celecoxib 200 milliGRAM(s) Oral two times a day  docusate sodium 100 milliGRAM(s) Oral three times a day  enoxaparin Injectable 40 milliGRAM(s) SubCutaneous every 24 hours  lactated ringers. 1000 milliLiter(s) (100 mL/Hr) IV Continuous <Continuous>  metoprolol tartrate 25 milliGRAM(s) Oral two times a day  oxyCODONE  ER Tablet 10 milliGRAM(s) Oral every 12 hours  polyethylene glycol 3350 17 Gram(s) Oral daily  tamoxifen 20 milliGRAM(s) Oral daily  traMADol 50 milliGRAM(s) Oral every 4 hours    MEDICATIONS  (PRN):  acetaminophen   Tablet 650 milliGRAM(s) Oral every 6 hours PRN For Temp over 38.3 C (100.94 F)  aluminum hydroxide/magnesium hydroxide/simethicone Suspension 30 milliLiter(s) Oral four times a day PRN Indigestion  bisacodyl Suppository 10 milliGRAM(s) Rectal daily PRN If no bowel movement by POD#2  magnesium hydroxide Suspension 30 milliLiter(s) Oral daily PRN Constipation  ondansetron Injectable 4 milliGRAM(s) IV Push every 6 hours PRN Nausea and/or Vomiting  senna 2 Tablet(s) Oral at bedtime PRN Constipation      LABS:                          9.3    9.2   )-----------( 173      ( 31 Mar 2018 05:45 )             29.5     03-31    140  |  105  |  9.0  ----------------------------<  114  4.2   |  27.0  |  0.54    Ca    8.5<L>      31 Mar 2018 05:45      REVIEW OF SYSTEMS:    CONSTITUTIONAL: No fever, weight loss, or fatigue  EYES: No eye pain, visual disturbances, or discharge  ENMT:  No difficulty hearing, tinnitus, vertigo; No sinus or throat pain  NECK: No pain or stiffness  RESPIRATORY: No cough, wheezing, chills or hemoptysis; No shortness of breath  CARDIOVASCULAR: No chest pain, palpitations, dizziness, or leg swelling  GASTROINTESTINAL: No abdominal or epigastric pain. No nausea, vomiting, or hematemesis; No diarrhea or constipation. No melena or hematochezia.  GENITOURINARY: No dysuria, frequency, hematuria, or incontinence  NEUROLOGICAL: No headaches, memory loss, loss of strength, numbness, or tremors  SKIN: No itching, burning, rashes, or lesions   LYMPH NODES: No enlarged glands  ENDOCRINE: No heat or cold intolerance; No hair loss  MUSCULOSKELETAL: L hip pain well controlled   PSYCHIATRIC: No depression, anxiety, mood swings, or difficulty sleeping  HEME/LYMPH: No easy bruising, or bleeding gums  ALLERGY AND IMMUNOLOGIC: No hives or eczema    Vital Signs Last 24 Hrs  T(C): 37.1 (31 Mar 2018 04:15), Max: 37.8 (30 Mar 2018 20:00)  T(F): 98.7 (31 Mar 2018 04:15), Max: 100.1 (30 Mar 2018 20:00)  HR: 113 (31 Mar 2018 04:15) (103 - 113)  BP: 117/59 (31 Mar 2018 04:15) (107/56 - 117/59)  BP(mean): --  RR: 18 (31 Mar 2018 04:15) (18 - 19)  SpO2: 97% (31 Mar 2018 04:15) (96% - 97%)  PHYSICAL EXAM:    GENERAL: NAD, well-groomed, well-developed  HEAD:  Atraumatic, Normocephalic  EYES: EOMI, PERRLA, conjunctiva and sclera clear  NECK: Supple, No JVD, Normal thyroid  NERVOUS SYSTEM:  Alert & Oriented X3, no focal deficit  CHEST/LUNG: CTA b/l ,  no  rales, rhonchi, wheezing, or rubs  HEART: Regular rate and rhythm; No murmurs, rubs, or gallops  ABDOMEN: Soft, Nontender, Nondistended; Bowel sounds present  EXTREMITIES:  2+ Peripheral Pulses, No clubbing, cyanosis, or edema , L hip dressing + , clean and dry   LYMPH: No lymphadenopathy noted  SKIN: No rashes or lesions

## 2018-04-10 LAB — SURGICAL PATHOLOGY FINAL REPORT - CH: SIGNIFICANT CHANGE UP

## 2018-04-14 RX ORDER — ASPIRIN/CALCIUM CARB/MAGNESIUM 324 MG
1 TABLET ORAL
Qty: 60 | Refills: 0 | OUTPATIENT
Start: 2018-04-14 | End: 2018-05-13

## 2018-04-17 ENCOUNTER — EMERGENCY (EMERGENCY)
Facility: HOSPITAL | Age: 63
LOS: 1 days | Discharge: HOME | End: 2018-04-17
Attending: EMERGENCY MEDICINE
Payer: MEDICARE

## 2018-04-17 VITALS
SYSTOLIC BLOOD PRESSURE: 153 MMHG | HEART RATE: 100 BPM | RESPIRATION RATE: 16 BRPM | DIASTOLIC BLOOD PRESSURE: 73 MMHG | TEMPERATURE: 99 F | WEIGHT: 195.99 LBS | OXYGEN SATURATION: 96 %

## 2018-04-17 DIAGNOSIS — D17.9 BENIGN LIPOMATOUS NEOPLASM, UNSPECIFIED: Chronic | ICD-10-CM

## 2018-04-17 DIAGNOSIS — Z96.641 PRESENCE OF RIGHT ARTIFICIAL HIP JOINT: Chronic | ICD-10-CM

## 2018-04-17 DIAGNOSIS — Z98.890 OTHER SPECIFIED POSTPROCEDURAL STATES: Chronic | ICD-10-CM

## 2018-04-17 DIAGNOSIS — Z90.12 ACQUIRED ABSENCE OF LEFT BREAST AND NIPPLE: Chronic | ICD-10-CM

## 2018-04-17 LAB
ALBUMIN SERPL ELPH-MCNC: 3 G/DL — LOW (ref 3.3–5.2)
ALP SERPL-CCNC: 79 U/L — SIGNIFICANT CHANGE UP (ref 40–120)
ALT FLD-CCNC: 15 U/L — SIGNIFICANT CHANGE UP
ANION GAP SERPL CALC-SCNC: 11 MMOL/L — SIGNIFICANT CHANGE UP (ref 5–17)
AST SERPL-CCNC: 19 U/L — SIGNIFICANT CHANGE UP
BASOPHILS # BLD AUTO: 0 K/UL — SIGNIFICANT CHANGE UP (ref 0–0.2)
BASOPHILS NFR BLD AUTO: 0.3 % — SIGNIFICANT CHANGE UP (ref 0–2)
BILIRUB SERPL-MCNC: 0.3 MG/DL — LOW (ref 0.4–2)
BUN SERPL-MCNC: 6 MG/DL — LOW (ref 8–20)
CALCIUM SERPL-MCNC: 9.2 MG/DL — SIGNIFICANT CHANGE UP (ref 8.6–10.2)
CHLORIDE SERPL-SCNC: 103 MMOL/L — SIGNIFICANT CHANGE UP (ref 98–107)
CO2 SERPL-SCNC: 26 MMOL/L — SIGNIFICANT CHANGE UP (ref 22–29)
CREAT SERPL-MCNC: 0.48 MG/DL — LOW (ref 0.5–1.3)
EOSINOPHIL # BLD AUTO: 0.5 K/UL — SIGNIFICANT CHANGE UP (ref 0–0.5)
EOSINOPHIL NFR BLD AUTO: 6.7 % — HIGH (ref 0–6)
GLUCOSE SERPL-MCNC: 101 MG/DL — SIGNIFICANT CHANGE UP (ref 70–115)
HCT VFR BLD CALC: 31.3 % — LOW (ref 37–47)
HGB BLD-MCNC: 9.9 G/DL — LOW (ref 12–16)
LYMPHOCYTES # BLD AUTO: 3.2 K/UL — SIGNIFICANT CHANGE UP (ref 1–4.8)
LYMPHOCYTES # BLD AUTO: 40.5 % — SIGNIFICANT CHANGE UP (ref 20–55)
MCHC RBC-ENTMCNC: 31.1 PG — HIGH (ref 27–31)
MCHC RBC-ENTMCNC: 31.6 G/DL — LOW (ref 32–36)
MCV RBC AUTO: 98.4 FL — SIGNIFICANT CHANGE UP (ref 81–99)
MONOCYTES # BLD AUTO: 1 K/UL — HIGH (ref 0–0.8)
MONOCYTES NFR BLD AUTO: 12.4 % — HIGH (ref 3–10)
NEUTROPHILS # BLD AUTO: 3.2 K/UL — SIGNIFICANT CHANGE UP (ref 1.8–8)
NEUTROPHILS NFR BLD AUTO: 40 % — SIGNIFICANT CHANGE UP (ref 37–73)
PLATELET # BLD AUTO: 496 K/UL — HIGH (ref 150–400)
POTASSIUM SERPL-MCNC: 4.1 MMOL/L — SIGNIFICANT CHANGE UP (ref 3.5–5.3)
POTASSIUM SERPL-SCNC: 4.1 MMOL/L — SIGNIFICANT CHANGE UP (ref 3.5–5.3)
PROT SERPL-MCNC: 6.3 G/DL — LOW (ref 6.6–8.7)
RBC # BLD: 3.18 M/UL — LOW (ref 4.4–5.2)
RBC # FLD: 14.5 % — SIGNIFICANT CHANGE UP (ref 11–15.6)
SODIUM SERPL-SCNC: 140 MMOL/L — SIGNIFICANT CHANGE UP (ref 135–145)
TROPONIN T SERPL-MCNC: <0.01 NG/ML — SIGNIFICANT CHANGE UP (ref 0–0.06)
WBC # BLD: 7.9 K/UL — SIGNIFICANT CHANGE UP (ref 4.8–10.8)
WBC # FLD AUTO: 7.9 K/UL — SIGNIFICANT CHANGE UP (ref 4.8–10.8)

## 2018-04-17 PROCEDURE — 71045 X-RAY EXAM CHEST 1 VIEW: CPT

## 2018-04-17 PROCEDURE — 71045 X-RAY EXAM CHEST 1 VIEW: CPT | Mod: 26

## 2018-04-17 PROCEDURE — 80053 COMPREHEN METABOLIC PANEL: CPT

## 2018-04-17 PROCEDURE — 93010 ELECTROCARDIOGRAM REPORT: CPT

## 2018-04-17 PROCEDURE — 36415 COLL VENOUS BLD VENIPUNCTURE: CPT

## 2018-04-17 PROCEDURE — 99283 EMERGENCY DEPT VISIT LOW MDM: CPT

## 2018-04-17 PROCEDURE — 84484 ASSAY OF TROPONIN QUANT: CPT

## 2018-04-17 PROCEDURE — 99284 EMERGENCY DEPT VISIT MOD MDM: CPT | Mod: 25

## 2018-04-17 PROCEDURE — 85027 COMPLETE CBC AUTOMATED: CPT

## 2018-04-17 PROCEDURE — 93005 ELECTROCARDIOGRAM TRACING: CPT

## 2018-04-17 PROCEDURE — 99284 EMERGENCY DEPT VISIT MOD MDM: CPT

## 2018-04-17 RX ORDER — SODIUM CHLORIDE 9 MG/ML
3 INJECTION INTRAMUSCULAR; INTRAVENOUS; SUBCUTANEOUS ONCE
Qty: 0 | Refills: 0 | Status: COMPLETED | OUTPATIENT
Start: 2018-04-17 | End: 2018-04-17

## 2018-04-17 RX ADMIN — SODIUM CHLORIDE 3 MILLILITER(S): 9 INJECTION INTRAMUSCULAR; INTRAVENOUS; SUBCUTANEOUS at 19:12

## 2018-04-17 NOTE — ED PROVIDER NOTE - PROGRESS NOTE DETAILS
Spoke with Dr. Segal (renal) who recommended admit and he will follow-up. no hypotension in ed hospitalist stating not meeting admission criteria at  this time pt refusing to go back to rehab plan ofr card consult am nad overnight Patient s/o to me by Dr Roberts to DC patient home after cardiology consultation.  Cardiology seen patient and SW seen patient and recommend DC home

## 2018-04-17 NOTE — ED PROVIDER NOTE - OBJECTIVE STATEMENT
64 y/o F BIBA w/ PMHx of HTN and HLD last well 18 days presents to ED c/o intermittent hypotension x17 days. Pt has been in [facility] rehab x17 days for R hip replacement. BP 03/31/18= 127/78, 04/05/2018= 103/57, 04/10/18= 105/58, 04/15/18= 94/56. She states when she stands up to walk she feels sweatiness and dizziness and has been unable to ambulate. Pt was sent in for further evaluation. She notes she has stopped taking HTN meds (Metoprolol 25 mg BID) x1 week. HR increased to 110 today. Pt went cardiologist Dr. Bonner in [place] for same prior to the results of her blood work at [facility]. Cardiologist gave recommendation for meds to increase BP but did not Rx. Denies dehydration, CP, SOB or any other complaints at this time. PMD: Dr. Sadler. 62 y/o F BIBA w/ PMHx of CHF, HTN and HLD last well 18 days presents to ED c/o intermittent hypotension x17 days. Pt has been in [facility] rehab x17 days for R hip replacement. BP 03/31/18= 127/78, 04/05/2018= 103/57, 04/10/18= 105/58, 04/15/18= 94/56. She states when she stands up to walk she feels sweatiness and dizziness and has been unable to ambulate. Pt was sent in for further evaluation. She notes she has stopped taking HTN meds (Metoprolol 25 mg BID) x1 week. HR increased to 110 today. Pt went cardiologist Dr. Bonner in [place] for same prior to the results of her blood work at [facility]. Cardiologist gave recommendation for meds to increase BP but did not Rx. Denies dehydration, CP, SOB or any other complaints at this time. PMD: Dr. Sadler.

## 2018-04-17 NOTE — ED ADULT NURSE NOTE - CHIEF COMPLAINT QUOTE
patient comes to ed from [facility] for eval of periods of hypotension x 17 days. patient not hypotensive on arrival to ed, awake alert. denies any complaints at this time.

## 2018-04-17 NOTE — ED ADULT TRIAGE NOTE - CHIEF COMPLAINT QUOTE
patient comes to ed from [facility] for eval of periods of hypotension x 17 days. patient not hypotensive on arrival to ed, awake alert. denies any complaints at this time.

## 2018-04-17 NOTE — ED ADULT NURSE NOTE - CHPI ED SYMPTOMS NEG
no vomiting/no dizziness/no fever/no syncope/no cough/no shortness of breath/no chills/no diaphoresis/no back pain/no chest pain/no nausea

## 2018-04-18 VITALS
OXYGEN SATURATION: 97 % | DIASTOLIC BLOOD PRESSURE: 74 MMHG | TEMPERATURE: 98 F | HEART RATE: 93 BPM | SYSTOLIC BLOOD PRESSURE: 116 MMHG | RESPIRATION RATE: 20 BRPM

## 2018-04-18 PROCEDURE — 99285 EMERGENCY DEPT VISIT HI MDM: CPT

## 2018-04-18 NOTE — CONSULT NOTE ADULT - ASSESSMENT
63F hx Breast cancer  left 2017, had surgery and radiation, HTN, HLD,  heart failure "fluid overloaded after R hip surgery in 12/2017) had Echo and NST reportedly WNL at that time who had L hip surgery on 3/29/2018 and has been in Rehab who was noted to have hypotension over the last 17 days,  reportedly SBP at about 90 mmHg and tachycardia at times at abot 112 bpm as per pt. BP 03/31/18= 127/78, 04/05/2018= 103/57, 04/10/18= 105/58, 04/15/18= 94/56. She states when she stands up to walk she feels sweatiness and dizziness and has been unable to ambulate. She notes she has stopped taking HTN meds (Metoprolol 25 mg BID) x1 week. HR increased to 110 today. Pt went cardiologist Dr. Bonner in [place] for same prior to the results of her blood work at [facility]. Cardiologist gave recommendation for meds to increase BP but did not Rx. D  She had lightheadedness  intermittently and vomited few days ago. She has noticed that her eating is less, drinking water but trying no to drink much whit concern not to get in to heart failure. Denied dyspnea, orthopnea PND, edema, CP, hematuria, melena, rectal bleed, syncpe, near syncope.       A/P  1) Hypotension: BP is table at this point. Some poor PO intake noted. Recent Echo and NST unremarkable  Telemetry showed SR.  EKG unremarkable.  Encouraged to have adequate PO intake.  Advised to follows at her PCP and Cardiologist as outpt. ( in [place])  2) Anemia: Hb 9.9. Pt has similar Hb in the past. Further eval through PCP office.   3) h/o Heart Failure as per pt. Reviewing Echo and NST essentially unremarkable at that time.    d/w Dr. Alfonso

## 2018-04-18 NOTE — CHART NOTE - NSCHARTNOTEFT_GEN_A_CORE
SOCIAL WORK NOTE:  DISCUSSED CASE WITH THE DOCTOR WHOM HAS CLEARED THE PATIENT FOR DISCHARGE.  MET WITH PATIENT WHOM IS AXOX4.  PLEASANT AND COOPERATIVE.  PATIENT CONFIRMED SHE IS FROM [facility] FOR [facility] AND HAS BEEN THERE SINCE 3/31/18.  PATIENT IS IN AGREEMENT WITH RETURN TO SNF.  ED CHART SENT TO [facility] ADMISSIONS AND THEY ARE AWARE OF IZZY'S RETURN AND EXPRESSED NO ISSUES.  AMBULANCE ARRANGED  FOR  1 PM.  MADE RN AWARE.  PATIENT REQUESTED  PLACE CALL TO MARIO BENAVIDES # [phone] TO MAKE HER AWARE OF TRANSFER BACK TO SNF

## 2018-04-18 NOTE — CONSULT NOTE ADULT - SUBJECTIVE AND OBJECTIVE BOX
CARDIOLOGY CONSULTATION NOTE       History obtained by: Patient, family and medical record     obtained: No    Chief complaint:    Patient is a 63y old  Female who presents with a chief complaint of hypotenison.    HPI:  63F hx Breast cancer  left , had surgery and radiation, HTN, HLD,  heart failure "fluid overloaded after R hip surgery in 2017) had Echo and NST reportedly WNL at that time who had L hip surgery on 3/29/2018 and has been in Rehab who was noted to have hypotension over the last 17 days,  reportedly SBP at about 90 mmHg and tachycardia at times at abot 112 bpm as per pt. BP 18= 127/78, 2018= 103/57, 04/10/18= 105/58, 04/15/18= 94/56. She states when she stands up to walk she feels sweatiness and dizziness and has been unable to ambulate. She notes she has stopped taking HTN meds (Metoprolol 25 mg BID) x1 week. HR increased to 110 today. Pt went cardiologist Dr. Bonner in [place] for same prior to the results of her blood work at [facility]. Cardiologist gave recommendation for meds to increase BP but did not Rx. D  She had lightheadedness  intermittently and vomited few days ago. She has noticed that her eating is less, drinking water but trying no to drink much whit concern not to get in to heart failure. Denied dyspnea, orthopnea PND, edema, CP, hematuria, melena, rectal bleed, syncpe, near syncope.       REVIEW OF SYMPTOMS:   Constitutional: Denied: fever, chills, weight loss or gain  Eyes: Denied: reddened ayes, eye discharge, eye pain  ENMT: Denied: ear pain, nasal discharge, mouth pain, throat pain or swelling  Cardiovascular: Denied: chest pain,  Chest pressure, palpitation, arrhythmia, irregular or fast heart beats, edema  Respiratory: Denied: cough, phlegm production, wheezes, dyspnea, orthopnea, PND,   GI: Denied: Abdominal pain, diarrhea, constipation, melena, rectal bleed  : Denied: hematuria, frequency  Musculoskeletal: Denied: Muscle aches, weakness, pain  Hematology/lymp: Denied: Bleeding disorder, anemia, blood clotting  Neuro: Denied: Headache, numbness, aphasia, dysarthria, seizure,  syncope, near syncope  Psych: Denied: depression, anxiety  Integumentary/skin: Denied: rash, bruises, ecchymosis, itching  Allergy/Immunology: denied environmental or drug allergies    ALL OTHER REVIEW OF SYSTEMS ARE NEGATIVE.    MEDICATIONS  (STANDING):    MEDICATIONS  (PRN):  ALLERGIES AND HOME MEDICATIONS:   Allergies:        Allergies:  	shellfish: Food, Patient, Swelling, Rash  	Crestor: Drug, Muscle Pain    Home Medications:   * Patient Currently Takes Medications as of 31-Mar-2018 08:56 documented in Structured Notes  · 	magnesium hydroxide 8% oral suspension: 30 milliliter(s) orally once a day, As needed, Constipation  · 	senna oral tablet: 2 tab(s) orally once a day (at bedtime), As needed, Constipation  · 	docusate sodium 100 mg oral capsule: 1 cap(s) orally 3 times a day  · 	enoxaparin: 40 milligram(s) injectable once a day  · 	acetaminophen 325 mg oral tablet: 3 tab(s) orally every 8 hours  · 	traMADol 50 mg oral tablet: 1 tab(s) orally every 4 hours  · 	metoprolol tartrate 25 mg oral tablet: 1 tab(s) orally 2 times a day  · 	tamoxifen 20 mg oral tablet: 1 tab(s) orally once a day  · 	atorvastatin 20 mg oral tablet: 1 tab(s) orally once a day (at bedtime)  · 	ferrous sulfate 325 mg (65 mg elemental iron) oral tablet: 1 tab(s) orally 3 times a day  · 	Vitamin D3 5000 intl units oral tablet: 1 tab(s) orally once a day      PAST MEDICAL & SURGICAL HISTORY:  CHF (congestive heart failure): 2017 resolved  Breast cancer: left , had surgery and radiation  High cholesterol  High blood pressure  History of hip replacement, total, right: 2017  History of lumpectomy of left breast:   Atypical lipoma of soft tissue: in left groin   H/O foot surgery: ankle left       FAMILY HISTORY:  Family history of stroke (Mother)  Family history of breast cancer in sister (Sibling)  Family history of hypertension in mother      SOCIAL HISTORY:    CIGARETTES:  No    ALCOHOL: No    DRUGS: No    Vital Signs Last 24 Hrs  T(C): 37.1 (2018 07:31), Max: 37.2 (2018 03:16)  T(F): 98.8 (2018 07:31), Max: 98.9 (2018 03:16)  HR: 112 (2018 07:31) (98 - 112)  BP: 150/103 (2018 07:31) (110/71 - 153/73)  BP(mean): --  RR: 20 (2018 07:31) (16 - 20)  SpO2: 98% (2018 07:31) (96% - 100%)    PHYSICAL EXAM:      Constitutional: No fever, chills, NAD, Comfortable    Eyes: Not reddened, no discharge    ENMT: No discharge, No pain    Neck: No JVD, No Bruit    Back: No CVA tenderness    Respiratory: Clear to auscultation bilaterally    Cardiovascular: RRR, Normal S1-2, No S3-, No friction rub murmur    Gastrointestinal: Soft, NT/ND. BS+, No organomegaly    Extremities: No edema    Vascular: Distal pulses intact    Neurological: Alert, awake, oriented, able to move extremities, speach is clear    Skin: No ecchymosis, no rash    Musculoskeletal: Non tender, no weaknss    Psychiatric: Aproppriate mood, no anxiety        INTERPRETATION OF TELEMETRY: SR HR 90s    ECG: NSR, NSST-T Abnl    I&O's Detail      LABS:                        9.9    7.9   )-----------( 496      ( 2018 19:03 )             31.3     04-17    140  |  103  |  6.0<L>  ----------------------------<  101  4.1   |  26.0  |  0.48<L>    Ca    9.2      2018 19:03    TPro  6.3<L>  /  Alb  3.0<L>  /  TBili  0.3<L>  /  DBili  x   /  AST  19  /  ALT  15  /  AlkPhos  79  04-17    CARDIAC MARKERS ( 2018 19:03 )  x     / <0.01 ng/mL / x     / x     / x                PATIENT: JOSEY SAMAYOA  : 1955   AGE: 62 (F)   MR#: [id number]  STUDY DATE: 2017  LOCATION: [place] [address]  REF. PHYSICIAN(S): Shamar Damian MD / Vern Moise  FELLOW: Darlene Elliott, ANP-C  ------------------------------------------------------------------------    TYPE OF TEST: Rest/Stress Pharmacologic  INDICATION: Abnormal electrocardiogram [ECG] [EKG]  (R94.31), Shortness of breath (R06.02)  ------------------------------------------------------------------------  HISTORY:  CARDIAC HISTORY: 62 years old female with HTN, HLD,  postmenoapusal, family history of CAD who presents for  ischemic evaluation of abnormal EKG and SOB with mild  positive troponin post op hip surgery.  OTHER HISTORY: Breast Cancer s/p right lumpectomy  RISK FACTORS: Elevated Cholesterol, Hypertension, Post  Menopausal, Family History  MEDICATIONS: ASA, Plavix, Metoprolol, Lipitor, Tamoxifen  ------------------------------------------------------------------------    BASELINE ELECTROCARDIOGRAM:  Rhythm: Normal Sinus Rhythm - 82 BPM    ------------------------------------------------------------------------    HEMODYNAMIC PARAMETERS:                                      HR      BP  Baseline  Pre-Injection             82  122/70  00:00     Inject Regadenoson         0  00:30     Post Injection            94  126/67  01:00     Post Injection            92  120/73  02:00     Post Injection            89  122/68  03:00     Post Injection            88  125/67  ------------------------------------------------------------------------    Agent: Regadenoson 0.4 mg/5 ml NS. injected over 10 sec.  HR: Baseline HR: 82 bpm   Peak HR: 94 bpm (60% of MPHR)  MPHR: 156 bpm   85% of MPHR: 133 bpm  BP: Baseline BP: 122/70 mmHg   Peak BP: 126/67 mmHg   Peak  RPP: [id number] (Rate Pressure Product)  Last Caffeine intake: 12 hrs  Terminated: Completion of protocol  ------------------------------------------------------------------------    SYMPTOMS/FINDINGS:  Symptoms: No Symptom  Chest pain: No chest pain with administration of  Regadenoson  ------------------------------------------------------------------------    ECG ABNORMALITIES DURING/AFTER STRESS:   Abnormalities: There were no diagnostic changes.  ------------------------------------------------------------------------    NEW ARRHYTHMIAS DEVELOPED DURING/AFTER STRESS:  None  ------------------------------------------------------------------------    STRESS TEST IMPRESSIONS:  Chest Pooja: No chest pain with administration of  Regadenoson.  Symptom: No Symptom.  HR Response: Appropriate.  BP Response: Appropriate.  Heart Rhythm: Normal Sinus Rhythm - 82 BPM.  ECG Abnormalities: There were no diagnostic changes.  Arrhythmia: None.    ------------------------------------------------------------------------    PROCEDURE:  7.5 mCi of Tc99m Sestamibi were injected at rest.  Approximately 45 minutes later, tomographic images were  obtained in a 180 degree arc from right anterior oblique  to left anterior oblique with 64 stops. At a separate  time, 22 mCi of Tc99m Sestamibi were injected during  stress protocol. Approximately 45 minute(s) later,  tomographic images were obtained in a 180 degree arc from  right anterior oblique to left anterior oblique with 64  stops. The tomographic slices were reconstructed in 3  orthogonal planes (short axis, horizontal long axis and  vertical long axis).  Interpretation was performed both by  visual and quantitative analysis.    ------------------------------------------------------------------------    NUCLEAR FINDINGS:  Review of raw data shows: The study is of good technical  quality.  The left ventricle was normal in size. Normal myocardial  perfusion scan, with no evidence of infarction or  inducible ischemia.  ------------------------------------------------------------------------      GATED ANALYSIS:  Gated wall motion analysis is performed, and shows normal  wall motion with post stress LVEF of 74%.  ------------------------------------------------------------------------      LV/RV OBSERVATION:  Normal LV ejection fraction.  ------------------------------------------------------------------------    IMPRESSIONS:Normal Study  * Review of raw data shows: The study is of good technical  quality.  * The left ventricle was normal in size. Normal myocardial  perfusion scan, with no evidence of infarction or  inducible ischemia.  * Gated wall motion analysis is performed, and shows  normal wall motion with post stress LVEF of 74%.  * Chest Pooja: No chest pain with administration of  Regadenoson.  * Symptom: No Symptom.  * HR Response: Appropriate.  * BP Response: Appropriate.  * Heart Rhythm: Normal Sinus Rhythm - 82 BPM.  * ECG Abnormalities: There were no diagnostic changes.  * Arrhythmia: None.    ------------------------------------------------------------------------      ------------------------------------------------------------------------    Confirmed on  2017 - 13:36:21 by Vern Moise MD  Cardiology Fellow: FRANSISCO Mccormick              EXAM:  ECHO TRANSTHORACIC;F U OR LTD      PROCEDURE DATE:  Dec  7 2017   .      INTERPRETATION:  REPORT:    TRANSTHORACIC ECHOCARDIOGRAM REPORT           Patient Name:   JOSEY SAMAYOA Patient Location: Inpatient  Medical Rec #:  [id number]           Accession #:      [id number]  Account #:                          Height:           66.9 in 170.0 cm  YOB: 1955            Weight:           194.0 lb 88.00 kg  Patient Age:    62 years            BSA:              1.99 m²  Patient Gender: F                   BP:               118/66 mmHg        Date of Exam:        2017 3:32:37 PM  Sonographer:         Isabel Darnell  Referring Physician: Shamar Damian MD     Procedure:     2D Echo/Doppler/Color Doppler Complete. With DEFINITY   contrast  Indications:   Dyspnea, unspecified - R06.00  Diagnosis:     Dyspnea, unspecified - R06.00  Study Details: Technically good study.     LV SYSTOLIC FUNCTION BY 2D PLANIMETRY (MOD):  EF-A4C View: 77.7 % EF-A2C View: 73.3 % EF-Biplane: 76.0 %     SPECTRAL DOPPLER ANALYSIS (where applicable):     PHYSICIAN INTERPRETATION:  Left Ventricle: Endocardial visualization was enhanced with intravenous   echo contrast. The left ventricular internal cavity size is normal.  Global LV systolic function was hyperdynamic. Left ventricular ejection   fraction, by visual estimation, is >75%.  Right Ventricle: Normal right ventricular size and function.  Pericardium: There is no evidence of pericardial effusion.  Mitral Valve: No evidence of mitral valve regurgitation is seen.  Tricuspid Valve: No tricuspid regurgitation is visualized.  Aortic Valve: No evidence of aortic valve regurgitation is seen.        Summary:   1. Endocardial visualization was enhanced with intravenous echo contrast.   2. Hyperdynamic wall motion. Left ventricular ejection fraction, by   visual estimation, is >75%.   3. Normal right ventricular size and function.   4. No significant valvular abnormality.   5. There is no evidence of pericardial effusion.   6. No cardiac cause of dyspnea identified on the echo.

## 2018-04-20 ENCOUNTER — APPOINTMENT (OUTPATIENT)
Dept: ORTHOPEDIC SURGERY | Facility: CLINIC | Age: 63
End: 2018-04-20
Payer: MEDICARE

## 2018-04-20 VITALS
DIASTOLIC BLOOD PRESSURE: 69 MMHG | HEIGHT: 67 IN | HEART RATE: 102 BPM | SYSTOLIC BLOOD PRESSURE: 113 MMHG | BODY MASS INDEX: 30.45 KG/M2 | WEIGHT: 194 LBS | TEMPERATURE: 98.5 F

## 2018-04-20 PROCEDURE — 99024 POSTOP FOLLOW-UP VISIT: CPT

## 2018-04-20 PROCEDURE — 73502 X-RAY EXAM HIP UNI 2-3 VIEWS: CPT | Mod: LT

## 2018-04-27 ENCOUNTER — OTHER (OUTPATIENT)
Age: 63
End: 2018-04-27

## 2018-05-02 ENCOUNTER — INPATIENT (INPATIENT)
Facility: HOSPITAL | Age: 63
LOS: 2 days | Discharge: HOME | DRG: 392 | End: 2018-05-05
Attending: HOSPITALIST | Admitting: HOSPITALIST
Payer: MEDICARE

## 2018-05-02 VITALS
OXYGEN SATURATION: 97 % | TEMPERATURE: 99 F | HEART RATE: 105 BPM | SYSTOLIC BLOOD PRESSURE: 118 MMHG | RESPIRATION RATE: 18 BRPM | DIASTOLIC BLOOD PRESSURE: 69 MMHG | HEIGHT: 67 IN | WEIGHT: 179.9 LBS

## 2018-05-02 DIAGNOSIS — D17.9 BENIGN LIPOMATOUS NEOPLASM, UNSPECIFIED: Chronic | ICD-10-CM

## 2018-05-02 DIAGNOSIS — Z96.641 PRESENCE OF RIGHT ARTIFICIAL HIP JOINT: Chronic | ICD-10-CM

## 2018-05-02 DIAGNOSIS — Z98.890 OTHER SPECIFIED POSTPROCEDURAL STATES: Chronic | ICD-10-CM

## 2018-05-02 DIAGNOSIS — Z90.12 ACQUIRED ABSENCE OF LEFT BREAST AND NIPPLE: Chronic | ICD-10-CM

## 2018-05-02 LAB
ALBUMIN SERPL ELPH-MCNC: 3 G/DL — LOW (ref 3.3–5.2)
ALP SERPL-CCNC: 68 U/L — SIGNIFICANT CHANGE UP (ref 40–120)
ALT FLD-CCNC: 15 U/L — SIGNIFICANT CHANGE UP
ANION GAP SERPL CALC-SCNC: 13 MMOL/L — SIGNIFICANT CHANGE UP (ref 5–17)
AST SERPL-CCNC: 18 U/L — SIGNIFICANT CHANGE UP
BASOPHILS # BLD AUTO: 0 K/UL — SIGNIFICANT CHANGE UP (ref 0–0.2)
BASOPHILS NFR BLD AUTO: 0.1 % — SIGNIFICANT CHANGE UP (ref 0–2)
BILIRUB SERPL-MCNC: 0.4 MG/DL — SIGNIFICANT CHANGE UP (ref 0.4–2)
BUN SERPL-MCNC: 7 MG/DL — LOW (ref 8–20)
CALCIUM SERPL-MCNC: 9.1 MG/DL — SIGNIFICANT CHANGE UP (ref 8.6–10.2)
CHLORIDE SERPL-SCNC: 104 MMOL/L — SIGNIFICANT CHANGE UP (ref 98–107)
CO2 SERPL-SCNC: 23 MMOL/L — SIGNIFICANT CHANGE UP (ref 22–29)
CREAT SERPL-MCNC: 0.51 MG/DL — SIGNIFICANT CHANGE UP (ref 0.5–1.3)
EOSINOPHIL # BLD AUTO: 0.3 K/UL — SIGNIFICANT CHANGE UP (ref 0–0.5)
EOSINOPHIL NFR BLD AUTO: 3 % — SIGNIFICANT CHANGE UP (ref 0–6)
GLUCOSE SERPL-MCNC: 111 MG/DL — SIGNIFICANT CHANGE UP (ref 70–115)
HCT VFR BLD CALC: 34.7 % — LOW (ref 37–47)
HGB BLD-MCNC: 10.9 G/DL — LOW (ref 12–16)
LIDOCAIN IGE QN: 14 U/L — LOW (ref 22–51)
LYMPHOCYTES # BLD AUTO: 1.8 K/UL — SIGNIFICANT CHANGE UP (ref 1–4.8)
LYMPHOCYTES # BLD AUTO: 18.6 % — LOW (ref 20–55)
MAGNESIUM SERPL-MCNC: 1.7 MG/DL — SIGNIFICANT CHANGE UP (ref 1.6–2.6)
MCHC RBC-ENTMCNC: 30.7 PG — SIGNIFICANT CHANGE UP (ref 27–31)
MCHC RBC-ENTMCNC: 31.4 G/DL — LOW (ref 32–36)
MCV RBC AUTO: 97.7 FL — SIGNIFICANT CHANGE UP (ref 81–99)
MONOCYTES # BLD AUTO: 0.9 K/UL — HIGH (ref 0–0.8)
MONOCYTES NFR BLD AUTO: 9.5 % — SIGNIFICANT CHANGE UP (ref 3–10)
NEUTROPHILS # BLD AUTO: 6.6 K/UL — SIGNIFICANT CHANGE UP (ref 1.8–8)
NEUTROPHILS NFR BLD AUTO: 68.3 % — SIGNIFICANT CHANGE UP (ref 37–73)
PLATELET # BLD AUTO: 251 K/UL — SIGNIFICANT CHANGE UP (ref 150–400)
POTASSIUM SERPL-MCNC: 4 MMOL/L — SIGNIFICANT CHANGE UP (ref 3.5–5.3)
POTASSIUM SERPL-SCNC: 4 MMOL/L — SIGNIFICANT CHANGE UP (ref 3.5–5.3)
PROT SERPL-MCNC: 6.4 G/DL — LOW (ref 6.6–8.7)
RBC # BLD: 3.55 M/UL — LOW (ref 4.4–5.2)
RBC # FLD: 14.1 % — SIGNIFICANT CHANGE UP (ref 11–15.6)
SODIUM SERPL-SCNC: 140 MMOL/L — SIGNIFICANT CHANGE UP (ref 135–145)
WBC # BLD: 9.7 K/UL — SIGNIFICANT CHANGE UP (ref 4.8–10.8)
WBC # FLD AUTO: 9.7 K/UL — SIGNIFICANT CHANGE UP (ref 4.8–10.8)

## 2018-05-02 PROCEDURE — 99284 EMERGENCY DEPT VISIT MOD MDM: CPT | Mod: 25

## 2018-05-02 PROCEDURE — 74177 CT ABD & PELVIS W/CONTRAST: CPT | Mod: 26

## 2018-05-02 RX ORDER — SODIUM CHLORIDE 9 MG/ML
3 INJECTION INTRAMUSCULAR; INTRAVENOUS; SUBCUTANEOUS ONCE
Qty: 0 | Refills: 0 | Status: COMPLETED | OUTPATIENT
Start: 2018-05-02 | End: 2018-05-02

## 2018-05-02 RX ORDER — SODIUM CHLORIDE 9 MG/ML
2000 INJECTION INTRAMUSCULAR; INTRAVENOUS; SUBCUTANEOUS ONCE
Qty: 0 | Refills: 0 | Status: COMPLETED | OUTPATIENT
Start: 2018-05-02 | End: 2018-05-02

## 2018-05-02 RX ADMIN — SODIUM CHLORIDE 1000 MILLILITER(S): 9 INJECTION INTRAMUSCULAR; INTRAVENOUS; SUBCUTANEOUS at 20:30

## 2018-05-02 RX ADMIN — SODIUM CHLORIDE 3 MILLILITER(S): 9 INJECTION INTRAMUSCULAR; INTRAVENOUS; SUBCUTANEOUS at 19:41

## 2018-05-02 NOTE — ED ADULT NURSE NOTE - OBJECTIVE STATEMENT
pt c/o diarrhea since sunday, denies fever/chills, pt at  today was sent to ER. pt states she feels weakness, dizziness, that I might pass out

## 2018-05-02 NOTE — ED PROVIDER NOTE - OBJECTIVE STATEMENT
64 y/o female with PMHx HTN (noncompliant with medication secondary to rehab s/p hip replacement), breast CA, and high cholesterol presents to the ED with c/o watery diarrhea, onset 3 days ago. Pt attempted to alleviate with Imodium without relief, last taken at 1500 today. Pt reports nausea and abdominal pain. Pt states prior to arrival she began feeling flushed, experiencing palpitations and felt as if she was going to faint. Pt saw PCP today for similar complaints and was sent to the ED for further evaluation. Pt denies fever, chills, recent travel, vomiting, and any other acute symptoms and complaints at this time.   PCP: Dr. Segovia   Surgery: Dr. Chavarria 64 y/o female with PMHx HTN (noncompliant with medication secondary to rehab s/p hip replacement), breast CA, and high cholesterol presents to the ED with c/o watery diarrhea, onset 3 days ago. Pt attempted to alleviate with Imodium without relief, last taken at 1500 today. Pt reports nausea and abdominal pain. Pt states prior to arrival she began feeling flushed, experiencing palpitations and felt as if she was going to faint. Pt saw PCP today for similar complaints and was sent to the ED for further evaluation. Pt denies fever, chills, recent travel, vomiting, and any other acute symptoms and complaints at this time.   PCP: Dr. Segovia   Surgery: Dr. Wilson

## 2018-05-02 NOTE — ED PROVIDER NOTE - PROGRESS NOTE DETAILS
Patient feeling better. LAbs and CT are as noted. Patient awaiting C.diff and Gi consult in the morning.

## 2018-05-02 NOTE — ED PROVIDER NOTE - MEDICAL DECISION MAKING DETAILS
Pt with diarrhea, probably causing dehydration however given recent antibiotics and hospitalization will evaluate for colitis and possible C-diff.

## 2018-05-03 DIAGNOSIS — K52.9 NONINFECTIVE GASTROENTERITIS AND COLITIS, UNSPECIFIED: ICD-10-CM

## 2018-05-03 DIAGNOSIS — Z85.3 PERSONAL HISTORY OF MALIGNANT NEOPLASM OF BREAST: ICD-10-CM

## 2018-05-03 DIAGNOSIS — I10 ESSENTIAL (PRIMARY) HYPERTENSION: ICD-10-CM

## 2018-05-03 DIAGNOSIS — E83.42 HYPOMAGNESEMIA: ICD-10-CM

## 2018-05-03 LAB
APPEARANCE UR: CLEAR — SIGNIFICANT CHANGE UP
BACTERIA # UR AUTO: ABNORMAL
BILIRUB UR-MCNC: NEGATIVE — SIGNIFICANT CHANGE UP
C DIFF BY PCR RESULT: SIGNIFICANT CHANGE UP
C DIFF TOX GENS STL QL NAA+PROBE: SIGNIFICANT CHANGE UP
COLOR SPEC: YELLOW — SIGNIFICANT CHANGE UP
DIFF PNL FLD: ABNORMAL
EPI CELLS # UR: ABNORMAL
GLUCOSE UR QL: NEGATIVE MG/DL — SIGNIFICANT CHANGE UP
KETONES UR-MCNC: NEGATIVE — SIGNIFICANT CHANGE UP
LEUKOCYTE ESTERASE UR-ACNC: ABNORMAL
NITRITE UR-MCNC: NEGATIVE — SIGNIFICANT CHANGE UP
PH UR: 5 — SIGNIFICANT CHANGE UP (ref 5–8)
PROT UR-MCNC: 30 MG/DL
RBC CASTS # UR COMP ASSIST: ABNORMAL /HPF (ref 0–4)
SP GR SPEC: 1.01 — SIGNIFICANT CHANGE UP (ref 1.01–1.02)
UROBILINOGEN FLD QL: NEGATIVE MG/DL — SIGNIFICANT CHANGE UP
WBC UR QL: SIGNIFICANT CHANGE UP

## 2018-05-03 PROCEDURE — 99223 1ST HOSP IP/OBS HIGH 75: CPT

## 2018-05-03 PROCEDURE — 76937 US GUIDE VASCULAR ACCESS: CPT | Mod: 26

## 2018-05-03 PROCEDURE — 36000 PLACE NEEDLE IN VEIN: CPT

## 2018-05-03 PROCEDURE — 99234 HOSP IP/OBS SM DT SF/LOW 45: CPT

## 2018-05-03 PROCEDURE — 99222 1ST HOSP IP/OBS MODERATE 55: CPT

## 2018-05-03 RX ORDER — METRONIDAZOLE 500 MG
500 TABLET ORAL EVERY 8 HOURS
Qty: 0 | Refills: 0 | Status: DISCONTINUED | OUTPATIENT
Start: 2018-05-03 | End: 2018-05-03

## 2018-05-03 RX ORDER — METRONIDAZOLE 500 MG
TABLET ORAL
Qty: 0 | Refills: 0 | Status: DISCONTINUED | OUTPATIENT
Start: 2018-05-03 | End: 2018-05-03

## 2018-05-03 RX ORDER — ACETAMINOPHEN 500 MG
650 TABLET ORAL EVERY 6 HOURS
Qty: 0 | Refills: 0 | Status: DISCONTINUED | OUTPATIENT
Start: 2018-05-03 | End: 2018-05-05

## 2018-05-03 RX ORDER — TAMOXIFEN CITRATE 20 MG/1
20 TABLET, FILM COATED ORAL DAILY
Qty: 0 | Refills: 0 | Status: DISCONTINUED | OUTPATIENT
Start: 2018-05-03 | End: 2018-05-05

## 2018-05-03 RX ORDER — CHOLECALCIFEROL (VITAMIN D3) 125 MCG
1 CAPSULE ORAL
Qty: 0 | Refills: 0 | COMMUNITY

## 2018-05-03 RX ORDER — FERROUS SULFATE 325(65) MG
1 TABLET ORAL
Qty: 0 | Refills: 0 | COMMUNITY

## 2018-05-03 RX ORDER — SODIUM CHLORIDE 9 MG/ML
1000 INJECTION, SOLUTION INTRAVENOUS
Qty: 0 | Refills: 0 | Status: DISCONTINUED | OUTPATIENT
Start: 2018-05-03 | End: 2018-05-05

## 2018-05-03 RX ORDER — CIPROFLOXACIN LACTATE 400MG/40ML
200 VIAL (ML) INTRAVENOUS ONCE
Qty: 0 | Refills: 0 | Status: DISCONTINUED | OUTPATIENT
Start: 2018-05-03 | End: 2018-05-03

## 2018-05-03 RX ORDER — CIPROFLOXACIN LACTATE 400MG/40ML
400 VIAL (ML) INTRAVENOUS ONCE
Qty: 0 | Refills: 0 | Status: DISCONTINUED | OUTPATIENT
Start: 2018-05-03 | End: 2018-05-03

## 2018-05-03 RX ORDER — DIPHENHYDRAMINE HCL 50 MG
25 CAPSULE ORAL EVERY 4 HOURS
Qty: 0 | Refills: 0 | Status: DISCONTINUED | OUTPATIENT
Start: 2018-05-03 | End: 2018-05-05

## 2018-05-03 RX ORDER — MAGNESIUM SULFATE 500 MG/ML
2 VIAL (ML) INJECTION ONCE
Qty: 0 | Refills: 0 | Status: COMPLETED | OUTPATIENT
Start: 2018-05-03 | End: 2018-05-03

## 2018-05-03 RX ORDER — METRONIDAZOLE 500 MG
500 TABLET ORAL ONCE
Qty: 0 | Refills: 0 | Status: DISCONTINUED | OUTPATIENT
Start: 2018-05-03 | End: 2018-05-03

## 2018-05-03 RX ORDER — ENOXAPARIN SODIUM 100 MG/ML
40 INJECTION SUBCUTANEOUS DAILY
Qty: 0 | Refills: 0 | Status: DISCONTINUED | OUTPATIENT
Start: 2018-05-03 | End: 2018-05-05

## 2018-05-03 RX ORDER — CIPROFLOXACIN LACTATE 400MG/40ML
400 VIAL (ML) INTRAVENOUS ONCE
Qty: 0 | Refills: 0 | Status: COMPLETED | OUTPATIENT
Start: 2018-05-03 | End: 2018-05-03

## 2018-05-03 RX ORDER — PANTOPRAZOLE SODIUM 20 MG/1
40 TABLET, DELAYED RELEASE ORAL
Qty: 0 | Refills: 0 | Status: DISCONTINUED | OUTPATIENT
Start: 2018-05-03 | End: 2018-05-05

## 2018-05-03 RX ORDER — CIPROFLOXACIN LACTATE 400MG/40ML
VIAL (ML) INTRAVENOUS
Qty: 0 | Refills: 0 | Status: DISCONTINUED | OUTPATIENT
Start: 2018-05-03 | End: 2018-05-03

## 2018-05-03 RX ADMIN — Medication 200 MILLIGRAM(S): at 15:12

## 2018-05-03 RX ADMIN — Medication 50 GRAM(S): at 15:12

## 2018-05-03 RX ADMIN — SODIUM CHLORIDE 100 MILLILITER(S): 9 INJECTION, SOLUTION INTRAVENOUS at 15:09

## 2018-05-03 NOTE — CONSULT NOTE ADULT - SUBJECTIVE AND OBJECTIVE BOX
HPI: 63 year old woman with history of breast cancer -left sided s/p surgery 2017, radiation, bilateral hip replacement, last one in 2018, hypertension, dyslipidemia, was in rehab after her left hip surgery. She started to have profuse diarrhea since . It became worse yesterday and thats why patient was sent to ED. She denies any vomiting, but was nauseous. No blood in the stool. She had mild abdominal pain. She had a colonoscopy in 2018 with Dr Myrick at [facility] and was fairly unremarkable as per the patient. Lsat antibiotics was at the time of hip surgery. She underwent CT abdomen which revealed colitis. C diff was negative. Stool culture were sent and pending. She was seen in ER.       PAST MEDICAL & SURGICAL HISTORY:  CHF (congestive heart failure): 2017 resolved  Breast cancer: left , had surgery and radiation  High cholesterol  High blood pressure  History of hip replacement, total, right: 2017  History of lumpectomy of left breast:   Atypical lipoma of soft tissue: in left groin   H/O foot surgery: ankle left       ROS:  No Heartburn, regurgitation, dysphagia, odynophagia.  No dyspepsia  + abdominal pain.    + Nausea, No vomiting.  No Bleeding.  No hematemesis.   + diarrhea.    No hematochezia  No weight loss, anorexia.  No edema.      MEDICATIONS  (STANDING):  ciprofloxacin   IVPB 400 milliGRAM(s) IV Intermittent once    MEDICATIONS  (PRN):      Allergies    Crestor (Muscle Pain)  shellfish (Swelling; Rash)    Intolerances        SOCIAL HISTORY:Denies x 3    ENDOSCOPIC/GI HISTORY: Colonoscopy in 2018    FAMILY HISTORY:  Family history of stroke (Mother)  Family history of breast cancer in sister (Sibling)  Family history of hypertension in mother      Vital Signs Last 24 Hrs  T(C): 37.1 (03 May 2018 11:48), Max: 37.1 (02 May 2018 18:20)  T(F): 98.8 (03 May 2018 11:48), Max: 98.8 (03 May 2018 11:48)  HR: 87 (03 May 2018 11:48) (87 - 105)  BP: 126/65 (03 May 2018 11:48) (109/64 - 126/65)  BP(mean): --  RR: 20 (03 May 2018 11:48) (16 - 20)  SpO2: 96% (03 May 2018 11:48) (96% - 97%)    PHYSICAL EXAM:    GENERAL: NAD, well-groomed, well-developed  HEAD:  Atraumatic, Normocephalic  EYES: EOMI, PERRLA, conjunctiva and sclera clear  ENMT: No tonsillar erythema, exudates, or enlargement; Moist mucous membranes, Good dentition, No lesions  NECK: Supple, No JVD, Normal thyroid  CHEST/LUNG: Clear to percussion bilaterally; No rales, rhonchi, wheezing, or rubs  HEART: Regular rate and rhythm; No murmurs, rubs, or gallops  ABDOMEN: Soft, Nontender, Nondistended; Bowel sounds present  RECTAL: No blood or masses.   EXTREMITIES:  2+ Peripheral Pulses, No clubbing, cyanosis, or edema  LYMPH: No lymphadenopathy noted  SKIN: No rashes or lesions      LABS:                        10.9   9.7   )-----------( 251      ( 02 May 2018 19:39 )             34.7     05-02    140  |  104  |  7.0<L>  ----------------------------<  111  4.0   |  23.0  |  0.51    Ca    9.1      02 May 2018 19:39  Mg     1.7     05-02    TPro  6.4<L>  /  Alb  3.0<L>  /  TBili  0.4  /  DBili  x   /  AST  18  /  ALT  15  /  AlkPhos  68  05-02       Urinalysis Basic - ( 03 May 2018 12:18 )    Color: Yellow / Appearance: Clear / S.015 / pH: x  Gluc: x / Ketone: Negative  / Bili: Negative / Urobili: Negative mg/dL   Blood: x / Protein: 30 mg/dL / Nitrite: Negative   Leuk Esterase: Trace / RBC: 3-5 /HPF / WBC 3-5   Sq Epi: x / Non Sq Epi: Moderate / Bacteria: Few        LIVER FUNCTIONS - ( 02 May 2018 19:39 )  Alb: 3.0 g/dL / Pro: 6.4 g/dL / ALK PHOS: 68 U/L / ALT: 15 U/L / AST: 18 U/L / GGT: x                 Clostridium difficile Toxin by PCR (18 @ 00:31)    Clostridium difficile Toxin by PCR: RESULT INTERPRETATION:    Not detected - No Clostridium difficile toxins detected by amplified DNA  PCR.    C. difficile PCR test results should be interpreted only with  consideration of the patient's clinical situation and history.  This test  willdetect the presence of toxigenic C. difficile.  However it cannot be  used as the sole criteria for the diagnosis of antibiotic associated  diarrhea, antibiotic associated colitis, or pseudomembranous colitis.  Colonization with C. difficile may exceed 20% in hospital patients, the  majority of whom are without Toxigenic Clostridium Difficile disease.  Testing is generally not recommended in children below the age of 1 year,  as up to half of healthy infants are asymptomatically colonized with C.  difficile.  In addition, C. difficile PCR testing cannot be used as a  "test of cure" as dead organism nucleic acids will persist and be  detected after treatment.  Successful treatment of C. difficile disease  is determined by resolution of clinical symptoms.    C Diff by PCR Result: [facility]        RADIOLOGY & ADDITIONAL STUDIES:  < from: CT Abdomen and Pelvis w/ IV Cont (18 @ 20:47) >    FINDINGS:    Lung bases:  6 mm right lower lobe pulmonary nodule.     ABDOMEN:    Liver:  Unremarkable.  No mass.    Gallbladder and bile ducts:  Unremarkable.  No calcified stones.  No   ductal   dilation.    Pancreas:  Unremarkable.  No mass.  No ductal dilation.    Spleen:  Unremarkable.  No splenomegaly.    Adrenals:  Indeterminate left adrenal lesion measuring 1.4 x 1.0 cm.    Normal   right adrenal gland.    Kidneys and ureters:  Nonobstructive right nephrolithiasis. No evidence   of   obstructive uropathy.  Normal left kidney.  No hydronephrosis.    Stomach and bowel:  Significant mural thickening and pericolonic   stranding   involving majority of the colon. Fluid is noted in the ascending   descending,   and rectosigmoid colon.  Mild circumferential mural thickening of the   aorta,   celiac trunk, SMA, and SHERI.  No obstruction.     PELVIS:    Appendix:  No findings to suggest acute appendicitis.    Bladder:  Unremarkable.  No mass.    Reproductive:  Unremarkable as visualized.     ABDOMEN and PELVIS:    Intraperitoneal space:  Unremarkable.  No free air.  No significant   fluid   collection.    Bones/joints:  Bilateral total hip replacement is significant streak   artifact   obscuring pelvic detail.  Mild scoliosis.Degenerative changes of the   lumbosacral spine.  No acute fracture.  No dislocation.    Soft tissues:  Bilateral abdominal pelvic wall subcutaneous edema.    Vasculature:  Unremarkable.  No abdominal aortic aneurysm.    Lymph nodes:  Unremarkable.  No enlarged lymph nodes.    IMPRESSION:       1.  Significant mural thickening and pericolonic stranding involving   majority   of the colon. Fluid is noted in the ascending descending, and   rectosigmoid   colon.  Findings compatible with nonspecific colitis/diarrheal process.   Correlate clinically as the differential for colitis is extensive   including  inflammatory, infectious, and ischemic etiologies..    2.  Nonobstructive right nephrolithiasis. No evidence of obstructive   uropathy.  3.  Mild circumferential mural thickening of the aorta, celiac trunk,   SMA, and   SHERI.  This may represent nonspecific diffuse vasculitis.  4.  Indeterminate left adrenal lesion measuring 1.4 x 1.0 cm.  5.  6 mm right lower lobe pulmonary nodule.    < end of copied text >

## 2018-05-03 NOTE — ED CDU PROVIDER INITIAL DAY NOTE - OBJECTIVE STATEMENT
64 y/o female with PMHx HTN (noncompliant with medication secondary to rehab s/p hip replacement), breast CA, and high cholesterol presents to the ED with c/o watery diarrhea, onset 3 days ago. Pt attempted to alleviate with Imodium without relief, last taken at 1500 today. Pt reports nausea and abdominal pain. Pt states prior to arrival she began feeling flushed, experiencing palpitations and felt as if she was going to faint. Pt saw PCP today for similar complaints and was sent to the ED for further evaluation. Pt denies fever, chills, recent travel, vomiting, and any other acute symptoms and complaints at this time.   PCP: Dr. Segovia   Surgery: Dr. Wilson

## 2018-05-03 NOTE — PROCEDURE NOTE - NSPOSTCAREGUIDE_GEN_A_CORE
Keep the cast/splint/dressing clean and dry/Instructed patient/caregiver regarding signs and symptoms of infection/Care for catheter as per unit/ICU protocols

## 2018-05-03 NOTE — H&P ADULT - ASSESSMENT
62 yo female with h/o breast cancer , h/o HTN not on any medication since his hip surgery admitted with c/o diarrhea, abdominal pain CT of scan with colitis

## 2018-05-03 NOTE — ED ADULT NURSE REASSESSMENT NOTE - NS ED NURSE REASSESS COMMENT FT1
Pt. received at 0200, sleeping in stretcher but awakens to voice. informed of plan of care. VSS. will continue to monitor and maintain safety.

## 2018-05-03 NOTE — H&P ADULT - PROBLEM SELECTOR PLAN 1
GI consult called per ED seen by Dr Danny mendoza, stool cx test pending, C diff negative   iv fluid , pain meds as needed

## 2018-05-03 NOTE — CONSULT NOTE ADULT - ASSESSMENT
Patient with recent hip surgery in the end of march, in rehab with acute diarrhea. Most likely infectious.     1. Send GI PCR  2. IV hydration at this time  3. Clear liquid diet  4. Start ciprofloxacin and flagyl at this time. If there is definite organism noted, then treat accordingly.   5. GI will follow up

## 2018-05-03 NOTE — H&P ADULT - NSHPLABSRESULTS_GEN_ALL_CORE
10.9   9.7   )-----------( 251      ( 02 May 2018 19:39 )             34.7   05-02    140  |  104  |  7.0<L>  ----------------------------<  111  4.0   |  23.0  |  0.51    Ca    9.1      02 May 2018 19:39  Mg     1.7     05-02    TPro  6.4<L>  /  Alb  3.0<L>  /  TBili  0.4  /  DBili  x   /  AST  18  /  ALT  15  /  AlkPhos  68  05-02  < from: CT Abdomen and Pelvis w/ IV Cont (05.02.18 @ 20:47) >    IMPRESSION:       1.  Significant mural thickening and pericolonic stranding involving   majority   of the colon. Fluid is noted in the ascending descending, and   rectosigmoid   colon.  Findings compatible with nonspecific colitis/diarrheal process.   Correlate clinically as the differential for colitis is extensive   including  inflammatory, infectious, and ischemic etiologies..    2.  Nonobstructive right nephrolithiasis. No evidence of obstructive   uropathy.  3.  Mild circumferential mural thickening of the aorta, celiac trunk,   SMA, and   SHERI.  This may represent nonspecific diffuse vasculitis.  4.  Indeterminate left adrenal lesion measuring 1.4 x 1.0 cm.  5.  6 mm right lower lobe pulmonary nodule.

## 2018-05-03 NOTE — PROCEDURE NOTE - ADDITIONAL PROCEDURE DETAILS
20G angiocatheter started in right forearm using U/S guidance. Patient tolerated the procedure well.

## 2018-05-03 NOTE — ED CDU PROVIDER SUBSEQUENT DAY NOTE - MEDICAL DECISION MAKING DETAILS
Patient with abnl CT and C. diff. Awaiting labs and consultation.---evaluated by gi--recommend cipro and admit

## 2018-05-03 NOTE — ED ADULT NURSE REASSESSMENT NOTE - NS ED NURSE REASSESS COMMENT FT1
Assumed care of patient at this time, patient is resting in bed with eyes closed, respirations are even and non labored. Patient has no needs at this time. NAD noted. Will continue to monitor.

## 2018-05-03 NOTE — PROCEDURE NOTE - NSPROCDETAILS_GEN_ALL_CORE
blood seen on insertion/dressing applied/flushes easily/secured in place/sterile technique, catheter placed/ultrasound utilization

## 2018-05-03 NOTE — CHART NOTE - NSCHARTNOTEFT_GEN_A_CORE
RE: Called by Nurse for rash at IV site.    Patient  h/o Breast CA admitted for diarrhea'/colitis, came from ED and just started on IV Cipro. RN notes that she first noticed some redness around IV site, unclear if infiltrate. Stopped Infusion of Cipro.  Upon inspection arm/hand did not appear swollen or erythematous, patient in NARD.  Patient denied pain, itchiness, difficulty breathing, numbness, increase warmth to extremity.    Allergic : Crestor and shellfish    Vital Signs Last 24 Hrs  T(C): 36.7 (03 May 2018 16:37), Max: 37.1 (02 May 2018 18:20)  T(F): 98 (03 May 2018 16:37), Max: 98.8 (03 May 2018 11:48)  HR: 87 (03 May 2018 16:37) (87 - 105)  BP: 116/66 (03 May 2018 16:37) (109/64 - 126/65)  RR: 18 (03 May 2018 16:37) (16 - 20)  SpO2: 98% (03 May 2018 16:37) (96% - 98%)      RUE surrounding removed IV site:  area of pink with pale discrete raised urticaria, minimal increased warmth to touch,  Good cappillary refill, and good distal pulse Area extends medial to lateral mid forearm below antecubital area.      A/P Allergic reaction to Cipro.??,   Dr Delgado notified regarding possibly changing  antibiotic. He suggested we wait until tomorrow for further test results.  Dr Elam made aware.  Benadryl 25mg PO q 4 hr x 24 hr.   monitor vitals and respiratory compromise. RN aware

## 2018-05-03 NOTE — H&P ADULT - HISTORY OF PRESENT ILLNESS
62 yo female with multiple medical problems presented to the ED with complaint of loose bowel movements and abdominal pain last night . Pt started to have watery diarrhea daily 4-5 BM with abdominal pain in upper and right lower abdomen on sunday , she is also with poor oral intake and mild nausea , no other complaints. CT of abdomen in the ED with diffuse colitis   apparently had colonoscopy done in march by Dr Myrick , no malignancy

## 2018-05-04 ENCOUNTER — TRANSCRIPTION ENCOUNTER (OUTPATIENT)
Age: 63
End: 2018-05-04

## 2018-05-04 LAB
ANION GAP SERPL CALC-SCNC: 13 MMOL/L — SIGNIFICANT CHANGE UP (ref 5–17)
BUN SERPL-MCNC: 6 MG/DL — LOW (ref 8–20)
CALCIUM SERPL-MCNC: 8.7 MG/DL — SIGNIFICANT CHANGE UP (ref 8.6–10.2)
CHLORIDE SERPL-SCNC: 105 MMOL/L — SIGNIFICANT CHANGE UP (ref 98–107)
CO2 SERPL-SCNC: 24 MMOL/L — SIGNIFICANT CHANGE UP (ref 22–29)
CREAT SERPL-MCNC: 0.43 MG/DL — LOW (ref 0.5–1.3)
GLUCOSE SERPL-MCNC: 85 MG/DL — SIGNIFICANT CHANGE UP (ref 70–115)
MAGNESIUM SERPL-MCNC: 1.8 MG/DL — SIGNIFICANT CHANGE UP (ref 1.8–2.6)
PHOSPHATE SERPL-MCNC: 3.5 MG/DL — SIGNIFICANT CHANGE UP (ref 2.4–4.7)
POTASSIUM SERPL-MCNC: 3.8 MMOL/L — SIGNIFICANT CHANGE UP (ref 3.5–5.3)
POTASSIUM SERPL-SCNC: 3.8 MMOL/L — SIGNIFICANT CHANGE UP (ref 3.5–5.3)
SODIUM SERPL-SCNC: 142 MMOL/L — SIGNIFICANT CHANGE UP (ref 135–145)

## 2018-05-04 PROCEDURE — 99233 SBSQ HOSP IP/OBS HIGH 50: CPT

## 2018-05-04 PROCEDURE — 93010 ELECTROCARDIOGRAM REPORT: CPT

## 2018-05-04 PROCEDURE — 99232 SBSQ HOSP IP/OBS MODERATE 35: CPT

## 2018-05-04 RX ORDER — ONDANSETRON 8 MG/1
4 TABLET, FILM COATED ORAL EVERY 4 HOURS
Qty: 0 | Refills: 0 | Status: DISCONTINUED | OUTPATIENT
Start: 2018-05-04 | End: 2018-05-05

## 2018-05-04 RX ORDER — FAMOTIDINE 10 MG/ML
20 INJECTION INTRAVENOUS AT BEDTIME
Qty: 0 | Refills: 0 | Status: DISCONTINUED | OUTPATIENT
Start: 2018-05-04 | End: 2018-05-05

## 2018-05-04 RX ADMIN — SODIUM CHLORIDE 100 MILLILITER(S): 9 INJECTION, SOLUTION INTRAVENOUS at 05:58

## 2018-05-04 RX ADMIN — SODIUM CHLORIDE 100 MILLILITER(S): 9 INJECTION, SOLUTION INTRAVENOUS at 17:57

## 2018-05-04 RX ADMIN — Medication 30 MILLILITER(S): at 18:15

## 2018-05-04 RX ADMIN — PANTOPRAZOLE SODIUM 40 MILLIGRAM(S): 20 TABLET, DELAYED RELEASE ORAL at 05:57

## 2018-05-04 RX ADMIN — TAMOXIFEN CITRATE 20 MILLIGRAM(S): 20 TABLET, FILM COATED ORAL at 12:17

## 2018-05-04 RX ADMIN — ENOXAPARIN SODIUM 40 MILLIGRAM(S): 100 INJECTION SUBCUTANEOUS at 12:17

## 2018-05-04 RX ADMIN — FAMOTIDINE 20 MILLIGRAM(S): 10 INJECTION INTRAVENOUS at 22:27

## 2018-05-04 NOTE — DISCHARGE NOTE ADULT - HOSPITAL COURSE
63 year old woman with history of breast cancer -left sided s/p surgery 2017, radiation, bilateral hip replacement, last one in march 2018, hypertension, dyslipidemia, was in rehab after her left hip surgery. She started to have profuse diarrhea since sunday. It became worse yesterday and dillon why patient was sent to ED. She denies any vomiting, but was nauseous. No blood in the stool. She had mild abdominal pain. She had a colonoscopy in march 2018 with Dr Myrick at [facility] and was fairly unremarkable as per the patient. Last  antibiotics use  was at the time of hip surgery. She underwent CT abdomen which revealed colitis. C diff was negative. Stool culture were sent , seen in the ED by GI , cipro initiated however [t did not tolerated had rash after discussion with GI no further antibiotics, pt's diarrhea is improved no BM for 48 hours, diet advanced tolerating low residue diet, cont to have some epigastric pain improving with protonix  and maalox , discharged home to follow up with GI and PCP .  total time spend  35 min

## 2018-05-04 NOTE — DISCHARGE NOTE ADULT - CARE PLAN
Principal Discharge DX:	Colitis  Goal:	improved  Assessment and plan of treatment:	low residue diet , avoid lactose   if symptoms worsen return to ED   see Dr Delgado gastroenterologist  Secondary Diagnosis:	History of breast cancer in female  Assessment and plan of treatment:	cont tamxifen  Secondary Diagnosis:	Epigastric abdominal pain  Assessment and plan of treatment:	cont protonix, maalox as needed   follow up with gastroenterologist

## 2018-05-04 NOTE — DISCHARGE NOTE ADULT - CARE PROVIDER_API CALL
Cain Delgado), Gastroenterology; Internal Medicine  [address] [place] [address]  Phone: [phone]  Fax: [phone]

## 2018-05-04 NOTE — DISCHARGE NOTE ADULT - PATIENT PORTAL LINK FT
You can access the [facility], offered by [facility], by registering with the following website: [email or web address]

## 2018-05-04 NOTE — DISCHARGE NOTE ADULT - MEDICATION SUMMARY - MEDICATIONS TO TAKE
I will START or STAY ON the medications listed below when I get home from the hospital:    aluminum hydroxide-magnesium hydroxide 200 mg-200 mg/5 mL oral suspension  -- 30 milliliter(s) by mouth every 4 hours, As needed, Dyspepsia  -- Indication: For indigestion    atorvastatin 20 mg oral tablet  -- 1 tab(s) by mouth once a day (at bedtime)  -- Indication: For Hyperlipidemia     tamoxifen 20 mg oral tablet  -- 1 tab(s) by mouth once a day  -- Indication: For breast cancer     pantoprazole 40 mg oral delayed release tablet  -- 1 tab(s) by mouth once a day (before a meal)  -- Indication: For gastritis

## 2018-05-04 NOTE — DISCHARGE NOTE ADULT - PLAN OF CARE
improved low residue diet , avoid lactose   if symptoms worsen return to ED   see Dr Delgado gastroenterologist cont tamxifen cont protonix, maalox as needed   follow up with gastroenterologist

## 2018-05-04 NOTE — PROGRESS NOTE ADULT - PROBLEM SELECTOR PLAN 3
controlled , cont home meds controlled not on any medication at home stopped after hip surgery in march

## 2018-05-05 VITALS
RESPIRATION RATE: 18 BRPM | TEMPERATURE: 99 F | SYSTOLIC BLOOD PRESSURE: 112 MMHG | OXYGEN SATURATION: 96 % | DIASTOLIC BLOOD PRESSURE: 62 MMHG | HEART RATE: 80 BPM

## 2018-05-05 LAB
ALBUMIN SERPL ELPH-MCNC: 2.8 G/DL — LOW (ref 3.3–5.2)
ALP SERPL-CCNC: 57 U/L — SIGNIFICANT CHANGE UP (ref 40–120)
ALT FLD-CCNC: 10 U/L — SIGNIFICANT CHANGE UP
AST SERPL-CCNC: 13 U/L — SIGNIFICANT CHANGE UP
BILIRUB DIRECT SERPL-MCNC: 0.1 MG/DL — SIGNIFICANT CHANGE UP (ref 0–0.3)
BILIRUB INDIRECT FLD-MCNC: 0.2 MG/DL — SIGNIFICANT CHANGE UP (ref 0.2–1)
BILIRUB SERPL-MCNC: 0.3 MG/DL — LOW (ref 0.4–2)
CK SERPL-CCNC: 49 U/L — SIGNIFICANT CHANGE UP (ref 25–170)
CULTURE RESULTS: SIGNIFICANT CHANGE UP
HCT VFR BLD CALC: 30.4 % — LOW (ref 37–47)
HGB BLD-MCNC: 9.8 G/DL — LOW (ref 12–16)
LIDOCAIN IGE QN: 11 U/L — LOW (ref 22–51)
MCHC RBC-ENTMCNC: 31.3 PG — HIGH (ref 27–31)
MCHC RBC-ENTMCNC: 32.2 G/DL — SIGNIFICANT CHANGE UP (ref 32–36)
MCV RBC AUTO: 97.1 FL — SIGNIFICANT CHANGE UP (ref 81–99)
PLATELET # BLD AUTO: 228 K/UL — SIGNIFICANT CHANGE UP (ref 150–400)
PROT SERPL-MCNC: 5.4 G/DL — LOW (ref 6.6–8.7)
RBC # BLD: 3.13 M/UL — LOW (ref 4.4–5.2)
RBC # FLD: 14.1 % — SIGNIFICANT CHANGE UP (ref 11–15.6)
SPECIMEN SOURCE: SIGNIFICANT CHANGE UP
TROPONIN T SERPL-MCNC: <0.01 NG/ML — SIGNIFICANT CHANGE UP (ref 0–0.06)
WBC # BLD: 5.6 K/UL — SIGNIFICANT CHANGE UP (ref 4.8–10.8)
WBC # FLD AUTO: 5.6 K/UL — SIGNIFICANT CHANGE UP (ref 4.8–10.8)

## 2018-05-05 PROCEDURE — 80048 BASIC METABOLIC PNL TOTAL CA: CPT

## 2018-05-05 PROCEDURE — 99232 SBSQ HOSP IP/OBS MODERATE 35: CPT

## 2018-05-05 PROCEDURE — 84484 ASSAY OF TROPONIN QUANT: CPT

## 2018-05-05 PROCEDURE — 83690 ASSAY OF LIPASE: CPT

## 2018-05-05 PROCEDURE — 83735 ASSAY OF MAGNESIUM: CPT

## 2018-05-05 PROCEDURE — 80076 HEPATIC FUNCTION PANEL: CPT

## 2018-05-05 PROCEDURE — 80053 COMPREHEN METABOLIC PANEL: CPT

## 2018-05-05 PROCEDURE — 87046 STOOL CULTR AEROBIC BACT EA: CPT

## 2018-05-05 PROCEDURE — 99239 HOSP IP/OBS DSCHRG MGMT >30: CPT

## 2018-05-05 PROCEDURE — 82550 ASSAY OF CK (CPK): CPT

## 2018-05-05 PROCEDURE — 93005 ELECTROCARDIOGRAM TRACING: CPT

## 2018-05-05 PROCEDURE — 84100 ASSAY OF PHOSPHORUS: CPT

## 2018-05-05 PROCEDURE — 87493 C DIFF AMPLIFIED PROBE: CPT

## 2018-05-05 PROCEDURE — 36415 COLL VENOUS BLD VENIPUNCTURE: CPT

## 2018-05-05 PROCEDURE — 85027 COMPLETE CBC AUTOMATED: CPT

## 2018-05-05 PROCEDURE — 81001 URINALYSIS AUTO W/SCOPE: CPT

## 2018-05-05 PROCEDURE — 87045 FECES CULTURE AEROBIC BACT: CPT

## 2018-05-05 PROCEDURE — 74177 CT ABD & PELVIS W/CONTRAST: CPT

## 2018-05-05 RX ORDER — PANTOPRAZOLE SODIUM 20 MG/1
1 TABLET, DELAYED RELEASE ORAL
Qty: 30 | Refills: 0 | OUTPATIENT
Start: 2018-05-05 | End: 2018-06-03

## 2018-05-05 RX ADMIN — ENOXAPARIN SODIUM 40 MILLIGRAM(S): 100 INJECTION SUBCUTANEOUS at 11:25

## 2018-05-05 RX ADMIN — TAMOXIFEN CITRATE 20 MILLIGRAM(S): 20 TABLET, FILM COATED ORAL at 13:52

## 2018-05-05 RX ADMIN — PANTOPRAZOLE SODIUM 40 MILLIGRAM(S): 20 TABLET, DELAYED RELEASE ORAL at 07:08

## 2018-05-05 RX ADMIN — SODIUM CHLORIDE 100 MILLILITER(S): 9 INJECTION, SOLUTION INTRAVENOUS at 07:08

## 2018-05-05 NOTE — PROGRESS NOTE ADULT - ASSESSMENT
64 yo female with colitis, epigastric pain , GI follow up appreciated
64 yo female with colitis, epigastric pain , GI follow up appreciated
Patient with most likely infectious colitis and diarrhea which is improving. Stool culture results pending. GI PCR pending.     1. Hold off antibiotics at this time  2. Advance to low fiber lactose free diet and assess  3. If tolerates and no more diarrhea, can be discharged

## 2018-05-05 NOTE — PROGRESS NOTE ADULT - SUBJECTIVE AND OBJECTIVE BOX
Pt seen and examined f/u diarrhea  This morning she feels well without any further BMs for past two days and is tolerating solid food-low fiber and low lactose. Denies significant abdominal pain.    REVIEW OF SYSTEMS:    CONSTITUTIONAL: No fever, weight loss, or fatigue  EYES: No eye pain, visual disturbances, or discharge  ENMT:  No difficulty hearing, tinnitus, vertigo; No sinus or throat pain  RESPIRATORY: No cough, wheezing, chills or hemoptysis; No shortness of breath  CARDIOVASCULAR: No chest pain, palpitations, dizziness, or leg swelling  GASTROINTESTINAL: No abdominal or epigastric pain. No nausea, vomiting, or hematemesis; No diarrhea or constipation. No melena or hematochezia.    MEDICATIONS:  MEDICATIONS  (STANDING):  enoxaparin Injectable 40 milliGRAM(s) SubCutaneous daily  famotidine    Tablet 20 milliGRAM(s) Oral at bedtime  pantoprazole    Tablet 40 milliGRAM(s) Oral before breakfast  tamoxifen 20 milliGRAM(s) Oral daily    MEDICATIONS  (PRN):  acetaminophen   Tablet. 650 milliGRAM(s) Oral every 6 hours PRN Moderate Pain (4 - 6)  aluminum hydroxide/magnesium hydroxide/simethicone Suspension 30 milliLiter(s) Oral every 4 hours PRN Dyspepsia  diphenhydrAMINE   Capsule 25 milliGRAM(s) Oral every 4 hours PRN Rash and/or Itching  ondansetron Injectable 4 milliGRAM(s) IV Push every 4 hours PRN Nausea and/or Vomiting      Allergies    Crestor (Muscle Pain)  shellfish (Swelling; Rash)    Intolerances        Vital Signs Last 24 Hrs  T(C): 36.4 (05 May 2018 08:12), Max: 36.9 (04 May 2018 22:51)  T(F): 97.5 (05 May 2018 08:12), Max: 98.4 (04 May 2018 22:51)  HR: 81 (05 May 2018 08:12) (81 - 83)  BP: 120/66 (05 May 2018 08:12) (120/66 - 122/64)  BP(mean): --  RR: 18 (05 May 2018 08:12) (18 - 18)  SpO2: 96% (05 May 2018 08:12) (96% - 96%)      PHYSICAL EXAM:    General: Well developed; well nourished; in no acute distress  HEENT: MMM, conjunctiva and sclera clear  Gastrointestinal:Abdomen: Soft non-tender non-distended; Normal bowel sounds; No hepatosplenomegaly  Extremities: no cyanosis, clubbing or edema.  Skin: Warm and dry. No obvious rash    LABS:            142  |  105  |  6.0<L>  ----------------------------<  85  3.8   |  24.0  |  0.43<L>    Ca    8.7      04 May 2018 08:20  Phos  3.5       Mg     1.8                 Urinalysis Basic - ( 03 May 2018 12:18 )    Color: Yellow / Appearance: Clear / S.015 / pH: x  Gluc: x / Ketone: Negative  / Bili: Negative / Urobili: Negative mg/dL   Blood: x / Protein: 30 mg/dL / Nitrite: Negative   Leuk Esterase: Trace / RBC: 3-5 /HPF / WBC 3-5   Sq Epi: x / Non Sq Epi: Moderate / Bacteria: Few
CC epigastric pain and dairrhea   pt is seen examined this am , she feels well ,epigastric pain improving   no BM for about days   GI follow up appreciated tolerating diet  no rash or itching     ROS: as above, all remaining ROS are negative.     Vital Signs Last 24 Hrs  T(C): 36.4 (05 May 2018 08:12), Max: 36.9 (04 May 2018 22:51)  T(F): 97.5 (05 May 2018 08:12), Max: 98.4 (04 May 2018 22:51)  HR: 81 (05 May 2018 08:12) (81 - 83)  BP: 120/66 (05 May 2018 08:12) (120/66 - 122/64)  BP(mean): --  RR: 18 (05 May 2018 08:12) (18 - 18)  SpO2: 96% (05 May 2018 08:12) (96% - 96%)03 May 2018 07:01  -  04 May 2018 07:00  --------------------------------------------------------    Constitutional: awake alert  no distress     Respiratory: clear to auscultation bilaterally     Cardiovascular: regular rate rythm S1 /S2     Gastrointestinal: soft , + epigastric tenderness     Extremities: no pretibial edema           LABS:                              9.8    5.6   )-----------( 228      ( 05 May 2018 08:44 )             30.4
Internal Medicine Hospitalist Progress Note      CC epigastric pain and dairrhea   pt is seen examined this am , she feels well , no BM since yesterday afternoon , still having epigastric pain   tolerating diet , discussed with Dr Delgado GI specialist   events yesterday noted had skin rash during cipro infusion with itching iv cipro stopped , no rash or itching today         ROS: as above, all remaining ROS are negative.       BACKGROUND:  MEDICATIONS  (STANDING):  enoxaparin Injectable 40 milliGRAM(s) SubCutaneous daily  lactated ringers. 1000 milliLiter(s) (100 mL/Hr) IV Continuous <Continuous>  pantoprazole    Tablet 40 milliGRAM(s) Oral before breakfast  tamoxifen 20 milliGRAM(s) Oral daily    MEDICATIONS  (PRN):  acetaminophen   Tablet. 650 milliGRAM(s) Oral every 6 hours PRN Moderate Pain (4 - 6)  diphenhydrAMINE   Capsule 25 milliGRAM(s) Oral every 4 hours PRN Rash and/or Itching    Allergies    Crestor (Muscle Pain)  shellfish (Swelling; Rash)    Intolerances            VITALS:  Vital Signs Last 24 Hrs  T(C): 37 (04 May 2018 07:36), Max: 37.1 (03 May 2018 11:48)  T(F): 98.6 (04 May 2018 07:36), Max: 98.8 (03 May 2018 11:48)  HR: 80 (04 May 2018 07:36) (80 - 87)  BP: 118/60 (04 May 2018 07:36) (112/64 - 126/65)  BP(mean): --  RR: 18 (04 May 2018 07:36) (18 - 20)  SpO2: 98% (04 May 2018 07:36) (96% - 98%) Daily     Daily   CAPILLARY BLOOD GLUCOSE        I&O's Summary    03 May 2018 07:01  -  04 May 2018 07:00  --------------------------------------------------------  IN: 1500 mL / OUT: 0 mL / NET: 1500 mL        PHYSICAL EXAM:      Constitutional: awake laert no distress     Neck: supple , no JVD     Respiratory: clear to auscultation bilaterally     Cardiovascular: regular rate rythm S1 /S2     Gastrointestinal: soft , + epigastric tenderness     Extremities: no pretibial edema           LABS:                        10.9   9.7   )-----------( 251      ( 02 May 2018 19:39 )             34.7     05-04    142  |  105  |  6.0<L>  ----------------------------<  85  3.8   |  24.0  |  0.43<L>    Ca    8.7      04 May 2018 08:20  Phos  3.5     05-04  Mg     1.8     05-04    TPro  6.4<L>  /  Alb  3.0<L>  /  TBili  0.4  /  DBili  x   /  AST  18  /  ALT  15  /  AlkPhos  68  05-02        C diff negative
INTERVAL HPI/OVERNIGHT EVENTS:Fu for diarrhea. Feels better today. No more diarrhea since 12 pm yesterday. C diff negative. Stool culture pending. GI PCR not collected as patient had no more diarrhea. Patient had a reaction to ciprofloxacin. Antibiotics discontinued. Tolerated clear liquid diet.     MEDICATIONS  (STANDING):  enoxaparin Injectable 40 milliGRAM(s) SubCutaneous daily  lactated ringers. 1000 milliLiter(s) (100 mL/Hr) IV Continuous <Continuous>  pantoprazole    Tablet 40 milliGRAM(s) Oral before breakfast  tamoxifen 20 milliGRAM(s) Oral daily    MEDICATIONS  (PRN):  acetaminophen   Tablet. 650 milliGRAM(s) Oral every 6 hours PRN Moderate Pain (4 - 6)  diphenhydrAMINE   Capsule 25 milliGRAM(s) Oral every 4 hours PRN Rash and/or Itching      Allergies    Crestor (Muscle Pain)  shellfish (Swelling; Rash)    Intolerances        Vital Signs Last 24 Hrs  T(C): 37 (04 May 2018 07:36), Max: 37.1 (03 May 2018 11:48)  T(F): 98.6 (04 May 2018 07:36), Max: 98.8 (03 May 2018 11:48)  HR: 80 (04 May 2018 07:36) (80 - 87)  BP: 118/60 (04 May 2018 07:36) (112/64 - 126/65)  BP(mean): --  RR: 18 (04 May 2018 07:36) (18 - 20)  SpO2: 98% (04 May 2018 07:36) (96% - 98%)    LABS:                        10.9   9.7   )-----------( 251      ( 02 May 2018 19:39 )             34.7     05-02    140  |  104  |  7.0<L>  ----------------------------<  111  4.0   |  23.0  |  0.51    Ca    9.1      02 May 2018 19:39  Mg     1.7     05-02    TPro  6.4<L>  /  Alb  3.0<L>  /  TBili  0.4  /  DBili  x   /  AST  18  /  ALT  15  /  AlkPhos  68  05-02      Urinalysis Basic - ( 03 May 2018 12:18 )    Color: Yellow / Appearance: Clear / S.015 / pH: x  Gluc: x / Ketone: Negative  / Bili: Negative / Urobili: Negative mg/dL   Blood: x / Protein: 30 mg/dL / Nitrite: Negative   Leuk Esterase: Trace / RBC: 3-5 /HPF / WBC 3-5   Sq Epi: x / Non Sq Epi: Moderate / Bacteria: Few        RADIOLOGY & ADDITIONAL TESTS:  < from: CT Abdomen and Pelvis w/ IV Cont (18 @ 20:47) >    TECHNIQUE:    Axial computed tomography images of the abdomen and pelvis with   intravenous   contrast.    Coronal and sagittal reformatted images were created and reviewed.    CONTRAST:    92 mL of omni 300 administered intravenously.    COMPARISON:    CR - XR HIP WITH PELVIS LEFT 2018 11:10    FINDINGS:    Lung bases:  6 mm right lower lobe pulmonary nodule.     ABDOMEN:    Liver:  Unremarkable.  No mass.    Gallbladder and bile ducts:  Unremarkable.  No calcified stones.  No   ductal   dilation.    Pancreas:  Unremarkable.  No mass.  No ductal dilation.    Spleen:  Unremarkable.  No splenomegaly.    Adrenals:  Indeterminate left adrenal lesion measuring 1.4 x 1.0 cm.    Normal   right adrenal gland.    Kidneys and ureters:  Nonobstructive right nephrolithiasis. No evidence   of   obstructive uropathy.  Normal left kidney.  No hydronephrosis.    Stomach and bowel:  Significant mural thickening and pericolonic   stranding   involving majority of the colon. Fluid is noted in the ascending   descending,   and rectosigmoid colon.  Mild circumferential mural thickening of the   aorta,   celiac trunk, SMA, and SHERI.  No obstruction.     PELVIS:    Appendix:  No findings to suggest acute appendicitis.    Bladder:  Unremarkable.  No mass.    Reproductive:  Unremarkable as visualized.     ABDOMEN and PELVIS:    Intraperitoneal space:  Unremarkable.  No free air.  No significant   fluid   collection.    Bones/joints:  Bilateral total hip replacement is significant streak   artifact   obscuring pelvic detail.  Mild scoliosis.Degenerative changes of the   lumbosacral spine.  No acute fracture.  No dislocation.    Soft tissues:  Bilateral abdominal pelvic wall subcutaneous edema.    Vasculature:  Unremarkable.  No abdominal aortic aneurysm.    Lymph nodes:  Unremarkable.  No enlarged lymph nodes.    IMPRESSION:       1.  Significant mural thickening and pericolonic stranding involving   majority   of the colon. Fluid is noted in the ascending descending, and   rectosigmoid   colon.  Findings compatible with nonspecific colitis/diarrheal process.   Correlate clinically as the differential for colitis is extensive   including  inflammatory, infectious, and ischemic etiologies..    2.  Nonobstructive right nephrolithiasis. No evidence of obstructive   uropathy.  3.  Mild circumferential mural thickening of the aorta, celiac trunk,   SMA, and   SHERI.  This may represent nonspecific diffuse vasculitis.  4.  Indeterminate left adrenal lesion measuring 1.4 x 1.0 cm.  5.  6 mm right lower lobe pulmonary nodule.    < end of copied text >

## 2018-05-05 NOTE — PROGRESS NOTE ADULT - PROBLEM SELECTOR PLAN 1
self likited and clinically resolved. Stool PCR not sent as no BM. OK to d/c from GI standpoint on low fat, low fiber and low lactose diet. self likited and clinically resolved. Stool PCR not sent as no BM. OK to d/c from GI standpoint on low fat, low fiber and low lactose diet. Can f/u with Dr. Delgado as outpatient.

## 2018-05-15 ENCOUNTER — EMERGENCY (EMERGENCY)
Facility: HOSPITAL | Age: 63
LOS: 0 days | Discharge: HOME | End: 2018-05-15
Attending: EMERGENCY MEDICINE | Admitting: EMERGENCY MEDICINE
Payer: MEDICARE

## 2018-05-15 VITALS — WEIGHT: 184.97 LBS

## 2018-05-15 VITALS
RESPIRATION RATE: 18 BRPM | TEMPERATURE: 98 F | OXYGEN SATURATION: 100 % | DIASTOLIC BLOOD PRESSURE: 60 MMHG | SYSTOLIC BLOOD PRESSURE: 112 MMHG | HEART RATE: 89 BPM

## 2018-05-15 DIAGNOSIS — K52.9 NONINFECTIVE GASTROENTERITIS AND COLITIS, UNSPECIFIED: ICD-10-CM

## 2018-05-15 DIAGNOSIS — Z96.641 PRESENCE OF RIGHT ARTIFICIAL HIP JOINT: Chronic | ICD-10-CM

## 2018-05-15 DIAGNOSIS — R19.7 DIARRHEA, UNSPECIFIED: ICD-10-CM

## 2018-05-15 DIAGNOSIS — D17.9 BENIGN LIPOMATOUS NEOPLASM, UNSPECIFIED: Chronic | ICD-10-CM

## 2018-05-15 DIAGNOSIS — Z90.12 ACQUIRED ABSENCE OF LEFT BREAST AND NIPPLE: Chronic | ICD-10-CM

## 2018-05-15 DIAGNOSIS — E78.5 HYPERLIPIDEMIA, UNSPECIFIED: ICD-10-CM

## 2018-05-15 DIAGNOSIS — Z85.3 PERSONAL HISTORY OF MALIGNANT NEOPLASM OF BREAST: ICD-10-CM

## 2018-05-15 DIAGNOSIS — Z98.890 OTHER SPECIFIED POSTPROCEDURAL STATES: Chronic | ICD-10-CM

## 2018-05-15 DIAGNOSIS — E86.0 DEHYDRATION: ICD-10-CM

## 2018-05-15 LAB
ALBUMIN SERPL ELPH-MCNC: 3 G/DL — LOW (ref 3.3–5)
ALP SERPL-CCNC: 92 U/L — SIGNIFICANT CHANGE UP (ref 40–120)
ALT FLD-CCNC: 23 U/L — SIGNIFICANT CHANGE UP (ref 12–78)
ANION GAP SERPL CALC-SCNC: 8 MMOL/L — SIGNIFICANT CHANGE UP (ref 5–17)
APPEARANCE UR: (no result)
AST SERPL-CCNC: 33 U/L — SIGNIFICANT CHANGE UP (ref 15–37)
BACTERIA # UR AUTO: (no result)
BASOPHILS # BLD AUTO: 0.01 K/UL — SIGNIFICANT CHANGE UP (ref 0–0.2)
BASOPHILS NFR BLD AUTO: 0.2 % — SIGNIFICANT CHANGE UP (ref 0–2)
BILIRUB SERPL-MCNC: 0.4 MG/DL — SIGNIFICANT CHANGE UP (ref 0.2–1.2)
BILIRUB UR-MCNC: NEGATIVE — SIGNIFICANT CHANGE UP
BUN SERPL-MCNC: 7 MG/DL — SIGNIFICANT CHANGE UP (ref 7–23)
CALCIUM SERPL-MCNC: 9.2 MG/DL — SIGNIFICANT CHANGE UP (ref 8.5–10.1)
CHLORIDE SERPL-SCNC: 107 MMOL/L — SIGNIFICANT CHANGE UP (ref 96–108)
CK SERPL-CCNC: 91 U/L — SIGNIFICANT CHANGE UP (ref 26–192)
CO2 SERPL-SCNC: 25 MMOL/L — SIGNIFICANT CHANGE UP (ref 22–31)
COLOR SPEC: YELLOW — SIGNIFICANT CHANGE UP
COMMENT - URINE: SIGNIFICANT CHANGE UP
CREAT SERPL-MCNC: 0.69 MG/DL — SIGNIFICANT CHANGE UP (ref 0.5–1.3)
DIFF PNL FLD: (no result)
EOSINOPHIL # BLD AUTO: 0.31 K/UL — SIGNIFICANT CHANGE UP (ref 0–0.5)
EOSINOPHIL NFR BLD AUTO: 4.8 % — SIGNIFICANT CHANGE UP (ref 0–6)
EPI CELLS # UR: (no result)
GLUCOSE SERPL-MCNC: 103 MG/DL — HIGH (ref 70–99)
GLUCOSE UR QL: NEGATIVE MG/DL — SIGNIFICANT CHANGE UP
HCT VFR BLD CALC: 40.9 % — SIGNIFICANT CHANGE UP (ref 34.5–45)
HGB BLD-MCNC: 13 G/DL — SIGNIFICANT CHANGE UP (ref 11.5–15.5)
IMM GRANULOCYTES NFR BLD AUTO: 0.2 % — SIGNIFICANT CHANGE UP (ref 0–1.5)
KETONES UR-MCNC: NEGATIVE — SIGNIFICANT CHANGE UP
LACTATE SERPL-SCNC: 2 MMOL/L — SIGNIFICANT CHANGE UP (ref 0.7–2)
LEUKOCYTE ESTERASE UR-ACNC: (no result)
LIDOCAIN IGE QN: 87 U/L — SIGNIFICANT CHANGE UP (ref 73–393)
LYMPHOCYTES # BLD AUTO: 2.55 K/UL — SIGNIFICANT CHANGE UP (ref 1–3.3)
LYMPHOCYTES # BLD AUTO: 39.7 % — SIGNIFICANT CHANGE UP (ref 13–44)
MAGNESIUM SERPL-MCNC: 1.8 MG/DL — SIGNIFICANT CHANGE UP (ref 1.6–2.6)
MCHC RBC-ENTMCNC: 31.6 PG — SIGNIFICANT CHANGE UP (ref 27–34)
MCHC RBC-ENTMCNC: 31.8 GM/DL — LOW (ref 32–36)
MCV RBC AUTO: 99.3 FL — SIGNIFICANT CHANGE UP (ref 80–100)
MONOCYTES # BLD AUTO: 0.55 K/UL — SIGNIFICANT CHANGE UP (ref 0–0.9)
MONOCYTES NFR BLD AUTO: 8.6 % — SIGNIFICANT CHANGE UP (ref 2–14)
NEUTROPHILS # BLD AUTO: 2.99 K/UL — SIGNIFICANT CHANGE UP (ref 1.8–7.4)
NEUTROPHILS NFR BLD AUTO: 46.5 % — SIGNIFICANT CHANGE UP (ref 43–77)
NITRITE UR-MCNC: NEGATIVE — SIGNIFICANT CHANGE UP
NRBC # BLD: 0 /100 WBCS — SIGNIFICANT CHANGE UP (ref 0–0)
PH UR: 6 — SIGNIFICANT CHANGE UP (ref 5–8)
PLATELET # BLD AUTO: 328 K/UL — SIGNIFICANT CHANGE UP (ref 150–400)
POTASSIUM SERPL-MCNC: 4.5 MMOL/L — SIGNIFICANT CHANGE UP (ref 3.5–5.3)
POTASSIUM SERPL-SCNC: 4.5 MMOL/L — SIGNIFICANT CHANGE UP (ref 3.5–5.3)
PROT SERPL-MCNC: 7.5 GM/DL — SIGNIFICANT CHANGE UP (ref 6–8.3)
PROT UR-MCNC: 15 MG/DL
RBC # BLD: 4.12 M/UL — SIGNIFICANT CHANGE UP (ref 3.8–5.2)
RBC # FLD: 13.8 % — SIGNIFICANT CHANGE UP (ref 10.3–14.5)
RBC CASTS # UR COMP ASSIST: (no result) /HPF (ref 0–4)
SODIUM SERPL-SCNC: 140 MMOL/L — SIGNIFICANT CHANGE UP (ref 135–145)
SP GR SPEC: 1.01 — SIGNIFICANT CHANGE UP (ref 1.01–1.02)
TROPONIN I SERPL-MCNC: <0.015 NG/ML — SIGNIFICANT CHANGE UP (ref 0.01–0.04)
TROPONIN I SERPL-MCNC: <0.015 NG/ML — SIGNIFICANT CHANGE UP (ref 0.01–0.04)
UROBILINOGEN FLD QL: NEGATIVE MG/DL — SIGNIFICANT CHANGE UP
WBC # BLD: 6.42 K/UL — SIGNIFICANT CHANGE UP (ref 3.8–10.5)
WBC # FLD AUTO: 6.42 K/UL — SIGNIFICANT CHANGE UP (ref 3.8–10.5)
WBC UR QL: SIGNIFICANT CHANGE UP

## 2018-05-15 PROCEDURE — 93010 ELECTROCARDIOGRAM REPORT: CPT

## 2018-05-15 PROCEDURE — 74177 CT ABD & PELVIS W/CONTRAST: CPT | Mod: 26

## 2018-05-15 PROCEDURE — 99285 EMERGENCY DEPT VISIT HI MDM: CPT

## 2018-05-15 PROCEDURE — 71045 X-RAY EXAM CHEST 1 VIEW: CPT | Mod: 26

## 2018-05-15 RX ORDER — ONDANSETRON 8 MG/1
4 TABLET, FILM COATED ORAL ONCE
Qty: 0 | Refills: 0 | Status: COMPLETED | OUTPATIENT
Start: 2018-05-15 | End: 2018-05-15

## 2018-05-15 RX ORDER — FAMOTIDINE 10 MG/ML
20 INJECTION INTRAVENOUS ONCE
Qty: 0 | Refills: 0 | Status: COMPLETED | OUTPATIENT
Start: 2018-05-15 | End: 2018-05-15

## 2018-05-15 RX ORDER — SODIUM CHLORIDE 9 MG/ML
2000 INJECTION INTRAMUSCULAR; INTRAVENOUS; SUBCUTANEOUS ONCE
Qty: 0 | Refills: 0 | Status: COMPLETED | OUTPATIENT
Start: 2018-05-15 | End: 2018-05-15

## 2018-05-15 RX ORDER — SODIUM CHLORIDE 9 MG/ML
3 INJECTION INTRAMUSCULAR; INTRAVENOUS; SUBCUTANEOUS ONCE
Qty: 0 | Refills: 0 | Status: COMPLETED | OUTPATIENT
Start: 2018-05-15 | End: 2018-05-15

## 2018-05-15 RX ADMIN — FAMOTIDINE 20 MILLIGRAM(S): 10 INJECTION INTRAVENOUS at 15:44

## 2018-05-15 RX ADMIN — SODIUM CHLORIDE 3 MILLILITER(S): 9 INJECTION INTRAMUSCULAR; INTRAVENOUS; SUBCUTANEOUS at 11:07

## 2018-05-15 RX ADMIN — SODIUM CHLORIDE 2000 MILLILITER(S): 9 INJECTION INTRAMUSCULAR; INTRAVENOUS; SUBCUTANEOUS at 11:15

## 2018-05-15 RX ADMIN — ONDANSETRON 4 MILLIGRAM(S): 8 TABLET, FILM COATED ORAL at 11:07

## 2018-05-15 NOTE — ED STATDOCS - PROGRESS NOTE DETAILS
63 yr. old female PMH: HLD, Breast CA, Hip Surgery Colitis presents to ED with pain in epigastric area +nausea +vomiting  diarrhea and low blood pressure for a few weeks worsened in the past 3 days. Was admitted to [facility] for colitis on 5/2-5/5. Seen by Dr. betancourt this am for weakness and low blood pressure. 63 yr. old female PMH: HLD, Breast CA, Hip Surgery Colitis presents to ED with pain in epigastric area +nausea +vomiting  diarrhea and low blood pressure for a few weeks worsened in the past 3 days. Was admitted to [facility] for colitis on 5/2-5/5. Seen by Dr. Bonner this am for weakness and low blood pressure. No fever ,no blood in stool. Seen and examined by attending in Intake. Plan: IV, Labs, IVF, EKG, Chest X-Ray and CT abd./pelvis. Will F/U with results and re evaluate. Ignacio BARAHONA

## 2018-05-15 NOTE — ED STATDOCS - ATTENDING CONTRIBUTION TO CARE
Attending Contribution to Care: I, Yaneth Sanders, performed the initial face to face bedside interview with this patient regarding history of present illness, review of symptoms and relevant past medical, social and family history.  I completed an independent physical examination.  I was the initial provider who evaluated this patient. I have signed out the follow up of any pending tests (i.e. labs, radiological studies) to the ACP.  I have communicated the patient’s plan of care and disposition with the ACP.

## 2018-05-15 NOTE — ED ADULT NURSE NOTE - OBJECTIVE STATEMENT
Pt alert and oriented x3. pt presents with abd pain n/v and diarrhea. Pt recent admission to [facility] for colitis. Pt r/o c diff.

## 2018-05-15 NOTE — ED ADULT NURSE NOTE - CAS EDN DISCHARGE ASSESSMENT
Dressing clean and dry/Awake/No adverse reaction to first time med in ED/Symptoms improved/Alert and oriented to person, place and time

## 2018-05-15 NOTE — ED STATDOCS - OBJECTIVE STATEMENT
64 y/o female with PMHx of HLD, breast CA, colitis presents to the ED sent by Dr. Bonner (cardiology) for evaluation of low blood pressure, vomiting & diarrhea x few weeks, worsened over the last 3 days. Pt was admitted to [facility] for colitis from 5/2-5/5, d/c home without abx. This morning pt was seen by Dr. Bonner (cardiology) where she was found to have low blood pressure and weakness and was sent to the ED. Pt denies hx of c-diff. Denies blood in stool, denies fever. Allergic to Crestor. Pt has not taken any medicine for pain PTA. PMD Dr. Bess.

## 2018-05-15 NOTE — ED STATDOCS - MEDICAL DECISION MAKING DETAILS
Labs, UA, blood cultures, lactate, stool cultures to be sent, IV fluids given, contact pt's gastroenterologist and likely admission for dehydration from colitis.

## 2018-05-16 LAB
C DIFF BY PCR RESULT: SIGNIFICANT CHANGE UP
C DIFF TOX GENS STL QL NAA+PROBE: SIGNIFICANT CHANGE UP

## 2018-05-18 ENCOUNTER — APPOINTMENT (OUTPATIENT)
Dept: GASTROENTEROLOGY | Facility: CLINIC | Age: 63
End: 2018-05-18

## 2018-05-18 LAB
CULTURE RESULTS: SIGNIFICANT CHANGE UP
CULTURE RESULTS: SIGNIFICANT CHANGE UP
SPECIMEN SOURCE: SIGNIFICANT CHANGE UP
SPECIMEN SOURCE: SIGNIFICANT CHANGE UP

## 2018-05-18 RX ORDER — CIPROFLOXACIN LACTATE 400MG/40ML
1 VIAL (ML) INTRAVENOUS
Qty: 6 | Refills: 0 | OUTPATIENT
Start: 2018-05-18 | End: 2018-05-20

## 2018-05-18 NOTE — ED POST DISCHARGE NOTE - RESULT SUMMARY
+urine cx, sensitive to cipro.  called patient and sent rx to pharm, she willl follow up with her PMD -Agustin Purcell PA-C

## 2018-05-22 ENCOUNTER — APPOINTMENT (OUTPATIENT)
Dept: ORTHOPEDIC SURGERY | Facility: CLINIC | Age: 63
End: 2018-05-22

## 2018-05-23 ENCOUNTER — APPOINTMENT (OUTPATIENT)
Dept: ORTHOPEDIC SURGERY | Facility: CLINIC | Age: 63
End: 2018-05-23

## 2018-05-25 ENCOUNTER — OTHER (OUTPATIENT)
Age: 63
End: 2018-05-25

## 2018-05-29 ENCOUNTER — INPATIENT (INPATIENT)
Facility: HOSPITAL | Age: 63
LOS: 2 days | Discharge: HOME | DRG: 392 | End: 2018-06-01
Attending: HOSPITALIST | Admitting: HOSPITALIST
Payer: MEDICARE

## 2018-05-29 ENCOUNTER — APPOINTMENT (OUTPATIENT)
Dept: ORTHOPEDIC SURGERY | Facility: CLINIC | Age: 63
End: 2018-05-29
Payer: MEDICARE

## 2018-05-29 VITALS
HEIGHT: 67 IN | SYSTOLIC BLOOD PRESSURE: 92 MMHG | WEIGHT: 194 LBS | BODY MASS INDEX: 30.45 KG/M2 | HEART RATE: 124 BPM | DIASTOLIC BLOOD PRESSURE: 58 MMHG

## 2018-05-29 VITALS — WEIGHT: 164.91 LBS | HEIGHT: 66 IN

## 2018-05-29 DIAGNOSIS — Z96.641 PRESENCE OF RIGHT ARTIFICIAL HIP JOINT: Chronic | ICD-10-CM

## 2018-05-29 DIAGNOSIS — D17.9 BENIGN LIPOMATOUS NEOPLASM, UNSPECIFIED: Chronic | ICD-10-CM

## 2018-05-29 DIAGNOSIS — E86.9 VOLUME DEPLETION, UNSPECIFIED: ICD-10-CM

## 2018-05-29 DIAGNOSIS — Z98.890 OTHER SPECIFIED POSTPROCEDURAL STATES: Chronic | ICD-10-CM

## 2018-05-29 DIAGNOSIS — E78.00 PURE HYPERCHOLESTEROLEMIA, UNSPECIFIED: ICD-10-CM

## 2018-05-29 DIAGNOSIS — K52.9 NONINFECTIVE GASTROENTERITIS AND COLITIS, UNSPECIFIED: ICD-10-CM

## 2018-05-29 DIAGNOSIS — Z29.9 ENCOUNTER FOR PROPHYLACTIC MEASURES, UNSPECIFIED: ICD-10-CM

## 2018-05-29 DIAGNOSIS — I10 ESSENTIAL (PRIMARY) HYPERTENSION: ICD-10-CM

## 2018-05-29 DIAGNOSIS — Z90.12 ACQUIRED ABSENCE OF LEFT BREAST AND NIPPLE: Chronic | ICD-10-CM

## 2018-05-29 LAB
ALBUMIN SERPL ELPH-MCNC: 3.2 G/DL — LOW (ref 3.3–5.2)
ALP SERPL-CCNC: 60 U/L — SIGNIFICANT CHANGE UP (ref 40–120)
ALT FLD-CCNC: 10 U/L — SIGNIFICANT CHANGE UP
ANION GAP SERPL CALC-SCNC: 13 MMOL/L — SIGNIFICANT CHANGE UP (ref 5–17)
APTT BLD: 24.2 SEC — LOW (ref 27.5–37.4)
AST SERPL-CCNC: 20 U/L — SIGNIFICANT CHANGE UP
BASOPHILS # BLD AUTO: 0 K/UL — SIGNIFICANT CHANGE UP (ref 0–0.2)
BASOPHILS NFR BLD AUTO: 0.4 % — SIGNIFICANT CHANGE UP (ref 0–2)
BILIRUB SERPL-MCNC: 0.6 MG/DL — SIGNIFICANT CHANGE UP (ref 0.4–2)
BUN SERPL-MCNC: 12 MG/DL — SIGNIFICANT CHANGE UP (ref 8–20)
CALCIUM SERPL-MCNC: 9.2 MG/DL — SIGNIFICANT CHANGE UP (ref 8.6–10.2)
CHLORIDE SERPL-SCNC: 105 MMOL/L — SIGNIFICANT CHANGE UP (ref 98–107)
CO2 SERPL-SCNC: 23 MMOL/L — SIGNIFICANT CHANGE UP (ref 22–29)
CREAT SERPL-MCNC: 0.52 MG/DL — SIGNIFICANT CHANGE UP (ref 0.5–1.3)
EOSINOPHIL # BLD AUTO: 0.2 K/UL — SIGNIFICANT CHANGE UP (ref 0–0.5)
EOSINOPHIL NFR BLD AUTO: 3.4 % — SIGNIFICANT CHANGE UP (ref 0–6)
GLUCOSE SERPL-MCNC: 120 MG/DL — HIGH (ref 70–115)
HCT VFR BLD CALC: 37.2 % — SIGNIFICANT CHANGE UP (ref 37–47)
HGB BLD-MCNC: 11.8 G/DL — LOW (ref 12–16)
INR BLD: 1.27 RATIO — HIGH (ref 0.88–1.16)
LACTATE BLDV-MCNC: 1.9 MMOL/L — SIGNIFICANT CHANGE UP (ref 0.5–2)
LYMPHOCYTES # BLD AUTO: 1.9 K/UL — SIGNIFICANT CHANGE UP (ref 1–4.8)
LYMPHOCYTES # BLD AUTO: 26.9 % — SIGNIFICANT CHANGE UP (ref 20–55)
MCHC RBC-ENTMCNC: 30.8 PG — SIGNIFICANT CHANGE UP (ref 27–31)
MCHC RBC-ENTMCNC: 31.7 G/DL — LOW (ref 32–36)
MCV RBC AUTO: 97.1 FL — SIGNIFICANT CHANGE UP (ref 81–99)
MONOCYTES # BLD AUTO: 0.6 K/UL — SIGNIFICANT CHANGE UP (ref 0–0.8)
MONOCYTES NFR BLD AUTO: 8.8 % — SIGNIFICANT CHANGE UP (ref 3–10)
NEUTROPHILS # BLD AUTO: 4.3 K/UL — SIGNIFICANT CHANGE UP (ref 1.8–8)
NEUTROPHILS NFR BLD AUTO: 60.4 % — SIGNIFICANT CHANGE UP (ref 37–73)
PLATELET # BLD AUTO: 211 K/UL — SIGNIFICANT CHANGE UP (ref 150–400)
POTASSIUM SERPL-MCNC: 4.4 MMOL/L — SIGNIFICANT CHANGE UP (ref 3.5–5.3)
POTASSIUM SERPL-SCNC: 4.4 MMOL/L — SIGNIFICANT CHANGE UP (ref 3.5–5.3)
PROT SERPL-MCNC: 6.3 G/DL — LOW (ref 6.6–8.7)
PROTHROM AB SERPL-ACNC: 14 SEC — HIGH (ref 9.8–12.7)
RBC # BLD: 3.83 M/UL — LOW (ref 4.4–5.2)
RBC # FLD: 13.8 % — SIGNIFICANT CHANGE UP (ref 11–15.6)
SODIUM SERPL-SCNC: 141 MMOL/L — SIGNIFICANT CHANGE UP (ref 135–145)
WBC # BLD: 7.1 K/UL — SIGNIFICANT CHANGE UP (ref 4.8–10.8)
WBC # FLD AUTO: 7.1 K/UL — SIGNIFICANT CHANGE UP (ref 4.8–10.8)

## 2018-05-29 PROCEDURE — 99024 POSTOP FOLLOW-UP VISIT: CPT

## 2018-05-29 PROCEDURE — 36556 INSERT NON-TUNNEL CV CATH: CPT

## 2018-05-29 PROCEDURE — 99222 1ST HOSP IP/OBS MODERATE 55: CPT

## 2018-05-29 PROCEDURE — 74176 CT ABD & PELVIS W/O CONTRAST: CPT | Mod: 26

## 2018-05-29 PROCEDURE — 71045 X-RAY EXAM CHEST 1 VIEW: CPT | Mod: 26

## 2018-05-29 PROCEDURE — 73502 X-RAY EXAM HIP UNI 2-3 VIEWS: CPT | Mod: LT

## 2018-05-29 PROCEDURE — 99291 CRITICAL CARE FIRST HOUR: CPT | Mod: 25

## 2018-05-29 RX ORDER — MISOPROSTOL 200 UG/1
200 TABLET ORAL
Qty: 2 | Refills: 0 | Status: ACTIVE | COMMUNITY
Start: 2018-02-07

## 2018-05-29 RX ORDER — ACETAMINOPHEN 500 MG
1000 TABLET ORAL ONCE
Qty: 0 | Refills: 0 | Status: COMPLETED | OUTPATIENT
Start: 2018-05-29 | End: 2018-05-29

## 2018-05-29 RX ORDER — ENOXAPARIN SODIUM 100 MG/ML
40 INJECTION SUBCUTANEOUS EVERY 24 HOURS
Qty: 0 | Refills: 0 | Status: DISCONTINUED | OUTPATIENT
Start: 2018-05-29 | End: 2018-06-01

## 2018-05-29 RX ORDER — TAMOXIFEN CITRATE 20 MG/1
20 TABLET, FILM COATED ORAL DAILY
Qty: 0 | Refills: 0 | Status: DISCONTINUED | OUTPATIENT
Start: 2018-05-29 | End: 2018-06-01

## 2018-05-29 RX ORDER — SODIUM CHLORIDE 9 MG/ML
1000 INJECTION, SOLUTION INTRAVENOUS
Qty: 0 | Refills: 0 | Status: DISCONTINUED | OUTPATIENT
Start: 2018-05-29 | End: 2018-06-01

## 2018-05-29 RX ORDER — SODIUM CHLORIDE 9 MG/ML
3 INJECTION INTRAMUSCULAR; INTRAVENOUS; SUBCUTANEOUS ONCE
Qty: 0 | Refills: 0 | Status: COMPLETED | OUTPATIENT
Start: 2018-05-29 | End: 2018-05-29

## 2018-05-29 RX ORDER — ATORVASTATIN CALCIUM 80 MG/1
20 TABLET, FILM COATED ORAL AT BEDTIME
Qty: 0 | Refills: 0 | Status: DISCONTINUED | OUTPATIENT
Start: 2018-05-29 | End: 2018-06-01

## 2018-05-29 RX ORDER — PANTOPRAZOLE SODIUM 20 MG/1
40 TABLET, DELAYED RELEASE ORAL DAILY
Qty: 0 | Refills: 0 | Status: DISCONTINUED | OUTPATIENT
Start: 2018-05-29 | End: 2018-06-01

## 2018-05-29 RX ORDER — ONDANSETRON 8 MG/1
4 TABLET, FILM COATED ORAL EVERY 6 HOURS
Qty: 0 | Refills: 0 | Status: DISCONTINUED | OUTPATIENT
Start: 2018-05-29 | End: 2018-06-01

## 2018-05-29 RX ORDER — SODIUM CHLORIDE 9 MG/ML
500 INJECTION INTRAMUSCULAR; INTRAVENOUS; SUBCUTANEOUS
Qty: 0 | Refills: 0 | Status: DISCONTINUED | OUTPATIENT
Start: 2018-05-29 | End: 2018-05-29

## 2018-05-29 RX ADMIN — SODIUM CHLORIDE 2000 MILLILITER(S): 9 INJECTION INTRAMUSCULAR; INTRAVENOUS; SUBCUTANEOUS at 12:58

## 2018-05-29 RX ADMIN — SODIUM CHLORIDE 100 MILLILITER(S): 9 INJECTION, SOLUTION INTRAVENOUS at 22:42

## 2018-05-29 RX ADMIN — SODIUM CHLORIDE 100 MILLILITER(S): 9 INJECTION, SOLUTION INTRAVENOUS at 16:38

## 2018-05-29 RX ADMIN — Medication 1000 MILLIGRAM(S): at 23:53

## 2018-05-29 RX ADMIN — Medication 400 MILLIGRAM(S): at 23:38

## 2018-05-29 RX ADMIN — SODIUM CHLORIDE 3 MILLILITER(S): 9 INJECTION INTRAMUSCULAR; INTRAVENOUS; SUBCUTANEOUS at 12:58

## 2018-05-29 RX ADMIN — ONDANSETRON 4 MILLIGRAM(S): 8 TABLET, FILM COATED ORAL at 17:54

## 2018-05-29 NOTE — ED PROVIDER NOTE - MUSCULOSKELETAL, MLM
Spine appears normal, range of motion is not limited, no muscle or joint tenderness. Palpable radial pulses.

## 2018-05-29 NOTE — ED PROCEDURE NOTE - CPROC ED INFUS LINE DETAIL1
The location was identified, and the area was draped and prepped./Ultrasound guidance was used during placement. Ultrasound guidance was used during placement./The location was identified, and the area was draped and prepped./The guidewire was recovered. All lumen(s) aspirated and flushed without difficulty./Ultrasound guidance was used during placement./The location was identified, and the area was draped and prepped./The guidewire was recovered.

## 2018-05-29 NOTE — ED PROCEDURE NOTE - CPROC ED INDICATIONS1
IV access due to numerous unsuccessful attempts at peripheral access, even with US guidance, likely due to level of dehydration

## 2018-05-29 NOTE — H&P ADULT - PROBLEM SELECTOR PLAN 1
Recurrent - 3rd hospital visit for similar symptoms, no known trigger  Unclear etiology  Repeat CT scan showed resolving colitis  C/w IVF for rehydration  Pain control and zofran PRN  If worsens will call GI  Awaiting stool studies  Clear liquid diet

## 2018-05-29 NOTE — H&P ADULT - FAMILY HISTORY
Family history of hypertension in mother     Family history of stroke     Sibling  Still living? Yes, Estimated age: Age Unknown  Family history of breast cancer in sister, Age at diagnosis: Age Unknown

## 2018-05-29 NOTE — H&P ADULT - HISTORY OF PRESENT ILLNESS
62 yo F with h/o HTN, HLD presents from home with worsening abdominal pain and diarrhea. Pt reports that symptoms started at the beginning of May after having been discharged home from [facility]. She had a difficult time tolerating oral intake as she threw up everything that she ate. She also noted watery diarrhea at the time. She was hospitalized on May 3 for these symptoms, where CT abd showed diffuse colitis. She improved on bowel rest and was discharged with protonix. Pt states that she then was seen by her cardiologist on May 15, and was sent to [facility] for hypotension and generalized weakness after another bout of diarrhea and vomiting. CT abd at that time was negative, and repeat stool studies were negative. Pt was discharged home without incident. Pt states that for the last 4 days, she began to have recurrent diarrhea and nausea and vomiting. Denies any sick contacts, recent travels or unsual meals. Unable to tolerate any oral intake. Reports weight loss but unable to quantify. States that she has been having night sweats as well. In the ED, repeat CT scan showed questionable mild wall thickening involving the proximal sigmoid colon, which  may be due to some residual colitis.

## 2018-05-29 NOTE — ED ADULT TRIAGE NOTE - CHIEF COMPLAINT QUOTE
abd pain, with n/v. history of colitis, started Friday. abd pain, with n/v. history of colitis, started Friday.  low BP now and symptomatic at VS measurements

## 2018-05-29 NOTE — ED PROVIDER NOTE - PROGRESS NOTE DETAILS
Discussed results with patient. She is still unable to tolerate PO, has persistent vomiting and diarrhea will admit for IV hydration.

## 2018-05-29 NOTE — ED ADULT NURSE NOTE - CHIEF COMPLAINT QUOTE
abd pain, with n/v. history of colitis, started Friday.  low BP now and symptomatic at VS measurements

## 2018-05-29 NOTE — ED PROVIDER NOTE - MEDICAL DECISION MAKING DETAILS
Recurrent vomiting diarrhea appears dehydrated, will obtain blood work, re-scan, eval for worsening colitis, hydrate and re-eval.

## 2018-05-29 NOTE — ED PROVIDER NOTE - OBJECTIVE STATEMENT
62 y/o F with PMHx of colitis, breast cancer (on Tamoxifen), HTN, CHF and HLD presents to ED c/o intermittent abdominal pain with associated vomiting and diarrhea onset about 1 month ago that has worsening over the past 4 days ago. States she cannot recall how many episodes of diarrhea because "it is too many times to count". Since onset of symptoms, has noticed weight loss and weakness. Patient was seen in ED on May 2nd for similar symptoms, was told she was negative for c-diff. Denies recent abx use, hematemesis, fevers, chills, chest pain, difficulty breathing or difficulty breathing. Last routine colonoscopy was March 7th 2018. NKDA. No further acute complaints at this time.     GI: Dr. Ibarra   PCP: Dr. Arambula 62 y/o F with PMHx of colitis, breast cancer (on Tamoxifen), HTN, CHF and HLD presents to ED c/o intermittent abdominal pain with associated vomiting and diarrhea onset about 1 month ago that has worsening over the past 4 days ago. States she cannot recall how many episodes of diarrhea because "it is too many times to count". Since onset of symptoms, has noticed weight loss and weakness. Patient was seen in ED on May 2nd for similar symptoms, was told she was negative for c-diff. Denies recent abx use, hematemesis, fevers, chills, chest pain, difficulty breathing or difficulty breathing. Last routine colonoscopy was March 7th 2018. Allergic to Crestor. No further acute complaints at this time.     PCP: Dr. Kwon 62 y/o F with PMHx of colitis, breast cancer (on Tamoxifen), HTN, CHF and HLD presents to ED c/o intermittent abdominal pain with associated vomiting and diarrhea onset about 1 month ago that has worsening over the past 4 days ago. States she cannot recall how many episodes of diarrhea because "it is too many times to count". Since onset of symptoms, has noticed weight loss and weakness. Patient was seen in ED earlier in May for similar symptoms, was told she was negative for c-diff. Denies recent abx use, hematemesis, melena, fevers, chills, chest pain or difficulty breathing. Last routine colonoscopy was March 7th 2018. Allergic to Crestor. No further acute complaints at this time.     PCP: Dr. Kwon  Cardiologst: Dr. Bonner 62 y/o F with PMHx of colitis, breast cancer (on Tamoxifen), HTN, CHF and HLD presents to ED c/o intermittent abdominal pain with associated vomiting and diarrhea onset about 1 month ago that has worsened over the past 4 days ago. States she cannot recall how many episodes of diarrhea because "it is too many times to count". Since onset of symptoms, has noticed weight loss and weakness. Patient was seen in ED earlier in May for similar symptoms, was told she was negative for c-diff. Denies recent abx use, hematemesis, melena, fevers, chills, chest pain or difficulty breathing. Last routine colonoscopy was March 7th 2018. Allergic to Crestor. No further acute complaints at this time.     PCP: Dr. Kwon  Cardiologst: Dr. Bonner 64 y/o F with PMHx of colitis, breast cancer (on Tamoxifen), HTN, CHF and HLD presents to ED c/o intermittent abdominal pain with associated vomiting and diarrhea onset about 1 month ago that has worsened over the past 4 days ago. States she cannot recall how many episodes of diarrhea because "it is too many times to count". Since onset of symptoms, has noticed weight loss and weakness. Patient was seen in ED earlier in May for similar symptoms, was told she was negative for c-diff. Denies recent abx use, hematemesis, melena, fevers, chills, night sweats, chest pain or difficulty breathing. Last routine colonoscopy was March 7th 2018. Allergic to Crestor. No further acute complaints at this time.     PCP: Dr. Kwon  Cardiologst: Dr. Bonner

## 2018-05-29 NOTE — ED ADULT NURSE REASSESSMENT NOTE - NS ED NURSE REASSESS COMMENT FT1
patient rec'd at this time patient alert and coopertive  iv to REJ infusing well  patient resting nad noted will monitr and chart maurizio

## 2018-05-30 LAB
ANION GAP SERPL CALC-SCNC: 12 MMOL/L — SIGNIFICANT CHANGE UP (ref 5–17)
BUN SERPL-MCNC: 8 MG/DL — SIGNIFICANT CHANGE UP (ref 8–20)
CALCIUM SERPL-MCNC: 8.6 MG/DL — SIGNIFICANT CHANGE UP (ref 8.6–10.2)
CHLORIDE SERPL-SCNC: 105 MMOL/L — SIGNIFICANT CHANGE UP (ref 98–107)
CO2 SERPL-SCNC: 23 MMOL/L — SIGNIFICANT CHANGE UP (ref 22–29)
CREAT SERPL-MCNC: 0.45 MG/DL — LOW (ref 0.5–1.3)
GLUCOSE SERPL-MCNC: 84 MG/DL — SIGNIFICANT CHANGE UP (ref 70–115)
HCT VFR BLD CALC: 33.7 % — LOW (ref 37–47)
HGB BLD-MCNC: 10.7 G/DL — LOW (ref 12–16)
MCHC RBC-ENTMCNC: 30.6 PG — SIGNIFICANT CHANGE UP (ref 27–31)
MCHC RBC-ENTMCNC: 31.8 G/DL — LOW (ref 32–36)
MCV RBC AUTO: 96.3 FL — SIGNIFICANT CHANGE UP (ref 81–99)
PLATELET # BLD AUTO: 221 K/UL — SIGNIFICANT CHANGE UP (ref 150–400)
POTASSIUM SERPL-MCNC: 4 MMOL/L — SIGNIFICANT CHANGE UP (ref 3.5–5.3)
POTASSIUM SERPL-SCNC: 4 MMOL/L — SIGNIFICANT CHANGE UP (ref 3.5–5.3)
RBC # BLD: 3.5 M/UL — LOW (ref 4.4–5.2)
RBC # FLD: 14.2 % — SIGNIFICANT CHANGE UP (ref 11–15.6)
SODIUM SERPL-SCNC: 140 MMOL/L — SIGNIFICANT CHANGE UP (ref 135–145)
WBC # BLD: 7.2 K/UL — SIGNIFICANT CHANGE UP (ref 4.8–10.8)
WBC # FLD AUTO: 7.2 K/UL — SIGNIFICANT CHANGE UP (ref 4.8–10.8)

## 2018-05-30 PROCEDURE — 93010 ELECTROCARDIOGRAM REPORT: CPT

## 2018-05-30 PROCEDURE — 99232 SBSQ HOSP IP/OBS MODERATE 35: CPT

## 2018-05-30 RX ORDER — HYOSCYAMINE SULFATE 0.13 MG
0.12 TABLET ORAL ONCE
Qty: 0 | Refills: 0 | Status: COMPLETED | OUTPATIENT
Start: 2018-05-30 | End: 2018-05-30

## 2018-05-30 RX ADMIN — SODIUM CHLORIDE 100 MILLILITER(S): 9 INJECTION, SOLUTION INTRAVENOUS at 06:01

## 2018-05-30 RX ADMIN — ATORVASTATIN CALCIUM 20 MILLIGRAM(S): 80 TABLET, FILM COATED ORAL at 21:48

## 2018-05-30 RX ADMIN — ONDANSETRON 4 MILLIGRAM(S): 8 TABLET, FILM COATED ORAL at 19:32

## 2018-05-30 RX ADMIN — SODIUM CHLORIDE 100 MILLILITER(S): 9 INJECTION, SOLUTION INTRAVENOUS at 21:48

## 2018-05-30 RX ADMIN — PANTOPRAZOLE SODIUM 40 MILLIGRAM(S): 20 TABLET, DELAYED RELEASE ORAL at 11:21

## 2018-05-30 RX ADMIN — SODIUM CHLORIDE 100 MILLILITER(S): 9 INJECTION, SOLUTION INTRAVENOUS at 11:21

## 2018-05-30 RX ADMIN — Medication 0.12 MILLIGRAM(S): at 21:48

## 2018-05-30 RX ADMIN — ENOXAPARIN SODIUM 40 MILLIGRAM(S): 100 INJECTION SUBCUTANEOUS at 06:01

## 2018-05-30 RX ADMIN — TAMOXIFEN CITRATE 20 MILLIGRAM(S): 20 TABLET, FILM COATED ORAL at 11:21

## 2018-05-30 NOTE — PROGRESS NOTE ADULT - SUBJECTIVE AND OBJECTIVE BOX
JOSEY SAMAYOA    [id number]    63y      Female    INTERVAL HPI/OVERNIGHT EVENTS: No further episodes of vomiting or diarrhea. Reports that she still has significant abdominal discomfort. Pt reports able to drink some liquids but is worried that she may vomit.    Hospital course:  62 yo F with h/o HTN, HLD presents from home with worsening abdominal pain and diarrhea. Pt reports that symptoms started at the beginning of May after having been discharged home from [facility]. She had a difficult time tolerating oral intake as she threw up everything that she ate. She also noted watery diarrhea at the time. She was hospitalized on May 3 for these symptoms, where CT abd showed diffuse colitis. She improved on bowel rest and was discharged with protonix. Pt states that she then was seen by her cardiologist on May 15, and was sent to [facility] for hypotension and generalized weakness after another bout of diarrhea and vomiting. CT abd at that time was negative, and repeat stool studies were negative. Pt was discharged home without incident. Pt states that for the last 4 days, she began to have recurrent diarrhea and nausea and vomiting. Denies any sick contacts, recent travels or unusual meals. Unable to tolerate any oral intake. Reports weight loss but unable to quantify. States that she has been having night sweats as well. In the ED, repeat CT scan showed questionable mild wall thickening involving the proximal sigmoid colon, which  may be due to some residual colitis.    REVIEW OF SYSTEMS:    CONSTITUTIONAL: No fever   RESPIRATORY: No cough   CARDIOVASCULAR: No chest pain     Vital Signs Last 24 Hrs  T(C): 36.9 (30 May 2018 08:48), Max: 37.6 (29 May 2018 17:56)  T(F): 98.4 (30 May 2018 08:48), Max: 99.6 (29 May 2018 17:56)  HR: 86 (30 May 2018 08:48) (86 - 100)  BP: 108/57 (30 May 2018 08:48) (108/57 - 126/65)  BP(mean): 82 (29 May 2018 16:16) (82 - 82)  RR: 18 (30 May 2018 08:48) (18 - 18)  SpO2: 98% (30 May 2018 08:48) (96% - 99%)    PHYSICAL EXAM:    GENERAL: NAD   HEENT: PERRL, +EOMI  NECK: soft, Supple   CHEST/LUNG: Clear to percussion bilaterally  HEART: S1S2+, Regular rate and rhythm   ABDOMEN: Soft, Nontender, Nondistended; Bowel sounds present  EXTREMITIES:  no edema    LABS:                        10.7   7.2   )-----------( 221      ( 30 May 2018 12:03 )             33.7     05-30    140  |  105  |  8.0  ----------------------------<  84  4.0   |  23.0  |  0.45<L>    Ca    8.6      30 May 2018 12:03    TPro  6.3<L>  /  Alb  3.2<L>  /  TBili  0.6  /  DBili  x   /  AST  20  /  ALT  10  /  AlkPhos  60  05-29    PT/INR - ( 29 May 2018 12:18 )   PT: 14.0 sec;   INR: 1.27 ratio         PTT - ( 29 May 2018 12:18 )  PTT:24.2 sec        MEDICATIONS  (STANDING):  atorvastatin 20 milliGRAM(s) Oral at bedtime  enoxaparin Injectable 40 milliGRAM(s) SubCutaneous every 24 hours  lactated ringers. 1000 milliLiter(s) (100 mL/Hr) IV Continuous <Continuous>  pantoprazole  Injectable 40 milliGRAM(s) IV Push daily  tamoxifen 20 milliGRAM(s) Oral daily    MEDICATIONS  (PRN):  aluminum hydroxide/magnesium hydroxide/simethicone Suspension 30 milliLiter(s) Oral every 4 hours PRN Dyspepsia  ondansetron Injectable 4 milliGRAM(s) IV Push every 6 hours PRN Nausea and/or Vomiting      RADIOLOGY & ADDITIONAL TESTS:

## 2018-05-31 PROCEDURE — 99232 SBSQ HOSP IP/OBS MODERATE 35: CPT

## 2018-05-31 RX ADMIN — PANTOPRAZOLE SODIUM 40 MILLIGRAM(S): 20 TABLET, DELAYED RELEASE ORAL at 11:42

## 2018-05-31 RX ADMIN — TAMOXIFEN CITRATE 20 MILLIGRAM(S): 20 TABLET, FILM COATED ORAL at 11:42

## 2018-05-31 RX ADMIN — ENOXAPARIN SODIUM 40 MILLIGRAM(S): 100 INJECTION SUBCUTANEOUS at 05:40

## 2018-05-31 RX ADMIN — ATORVASTATIN CALCIUM 20 MILLIGRAM(S): 80 TABLET, FILM COATED ORAL at 21:53

## 2018-05-31 NOTE — PHYSICAL THERAPY INITIAL EVALUATION ADULT - CRITERIA FOR SKILLED THERAPEUTIC INTERVENTIONS
predicted duration of therapy intervention/functional limitations in following categories/therapy frequency/anticipated discharge recommendation/rehab potential/impairments found

## 2018-05-31 NOTE — PHYSICAL THERAPY INITIAL EVALUATION ADULT - PERTINENT HX OF CURRENT PROBLEM, REHAB EVAL
pt presents to [facility] due to nausea, vomiting, diarrhea, colitis, gastroenteritis, dehydration

## 2018-05-31 NOTE — PROGRESS NOTE ADULT - SUBJECTIVE AND OBJECTIVE BOX
JOSEY SAMAYOA    [id number]    63y      Female    INTERVAL HPI/OVERNIGHT EVENTS: Tolerating minimal full liquid. Had multiple episodes of diarrhea over 24 hours. Awaiting PT eval.     Hospital course:  62 yo F with h/o HTN, HLD presents from home with worsening abdominal pain and diarrhea. Pt reports that symptoms started at the beginning of May after having been discharged home from [facility]. She had a difficult time tolerating oral intake as she threw up everything that she ate. She also noted watery diarrhea at the time. She was hospitalized on May 3 for these symptoms, where CT abd showed diffuse colitis. She improved on bowel rest and was discharged with protonix. Pt states that she then was seen by her cardiologist on May 15, and was sent to [facility] for hypotension and generalized weakness after another bout of diarrhea and vomiting. CT abd at that time was negative, and repeat stool studies were negative. Pt was discharged home without incident. Pt states that for the last 4 days, she began to have recurrent diarrhea and nausea and vomiting. Denies any sick contacts, recent travels or unusual meals. Unable to tolerate any oral intake. Reports weight loss but unable to quantify. States that she has been having night sweats as well. In the ED, repeat CT scan showed questionable mild wall thickening involving the proximal sigmoid colon, which  may be due to some residual colitis.      REVIEW OF SYSTEMS:    CONSTITUTIONAL: No fever   RESPIRATORY: No cough   CARDIOVASCULAR: No chest pain     Vital Signs Last 24 Hrs  T(C): 37.9 (31 May 2018 08:36), Max: 37.9 (31 May 2018 08:36)  T(F): 100.2 (31 May 2018 08:36), Max: 100.2 (31 May 2018 08:36)  HR: 90 (31 May 2018 08:36) (79 - 90)  BP: 102/54 (31 May 2018 08:36) (101/58 - 104/51)  BP(mean): --  RR: 18 (31 May 2018 08:36) (18 - 18)  SpO2: 98% (31 May 2018 08:36) (98% - 99%)    PHYSICAL EXAM:    GENERAL: NAD  HEENT: PERRL, +EOMI  NECK: soft, Supple  CHEST/LUNG: Clear to percussion bilaterally   HEART: S1S2+, Regular rate and rhythm   ABDOMEN: Soft, Nontender, Nondistended; Bowel sounds present       LABS:                        10.7   7.2   )-----------( 221      ( 30 May 2018 12:03 )             33.7     05-30    140  |  105  |  8.0  ----------------------------<  84  4.0   |  23.0  |  0.45<L>    Ca    8.6      30 May 2018 12:03              MEDICATIONS  (STANDING):  atorvastatin 20 milliGRAM(s) Oral at bedtime  enoxaparin Injectable 40 milliGRAM(s) SubCutaneous every 24 hours  lactated ringers. 1000 milliLiter(s) (100 mL/Hr) IV Continuous <Continuous>  pantoprazole  Injectable 40 milliGRAM(s) IV Push daily  tamoxifen 20 milliGRAM(s) Oral daily    MEDICATIONS  (PRN):  aluminum hydroxide/magnesium hydroxide/simethicone Suspension 30 milliLiter(s) Oral every 4 hours PRN Dyspepsia  ondansetron Injectable 4 milliGRAM(s) IV Push every 6 hours PRN Nausea and/or Vomiting      RADIOLOGY & ADDITIONAL TESTS:

## 2018-05-31 NOTE — PHYSICAL THERAPY INITIAL EVALUATION ADULT - GAIT PATTERN USED, PT EVAL
ambulation distance limited due to dizziness, assist for steadying, increased vale UE support, decreased activity tolerance

## 2018-05-31 NOTE — PROGRESS NOTE ADULT - SUBJECTIVE AND OBJECTIVE BOX
SURGICAL PA NOTE: CTSP by Dr Myrick - pt requesting to be seen re: Nausea/vomitng/diarrhea x 3+ weeks , requesting upper endoscopy    STATUS POST:  recent L THR, s/p R THR 11/17, s/p 1month [facility] stay 3/31 to 4/28 /18    POST OPERATIVE DAY #:     Vital Signs Last 24 Hrs  T(C): 36.3 (30 May 2018 23:32), Max: 36.9 (30 May 2018 08:48)  T(F): 97.4 (30 May 2018 23:32), Max: 98.5 (30 May 2018 15:41)  HR: 79 (30 May 2018 23:32) (79 - 86)  BP: 101/58 (30 May 2018 23:32) (101/58 - 108/57)  BP(mean): --  RR: 18 (30 May 2018 23:32) (18 - 18)  SpO2: 99% (30 May 2018 23:32) (98% - 99%)    HPI:  62 yo F with h/o HTN, HLD presents from home with worsening abdominal pain and diarrhea. Pt reports that symptoms started at the beginning of May after having been discharged home from [facility]. She had a difficult time tolerating oral intake as she threw up everything that she ate. She also noted watery diarrhea at the time. She was hospitalized on May 3 for these symptoms, where CT abd showed diffuse colitis. She improved on bowel rest and was discharged with protonix. Pt states that she then was seen by her cardiologist on May 15, and was sent to [facility] for hypotension and generalized weakness after another bout of diarrhea and vomiting. CT abd at that time was negative, and repeat stool studies were negative. Pt was discharged home without incident. Pt states that for the last 4 days, she began to have recurrent diarrhea and nausea and vomiting. Denies any sick contacts, recent travels or unsual meals. Unable to tolerate any oral intake. Reports weight loss but unable to quantify. States that she has been having night sweats as well. In the ED, repeat CT scan showed questionable mild wall thickening involving the proximal sigmoid colon, which  may be due to some residual colitis. (29 May 2018 16:16)      Noninfectious gastroenteritis  H/o or current diagnosis of HF- ACEI/ARB contraindication unknown  Family history of stroke  Family history of breast cancer in sister (Sibling)  Family history of hypertension in mother  Handoff  MEWS Score  CHF (congestive heart failure)  Breast cancer  High cholesterol  High blood pressure  Colitis  Prophylactic measure  Essential hypertension  High cholesterol  Volume depletion, gastrointestinal loss  Colitis  History of hip replacement, total, right  History of lumpectomy of left breast  Atypical lipoma of soft tissue  H/O foot surgery  No significant past surgical history  VOMITTING  14  Volume depletion, gastrointestinal loss      SUBJECTIVE: Pt seen and examined with Dr Myrick, pt lying supine with HOB up, c/o mild nausea, able to tolerate small amt full liquid diet, c/o continued loose frequent BM's, nonbloody, with some fecal incontinence, c/o mild epigastric pain, pt recently colonoscoped by Dr Myrick 3/18: mild proctitis, pt relays fmaily hx of paternal side gastric cancer    Diet: fulls    Activity:     Fevers: [ ]Yes [ x]NO  Chills: [ ] Yes [ x] NO  SOB:  [ ] YES [x ] NO  Dyspnea: [ ]YES x[ ]NO  Chest Discomfort: [ ] YES [x ] NO    Nausea: [ x] YES [ ] NO           Vomiting: [ ] YES [x ] NO  Flatus: [x ] YES [ ] NO             Bowel Movement: [x ] YES [ ] NO  Diarrhea: [x ] YES [ ] NO         Void: [ x]YES [ ]No  Constipation: [ ] YES [x ] NO       Pain (0-10):     3         Pain Control Adequate: [x ] YES [ ] NO    Landin:    NGT:      I&O's Detail    30 May 2018 07:01  -  31 May 2018 00:05  --------------------------------------------------------  IN:    lactated ringers.: 1100 mL    Oral Fluid: 1220 mL  Total IN: 2320 mL    OUT:    Stool: 1 mL    Voided: 1 mL  Total OUT: 2 mL    Total NET: 2318 mL        I&O's Summary    30 May 2018 07:01  -  31 May 2018 00:05  --------------------------------------------------------  IN: 2320 mL / OUT: 2 mL / NET: 2318 mL          MEDICATIONS  (STANDING):  atorvastatin 20 milliGRAM(s) Oral at bedtime  enoxaparin Injectable 40 milliGRAM(s) SubCutaneous every 24 hours  lactated ringers. 1000 milliLiter(s) (100 mL/Hr) IV Continuous <Continuous>  pantoprazole  Injectable 40 milliGRAM(s) IV Push daily  tamoxifen 20 milliGRAM(s) Oral daily    MEDICATIONS  (PRN):  aluminum hydroxide/magnesium hydroxide/simethicone Suspension 30 milliLiter(s) Oral every 4 hours PRN Dyspepsia  ondansetron Injectable 4 milliGRAM(s) IV Push every 6 hours PRN Nausea and/or Vomiting      LABS:                        10.7   7.2   )-----------( 221      ( 30 May 2018 12:03 )             33.7     05-30    140  |  105  |  8.0  ----------------------------<  84  4.0   |  23.0  |  0.45<L>    Ca    8.6      30 May 2018 12:03    TPro  6.3<L>  /  Alb  3.2<L>  /  TBili  0.6  /  DBili  x   /  AST  20  /  ALT  10  /  AlkPhos  60  05-29    PT/INR - ( 29 May 2018 12:18 )   PT: 14.0 sec;   INR: 1.27 ratio         PTT - ( 29 May 2018 12:18 )  PTT:24.2 sec        RADIOLOGY & ADDITIONAL STUDIES: SURGICAL PA NOTE: CTSP by Dr Myrick - pt requesting to be seen re: Nausea/vomitng/diarrhea x 3+ weeks , requesting upper endoscopy    STATUS POST:  recent L THR, s/p R THR 11/17, s/p 1month [facility] stay 3/31 to 4/28 /18    POST OPERATIVE DAY #:     Vital Signs Last 24 Hrs  T(C): 36.3 (30 May 2018 23:32), Max: 36.9 (30 May 2018 08:48)  T(F): 97.4 (30 May 2018 23:32), Max: 98.5 (30 May 2018 15:41)  HR: 79 (30 May 2018 23:32) (79 - 86)  BP: 101/58 (30 May 2018 23:32) (101/58 - 108/57)  BP(mean): --  RR: 18 (30 May 2018 23:32) (18 - 18)  SpO2: 99% (30 May 2018 23:32) (98% - 99%)    HPI:  62 yo F with h/o HTN, HLD presents from home with worsening abdominal pain and diarrhea. Pt reports that symptoms started at the beginning of May after having been discharged home from [facility]. She had a difficult time tolerating oral intake as she threw up everything that she ate. She also noted watery diarrhea at the time. She was hospitalized on May 3 for these symptoms, where CT abd showed diffuse colitis. She improved on bowel rest and was discharged with protonix. Pt states that she then was seen by her cardiologist on May 15, and was sent to [facility] for hypotension and generalized weakness after another bout of diarrhea and vomiting. CT abd at that time was negative, and repeat stool studies were negative. Pt was discharged home without incident. Pt states that for the last 4 days, she began to have recurrent diarrhea and nausea and vomiting. Denies any sick contacts, recent travels or unsual meals. Unable to tolerate any oral intake. Reports weight loss but unable to quantify. States that she has been having night sweats as well. In the ED, repeat CT scan showed questionable mild wall thickening involving the proximal sigmoid colon, which  may be due to some residual colitis. (29 May 2018 16:16)      Noninfectious gastroenteritis  H/o or current diagnosis of HF- ACEI/ARB contraindication unknown  Family history of stroke  Family history of breast cancer in sister (Sibling)  Family history of hypertension in mother  Handoff  MEWS Score  CHF (congestive heart failure)  Breast cancer  High cholesterol  High blood pressure  Colitis  Prophylactic measure  Essential hypertension  High cholesterol  Volume depletion, gastrointestinal loss  Colitis  History of hip replacement, total, right  History of lumpectomy of left breast  Atypical lipoma of soft tissue  H/O foot surgery  No significant past surgical history  VOMITTING  14  Volume depletion, gastrointestinal loss      SUBJECTIVE: Pt seen and examined with Dr Myrick, pt lying supine with HOB up, c/o mild nausea, able to tolerate small amt full liquid diet, c/o continued loose frequent BM's, nonbloody, with some fecal incontinence, c/o mild epigastric pain, pt recently colonoscoped by Dr Myrick 3/18: mild proctitis, pt relays family hx of paternal side gastric cancer, uncles x 2    Diet: fulls    Activity:     Fevers: [ ]Yes [ x]NO  Chills: [ ] Yes [ x] NO  SOB:  [ ] YES [x ] NO  Dyspnea: [ ]YES x[ ]NO  Chest Discomfort: [ ] YES [x ] NO    Nausea: [ x] YES [ ] NO           Vomiting: [ ] YES [x ] NO  Flatus: [x ] YES [ ] NO             Bowel Movement: [x ] YES [ ] NO  Diarrhea: [x ] YES [ ] NO         Void: [ x]YES [ ]No  Constipation: [ ] YES [x ] NO       Pain (0-10):     3         Pain Control Adequate: [x ] YES [ ] NO    Landin:    NGT:      I&O's Detail    30 May 2018 07:01  -  31 May 2018 00:05  --------------------------------------------------------  IN:    lactated ringers.: 1100 mL    Oral Fluid: 1220 mL  Total IN: 2320 mL    OUT:    Stool: 1 mL    Voided: 1 mL  Total OUT: 2 mL    Total NET: 2318 mL        I&O's Summary    30 May 2018 07:01  -  31 May 2018 00:05  --------------------------------------------------------  IN: 2320 mL / OUT: 2 mL / NET: 2318 mL          MEDICATIONS  (STANDING):  atorvastatin 20 milliGRAM(s) Oral at bedtime  enoxaparin Injectable 40 milliGRAM(s) SubCutaneous every 24 hours  lactated ringers. 1000 milliLiter(s) (100 mL/Hr) IV Continuous <Continuous>  pantoprazole  Injectable 40 milliGRAM(s) IV Push daily  tamoxifen 20 milliGRAM(s) Oral daily    MEDICATIONS  (PRN):  aluminum hydroxide/magnesium hydroxide/simethicone Suspension 30 milliLiter(s) Oral every 4 hours PRN Dyspepsia  ondansetron Injectable 4 milliGRAM(s) IV Push every 6 hours PRN Nausea and/or Vomiting      LABS:                        10.7   7.2   )-----------( 221      ( 30 May 2018 12:03 )             33.7     05-30    140  |  105  |  8.0  ----------------------------<  84  4.0   |  23.0  |  0.45<L>    Ca    8.6      30 May 2018 12:03    TPro  6.3<L>  /  Alb  3.2<L>  /  TBili  0.6  /  DBili  x   /  AST  20  /  ALT  10  /  AlkPhos  60  05-29    PT/INR - ( 29 May 2018 12:18 )   PT: 14.0 sec;   INR: 1.27 ratio         PTT - ( 29 May 2018 12:18 )  PTT:24.2 sec        RADIOLOGY & ADDITIONAL STUDIES:

## 2018-06-01 ENCOUNTER — TRANSCRIPTION ENCOUNTER (OUTPATIENT)
Age: 63
End: 2018-06-01

## 2018-06-01 VITALS
DIASTOLIC BLOOD PRESSURE: 60 MMHG | TEMPERATURE: 98 F | OXYGEN SATURATION: 100 % | WEIGHT: 172.18 LBS | RESPIRATION RATE: 18 BRPM | SYSTOLIC BLOOD PRESSURE: 99 MMHG | HEART RATE: 92 BPM

## 2018-06-01 LAB
APPEARANCE UR: CLEAR — SIGNIFICANT CHANGE UP
BILIRUB UR-MCNC: NEGATIVE — SIGNIFICANT CHANGE UP
COLOR SPEC: YELLOW — SIGNIFICANT CHANGE UP
DIFF PNL FLD: NEGATIVE — SIGNIFICANT CHANGE UP
GLUCOSE UR QL: NEGATIVE MG/DL — SIGNIFICANT CHANGE UP
KETONES UR-MCNC: NEGATIVE — SIGNIFICANT CHANGE UP
LEUKOCYTE ESTERASE UR-ACNC: NEGATIVE — SIGNIFICANT CHANGE UP
NITRITE UR-MCNC: NEGATIVE — SIGNIFICANT CHANGE UP
PH UR: 5 — SIGNIFICANT CHANGE UP (ref 5–8)
PROT UR-MCNC: NEGATIVE MG/DL — SIGNIFICANT CHANGE UP
SP GR SPEC: 1.02 — SIGNIFICANT CHANGE UP (ref 1.01–1.02)
UROBILINOGEN FLD QL: 1 MG/DL

## 2018-06-01 PROCEDURE — 86677 HELICOBACTER PYLORI ANTIBODY: CPT

## 2018-06-01 PROCEDURE — 83605 ASSAY OF LACTIC ACID: CPT

## 2018-06-01 PROCEDURE — 36556 INSERT NON-TUNNEL CV CATH: CPT

## 2018-06-01 PROCEDURE — 97110 THERAPEUTIC EXERCISES: CPT

## 2018-06-01 PROCEDURE — 81003 URINALYSIS AUTO W/O SCOPE: CPT

## 2018-06-01 PROCEDURE — 99285 EMERGENCY DEPT VISIT HI MDM: CPT | Mod: 25

## 2018-06-01 PROCEDURE — 87086 URINE CULTURE/COLONY COUNT: CPT

## 2018-06-01 PROCEDURE — C1751: CPT

## 2018-06-01 PROCEDURE — 93005 ELECTROCARDIOGRAM TRACING: CPT

## 2018-06-01 PROCEDURE — 87186 SC STD MICRODIL/AGAR DIL: CPT

## 2018-06-01 PROCEDURE — 97116 GAIT TRAINING THERAPY: CPT

## 2018-06-01 PROCEDURE — 80053 COMPREHEN METABOLIC PANEL: CPT

## 2018-06-01 PROCEDURE — 86768 SALMONELLA ANTIBODY: CPT

## 2018-06-01 PROCEDURE — 71045 X-RAY EXAM CHEST 1 VIEW: CPT

## 2018-06-01 PROCEDURE — 85027 COMPLETE CBC AUTOMATED: CPT

## 2018-06-01 PROCEDURE — 74176 CT ABD & PELVIS W/O CONTRAST: CPT

## 2018-06-01 PROCEDURE — 85730 THROMBOPLASTIN TIME PARTIAL: CPT

## 2018-06-01 PROCEDURE — 85610 PROTHROMBIN TIME: CPT

## 2018-06-01 PROCEDURE — 87040 BLOOD CULTURE FOR BACTERIA: CPT

## 2018-06-01 PROCEDURE — 80048 BASIC METABOLIC PNL TOTAL CA: CPT

## 2018-06-01 PROCEDURE — 36415 COLL VENOUS BLD VENIPUNCTURE: CPT

## 2018-06-01 PROCEDURE — 97163 PT EVAL HIGH COMPLEX 45 MIN: CPT

## 2018-06-01 PROCEDURE — 99239 HOSP IP/OBS DSCHRG MGMT >30: CPT

## 2018-06-01 RX ADMIN — TAMOXIFEN CITRATE 20 MILLIGRAM(S): 20 TABLET, FILM COATED ORAL at 12:34

## 2018-06-01 RX ADMIN — PANTOPRAZOLE SODIUM 40 MILLIGRAM(S): 20 TABLET, DELAYED RELEASE ORAL at 12:34

## 2018-06-01 RX ADMIN — ENOXAPARIN SODIUM 40 MILLIGRAM(S): 100 INJECTION SUBCUTANEOUS at 05:17

## 2018-06-01 NOTE — PROGRESS NOTE ADULT - SUBJECTIVE AND OBJECTIVE BOX
JOSEY SAMAYOA    [id number]    63y      Female    INTERVAL HPI/OVERNIGHT EVENTS: Offers no complaints. Tolerated diet well. No diarrhea no vomiting.    Hospital course:  62 yo F with h/o HTN, HLD presents from home with worsening abdominal pain and diarrhea. Pt reports that symptoms started at the beginning of May after having been discharged home from [facility]. She had a difficult time tolerating oral intake as she threw up everything that she ate. She also noted watery diarrhea at the time. She was hospitalized on May 3 for these symptoms, where CT abd showed diffuse colitis. She improved on bowel rest and was discharged with protonix. Pt states that she then was seen by her cardiologist on May 15, and was sent to [facility] for hypotension and generalized weakness after another bout of diarrhea and vomiting. CT abd at that time was negative, and repeat stool studies were negative. Pt was discharged home without incident. Pt states that for the last 4 days, she began to have recurrent diarrhea and nausea and vomiting. Denies any sick contacts, recent travels or unusual meals. Unable to tolerate any oral intake. Reports weight loss but unable to quantify. States that she has been having night sweats as well. In the ED, repeat CT scan showed questionable mild wall thickening involving the proximal sigmoid colon, which  may be due to some residual colitis. Pt placed on bowel rest and IVF. She improved significantly without any incident. PT eval recommending home with home PT.      REVIEW OF SYSTEMS:    CONSTITUTIONAL: No fever  RESPIRATORY: No cough   CARDIOVASCULAR: No chest pain     Vital Signs Last 24 Hrs  T(C): 36.7 (2018 07:30), Max: 36.7 (31 May 2018 23:48)  T(F): 98 (2018 07:30), Max: 98 (31 May 2018 23:48)  HR: 82 (2018 07:30) (77 - 82)  BP: 111/57 (2018 07:30) (105/58 - 123/70)  BP(mean): --  RR: 18 (2018 07:30) (18 - 18)  SpO2: 98% (2018 07:30) (96% - 98%)    PHYSICAL EXAM:    GENERAL: NAD  HEENT: PERRL, +EOMI  NECK: soft, Supple  CHEST/LUNG: Clear to percussion bilaterally   HEART: S1S2+, Regular rate and rhythm   ABDOMEN: Soft, Nontender, Nondistended; Bowel sounds present       LABS:            Urinalysis Basic - ( 2018 01:35 )    Color: Yellow / Appearance: Clear / S.020 / pH: x  Gluc: x / Ketone: Negative  / Bili: Negative / Urobili: 1 mg/dL   Blood: x / Protein: Negative mg/dL / Nitrite: Negative   Leuk Esterase: Negative / RBC: x / WBC x   Sq Epi: x / Non Sq Epi: x / Bacteria: x          MEDICATIONS  (STANDING):  atorvastatin 20 milliGRAM(s) Oral at bedtime  enoxaparin Injectable 40 milliGRAM(s) SubCutaneous every 24 hours  lactated ringers. 1000 milliLiter(s) (100 mL/Hr) IV Continuous <Continuous>  pantoprazole  Injectable 40 milliGRAM(s) IV Push daily  tamoxifen 20 milliGRAM(s) Oral daily    MEDICATIONS  (PRN):  aluminum hydroxide/magnesium hydroxide/simethicone Suspension 30 milliLiter(s) Oral every 4 hours PRN Dyspepsia  ondansetron Injectable 4 milliGRAM(s) IV Push every 6 hours PRN Nausea and/or Vomiting      RADIOLOGY & ADDITIONAL TESTS:

## 2018-06-01 NOTE — DISCHARGE NOTE ADULT - HOSPITAL COURSE
62 yo F with h/o HTN, HLD presents from home with worsening abdominal pain and diarrhea. Pt reports that symptoms started at the beginning of May after having been discharged home from [facility]. She had a difficult time tolerating oral intake as she threw up everything that she ate. She also noted watery diarrhea at the time. She was hospitalized on May 3 for these symptoms, where CT abd showed diffuse colitis. She improved on bowel rest and was discharged with protonix. Pt states that she then was seen by her cardiologist on May 15, and was sent to [facility] for hypotension and generalized weakness after another bout of diarrhea and vomiting. CT abd at that time was negative, and repeat stool studies were negative. Pt was discharged home without incident. Pt states that for the last 4 days, she began to have recurrent diarrhea and nausea and vomiting. Denies any sick contacts, recent travels or unusual meals. Unable to tolerate any oral intake. Reports weight loss but unable to quantify. States that she has been having night sweats as well. In the ED, repeat CT scan showed questionable mild wall thickening involving the proximal sigmoid colon, which  may be due to some residual colitis. Pt placed on bowel rest and IVF. She improved significantly without any incident. PT eval recommending home with home PT.    Time to discharge: >35 minutes spent coordinating care

## 2018-06-01 NOTE — DISCHARGE NOTE ADULT - PLAN OF CARE
Therapeutic optimization Resolved. Follow up with Dr. Myrick for endoscopy. Resume home medications. Resolved

## 2018-06-01 NOTE — PROGRESS NOTE ADULT - PROBLEM SELECTOR PROBLEM 2
Volume depletion, gastrointestinal loss

## 2018-06-01 NOTE — DISCHARGE NOTE ADULT - CARE PLAN
Principal Discharge DX:	Colitis  Goal:	Therapeutic optimization  Assessment and plan of treatment:	Resolved. Follow up with Dr. Myrick for endoscopy.  Secondary Diagnosis:	High cholesterol  Assessment and plan of treatment:	Resume home medications.  Secondary Diagnosis:	Volume depletion, gastrointestinal loss  Assessment and plan of treatment:	Resolved

## 2018-06-01 NOTE — DISCHARGE NOTE ADULT - MEDICATION SUMMARY - MEDICATIONS TO TAKE
I will START or STAY ON the medications listed below when I get home from the hospital:    aluminum hydroxide-magnesium hydroxide 200 mg-200 mg/5 mL oral suspension  -- 30 milliliter(s) by mouth every 4 hours, As needed, Dyspepsia  -- Indication: For Home medication    atorvastatin 20 mg oral tablet  -- 1 tab(s) by mouth once a day (at bedtime)  -- Indication: For Home medication    tamoxifen 20 mg oral tablet  -- 1 tab(s) by mouth once a day  -- Indication: For Home medication    pantoprazole 40 mg oral delayed release tablet  -- 1 tab(s) by mouth once a day (before a meal)  -- Indication: For Home medication

## 2018-06-01 NOTE — PROGRESS NOTE ADULT - PROBLEM SELECTOR PLAN 1
cont fulls/hydration, chk C diff PCR, chk stool for O & P, chk stool cultures, chk H Pylori, chk labs, serial abd exams, plan per Dr Myrick: will perform upper endoscopy as outpatient in approx 2 weeks once patient's sx have resolved
Improving with IVF  Recurrent - 3rd hospital visit for similar symptoms, no known trigger  Unclear etiology  Repeat CT scan showed resolving colitis  C/w IVF for rehydration  Zofran PRN  Awaiting stool studies  Advance diet to soft  Appreciate surgical consult
Resolved  Tolerating diet  Reports that she is ready to go home  Plan to f/u with Dr. Myrick for EGD
Improving with IVF  Recurrent - 3rd hospital visit for similar symptoms, no known trigger  Unclear etiology  Repeat CT scan showed resolving colitis  C/w IVF for rehydration  Zofran PRN  Awaiting stool studies  Advance diet to full liquid

## 2018-06-01 NOTE — DISCHARGE NOTE ADULT - PATIENT PORTAL LINK FT
You can access the [facility] Patient Portal, offered by [facility], by registering with the following website: [email or web address]

## 2018-06-01 NOTE — DISCHARGE NOTE ADULT - CARE PROVIDER_API CALL
Sadi Myrick), Surgery  [address]  [address]  [place] [address]  Phone: [phone]  Fax: [phone]

## 2018-06-01 NOTE — PROGRESS NOTE ADULT - ATTENDING COMMENTS
Dispo: PT eval pending
serial CAT's reviewed
Dispo: PT eval pending
Dispo: PT eval: home with home PT

## 2018-06-01 NOTE — PROGRESS NOTE ADULT - ASSESSMENT
62 yo F with h/o HTN, HLD here with recurrent gastroenteritis and dehydration.
62 yo F with h/o HTN, HLD here with recurrent gastroenteritis and dehydration.
64 yo F with h/o HTN, HLD here with recurrent gastroenteritis and dehydration.
stable, noninfectious gastro -duodenitis, colitis

## 2018-06-01 NOTE — PROGRESS NOTE ADULT - PROBLEM SELECTOR PLAN 2
Resolved  C/w IVF  TLC removed   Peripheral IV in place
Resolved  C/w IVF  TLC removed   Peripheral IV in place
Improving  C/w IVF  TLC removed   Peripheral IV in place

## 2018-06-03 LAB
-  AMIKACIN: SIGNIFICANT CHANGE UP
-  AMIKACIN: SIGNIFICANT CHANGE UP
-  AMPICILLIN/SULBACTAM: SIGNIFICANT CHANGE UP
-  AMPICILLIN/SULBACTAM: SIGNIFICANT CHANGE UP
-  AMPICILLIN: SIGNIFICANT CHANGE UP
-  AMPICILLIN: SIGNIFICANT CHANGE UP
-  AZTREONAM: SIGNIFICANT CHANGE UP
-  AZTREONAM: SIGNIFICANT CHANGE UP
-  CEFAZOLIN: SIGNIFICANT CHANGE UP
-  CEFAZOLIN: SIGNIFICANT CHANGE UP
-  CEFEPIME: SIGNIFICANT CHANGE UP
-  CEFEPIME: SIGNIFICANT CHANGE UP
-  CEFOXITIN: SIGNIFICANT CHANGE UP
-  CEFOXITIN: SIGNIFICANT CHANGE UP
-  CEFTRIAXONE: SIGNIFICANT CHANGE UP
-  CEFTRIAXONE: SIGNIFICANT CHANGE UP
-  CIPROFLOXACIN: SIGNIFICANT CHANGE UP
-  CIPROFLOXACIN: SIGNIFICANT CHANGE UP
-  ERTAPENEM: SIGNIFICANT CHANGE UP
-  ERTAPENEM: SIGNIFICANT CHANGE UP
-  GENTAMICIN: SIGNIFICANT CHANGE UP
-  GENTAMICIN: SIGNIFICANT CHANGE UP
-  IMIPENEM: SIGNIFICANT CHANGE UP
-  LEVOFLOXACIN: SIGNIFICANT CHANGE UP
-  LEVOFLOXACIN: SIGNIFICANT CHANGE UP
-  MEROPENEM: SIGNIFICANT CHANGE UP
-  MEROPENEM: SIGNIFICANT CHANGE UP
-  NITROFURANTOIN: SIGNIFICANT CHANGE UP
-  NITROFURANTOIN: SIGNIFICANT CHANGE UP
-  PIPERACILLIN/TAZOBACTAM: SIGNIFICANT CHANGE UP
-  PIPERACILLIN/TAZOBACTAM: SIGNIFICANT CHANGE UP
-  TIGECYCLINE: SIGNIFICANT CHANGE UP
-  TOBRAMYCIN: SIGNIFICANT CHANGE UP
-  TOBRAMYCIN: SIGNIFICANT CHANGE UP
-  TRIMETHOPRIM/SULFAMETHOXAZOLE: SIGNIFICANT CHANGE UP
-  TRIMETHOPRIM/SULFAMETHOXAZOLE: SIGNIFICANT CHANGE UP
CULTURE RESULTS: SIGNIFICANT CHANGE UP
METHOD TYPE: SIGNIFICANT CHANGE UP
METHOD TYPE: SIGNIFICANT CHANGE UP
ORGANISM # SPEC MICROSCOPIC CNT: SIGNIFICANT CHANGE UP
SPECIMEN SOURCE: SIGNIFICANT CHANGE UP

## 2018-06-04 LAB
-  ERTAPENEM: SIGNIFICANT CHANGE UP
-  ERTAPENEM: SIGNIFICANT CHANGE UP
ORGANISM # SPEC MICROSCOPIC CNT: SIGNIFICANT CHANGE UP
ORGANISM # SPEC MICROSCOPIC CNT: SIGNIFICANT CHANGE UP

## 2018-06-05 LAB
H.PYLORI ANTIBODY IGA: 5.9 UNITS — SIGNIFICANT CHANGE UP (ref 0–20)
S TYPHI H D AB SER QL: ABNORMAL

## 2018-06-27 ENCOUNTER — APPOINTMENT (OUTPATIENT)
Dept: ORTHOPEDIC SURGERY | Facility: CLINIC | Age: 63
End: 2018-06-27
Payer: MEDICARE

## 2018-06-27 ENCOUNTER — FORM ENCOUNTER (OUTPATIENT)
Age: 63
End: 2018-06-27

## 2018-06-27 VITALS
HEIGHT: 67 IN | BODY MASS INDEX: 26.68 KG/M2 | HEART RATE: 108 BPM | SYSTOLIC BLOOD PRESSURE: 102 MMHG | DIASTOLIC BLOOD PRESSURE: 67 MMHG | WEIGHT: 170 LBS

## 2018-06-27 DIAGNOSIS — Z96.642 PRESENCE OF LEFT ARTIFICIAL HIP JOINT: ICD-10-CM

## 2018-06-27 DIAGNOSIS — Z96.642 AFTERCARE FOLLOWING JOINT REPLACEMENT SURGERY: ICD-10-CM

## 2018-06-27 DIAGNOSIS — Z47.1 AFTERCARE FOLLOWING JOINT REPLACEMENT SURGERY: ICD-10-CM

## 2018-06-27 PROCEDURE — 99024 POSTOP FOLLOW-UP VISIT: CPT

## 2018-06-27 PROCEDURE — 73502 X-RAY EXAM HIP UNI 2-3 VIEWS: CPT | Mod: LT

## 2018-06-27 RX ORDER — PANTOPRAZOLE 40 MG/1
40 TABLET, DELAYED RELEASE ORAL
Qty: 30 | Refills: 0 | Status: ACTIVE | COMMUNITY
Start: 2018-06-12

## 2018-07-13 ENCOUNTER — EMERGENCY (EMERGENCY)
Facility: HOSPITAL | Age: 63
LOS: 1 days | Discharge: HOME | End: 2018-07-13
Attending: EMERGENCY MEDICINE
Payer: MEDICARE

## 2018-07-13 VITALS
TEMPERATURE: 98 F | RESPIRATION RATE: 18 BRPM | HEART RATE: 88 BPM | SYSTOLIC BLOOD PRESSURE: 106 MMHG | DIASTOLIC BLOOD PRESSURE: 62 MMHG | OXYGEN SATURATION: 97 %

## 2018-07-13 VITALS
OXYGEN SATURATION: 98 % | DIASTOLIC BLOOD PRESSURE: 61 MMHG | HEIGHT: 55 IN | WEIGHT: 169.98 LBS | SYSTOLIC BLOOD PRESSURE: 106 MMHG | RESPIRATION RATE: 20 BRPM | HEART RATE: 114 BPM | TEMPERATURE: 98 F

## 2018-07-13 DIAGNOSIS — Z90.12 ACQUIRED ABSENCE OF LEFT BREAST AND NIPPLE: Chronic | ICD-10-CM

## 2018-07-13 DIAGNOSIS — D17.9 BENIGN LIPOMATOUS NEOPLASM, UNSPECIFIED: Chronic | ICD-10-CM

## 2018-07-13 DIAGNOSIS — Z98.890 OTHER SPECIFIED POSTPROCEDURAL STATES: Chronic | ICD-10-CM

## 2018-07-13 DIAGNOSIS — Z96.641 PRESENCE OF RIGHT ARTIFICIAL HIP JOINT: Chronic | ICD-10-CM

## 2018-07-13 LAB
ALBUMIN SERPL ELPH-MCNC: 2.8 G/DL — LOW (ref 3.3–5.2)
ALP SERPL-CCNC: 47 U/L — SIGNIFICANT CHANGE UP (ref 40–120)
ALT FLD-CCNC: 11 U/L — SIGNIFICANT CHANGE UP
ANION GAP SERPL CALC-SCNC: 13 MMOL/L — SIGNIFICANT CHANGE UP (ref 5–17)
APPEARANCE UR: ABNORMAL
AST SERPL-CCNC: 21 U/L — SIGNIFICANT CHANGE UP
BACTERIA # UR AUTO: ABNORMAL
BASOPHILS # BLD AUTO: 0 K/UL — SIGNIFICANT CHANGE UP (ref 0–0.2)
BASOPHILS NFR BLD AUTO: 0 % — SIGNIFICANT CHANGE UP (ref 0–2)
BILIRUB SERPL-MCNC: 0.3 MG/DL — LOW (ref 0.4–2)
BILIRUB UR-MCNC: ABNORMAL
BLD GP AB SCN SERPL QL: SIGNIFICANT CHANGE UP
BUN SERPL-MCNC: 10 MG/DL — SIGNIFICANT CHANGE UP (ref 8–20)
CALCIUM SERPL-MCNC: 8.7 MG/DL — SIGNIFICANT CHANGE UP (ref 8.6–10.2)
CHLORIDE SERPL-SCNC: 106 MMOL/L — SIGNIFICANT CHANGE UP (ref 98–107)
CO2 SERPL-SCNC: 23 MMOL/L — SIGNIFICANT CHANGE UP (ref 22–29)
COD CRY URNS QL: ABNORMAL
COLOR SPEC: YELLOW — SIGNIFICANT CHANGE UP
COMMENT - URINE: SIGNIFICANT CHANGE UP
COMMENT - URINE: SIGNIFICANT CHANGE UP
CREAT SERPL-MCNC: 0.42 MG/DL — LOW (ref 0.5–1.3)
DIFF PNL FLD: ABNORMAL
EOSINOPHIL # BLD AUTO: 0.3 K/UL — SIGNIFICANT CHANGE UP (ref 0–0.5)
EOSINOPHIL NFR BLD AUTO: 6 % — HIGH (ref 0–5)
EPI CELLS # UR: ABNORMAL
GLUCOSE SERPL-MCNC: 87 MG/DL — SIGNIFICANT CHANGE UP (ref 70–115)
GLUCOSE UR QL: NEGATIVE MG/DL — SIGNIFICANT CHANGE UP
HCT VFR BLD CALC: 36.2 % — LOW (ref 37–47)
HGB BLD-MCNC: 11.8 G/DL — LOW (ref 12–16)
HYALINE CASTS # UR AUTO: ABNORMAL /LPF
KETONES UR-MCNC: ABNORMAL
LEUKOCYTE ESTERASE UR-ACNC: ABNORMAL
LIDOCAIN IGE QN: 16 U/L — LOW (ref 22–51)
LYMPHOCYTES # BLD AUTO: 2.1 K/UL — SIGNIFICANT CHANGE UP (ref 1–4.8)
LYMPHOCYTES # BLD AUTO: 30 % — SIGNIFICANT CHANGE UP (ref 20–55)
MCHC RBC-ENTMCNC: 31.3 PG — HIGH (ref 27–31)
MCHC RBC-ENTMCNC: 32.6 G/DL — SIGNIFICANT CHANGE UP (ref 32–36)
MCV RBC AUTO: 96 FL — SIGNIFICANT CHANGE UP (ref 81–99)
MONOCYTES # BLD AUTO: 0.8 K/UL — SIGNIFICANT CHANGE UP (ref 0–0.8)
MONOCYTES NFR BLD AUTO: 2 % — LOW (ref 3–10)
NEUTROPHILS # BLD AUTO: 4.4 K/UL — SIGNIFICANT CHANGE UP (ref 1.8–8)
NEUTROPHILS NFR BLD AUTO: 62 % — SIGNIFICANT CHANGE UP (ref 37–73)
NITRITE UR-MCNC: NEGATIVE — SIGNIFICANT CHANGE UP
PH UR: 6 — SIGNIFICANT CHANGE UP (ref 5–8)
PLAT MORPH BLD: ABNORMAL
PLATELET # BLD AUTO: 277 K/UL — SIGNIFICANT CHANGE UP (ref 150–400)
PLATELET CLUMP BLD QL SMEAR: SIGNIFICANT CHANGE UP
POTASSIUM SERPL-MCNC: 4.6 MMOL/L — SIGNIFICANT CHANGE UP (ref 3.5–5.3)
POTASSIUM SERPL-SCNC: 4.6 MMOL/L — SIGNIFICANT CHANGE UP (ref 3.5–5.3)
PROT SERPL-MCNC: 5.6 G/DL — LOW (ref 6.6–8.7)
PROT UR-MCNC: 30 MG/DL
RBC # BLD: 3.77 M/UL — LOW (ref 4.4–5.2)
RBC # FLD: 14.8 % — SIGNIFICANT CHANGE UP (ref 11–15.6)
RBC BLD AUTO: NORMAL — SIGNIFICANT CHANGE UP
RBC CASTS # UR COMP ASSIST: SIGNIFICANT CHANGE UP /HPF (ref 0–4)
SODIUM SERPL-SCNC: 142 MMOL/L — SIGNIFICANT CHANGE UP (ref 135–145)
SP GR SPEC: 1.03 — HIGH (ref 1.01–1.02)
TYPE + AB SCN PNL BLD: SIGNIFICANT CHANGE UP
UROBILINOGEN FLD QL: 1 MG/DL
WBC # BLD: 7.7 K/UL — SIGNIFICANT CHANGE UP (ref 4.8–10.8)
WBC # FLD AUTO: 7.7 K/UL — SIGNIFICANT CHANGE UP (ref 4.8–10.8)
WBC UR QL: SIGNIFICANT CHANGE UP

## 2018-07-13 PROCEDURE — 71045 X-RAY EXAM CHEST 1 VIEW: CPT | Mod: 26

## 2018-07-13 PROCEDURE — 96375 TX/PRO/DX INJ NEW DRUG ADDON: CPT

## 2018-07-13 PROCEDURE — 86901 BLOOD TYPING SEROLOGIC RH(D): CPT

## 2018-07-13 PROCEDURE — 85730 THROMBOPLASTIN TIME PARTIAL: CPT

## 2018-07-13 PROCEDURE — 71045 X-RAY EXAM CHEST 1 VIEW: CPT

## 2018-07-13 PROCEDURE — 85027 COMPLETE CBC AUTOMATED: CPT

## 2018-07-13 PROCEDURE — 96374 THER/PROPH/DIAG INJ IV PUSH: CPT

## 2018-07-13 PROCEDURE — 86900 BLOOD TYPING SEROLOGIC ABO: CPT

## 2018-07-13 PROCEDURE — 36415 COLL VENOUS BLD VENIPUNCTURE: CPT

## 2018-07-13 PROCEDURE — 83690 ASSAY OF LIPASE: CPT

## 2018-07-13 PROCEDURE — 84484 ASSAY OF TROPONIN QUANT: CPT

## 2018-07-13 PROCEDURE — 99285 EMERGENCY DEPT VISIT HI MDM: CPT

## 2018-07-13 PROCEDURE — 85610 PROTHROMBIN TIME: CPT

## 2018-07-13 PROCEDURE — 80053 COMPREHEN METABOLIC PANEL: CPT

## 2018-07-13 PROCEDURE — 99284 EMERGENCY DEPT VISIT MOD MDM: CPT | Mod: 25

## 2018-07-13 PROCEDURE — 74176 CT ABD & PELVIS W/O CONTRAST: CPT

## 2018-07-13 PROCEDURE — 74176 CT ABD & PELVIS W/O CONTRAST: CPT | Mod: 26

## 2018-07-13 PROCEDURE — 81001 URINALYSIS AUTO W/SCOPE: CPT

## 2018-07-13 PROCEDURE — 86850 RBC ANTIBODY SCREEN: CPT

## 2018-07-13 RX ORDER — FAMOTIDINE 10 MG/ML
20 INJECTION INTRAVENOUS ONCE
Qty: 0 | Refills: 0 | Status: COMPLETED | OUTPATIENT
Start: 2018-07-13 | End: 2018-07-13

## 2018-07-13 RX ORDER — MORPHINE SULFATE 50 MG/1
2 CAPSULE, EXTENDED RELEASE ORAL ONCE
Qty: 0 | Refills: 0 | Status: DISCONTINUED | OUTPATIENT
Start: 2018-07-13 | End: 2018-07-13

## 2018-07-13 RX ORDER — SODIUM CHLORIDE 9 MG/ML
1000 INJECTION INTRAMUSCULAR; INTRAVENOUS; SUBCUTANEOUS ONCE
Qty: 0 | Refills: 0 | Status: COMPLETED | OUTPATIENT
Start: 2018-07-13 | End: 2018-07-13

## 2018-07-13 RX ORDER — SODIUM CHLORIDE 9 MG/ML
3 INJECTION INTRAMUSCULAR; INTRAVENOUS; SUBCUTANEOUS ONCE
Qty: 0 | Refills: 0 | Status: COMPLETED | OUTPATIENT
Start: 2018-07-13 | End: 2018-07-13

## 2018-07-13 RX ORDER — ONDANSETRON 8 MG/1
4 TABLET, FILM COATED ORAL ONCE
Qty: 0 | Refills: 0 | Status: COMPLETED | OUTPATIENT
Start: 2018-07-13 | End: 2018-07-13

## 2018-07-13 RX ADMIN — SODIUM CHLORIDE 3 MILLILITER(S): 9 INJECTION INTRAMUSCULAR; INTRAVENOUS; SUBCUTANEOUS at 11:29

## 2018-07-13 RX ADMIN — SODIUM CHLORIDE 1000 MILLILITER(S): 9 INJECTION INTRAMUSCULAR; INTRAVENOUS; SUBCUTANEOUS at 11:29

## 2018-07-13 RX ADMIN — MORPHINE SULFATE 2 MILLIGRAM(S): 50 CAPSULE, EXTENDED RELEASE ORAL at 14:06

## 2018-07-13 RX ADMIN — FAMOTIDINE 20 MILLIGRAM(S): 10 INJECTION INTRAVENOUS at 11:28

## 2018-07-13 RX ADMIN — MORPHINE SULFATE 2 MILLIGRAM(S): 50 CAPSULE, EXTENDED RELEASE ORAL at 11:29

## 2018-07-13 RX ADMIN — ONDANSETRON 4 MILLIGRAM(S): 8 TABLET, FILM COATED ORAL at 11:28

## 2018-07-13 NOTE — ED PROVIDER NOTE - MEDICAL DECISION MAKING DETAILS
62 yo F with hx of colitis with possible recurrent colitis, with nausea, vomiting, diarrhea and tachycardiac, dehydration Labs, CT scan, IV fluids likely admit

## 2018-07-13 NOTE — ED PROVIDER NOTE - OBJECTIVE STATEMENT
62 y/o F pt  with hx of colitis, CHF  presents to ED c/o upper epigastric abdominal pain associated with nausea, vomiting and diarrhea  for the past 3 days. Symptoms originally started 8 days ago; resolved on it's own until 3 days ago. Decrease PO intake. No recent abx. Denies urinary symptoms, fevers, chills. No sick contact. No further complaints at this time.   PMD: Dr. Joseph Marcum

## 2018-07-30 ENCOUNTER — INPATIENT (INPATIENT)
Facility: HOSPITAL | Age: 63
LOS: 1 days | Discharge: HOME | End: 2018-08-01
Attending: INTERNAL MEDICINE | Admitting: INTERNAL MEDICINE
Payer: MEDICARE

## 2018-07-30 ENCOUNTER — OUTPATIENT (OUTPATIENT)
Dept: OUTPATIENT SERVICES | Facility: HOSPITAL | Age: 63
LOS: 1 days | Discharge: HOME | End: 2018-07-30
Payer: MEDICARE

## 2018-07-30 VITALS
SYSTOLIC BLOOD PRESSURE: 117 MMHG | RESPIRATION RATE: 16 BRPM | DIASTOLIC BLOOD PRESSURE: 72 MMHG | OXYGEN SATURATION: 98 % | TEMPERATURE: 98 F | HEART RATE: 105 BPM | HEIGHT: 67 IN

## 2018-07-30 VITALS — WEIGHT: 145.06 LBS | HEIGHT: 67 IN

## 2018-07-30 DIAGNOSIS — Z96.642 PRESENCE OF LEFT ARTIFICIAL HIP JOINT: Chronic | ICD-10-CM

## 2018-07-30 DIAGNOSIS — Z98.890 OTHER SPECIFIED POSTPROCEDURAL STATES: Chronic | ICD-10-CM

## 2018-07-30 DIAGNOSIS — Z90.12 ACQUIRED ABSENCE OF LEFT BREAST AND NIPPLE: Chronic | ICD-10-CM

## 2018-07-30 DIAGNOSIS — R63.4 ABNORMAL WEIGHT LOSS: ICD-10-CM

## 2018-07-30 DIAGNOSIS — D17.9 BENIGN LIPOMATOUS NEOPLASM, UNSPECIFIED: Chronic | ICD-10-CM

## 2018-07-30 DIAGNOSIS — Z96.641 PRESENCE OF RIGHT ARTIFICIAL HIP JOINT: Chronic | ICD-10-CM

## 2018-07-30 DIAGNOSIS — Z80.0 FAMILY HISTORY OF MALIGNANT NEOPLASM OF DIGESTIVE ORGANS: ICD-10-CM

## 2018-07-30 DIAGNOSIS — K21.9 GASTRO-ESOPHAGEAL REFLUX DISEASE WITHOUT ESOPHAGITIS: ICD-10-CM

## 2018-07-30 PROBLEM — I50.9 HEART FAILURE, UNSPECIFIED: Chronic | Status: ACTIVE | Noted: 2018-03-12

## 2018-07-30 PROBLEM — C50.919 MALIGNANT NEOPLASM OF UNSPECIFIED SITE OF UNSPECIFIED FEMALE BREAST: Chronic | Status: ACTIVE | Noted: 2017-08-08

## 2018-07-30 LAB
ALBUMIN SERPL ELPH-MCNC: 3 G/DL — LOW (ref 3.3–5)
ALP SERPL-CCNC: 65 U/L — SIGNIFICANT CHANGE UP (ref 40–120)
ALT FLD-CCNC: 12 U/L — SIGNIFICANT CHANGE UP (ref 12–78)
ANION GAP SERPL CALC-SCNC: 6 MMOL/L — SIGNIFICANT CHANGE UP (ref 5–17)
ANION GAP SERPL CALC-SCNC: 7 MMOL/L — SIGNIFICANT CHANGE UP (ref 5–17)
APTT BLD: 26 SEC — LOW (ref 27.5–37.4)
AST SERPL-CCNC: 22 U/L — SIGNIFICANT CHANGE UP (ref 15–37)
BASOPHILS # BLD AUTO: 0.02 K/UL — SIGNIFICANT CHANGE UP (ref 0–0.2)
BASOPHILS # BLD AUTO: 0.02 K/UL — SIGNIFICANT CHANGE UP (ref 0–0.2)
BASOPHILS NFR BLD AUTO: 0.3 % — SIGNIFICANT CHANGE UP (ref 0–2)
BASOPHILS NFR BLD AUTO: 0.3 % — SIGNIFICANT CHANGE UP (ref 0–2)
BILIRUB SERPL-MCNC: 0.7 MG/DL — SIGNIFICANT CHANGE UP (ref 0.2–1.2)
BUN SERPL-MCNC: 10 MG/DL — SIGNIFICANT CHANGE UP (ref 7–23)
BUN SERPL-MCNC: 9 MG/DL — SIGNIFICANT CHANGE UP (ref 7–23)
CALCIUM SERPL-MCNC: 9.3 MG/DL — SIGNIFICANT CHANGE UP (ref 8.5–10.1)
CALCIUM SERPL-MCNC: 9.5 MG/DL — SIGNIFICANT CHANGE UP (ref 8.5–10.1)
CHLORIDE SERPL-SCNC: 107 MMOL/L — SIGNIFICANT CHANGE UP (ref 96–108)
CHLORIDE SERPL-SCNC: 109 MMOL/L — HIGH (ref 96–108)
CO2 SERPL-SCNC: 24 MMOL/L — SIGNIFICANT CHANGE UP (ref 22–31)
CO2 SERPL-SCNC: 26 MMOL/L — SIGNIFICANT CHANGE UP (ref 22–31)
CREAT SERPL-MCNC: 0.67 MG/DL — SIGNIFICANT CHANGE UP (ref 0.5–1.3)
CREAT SERPL-MCNC: 0.76 MG/DL — SIGNIFICANT CHANGE UP (ref 0.5–1.3)
EOSINOPHIL # BLD AUTO: 0.78 K/UL — HIGH (ref 0–0.5)
EOSINOPHIL # BLD AUTO: 0.96 K/UL — HIGH (ref 0–0.5)
EOSINOPHIL NFR BLD AUTO: 10.6 % — HIGH (ref 0–6)
EOSINOPHIL NFR BLD AUTO: 13.2 % — HIGH (ref 0–6)
GLUCOSE SERPL-MCNC: 101 MG/DL — HIGH (ref 70–99)
GLUCOSE SERPL-MCNC: 105 MG/DL — HIGH (ref 70–99)
HCT VFR BLD CALC: 39.8 % — SIGNIFICANT CHANGE UP (ref 34.5–45)
HCT VFR BLD CALC: 41.9 % — SIGNIFICANT CHANGE UP (ref 34.5–45)
HGB BLD-MCNC: 12.9 G/DL — SIGNIFICANT CHANGE UP (ref 11.5–15.5)
HGB BLD-MCNC: 13.6 G/DL — SIGNIFICANT CHANGE UP (ref 11.5–15.5)
IMM GRANULOCYTES NFR BLD AUTO: 0.1 % — SIGNIFICANT CHANGE UP (ref 0–1.5)
IMM GRANULOCYTES NFR BLD AUTO: 0.4 % — SIGNIFICANT CHANGE UP (ref 0–1.5)
INR BLD: 1.2 RATIO — HIGH (ref 0.88–1.16)
LACTATE SERPL-SCNC: 2 MMOL/L — SIGNIFICANT CHANGE UP (ref 0.7–2)
LACTATE SERPL-SCNC: 3.1 MMOL/L — HIGH (ref 0.7–2)
LIDOCAIN IGE QN: 97 U/L — SIGNIFICANT CHANGE UP (ref 73–393)
LYMPHOCYTES # BLD AUTO: 2.67 K/UL — SIGNIFICANT CHANGE UP (ref 1–3.3)
LYMPHOCYTES # BLD AUTO: 2.87 K/UL — SIGNIFICANT CHANGE UP (ref 1–3.3)
LYMPHOCYTES # BLD AUTO: 36.2 % — SIGNIFICANT CHANGE UP (ref 13–44)
LYMPHOCYTES # BLD AUTO: 39.3 % — SIGNIFICANT CHANGE UP (ref 13–44)
MCHC RBC-ENTMCNC: 31.5 PG — SIGNIFICANT CHANGE UP (ref 27–34)
MCHC RBC-ENTMCNC: 32 PG — SIGNIFICANT CHANGE UP (ref 27–34)
MCHC RBC-ENTMCNC: 32.4 GM/DL — SIGNIFICANT CHANGE UP (ref 32–36)
MCHC RBC-ENTMCNC: 32.5 GM/DL — SIGNIFICANT CHANGE UP (ref 32–36)
MCV RBC AUTO: 97.3 FL — SIGNIFICANT CHANGE UP (ref 80–100)
MCV RBC AUTO: 98.6 FL — SIGNIFICANT CHANGE UP (ref 80–100)
MONOCYTES # BLD AUTO: 0.59 K/UL — SIGNIFICANT CHANGE UP (ref 0–0.9)
MONOCYTES # BLD AUTO: 0.64 K/UL — SIGNIFICANT CHANGE UP (ref 0–0.9)
MONOCYTES NFR BLD AUTO: 8.1 % — SIGNIFICANT CHANGE UP (ref 2–14)
MONOCYTES NFR BLD AUTO: 8.7 % — SIGNIFICANT CHANGE UP (ref 2–14)
NEUTROPHILS # BLD AUTO: 2.85 K/UL — SIGNIFICANT CHANGE UP (ref 1.8–7.4)
NEUTROPHILS # BLD AUTO: 3.23 K/UL — SIGNIFICANT CHANGE UP (ref 1.8–7.4)
NEUTROPHILS NFR BLD AUTO: 39 % — LOW (ref 43–77)
NEUTROPHILS NFR BLD AUTO: 43.8 % — SIGNIFICANT CHANGE UP (ref 43–77)
NRBC # BLD: 0 /100 WBCS — SIGNIFICANT CHANGE UP (ref 0–0)
PLATELET # BLD AUTO: 202 K/UL — SIGNIFICANT CHANGE UP (ref 150–400)
PLATELET # BLD AUTO: 254 K/UL — SIGNIFICANT CHANGE UP (ref 150–400)
POTASSIUM SERPL-MCNC: 3.9 MMOL/L — SIGNIFICANT CHANGE UP (ref 3.5–5.3)
POTASSIUM SERPL-MCNC: 4.7 MMOL/L — SIGNIFICANT CHANGE UP (ref 3.5–5.3)
POTASSIUM SERPL-SCNC: 3.9 MMOL/L — SIGNIFICANT CHANGE UP (ref 3.5–5.3)
POTASSIUM SERPL-SCNC: 4.7 MMOL/L — SIGNIFICANT CHANGE UP (ref 3.5–5.3)
PROT SERPL-MCNC: 7.2 GM/DL — SIGNIFICANT CHANGE UP (ref 6–8.3)
PROTHROM AB SERPL-ACNC: 13 SEC — HIGH (ref 9.8–12.7)
RBC # BLD: 4.09 M/UL — SIGNIFICANT CHANGE UP (ref 3.8–5.2)
RBC # BLD: 4.25 M/UL — SIGNIFICANT CHANGE UP (ref 3.8–5.2)
RBC # FLD: 14.1 % — SIGNIFICANT CHANGE UP (ref 10.3–14.5)
RBC # FLD: 14.1 % — SIGNIFICANT CHANGE UP (ref 10.3–14.5)
SODIUM SERPL-SCNC: 139 MMOL/L — SIGNIFICANT CHANGE UP (ref 135–145)
SODIUM SERPL-SCNC: 140 MMOL/L — SIGNIFICANT CHANGE UP (ref 135–145)
WBC # BLD: 7.3 K/UL — SIGNIFICANT CHANGE UP (ref 3.8–10.5)
WBC # BLD: 7.37 K/UL — SIGNIFICANT CHANGE UP (ref 3.8–10.5)
WBC # FLD AUTO: 7.3 K/UL — SIGNIFICANT CHANGE UP (ref 3.8–10.5)
WBC # FLD AUTO: 7.37 K/UL — SIGNIFICANT CHANGE UP (ref 3.8–10.5)

## 2018-07-30 PROCEDURE — 93010 ELECTROCARDIOGRAM REPORT: CPT

## 2018-07-30 PROCEDURE — 74177 CT ABD & PELVIS W/CONTRAST: CPT | Mod: 26

## 2018-07-30 PROCEDURE — 99285 EMERGENCY DEPT VISIT HI MDM: CPT

## 2018-07-30 RX ORDER — SODIUM CHLORIDE 9 MG/ML
1000 INJECTION INTRAMUSCULAR; INTRAVENOUS; SUBCUTANEOUS ONCE
Qty: 0 | Refills: 0 | Status: COMPLETED | OUTPATIENT
Start: 2018-07-30 | End: 2018-07-30

## 2018-07-30 RX ORDER — FAMOTIDINE 10 MG/ML
1 INJECTION INTRAVENOUS
Qty: 0 | Refills: 0 | COMMUNITY

## 2018-07-30 RX ORDER — SODIUM CHLORIDE 9 MG/ML
3 INJECTION INTRAMUSCULAR; INTRAVENOUS; SUBCUTANEOUS ONCE
Qty: 0 | Refills: 0 | Status: COMPLETED | OUTPATIENT
Start: 2018-07-30 | End: 2018-07-30

## 2018-07-30 RX ORDER — ONDANSETRON 8 MG/1
4 TABLET, FILM COATED ORAL ONCE
Qty: 0 | Refills: 0 | Status: COMPLETED | OUTPATIENT
Start: 2018-07-30 | End: 2018-07-30

## 2018-07-30 RX ADMIN — SODIUM CHLORIDE 3 MILLILITER(S): 9 INJECTION INTRAMUSCULAR; INTRAVENOUS; SUBCUTANEOUS at 17:28

## 2018-07-30 RX ADMIN — ONDANSETRON 4 MILLIGRAM(S): 8 TABLET, FILM COATED ORAL at 17:28

## 2018-07-30 RX ADMIN — SODIUM CHLORIDE 1000 MILLILITER(S): 9 INJECTION INTRAMUSCULAR; INTRAVENOUS; SUBCUTANEOUS at 18:15

## 2018-07-30 RX ADMIN — SODIUM CHLORIDE 1000 MILLILITER(S): 9 INJECTION INTRAMUSCULAR; INTRAVENOUS; SUBCUTANEOUS at 20:55

## 2018-07-30 RX ADMIN — SODIUM CHLORIDE 2000 MILLILITER(S): 9 INJECTION INTRAMUSCULAR; INTRAVENOUS; SUBCUTANEOUS at 20:17

## 2018-07-30 RX ADMIN — SODIUM CHLORIDE 1000 MILLILITER(S): 9 INJECTION INTRAMUSCULAR; INTRAVENOUS; SUBCUTANEOUS at 17:28

## 2018-07-30 NOTE — ED PROVIDER NOTE - OBJECTIVE STATEMENT
62 y/o F with PMHx of Anemia, Breast CA, CHF, HTN, and HLD presenting to the ED with sister c/o N/V/D, abd pain, and decreased appetite since 04/2018 s/p hip surgery. +weight loss. Today, she went into Dr. Lynn's office (GI) for pre-surgical work-up and was sent to hospital for evaluation of nausea, vomiting, abd pain, and decreased appetite. She has not experienced any diarrhea today. Recently admitted to [facility] on 07/13 for colitis. Last colonoscopy in 03/2018 which should polyps which were removed and colon irritation.. Multiple CT scans performed since 04/2018 have been unremarkable. No hx of MI or cardiac stents. Pt is not in compliance with water pill. Never-smoker. Non-drinker. No recreational drug use. PCP: Dr. Avalos.

## 2018-07-30 NOTE — ED ADULT NURSE NOTE - OBJECTIVE STATEMENT
Pt c/o abdominal pain, nausea, vomiting and weakness since April. As per pt, she was here at pre-surgical testing prior to endoscopy test and was sent to ED d/t c/o weakness and abdominal complaints. Pt states she recently went to [facility] for colitis and hasn't had any relief of pain and discomfort.

## 2018-07-30 NOTE — H&P PST ADULT - ASSESSMENT
yo  scheduled for EGD/colonoscopy with Dr. ramos    1. Labs as per surgeon  2. EKG  3. Pre-procedure instructions as per surgeon 62 yo female scheduled for EGD on 8/1/18 with Dr. Kemp    1. Labs as per surgeon  2. EKG  3. Medication & Pre-procedure instructions as per surgeon  4. Pt & sister Chante escorted to ED via wheelchair for weakness & inability to keep food or fluids down.

## 2018-07-30 NOTE — H&P PST ADULT - CARDIOVASCULAR COMMENTS
Followed by Dr Bonner. Currently no longer on HTN & HLD medication since "colitis" started April 2018

## 2018-07-30 NOTE — H&P PST ADULT - PMH
Anemia    Breast cancer  left 2017, had surgery and radiation  CHF (congestive heart failure)  12/2017 resolved  High blood pressure  reports medication was stopped April 2018 because BP was low  High cholesterol  d/c'd by prescribing MD in April "since I'm not eating"  Nausea & vomiting    Weakness

## 2018-07-30 NOTE — H&P PST ADULT - HISTORY OF PRESENT ILLNESS
64 yo female presents to PST. c/o colitis since April 2018. c/o mid-epigastric pain, n/v, weakness & decreased appetitie. Reports being admitted to [facility] 7/13/18 for diarrhea & n/v. Denies blood in stool or vomit.

## 2018-07-30 NOTE — H&P PST ADULT - PSH
Atypical lipoma of soft tissue  in left groin 2008  H/O foot surgery  ankle left 2004  History of hip replacement, total, right  11/2017  History of lumpectomy of left breast  2017  History of total hip replacement, left  march 2018  S/P colonoscopy  2018

## 2018-07-30 NOTE — ED PROVIDER NOTE - MEDICAL DECISION MAKING DETAILS
64 y/o F presenting for nausea, vomiting, abd pain, decreased appetite. Plan: Blood-work, IV fluids, CT scan, likely admit.

## 2018-07-30 NOTE — H&P PST ADULT - FAMILY HISTORY
Sibling  Still living? Yes, Estimated age: Age Unknown  Family history of breast cancer in sister, Age at diagnosis: Age Unknown     Mother  Still living? Yes, Estimated age: 81-90  Family history of hypertension in mother, Age at diagnosis: Age Unknown  Family history of stroke, Age at diagnosis: Age Unknown     Father  Still living? No  Family history of cardiac arrest, Age at diagnosis: Age Unknown

## 2018-07-31 LAB
ALBUMIN SERPL ELPH-MCNC: 2.7 G/DL — LOW (ref 3.3–5)
ALP SERPL-CCNC: 56 U/L — SIGNIFICANT CHANGE UP (ref 40–120)
ALT FLD-CCNC: 12 U/L — SIGNIFICANT CHANGE UP (ref 12–78)
ANION GAP SERPL CALC-SCNC: 11 MMOL/L — SIGNIFICANT CHANGE UP (ref 5–17)
AST SERPL-CCNC: 19 U/L — SIGNIFICANT CHANGE UP (ref 15–37)
BASOPHILS # BLD AUTO: 0.02 K/UL — SIGNIFICANT CHANGE UP (ref 0–0.2)
BASOPHILS NFR BLD AUTO: 0.2 % — SIGNIFICANT CHANGE UP (ref 0–2)
BILIRUB SERPL-MCNC: 0.8 MG/DL — SIGNIFICANT CHANGE UP (ref 0.2–1.2)
BUN SERPL-MCNC: 7 MG/DL — SIGNIFICANT CHANGE UP (ref 7–23)
CALCIUM SERPL-MCNC: 8.8 MG/DL — SIGNIFICANT CHANGE UP (ref 8.5–10.1)
CHLORIDE SERPL-SCNC: 110 MMOL/L — HIGH (ref 96–108)
CHOLEST SERPL-MCNC: 214 MG/DL — HIGH (ref 10–199)
CO2 SERPL-SCNC: 22 MMOL/L — SIGNIFICANT CHANGE UP (ref 22–31)
CREAT SERPL-MCNC: 0.48 MG/DL — LOW (ref 0.5–1.3)
EOSINOPHIL # BLD AUTO: 1.51 K/UL — HIGH (ref 0–0.5)
EOSINOPHIL NFR BLD AUTO: 18.6 % — HIGH (ref 0–6)
ERYTHROCYTE [SEDIMENTATION RATE] IN BLOOD: 30 MM/HR — HIGH (ref 0–20)
GLUCOSE SERPL-MCNC: 76 MG/DL — SIGNIFICANT CHANGE UP (ref 70–99)
HCT VFR BLD CALC: 34.1 % — LOW (ref 34.5–45)
HDLC SERPL-MCNC: 25 MG/DL — LOW (ref 40–125)
HGB BLD-MCNC: 11.1 G/DL — LOW (ref 11.5–15.5)
IMM GRANULOCYTES NFR BLD AUTO: 0.2 % — SIGNIFICANT CHANGE UP (ref 0–1.5)
INR BLD: 1.24 RATIO — HIGH (ref 0.88–1.16)
LACTATE SERPL-SCNC: 1 MMOL/L — SIGNIFICANT CHANGE UP (ref 0.7–2)
LACTATE SERPL-SCNC: 1.1 MMOL/L — SIGNIFICANT CHANGE UP (ref 0.7–2)
LIPID PNL WITH DIRECT LDL SERPL: 171 MG/DL — HIGH
LYMPHOCYTES # BLD AUTO: 2.99 K/UL — SIGNIFICANT CHANGE UP (ref 1–3.3)
LYMPHOCYTES # BLD AUTO: 36.7 % — SIGNIFICANT CHANGE UP (ref 13–44)
MAGNESIUM SERPL-MCNC: 1.6 MG/DL — SIGNIFICANT CHANGE UP (ref 1.6–2.6)
MCHC RBC-ENTMCNC: 32.3 PG — SIGNIFICANT CHANGE UP (ref 27–34)
MCHC RBC-ENTMCNC: 32.6 GM/DL — SIGNIFICANT CHANGE UP (ref 32–36)
MCV RBC AUTO: 99.1 FL — SIGNIFICANT CHANGE UP (ref 80–100)
MONOCYTES # BLD AUTO: 0.72 K/UL — SIGNIFICANT CHANGE UP (ref 0–0.9)
MONOCYTES NFR BLD AUTO: 8.8 % — SIGNIFICANT CHANGE UP (ref 2–14)
NEUTROPHILS # BLD AUTO: 2.88 K/UL — SIGNIFICANT CHANGE UP (ref 1.8–7.4)
NEUTROPHILS NFR BLD AUTO: 35.5 % — LOW (ref 43–77)
NRBC # BLD: 0 /100 WBCS — SIGNIFICANT CHANGE UP (ref 0–0)
PHOSPHATE SERPL-MCNC: 3.1 MG/DL — SIGNIFICANT CHANGE UP (ref 2.5–4.5)
PLATELET # BLD AUTO: 225 K/UL — SIGNIFICANT CHANGE UP (ref 150–400)
POTASSIUM SERPL-MCNC: 3.7 MMOL/L — SIGNIFICANT CHANGE UP (ref 3.5–5.3)
POTASSIUM SERPL-SCNC: 3.7 MMOL/L — SIGNIFICANT CHANGE UP (ref 3.5–5.3)
PROT SERPL-MCNC: 6.2 GM/DL — SIGNIFICANT CHANGE UP (ref 6–8.3)
PROTHROM AB SERPL-ACNC: 13.5 SEC — HIGH (ref 9.8–12.7)
RBC # BLD: 3.44 M/UL — LOW (ref 3.8–5.2)
RBC # FLD: 14.1 % — SIGNIFICANT CHANGE UP (ref 10.3–14.5)
SODIUM SERPL-SCNC: 143 MMOL/L — SIGNIFICANT CHANGE UP (ref 135–145)
TOTAL CHOLESTEROL/HDL RATIO MEASUREMENT: 8.6 RATIO — HIGH (ref 3.3–7.1)
TRIGL SERPL-MCNC: 88 MG/DL — SIGNIFICANT CHANGE UP (ref 10–149)
TROPONIN I SERPL-MCNC: <0.015 NG/ML — SIGNIFICANT CHANGE UP (ref 0.01–0.04)
WBC # BLD: 8.14 K/UL — SIGNIFICANT CHANGE UP (ref 3.8–10.5)
WBC # FLD AUTO: 8.14 K/UL — SIGNIFICANT CHANGE UP (ref 3.8–10.5)

## 2018-07-31 RX ORDER — MAGNESIUM SULFATE 500 MG/ML
2 VIAL (ML) INJECTION ONCE
Qty: 0 | Refills: 0 | Status: COMPLETED | OUTPATIENT
Start: 2018-07-31 | End: 2018-07-31

## 2018-07-31 RX ORDER — FAMOTIDINE 10 MG/ML
20 INJECTION INTRAVENOUS
Qty: 0 | Refills: 0 | Status: DISCONTINUED | OUTPATIENT
Start: 2018-07-31 | End: 2018-08-01

## 2018-07-31 RX ORDER — ONDANSETRON 8 MG/1
4 TABLET, FILM COATED ORAL EVERY 6 HOURS
Qty: 0 | Refills: 0 | Status: DISCONTINUED | OUTPATIENT
Start: 2018-07-31 | End: 2018-08-01

## 2018-07-31 RX ORDER — SODIUM CHLORIDE 9 MG/ML
1000 INJECTION INTRAMUSCULAR; INTRAVENOUS; SUBCUTANEOUS
Qty: 0 | Refills: 0 | Status: DISCONTINUED | OUTPATIENT
Start: 2018-07-31 | End: 2018-08-01

## 2018-07-31 RX ORDER — CHOLECALCIFEROL (VITAMIN D3) 125 MCG
1000 CAPSULE ORAL DAILY
Qty: 0 | Refills: 0 | Status: DISCONTINUED | OUTPATIENT
Start: 2018-07-31 | End: 2018-08-01

## 2018-07-31 RX ORDER — ONDANSETRON 8 MG/1
4 TABLET, FILM COATED ORAL EVERY 6 HOURS
Qty: 0 | Refills: 0 | Status: DISCONTINUED | OUTPATIENT
Start: 2018-07-31 | End: 2018-07-31

## 2018-07-31 RX ORDER — TAMOXIFEN CITRATE 20 MG/1
20 TABLET, FILM COATED ORAL DAILY
Qty: 0 | Refills: 0 | Status: DISCONTINUED | OUTPATIENT
Start: 2018-07-31 | End: 2018-08-01

## 2018-07-31 RX ORDER — HEPARIN SODIUM 5000 [USP'U]/ML
5000 INJECTION INTRAVENOUS; SUBCUTANEOUS EVERY 12 HOURS
Qty: 0 | Refills: 0 | Status: DISCONTINUED | OUTPATIENT
Start: 2018-07-31 | End: 2018-08-01

## 2018-07-31 RX ORDER — PANTOPRAZOLE SODIUM 20 MG/1
40 TABLET, DELAYED RELEASE ORAL
Qty: 0 | Refills: 0 | Status: DISCONTINUED | OUTPATIENT
Start: 2018-07-31 | End: 2018-08-01

## 2018-07-31 RX ADMIN — SODIUM CHLORIDE 100 MILLILITER(S): 9 INJECTION INTRAMUSCULAR; INTRAVENOUS; SUBCUTANEOUS at 12:24

## 2018-07-31 RX ADMIN — TAMOXIFEN CITRATE 20 MILLIGRAM(S): 20 TABLET, FILM COATED ORAL at 21:15

## 2018-07-31 RX ADMIN — ONDANSETRON 4 MILLIGRAM(S): 8 TABLET, FILM COATED ORAL at 13:09

## 2018-07-31 RX ADMIN — Medication 1000 UNIT(S): at 12:23

## 2018-07-31 RX ADMIN — HEPARIN SODIUM 5000 UNIT(S): 5000 INJECTION INTRAVENOUS; SUBCUTANEOUS at 17:32

## 2018-07-31 RX ADMIN — Medication 50 GRAM(S): at 12:23

## 2018-07-31 RX ADMIN — PANTOPRAZOLE SODIUM 40 MILLIGRAM(S): 20 TABLET, DELAYED RELEASE ORAL at 12:23

## 2018-07-31 NOTE — PATIENT PROFILE ADULT. - TEACHING/LEARNING LEARNING PREFERENCES
group instruction/individual instruction/audio/pictorial/written material/verbal instruction/skill demonstration/video

## 2018-07-31 NOTE — CONSULT NOTE ADULT - SUBJECTIVE AND OBJECTIVE BOX
Patient is a 63y old  Female who presents with a chief complaint of nausea / vomiting / diarrhea (31 Jul 2018 05:36)      HPI:  62 y/o F with PMH of breast cancer s/p surgery/radiation, CHF, HTN, dyslipidemia p/w nausea / vomiting / diarrhea. Patient states symptoms began in May and she has lost ~30lbs. C/o multiple episodes of diarrhea per day, and being unable to tolerate any oral intake at all. +Generalized abdominal pain. Patient was scheduled for endoscopy for 8/1 with Dr. Kemp, but when she went to pre-surgical clearance, they referred her to ED. Denies fever, chills, CP, SOB, cough, runny nose, sore throat. Denies recent travel.  She has had persistent malaise, with nausea, vomiting and weight loss    PAST MEDICAL & SURGICAL HISTORY:  Anemia  Weakness  Nausea & vomiting  CHF (congestive heart failure): 12/2017 resolved  Breast cancer: left 2017, had surgery and radiation  High cholesterol: d/c&#x27;d by prescribing MD in April &quot;since I&#x27;m not eating&quot;  High blood pressure: reports medication was stopped April 2018 because BP was low  S/P colonoscopy: 2018  History of total hip replacement, left: march 2018  History of hip replacement, total, right: 11/2017  History of lumpectomy of left breast: 2017  Atypical lipoma of soft tissue: in left groin 2008  H/O foot surgery: ankle left 2004    MEDICATIONS  (STANDING):  cholecalciferol 1000 Unit(s) Oral daily  heparin  Injectable 5000 Unit(s) SubCutaneous every 12 hours  pantoprazole    Tablet 40 milliGRAM(s) Oral before breakfast  sodium chloride 0.9%. 1000 milliLiter(s) (100 mL/Hr) IV Continuous <Continuous>  tamoxifen 20 milliGRAM(s) Oral daily    MEDICATIONS  (PRN):  famotidine    Tablet 20 milliGRAM(s) Oral two times a day PRN -  ondansetron Injectable 4 milliGRAM(s) IV Push every 6 hours PRN Nausea and/or Vomiting    Allergies  Crestor (Muscle Pain)  shellfish (Swelling; Rash)      SOCIAL HISTORY: no tob, etoh    FAMILY HISTORY:  Family history of cardiac arrest (Father)  Family history of stroke (Mother)  Family history of breast cancer in sister (Sibling)  Family history of hypertension in mother (Mother)      REVIEW OF SYSTEMS:    CONSTITUTIONAL: weakness  EYES/ENT: No visual changes;  No vertigo or throat pain   NECK: No pain or stiffness  RESPIRATORY: No cough, wheezing, hemoptysis; No shortness of breath  CARDIOVASCULAR: No chest pain or palpitations  GASTROINTESTINAL: nausea with vomiting, weight loss as above  GENITOURINARY: No dysuria, frequency or hematuria  NEUROLOGICAL: No numbness  SKIN: No itching, burning, rashes, or lesions   PSYCH: Normal mood and affect  All other review of systems is negative unless indicated above.    Vital Signs Last 24 Hrs  T(C): 36.9 (31 Jul 2018 11:43), Max: 37 (31 Jul 2018 02:55)  T(F): 98.4 (31 Jul 2018 11:43), Max: 98.6 (31 Jul 2018 02:55)  HR: 93 (31 Jul 2018 11:43) (83 - 112)  BP: 111/68 (31 Jul 2018 11:43) (103/64 - 119/62)  BP(mean): 87 (30 Jul 2018 14:24) (87 - 87)  RR: 17 (31 Jul 2018 11:43) (16 - 17)  SpO2: 100% (31 Jul 2018 11:43) (98% - 100%)    PHYSICAL EXAM:    Constitutional: NAD, well-developed  HEENT: MMM  Neck: No LAD  Respiratory: CTA  Cardiovascular: S1 and S2  Gastrointestinal: BS+, soft, NT/ND  Extremities: No peripheral edema  Vascular: 2+ peripheral pulses  Neurological: A/O x 3, no focal deficits  Psychiatric: Normal mood, normal affect  Skin: No rashes    LABS:                        11.1   8.14  )-----------( 225      ( 31 Jul 2018 06:48 )             34.1     07-31    143  |  110<H>  |  7   ----------------------------<  76  3.7   |  22  |  0.48<L>    Ca    8.8      31 Jul 2018 06:48  Phos  3.1     07-31  Mg     1.6     07-31    TPro  6.2  /  Alb  2.7<L>  /  TBili  0.8  /  DBili  x   /  AST  19  /  ALT  12  /  AlkPhos  56  07-31    PT/INR - ( 31 Jul 2018 06:48 )   PT: 13.5 sec;   INR: 1.24 ratio         PTT - ( 30 Jul 2018 15:30 )  PTT:26.0 sec  LIVER FUNCTIONS - ( 31 Jul 2018 06:48 )  Alb: 2.7 g/dL / Pro: 6.2 gm/dL / ALK PHOS: 56 U/L / ALT: 12 U/L / AST: 19 U/L / GGT: x             RADIOLOGY & ADDITIONAL STUDIES:

## 2018-07-31 NOTE — CHART NOTE - NSCHARTNOTEFT_GEN_A_CORE
Hospitalist Update Note    Patient seen and examined, has mild upset stomach but wants to start diet as she has not eaten since 10am yesterday AM. No CP / fevers/ chills.     Vital Signs Last 24 Hrs  T(C): 37 (31 Jul 2018 02:55), Max: 37 (31 Jul 2018 02:55)  T(F): 98.6 (31 Jul 2018 02:55), Max: 98.6 (31 Jul 2018 02:55)  HR: 89 (31 Jul 2018 02:55) (83 - 112)  BP: 119/62 (31 Jul 2018 02:55) (103/64 - 119/62)  BP(mean): 87 (30 Jul 2018 14:24) (87 - 87)  RR: 16 (31 Jul 2018 02:55) (16 - 17)  SpO2: 98% (31 Jul 2018 02:55) (98% - 100%)    Gen: awake and alert, weak appearing elderly female.   GI: abd soft, mild mid-epigastric TTP, no guarding/ rebounding    MEDICATIONS  (STANDING):  cholecalciferol 1000 Unit(s) Oral daily  heparin  Injectable 5000 Unit(s) SubCutaneous every 12 hours  magnesium sulfate  IVPB 2 Gram(s) IV Intermittent once  pantoprazole    Tablet 40 milliGRAM(s) Oral before breakfast  sodium chloride 0.9%. 1000 milliLiter(s) (100 mL/Hr) IV Continuous <Continuous>  tamoxifen 20 milliGRAM(s) Oral daily      Plan:     # Diarrhea - r/o infection, pending stool studies, C. diff. On isolation precautions.   - ID c/s for increasing eosinophilia.    # Nausea/ Vomiting - start clears, pending GI input, possible EGD tomorrow.   - on PPI, prn anti -emetics.     # Hx of Breast cancer - s/p sx and radiation, continue tamoxifen. Hospitalist Update Note    Patient seen and examined, has mild upset stomach but wants to start diet as she has not eaten since 10am yesterday AM. No CP / fevers/ chills.     Vital Signs Last 24 Hrs  T(C): 37 (31 Jul 2018 02:55), Max: 37 (31 Jul 2018 02:55)  T(F): 98.6 (31 Jul 2018 02:55), Max: 98.6 (31 Jul 2018 02:55)  HR: 89 (31 Jul 2018 02:55) (83 - 112)  BP: 119/62 (31 Jul 2018 02:55) (103/64 - 119/62)  BP(mean): 87 (30 Jul 2018 14:24) (87 - 87)  RR: 16 (31 Jul 2018 02:55) (16 - 17)  SpO2: 98% (31 Jul 2018 02:55) (98% - 100%)    Gen: awake and alert, weak appearing elderly female.   GI: abd soft, mild mid-epigastric TTP, no guarding/ rebounding    MEDICATIONS  (STANDING):  cholecalciferol 1000 Unit(s) Oral daily  heparin  Injectable 5000 Unit(s) SubCutaneous every 12 hours  magnesium sulfate  IVPB 2 Gram(s) IV Intermittent once  pantoprazole    Tablet 40 milliGRAM(s) Oral before breakfast  sodium chloride 0.9%. 1000 milliLiter(s) (100 mL/Hr) IV Continuous <Continuous>  tamoxifen 20 milliGRAM(s) Oral daily      Plan:     # Diarrhea - r/o infection, pending stool studies, C. diff. On isolation precautions.   - ID c/s for increasing eosinophilia.    # Hypomag - 2/2 GI losses, replete w/ Mag rider.     # Nausea/ Vomiting - start clears, pending GI input, possible EGD tomorrow.   - on PPI, prn anti -emetics.     # Hx of Breast cancer - s/p sx and radiation, continue tamoxifen.    Discussed w/ RN.

## 2018-07-31 NOTE — H&P ADULT - ASSESSMENT
62 y/o F with PMH of breast cancer s/p surgery/radiation, CHF, HTN, dyslipidemia p/w nausea / vomiting / diarrhea    *Dehydration 2/2 Nausea / vomiting / diarrhea / weight loss - uncertain etiology  -Stool studies   -GI consult  -ID consult  -Lactic acidosis resolved: 3.1 -> 1.1   -Check UA   -IVF  -Zofran PRN  -NPO for now    *Nephrolithiasis / renal cyst and h/o breast cancer / CHF / HTN / dyslipidemia   -If stable -> outpatient f/u for further management     *DVT ppx  -Heparin SubQ 64 y/o F with PMH of breast cancer s/p surgery/radiation, CHF, HTN, dyslipidemia p/w nausea / vomiting / diarrhea    *Dehydration 2/2 Nausea / vomiting / diarrhea / weight loss - uncertain etiology  -Stool studies   -GI consult  -ID consult - has elevated eosinophil count  -Lactic acidosis resolved: 3.1 -> 1.1   -Check UA   -IVF  -Zofran PRN  -NPO for now    *Nephrolithiasis / renal cyst and h/o breast cancer / CHF / HTN / dyslipidemia   -If stable -> outpatient f/u for further management     *DVT ppx  -Heparin SubQ

## 2018-07-31 NOTE — ED ADULT NURSE REASSESSMENT NOTE - NS ED NURSE REASSESS COMMENT FT1
pt received from LAZARUS Yen.  pt aaox3, no c/o pain in no apparent distress.   plan of care, transition from ED to admitted status d/w pt. all questions answered to pt satisfaction.  No additional questions or needs at this time. PT with outstanding rx order, per pt does not want to take pill at this time, nervous re:nausea. will reassess for 1200 med pass.  fall/harm risk assessed. pt given red socks and instructed re: call bell use.  pt agrees to use call bell for assistance with needs. will not get up without staff assistance.  will continue to monitor.
pt remains as previously assessed. awaiting meal tray delivery from cafeteria. pt given two apple juices, tolerated fine with no complaints.  per pt "I haven't had a bowel movement since friday".  MD chavez aware. No additional orders at this time. Will medicate now as pt is now agreeable to taking medicine.  will continue to monitor.
Assumed care of patient from LAZARUS De Santiago. Patient resting comfortably in bed. Safety and comfort maintained. Will continue to monitor.

## 2018-07-31 NOTE — H&P ADULT - HISTORY OF PRESENT ILLNESS
62 y/o F with PMH of breast cancer s/p surgery/radiation, CHF, HTN, dyslipidemia p/w nausea / vomiting / diarrhea. Patient states symptoms began in May and she has lost ~30lbs. C/o multiple episodes of diarrhea per day, and being unable to tolerate any oral intake at all. +Generalized abdominal pain. Patient was scheduled for endoscopy for wednesday with Dr. Kemp, but yesterday when she went from pre-surgical clearance she was feeling so nauseated and weak, they referred her to ED. Denies fever, chills, CP, SOB, cough, runny nose, sore throat. Denies recent travel    PSH: breast surgery, foot surgery, hip replacement    Social Hx: Denies x 3, lives with son    Family Hx: mom - CAD

## 2018-07-31 NOTE — CONSULT NOTE ADULT - SUBJECTIVE AND OBJECTIVE BOX
Patient is a 63y old  Female who presents with a chief complaint of nausea / vomiting / diarrhea    HPI:  64 y/o F with PMH of breast cancer s/p surgery/radiation, CHF, HTN, dyslipidemia p/w nausea / vomiting / diarrhea. Patient states symptoms began in May and she has lost ~30lbs. C/o multiple episodes of diarrhea per day, and being unable to tolerate any oral intake at all. +Generalized abdominal pain. Patient was scheduled for endoscopy for 8/1 with Dr. Kemp, but on 7/30 when she went from pre-surgical clearance she was feeling so nauseated and weak, they referred her to ED. Denies fever, chills, CP, SOB, cough, runny nose, sore throat. She reports being from [place] originally but hasnt traveled back in about 4 years. Has been having loose stools but not as much in last 24-48 hours d/t poor po intake. Reports intermittent abd pain. Here afebrile, normal wbc ct, eosinophilia to 18%, normal LFTs and unremarkable CT abd/pelvis. Denies recent abx use but reports 2 admissions to [facility] in May for colitis.     PSH: breast surgery, foot surgery, hip replacement    PMH: as above    Meds: per reconciliation sheet, noted below  MEDICATIONS  (STANDING):  cholecalciferol 1000 Unit(s) Oral daily  heparin  Injectable 5000 Unit(s) SubCutaneous every 12 hours  pantoprazole    Tablet 40 milliGRAM(s) Oral before breakfast  sodium chloride 0.9%. 1000 milliLiter(s) (100 mL/Hr) IV Continuous <Continuous>  tamoxifen 20 milliGRAM(s) Oral daily    Allergies    Crestor (Muscle Pain)  shellfish (Swelling; Rash)    Intolerances      Social: no smoking, no alcohol, no illegal drugs; no recent travel, no exposure to TB  FAMILY HISTORY:  Family history of cardiac arrest (Father)  Family history of stroke (Mother)  Family history of breast cancer in sister (Sibling)  Family history of hypertension in mother (Mother)     no history of premature cardiovascular disease in first degree relatives    ROS: the patient denies fever, no chills, no HA, no dizziness, no sore throat, no blurry vision, no CP, no palpitations, no SOB, no cough, no dysuria, no leg pain, no claudication, no rash, no joint aches, no rectal pain or bleeding, no night sweats  All other systems reviewed and are negative    Vital Signs Last 24 Hrs  T(C): 36.9 (31 Jul 2018 11:43), Max: 37 (31 Jul 2018 02:55)  T(F): 98.4 (31 Jul 2018 11:43), Max: 98.6 (31 Jul 2018 02:55)  HR: 93 (31 Jul 2018 11:43) (83 - 112)  BP: 111/68 (31 Jul 2018 11:43) (103/64 - 119/62)  BP(mean): 87 (30 Jul 2018 14:24) (87 - 87)  RR: 17 (31 Jul 2018 11:43) (16 - 17)  SpO2: 100% (31 Jul 2018 11:43) (98% - 100%)  Daily Height in cm: 170.18 (30 Jul 2018 15:39)        PE:  Constitutional: frail looking  HEENT: NC/AT, EOMI, PERRLA, conjunctivae clear; ears and nose atraumatic; pharynx benign  Neck: supple; thyroid not palpable  Back: no tenderness  Respiratory: respiratory effort normal; clear to auscultation  Cardiovascular: S1S2 regular, no murmurs  Abdomen: soft, tender, not distended, positive BS; liver and spleen WNL  Genitourinary: no suprapubic tenderness  Lymphatic: no LN palpable  Musculoskeletal: no muscle tenderness, no joint swelling or tenderness  Extremities: no pedal edema  Neurological/ Psychiatric:  moving all extremities  Skin: no rashes; no palpable lesions    Labs: all available labs reviewed                        11.1   8.14  )-----------( 225      ( 31 Jul 2018 06:48 )             34.1     07-31    143  |  110<H>  |  7   ----------------------------<  76  3.7   |  22  |  0.48<L>    Ca    8.8      31 Jul 2018 06:48  Phos  3.1     07-31  Mg     1.6     07-31    TPro  6.2  /  Alb  2.7<L>  /  TBili  0.8  /  DBili  x   /  AST  19  /  ALT  12  /  AlkPhos  56  07-31     LIVER FUNCTIONS - ( 31 Jul 2018 06:48 )  Alb: 2.7 g/dL / Pro: 6.2 gm/dL / ALK PHOS: 56 U/L / ALT: 12 U/L / AST: 19 U/L / GGT: x                 Radiology: all available radiological tests reviewed  < from: CT Abdomen and Pelvis w/ Oral Cont and w/ IV Cont (07.30.18 @ 19:18) >    EXAM:  CT ABDOMEN AND PELVIS OC IC                            PROCEDURE DATE:  07/30/2018          INTERPRETATION:  CLINICAL STATEMENT: n/v/d, hx of colitis    TECHNIQUE: CT of the abdomen and pelvis was performed with IV contrast.   Oral contrastwas administered. Approximately 90 cc of Omnipaque 350   administered.    COMPARISON: 7/13/2018    FINDINGS:    The lower chest is unremarkable.    The liver is unremarkable. The fluid-filled gallbladder is appreciated.   Gallbladder sludge is questioned.    The spleen, pancreas and adrenal glands are unremarkable.The kidneys   demonstrate nonobstructing stones in the lower pole of the right kidney   and a small lower pole right renal cyst. The fluid-filled bladder appears   intact.    There is no bowel obstruction. The appendix is not seen however there are   no secondary signs of appendicitis..    There is no intraperitoneal free air.  There is no free fluid. The aorta   is not aneurysmal.    There is no significant abdominal, retroperitoneal or pelvic   lymphadenopathy. The pelvic evaluation is limited by artifact from total   hip replacements.    The osseous structures demonstrate degenerative changes. Bilateral total   hip replacements are noted. There is diffuse subcutaneous edema.    IMPRESSION:    Diffuse subcutaneous edema is suggested.  Nonobstructing right renal calculi  No acute pathology in the abdomen or pelvis.        Advanced directives addressed: full resuscitation

## 2018-07-31 NOTE — CONSULT NOTE ADULT - ASSESSMENT
62 y/o F with PMH of breast cancer s/p surgery/radiation, CHF, HTN, dyslipidemia p/w nausea / vomiting / diarrhea. Patient states symptoms began in May and she has lost ~30lbs. C/o multiple episodes of diarrhea per day, and being unable to tolerate any oral intake at all. +Generalized abdominal pain. Patient was scheduled for endoscopy for 8/1 with Dr. Kemp, but on 7/30 when she went from pre-surgical clearance she was feeling so nauseated and weak, they referred her to ED. Denies fever, chills, CP, SOB, cough, runny nose, sore throat. She reports being from [place] originally but hasnt traveled back in about 4 years. Has been having loose stools but not as much in last 24-48 hours d/t poor po intake. Reports intermittent abd pain. Here afebrile, normal wbc ct, eosinophilia to 18%, normal LFTs and unremarkable CT abd/pelvis.    1. eosinophilia/nausea/vomiting/diarrhea  - check stool O & P/c diff pcr/GI pcr  - check strongyloides ab  - CT abd/pelvis unremarkable  - GI eval ? endoscopy  - monitor temps  - monitor off antibiotics for now  - f/u cbc  - supportive care    2. other issues - care per medicine

## 2018-08-01 ENCOUNTER — RESULT REVIEW (OUTPATIENT)
Age: 63
End: 2018-08-01

## 2018-08-01 ENCOUNTER — TRANSCRIPTION ENCOUNTER (OUTPATIENT)
Age: 63
End: 2018-08-01

## 2018-08-01 VITALS
OXYGEN SATURATION: 96 % | SYSTOLIC BLOOD PRESSURE: 102 MMHG | DIASTOLIC BLOOD PRESSURE: 49 MMHG | RESPIRATION RATE: 16 BRPM | TEMPERATURE: 98 F | HEART RATE: 85 BPM

## 2018-08-01 PROCEDURE — 88305 TISSUE EXAM BY PATHOLOGIST: CPT | Mod: 26

## 2018-08-01 PROCEDURE — 88312 SPECIAL STAINS GROUP 1: CPT | Mod: 26

## 2018-08-01 RX ORDER — ONDANSETRON 8 MG/1
4 TABLET, FILM COATED ORAL ONCE
Qty: 0 | Refills: 0 | Status: COMPLETED | OUTPATIENT
Start: 2018-08-01 | End: 2018-08-01

## 2018-08-01 RX ORDER — PANTOPRAZOLE SODIUM 20 MG/1
1 TABLET, DELAYED RELEASE ORAL
Qty: 0 | Refills: 0 | COMMUNITY

## 2018-08-01 RX ORDER — PANTOPRAZOLE SODIUM 20 MG/1
1 TABLET, DELAYED RELEASE ORAL
Qty: 30 | Refills: 0 | OUTPATIENT
Start: 2018-08-01

## 2018-08-01 RX ADMIN — FAMOTIDINE 20 MILLIGRAM(S): 10 INJECTION INTRAVENOUS at 13:43

## 2018-08-01 RX ADMIN — TAMOXIFEN CITRATE 20 MILLIGRAM(S): 20 TABLET, FILM COATED ORAL at 11:41

## 2018-08-01 RX ADMIN — HEPARIN SODIUM 5000 UNIT(S): 5000 INJECTION INTRAVENOUS; SUBCUTANEOUS at 05:10

## 2018-08-01 RX ADMIN — Medication 1000 UNIT(S): at 11:41

## 2018-08-01 RX ADMIN — ONDANSETRON 4 MILLIGRAM(S): 8 TABLET, FILM COATED ORAL at 13:43

## 2018-08-01 NOTE — DISCHARGE NOTE ADULT - MEDICATION SUMMARY - MEDICATIONS TO TAKE
I will START or STAY ON the medications listed below when I get home from the hospital:    ondansetron 4 mg oral tablet, disintegrating  -- 1 tab(s) by mouth every 4 to 6 hours, As Needed  -- Indication: For Nausea    tamoxifen 20 mg oral tablet  -- 1 tab(s) by mouth once a day  -- Indication: For Breast cancer    Pepcid 20 mg oral tablet  -- 1 tab(s) by mouth 2 times a day, As Needed  -- Indication: For reflux    pantoprazole 40 mg oral delayed release tablet  -- 1 tab(s) by mouth once a day before breakfast.   -- Indication: For gastritis    Vitamin D3  -- Indication: For vitamin d

## 2018-08-01 NOTE — DISCHARGE NOTE ADULT - PLAN OF CARE
resolution of abdominal pain EGD revealed gastritis. Avoid alcoholic beverages and spicy foods.   - take Pantoprazole every morning before breakfast.

## 2018-08-01 NOTE — DISCHARGE NOTE ADULT - PATIENT PORTAL LINK FT
You can access the [facility] Patient Portal, offered by [facility], by registering with the following website: [email or web address]

## 2018-08-01 NOTE — DISCHARGE NOTE ADULT - CARE PROVIDER_API CALL
Esdras Kemp (MD), Gastroenterology; Internal Medicine  [address] [place] [address]  Phone: [phone]  Fax: [phone]    Edwige Avalos (), [place] [address]  Phone: [phone]  Fax: [phone]

## 2018-08-01 NOTE — DISCHARGE NOTE ADULT - OTHER SIGNIFICANT FINDINGS
Upper Endoscopy (08.01.18 @ 09:58) >   - Normal esophagus.                   - Gastritis. Biopsied.   - Normal second portion of the duodenum. Biopsied.

## 2018-08-01 NOTE — DISCHARGE NOTE ADULT - HOSPITAL COURSE
Reason for Admission: nausea / vomiting / diarrhea	  History of Present Illness: 	  64 y/o F with PMH of breast cancer s/p surgery/radiation, CHF, HTN, dyslipidemia p/w nausea / vomiting / diarrhea. Patient states symptoms began in May and she has lost ~30lbs. C/o multiple episodes of diarrhea per day, and being unable to tolerate any oral intake at all. +Generalized abdominal pain. Patient was scheduled for endoscopy for wednesday with Dr. Kemp, but yesterday when she went from pre-surgical clearance she was feeling so nauseated and weak, they referred her to ED.    Hospital course: Underwent EGD with Dr. Kemp found to have gastritis. No further diarrhea noted and thus GI PCR and C. diff could not be sent. No fevers, abd pain resolved and patient tolerated PO diet.     No CP / SOB and eager for dc home. Instructed to remain on PPI daily and prn H2 blocker, discussed diet modification.     ROS: neg unless stated above.   Vital Signs Last 24 Hrs  T(C): 36.8 (01 Aug 2018 11:20), Max: 36.8 (31 Jul 2018 15:55)  T(F): 98.2 (01 Aug 2018 11:20), Max: 98.3 (31 Jul 2018 15:55)  HR: 78 (01 Aug 2018 11:20) (78 - 89)  BP: 103/49 (01 Aug 2018 11:20) (103/48 - 106/59)  BP(mean): --  RR: 17 (01 Aug 2018 11:20) (16 - 18)  SpO2: 96% (01 Aug 2018 04:43) (96% - 99%)  PHYSICAL EXAM:    Constitutional: NAD, awake and alert, well-developed  HEENT: PERR, EOMI, Normal Hearing, MMM  Neck: Soft and supple, No LAD, No JVD  Respiratory: Breath sounds are clear bilaterally, No wheezing, rales or rhonchi  Cardiovascular: S1 and S2, regular rate and rhythm, no Murmurs, gallops or rubs  Gastrointestinal: Bowel Sounds present, soft, nontender, nondistended, no guarding, no rebound  Extremities: No peripheral edema  Vascular: 2+ peripheral pulses  Neurological: A/O x 3, no focal deficits  Musculoskeletal: 5/5 strength b/l upper and lower extremities  Skin: No rashes    LABS: All Labs Reviewed:           A&P    64 y/o F with PMH of breast cancer s/p surgery/radiation, CHF, HTN, dyslipidemia p/w nausea / vomiting / diarrhea    *Dehydration 2/2 Nausea / vomiting / diarrhea / weight loss -   - likely 2/2 gastritis, no acute signs of infection.   - PPI daily, prn H2 blocker. Tolerating diet, continue w/ outpatient f/u.   -ID consult - has elevated eosinophil count, pending strongylodes, f/u outpt. Discussed w/ ID.   -Lactic acidosis resolved: 3.1 -> 1.1     *Nephrolithiasis / renal cyst and h/o breast cancer / CHF / HTN / dyslipidemia   -If stable -> outpatient f/u for further management     DC home today. Discussed w/ pt and staff. Total time > 35 mins  DC note to be faxed to PCP.

## 2018-08-01 NOTE — DISCHARGE NOTE ADULT - CARE PLAN
Principal Discharge DX:	Acute gastritis without hemorrhage, unspecified gastritis type  Goal:	resolution of abdominal pain  Assessment and plan of treatment:	EGD revealed gastritis. Avoid alcoholic beverages and spicy foods.   - take Pantoprazole every morning before breakfast.

## 2018-08-01 NOTE — CHART NOTE - NSCHARTNOTEFT_GEN_A_CORE
Upper endoscopy showed gastritis, and some retained stomach contents. No obvious pathology was noted. Would advance diet if tolerated.

## 2018-08-02 LAB — STRONGYLOIDES AB SER-ACNC: NEGATIVE — SIGNIFICANT CHANGE UP

## 2018-08-07 ENCOUNTER — APPOINTMENT (OUTPATIENT)
Dept: CHRONIC DISEASE MANAGEMENT | Facility: CLINIC | Age: 63
End: 2018-08-07

## 2018-08-07 ENCOUNTER — INPATIENT (INPATIENT)
Facility: HOSPITAL | Age: 63
LOS: 8 days | Discharge: NURSING, REHAB OR LONG-TERM CARE | End: 2018-08-16
Attending: HOSPITALIST | Admitting: HOSPITALIST
Payer: MEDICARE

## 2018-08-07 VITALS — SYSTOLIC BLOOD PRESSURE: 90 MMHG | RESPIRATION RATE: 15 BRPM | DIASTOLIC BLOOD PRESSURE: 61 MMHG | HEART RATE: 90 BPM

## 2018-08-07 DIAGNOSIS — D72.1 EOSINOPHILIA: ICD-10-CM

## 2018-08-07 DIAGNOSIS — E86.0 DEHYDRATION: ICD-10-CM

## 2018-08-07 DIAGNOSIS — Z96.641 PRESENCE OF RIGHT ARTIFICIAL HIP JOINT: Chronic | ICD-10-CM

## 2018-08-07 DIAGNOSIS — E87.2 ACIDOSIS: ICD-10-CM

## 2018-08-07 DIAGNOSIS — N28.1 CYST OF KIDNEY, ACQUIRED: ICD-10-CM

## 2018-08-07 DIAGNOSIS — Z96.642 PRESENCE OF LEFT ARTIFICIAL HIP JOINT: Chronic | ICD-10-CM

## 2018-08-07 DIAGNOSIS — Z85.3 PERSONAL HISTORY OF MALIGNANT NEOPLASM OF BREAST: ICD-10-CM

## 2018-08-07 DIAGNOSIS — I11.0 HYPERTENSIVE HEART DISEASE WITH HEART FAILURE: ICD-10-CM

## 2018-08-07 DIAGNOSIS — E78.5 HYPERLIPIDEMIA, UNSPECIFIED: ICD-10-CM

## 2018-08-07 DIAGNOSIS — I50.9 HEART FAILURE, UNSPECIFIED: ICD-10-CM

## 2018-08-07 DIAGNOSIS — D17.9 BENIGN LIPOMATOUS NEOPLASM, UNSPECIFIED: Chronic | ICD-10-CM

## 2018-08-07 DIAGNOSIS — Z90.12 ACQUIRED ABSENCE OF LEFT BREAST AND NIPPLE: Chronic | ICD-10-CM

## 2018-08-07 DIAGNOSIS — Z98.890 OTHER SPECIFIED POSTPROCEDURAL STATES: Chronic | ICD-10-CM

## 2018-08-07 DIAGNOSIS — N20.0 CALCULUS OF KIDNEY: ICD-10-CM

## 2018-08-07 DIAGNOSIS — E83.42 HYPOMAGNESEMIA: ICD-10-CM

## 2018-08-07 DIAGNOSIS — K29.00 ACUTE GASTRITIS WITHOUT BLEEDING: ICD-10-CM

## 2018-08-07 LAB
ALBUMIN SERPL ELPH-MCNC: 2.8 G/DL — LOW (ref 3.3–5)
ALP SERPL-CCNC: 57 U/L — SIGNIFICANT CHANGE UP (ref 40–120)
ALT FLD-CCNC: 14 U/L — SIGNIFICANT CHANGE UP (ref 12–78)
ANION GAP SERPL CALC-SCNC: 11 MMOL/L — SIGNIFICANT CHANGE UP (ref 5–17)
APTT BLD: 25.3 SEC — LOW (ref 27.5–37.4)
AST SERPL-CCNC: 19 U/L — SIGNIFICANT CHANGE UP (ref 15–37)
BASOPHILS # BLD AUTO: 0.03 K/UL — SIGNIFICANT CHANGE UP (ref 0–0.2)
BASOPHILS NFR BLD AUTO: 0.5 % — SIGNIFICANT CHANGE UP (ref 0–2)
BILIRUB SERPL-MCNC: 0.6 MG/DL — SIGNIFICANT CHANGE UP (ref 0.2–1.2)
BLD GP AB SCN SERPL QL: SIGNIFICANT CHANGE UP
BUN SERPL-MCNC: 7 MG/DL — SIGNIFICANT CHANGE UP (ref 7–23)
CALCIUM SERPL-MCNC: 9 MG/DL — SIGNIFICANT CHANGE UP (ref 8.5–10.1)
CHLORIDE SERPL-SCNC: 108 MMOL/L — SIGNIFICANT CHANGE UP (ref 96–108)
CO2 SERPL-SCNC: 24 MMOL/L — SIGNIFICANT CHANGE UP (ref 22–31)
CREAT SERPL-MCNC: 0.69 MG/DL — SIGNIFICANT CHANGE UP (ref 0.5–1.3)
EOSINOPHIL # BLD AUTO: 0.39 K/UL — SIGNIFICANT CHANGE UP (ref 0–0.5)
EOSINOPHIL NFR BLD AUTO: 6.4 % — HIGH (ref 0–6)
GLUCOSE SERPL-MCNC: 105 MG/DL — HIGH (ref 70–99)
HCT VFR BLD CALC: 37.2 % — SIGNIFICANT CHANGE UP (ref 34.5–45)
HGB BLD-MCNC: 12 G/DL — SIGNIFICANT CHANGE UP (ref 11.5–15.5)
IMM GRANULOCYTES NFR BLD AUTO: 0.2 % — SIGNIFICANT CHANGE UP (ref 0–1.5)
INR BLD: 1.33 RATIO — HIGH (ref 0.88–1.16)
LACTATE SERPL-SCNC: 1.4 MMOL/L — SIGNIFICANT CHANGE UP (ref 0.7–2)
LACTATE SERPL-SCNC: 2.3 MMOL/L — HIGH (ref 0.7–2)
LIDOCAIN IGE QN: 68 U/L — LOW (ref 73–393)
LYMPHOCYTES # BLD AUTO: 1.99 K/UL — SIGNIFICANT CHANGE UP (ref 1–3.3)
LYMPHOCYTES # BLD AUTO: 32.8 % — SIGNIFICANT CHANGE UP (ref 13–44)
MCHC RBC-ENTMCNC: 31.9 PG — SIGNIFICANT CHANGE UP (ref 27–34)
MCHC RBC-ENTMCNC: 32.3 GM/DL — SIGNIFICANT CHANGE UP (ref 32–36)
MCV RBC AUTO: 98.9 FL — SIGNIFICANT CHANGE UP (ref 80–100)
MONOCYTES # BLD AUTO: 0.52 K/UL — SIGNIFICANT CHANGE UP (ref 0–0.9)
MONOCYTES NFR BLD AUTO: 8.6 % — SIGNIFICANT CHANGE UP (ref 2–14)
NEUTROPHILS # BLD AUTO: 3.13 K/UL — SIGNIFICANT CHANGE UP (ref 1.8–7.4)
NEUTROPHILS NFR BLD AUTO: 51.5 % — SIGNIFICANT CHANGE UP (ref 43–77)
NRBC # BLD: 0 /100 WBCS — SIGNIFICANT CHANGE UP (ref 0–0)
PLATELET # BLD AUTO: 259 K/UL — SIGNIFICANT CHANGE UP (ref 150–400)
POTASSIUM SERPL-MCNC: 4.3 MMOL/L — SIGNIFICANT CHANGE UP (ref 3.5–5.3)
POTASSIUM SERPL-SCNC: 4.3 MMOL/L — SIGNIFICANT CHANGE UP (ref 3.5–5.3)
PROT SERPL-MCNC: 6.2 GM/DL — SIGNIFICANT CHANGE UP (ref 6–8.3)
PROTHROM AB SERPL-ACNC: 14.5 SEC — HIGH (ref 9.8–12.7)
RBC # BLD: 3.76 M/UL — LOW (ref 3.8–5.2)
RBC # FLD: 14.1 % — SIGNIFICANT CHANGE UP (ref 10.3–14.5)
SODIUM SERPL-SCNC: 143 MMOL/L — SIGNIFICANT CHANGE UP (ref 135–145)
TROPONIN I SERPL-MCNC: <0.015 NG/ML — SIGNIFICANT CHANGE UP (ref 0.01–0.04)
TYPE + AB SCN PNL BLD: SIGNIFICANT CHANGE UP
WBC # BLD: 6.07 K/UL — SIGNIFICANT CHANGE UP (ref 3.8–10.5)
WBC # FLD AUTO: 6.07 K/UL — SIGNIFICANT CHANGE UP (ref 3.8–10.5)

## 2018-08-07 PROCEDURE — 99285 EMERGENCY DEPT VISIT HI MDM: CPT

## 2018-08-07 PROCEDURE — 74177 CT ABD & PELVIS W/CONTRAST: CPT | Mod: 26

## 2018-08-07 PROCEDURE — 71045 X-RAY EXAM CHEST 1 VIEW: CPT | Mod: 26

## 2018-08-07 RX ORDER — SODIUM CHLORIDE 9 MG/ML
3 INJECTION INTRAMUSCULAR; INTRAVENOUS; SUBCUTANEOUS ONCE
Qty: 0 | Refills: 0 | Status: COMPLETED | OUTPATIENT
Start: 2018-08-07 | End: 2018-08-07

## 2018-08-07 RX ORDER — ACETAMINOPHEN 500 MG
1000 TABLET ORAL ONCE
Qty: 0 | Refills: 0 | Status: COMPLETED | OUTPATIENT
Start: 2018-08-07 | End: 2018-08-07

## 2018-08-07 RX ORDER — SODIUM CHLORIDE 9 MG/ML
500 INJECTION INTRAMUSCULAR; INTRAVENOUS; SUBCUTANEOUS ONCE
Qty: 0 | Refills: 0 | Status: COMPLETED | OUTPATIENT
Start: 2018-08-07 | End: 2018-08-07

## 2018-08-07 RX ORDER — ONDANSETRON 8 MG/1
4 TABLET, FILM COATED ORAL ONCE
Qty: 0 | Refills: 0 | Status: COMPLETED | OUTPATIENT
Start: 2018-08-07 | End: 2018-08-07

## 2018-08-07 RX ORDER — ONDANSETRON 8 MG/1
4 TABLET, FILM COATED ORAL ONCE
Qty: 0 | Refills: 0 | Status: DISCONTINUED | OUTPATIENT
Start: 2018-08-07 | End: 2018-08-07

## 2018-08-07 RX ADMIN — ONDANSETRON 4 MILLIGRAM(S): 8 TABLET, FILM COATED ORAL at 17:38

## 2018-08-07 RX ADMIN — SODIUM CHLORIDE 500 MILLILITER(S): 9 INJECTION INTRAMUSCULAR; INTRAVENOUS; SUBCUTANEOUS at 17:30

## 2018-08-07 RX ADMIN — Medication 400 MILLIGRAM(S): at 17:41

## 2018-08-07 RX ADMIN — SODIUM CHLORIDE 500 MILLILITER(S): 9 INJECTION INTRAMUSCULAR; INTRAVENOUS; SUBCUTANEOUS at 18:30

## 2018-08-07 RX ADMIN — Medication 1000 MILLIGRAM(S): at 17:56

## 2018-08-07 RX ADMIN — Medication 1000 MILLIGRAM(S): at 18:00

## 2018-08-07 RX ADMIN — SODIUM CHLORIDE 500 MILLILITER(S): 9 INJECTION INTRAMUSCULAR; INTRAVENOUS; SUBCUTANEOUS at 22:00

## 2018-08-07 RX ADMIN — SODIUM CHLORIDE 3 MILLILITER(S): 9 INJECTION INTRAMUSCULAR; INTRAVENOUS; SUBCUTANEOUS at 17:12

## 2018-08-07 NOTE — ED PROVIDER NOTE - NS_ ATTENDINGSCRIBEDETAILS _ED_A_ED_FT
The scribe's documentation has been prepared under my direction and personally reviewed by me in its entirety.  I confirm that the note above accurately reflects all my work, treatment, procedures, and decision making except where otherwise noted or amended by me.  Jas Ordaz M.D.

## 2018-08-07 NOTE — ED PROVIDER NOTE - PROGRESS NOTE DETAILS
JW gastric tumor likely causing gastric obstruction.  Fluid responsive hypotension.  No signs of sepsis today.  Pt admitted for endoscopy.  Given presentation and findings, patient requires hospitalization likely requiring 5 days of inpatient care.  Patient signed out to Dr. Bear  Patient's history, vital signs, lab results, imaging, pertinent findings, and clinical condition reviewed during sign out.  At sign out patient admitted in hemodynamically stable condition in no acute distress.

## 2018-08-07 NOTE — ED PROVIDER NOTE - CRITICAL CARE PROVIDED
interpretation of diagnostic studies/documentation/additional history taking/direct patient care (not related to procedure)/Pt hypotensive.  Pt resuscitated appropriately and this life threatening condition resolved./consultation with other physicians

## 2018-08-07 NOTE — ED ADULT NURSE REASSESSMENT NOTE - NS ED NURSE REASSESS COMMENT FT1
Pt very difficult IV stick. 22G IVL obtained in right forearm by Misty Irving RN. Unable to get all blood work, CMP & CBC sent to lab. Lab called to draw remaining blood work. Dr Ordaz made aware

## 2018-08-07 NOTE — ED ADULT NURSE NOTE - OBJECTIVE STATEMENT
Pt presents to ED c/o abd pain for more than 1 week. Pt reports N/V for more than a week. Pt states she has been unable to tolerate PO intake for a week. Pt reports diarrhea. Pt reports she was diagnosed with colitis in May. Pt report hx of left breast ca 1 year ago. Pt currently takes Tamoxifen.

## 2018-08-07 NOTE — ED PROVIDER NOTE - MEDICAL DECISION MAKING DETAILS
Pt is a 62 y/o F, with PMHx of L breast CA, CHF, anemia, HTN, HLD, presenting to the ED with c/o abd pain, onset today. Pt is a 64 y/o F, with PMHx of L breast CA, CHF, anemia, HTN, HLD, presenting to the ED with c/o abd pain, onset today.  PT intolerant of solids and liquids.  Exam reveals abdominal tenderness and hypotension.  DDX obstruction, gastric obstruction, GERD, PUD.  Given presentation Plan: Evaluate for intra-abdominal pathology.  IV, pulse ox, and telemetry monitoring. Complete metabolic panel: evaluate patient for electrolyte abnormality, liver dysfunction, biliary obstruction, and metabolic dyscrasia.  CBC: Evaluate patient for anemia, hemorrhage, and hematologic dyscrasia.  PT/PTT: evaluate for synthesis abnormality and coagulopathy. Venous Blood Gas Complete: Rapid hematologic and electrolyte analysis, lactate level to assess for global perfusion abnormality.  Lipase: Assess for pancreatitis.  Type and screen in anticipation of surgery or transfusion.  Screening radiographs for free air and obstruction.  CT abdomen and pelvis to rule out obstruction, volvulus, perforation, fluid collection, and other intra-abdominal pathology.   Consider angiography with suggestive findings.  Treat symptomatically with anti-nausea medication, analgesics, and resuscitate with intravenous fluids.  Reassess.  Surgical consultation as required.

## 2018-08-07 NOTE — ED PROVIDER NOTE - CARE PLAN
Principal Discharge DX:	Gastric tumor Principal Discharge DX:	Gastric tumor  Secondary Diagnosis:	Hypotension

## 2018-08-07 NOTE — ED PROVIDER NOTE - OBJECTIVE STATEMENT
Pt is a 64 y/o F, with PMHx of L breast CA, CHF, anemia, HTN, HLD, presenting to the ED with c/o abd pain, onset today. Pt reports diffuse, non specific abd pain that has been constant since initial onset. Associated nausea, vomiting, and diarrhea. Has been having dec PO intake over the last few days. Was sent from [facility] for eval of sxs. Denies fever and urinary sxs. Pt is hypotensive on arrival. Pt is a 64 y/o F, with PMHx of L breast CA, CHF, anemia, HTN, HLD, presenting to the ED with c/o abd pain, onset today. Pt reports diffuse, non specific abd pain that has been constant since initial onset. Associated nausea, vomiting, and diarrhea. Has been having dec PO intake over the last few days. Was sent from [facility] for eval of sxs. Denies fever and urinary sxs. Pt is hypotensive on arrival. Had an endoscopy last week by her personal GI. Pt is a 64 y/o F, with PMHx of L breast CA, CHF, anemia, HTN, HLD, presenting to the ED with c/o abd pain, onset today. Pt reports diffuse, non specific abd pain that has been constant since initial onset. Associated nausea, vomiting, and diarrhea. Has been having dec PO intake over the last few days. Was sent from [facility] for eval of sxs. Denies fever and urinary sxs. Pt is hypotensive on arrival. Had an endoscopy last week by her personal GI.  No chest pain or SOB.  No headache or neurological Sx.

## 2018-08-07 NOTE — ED ADULT NURSE NOTE - NSIMPLEMENTINTERV_GEN_ALL_ED
Implemented All Fall Risk Interventions:  [place] to call system. Call bell, personal items and telephone within reach. Instruct patient to call for assistance. Room bathroom lighting operational. Non-slip footwear when patient is off stretcher. Physically safe environment: no spills, clutter or unnecessary equipment. Stretcher in lowest position, wheels locked, appropriate side rails in place. Provide visual cue, wrist band, yellow gown, etc. Monitor gait and stability. Monitor for mental status changes and reorient to person, place, and time. Review medications for side effects contributing to fall risk. Reinforce activity limits and safety measures with patient and family.

## 2018-08-08 PROBLEM — R11.2 NAUSEA WITH VOMITING, UNSPECIFIED: Chronic | Status: ACTIVE | Noted: 2018-07-30

## 2018-08-08 PROBLEM — R53.1 WEAKNESS: Chronic | Status: ACTIVE | Noted: 2018-07-30

## 2018-08-08 PROBLEM — D64.9 ANEMIA, UNSPECIFIED: Chronic | Status: ACTIVE | Noted: 2018-07-30

## 2018-08-08 LAB — SURGICAL PATHOLOGY FINAL REPORT - CH: SIGNIFICANT CHANGE UP

## 2018-08-08 RX ORDER — SODIUM CHLORIDE 9 MG/ML
1000 INJECTION INTRAMUSCULAR; INTRAVENOUS; SUBCUTANEOUS
Qty: 0 | Refills: 0 | Status: DISCONTINUED | OUTPATIENT
Start: 2018-08-08 | End: 2018-08-14

## 2018-08-08 RX ORDER — METOCLOPRAMIDE HCL 10 MG
10 TABLET ORAL ONCE
Qty: 0 | Refills: 0 | Status: DISCONTINUED | OUTPATIENT
Start: 2018-08-08 | End: 2018-08-08

## 2018-08-08 RX ORDER — ONDANSETRON 8 MG/1
4 TABLET, FILM COATED ORAL EVERY 6 HOURS
Qty: 0 | Refills: 0 | Status: DISCONTINUED | OUTPATIENT
Start: 2018-08-08 | End: 2018-08-16

## 2018-08-08 RX ORDER — SODIUM CHLORIDE 9 MG/ML
1000 INJECTION INTRAMUSCULAR; INTRAVENOUS; SUBCUTANEOUS
Qty: 0 | Refills: 0 | Status: DISCONTINUED | OUTPATIENT
Start: 2018-08-08 | End: 2018-08-08

## 2018-08-08 RX ORDER — METOCLOPRAMIDE HCL 10 MG
TABLET ORAL
Qty: 0 | Refills: 0 | Status: DISCONTINUED | OUTPATIENT
Start: 2018-08-08 | End: 2018-08-08

## 2018-08-08 RX ORDER — ACETAMINOPHEN 500 MG
650 TABLET ORAL EVERY 6 HOURS
Qty: 0 | Refills: 0 | Status: DISCONTINUED | OUTPATIENT
Start: 2018-08-08 | End: 2018-08-16

## 2018-08-08 RX ORDER — METOCLOPRAMIDE HCL 10 MG
10 TABLET ORAL EVERY 8 HOURS
Qty: 0 | Refills: 0 | Status: DISCONTINUED | OUTPATIENT
Start: 2018-08-08 | End: 2018-08-08

## 2018-08-08 RX ORDER — HEPARIN SODIUM 5000 [USP'U]/ML
5000 INJECTION INTRAVENOUS; SUBCUTANEOUS EVERY 12 HOURS
Qty: 0 | Refills: 0 | Status: DISCONTINUED | OUTPATIENT
Start: 2018-08-08 | End: 2018-08-16

## 2018-08-08 RX ADMIN — ONDANSETRON 4 MILLIGRAM(S): 8 TABLET, FILM COATED ORAL at 17:50

## 2018-08-08 RX ADMIN — HEPARIN SODIUM 5000 UNIT(S): 5000 INJECTION INTRAVENOUS; SUBCUTANEOUS at 17:49

## 2018-08-08 RX ADMIN — SODIUM CHLORIDE 80 MILLILITER(S): 9 INJECTION INTRAMUSCULAR; INTRAVENOUS; SUBCUTANEOUS at 17:51

## 2018-08-08 NOTE — ED ADULT NURSE REASSESSMENT NOTE - NS ED NURSE REASSESS COMMENT FT1
Pt resting comfortably in bed in hallway awaiting admission orders. Pt to be admitted to med surg. No complaints at present time. VSS. Will continue to monitor. Comfort and safety maintained.

## 2018-08-08 NOTE — H&P ADULT - NSHPPHYSICALEXAM_GEN_ALL_CORE
· ENMT: Airway patent  · EYES: Clear bilaterally  · CARDIAC: Normal rate, regular rhythm.  Heart sounds S1, S2.  No murmurs, rubs or gallops.  · RESPIRATORY: Breath sounds clear and equal bilaterally.  · GASTROINTESTINAL: Abdomen soft, non-tender, no guarding.  · NEUROLOGICAL: Alert and oriented, no focal deficits, no motor or sensory deficits.  · SKIN: Skin normal color for race, warm, dry and intact. No evidence of rash.

## 2018-08-08 NOTE — H&P ADULT - NSHPLABSRESULTS_GEN_ALL_CORE
10.9   5.44  )-----------( 223      ( 09 Aug 2018 06:06 )             34.2   08-09    142  |  108  |  7   ----------------------------<  68<L>  3.7   |  26  |  0.47<L>    Ca    8.5      09 Aug 2018 06:06    TPro  6.2  /  Alb  2.8<L>  /  TBili  0.6  /  DBili  x   /  AST  19  /  ALT  14  /  AlkPhos  57  08-07

## 2018-08-08 NOTE — PATIENT PROFILE ADULT. - TEACHING/LEARNING LEARNING PREFERENCES
verbal instruction/group instruction/audio/individual instruction/pictorial/video/skill demonstration/written material

## 2018-08-08 NOTE — CONSULT NOTE ADULT - ASSESSMENT
62 yo female with anorexia, abdominal pain, and vomiting. Awaiting pathology report. Would start empiric metoclopromide, and do gastric emptying study. Would maintain aggressive hydration. Will follow - thanks!

## 2018-08-08 NOTE — CONSULT NOTE ADULT - SUBJECTIVE AND OBJECTIVE BOX
Patient is a 63y old  Female who presents with a chief complaint of vomiting.    HPI: Patient has had several weeks of poor po intake and abdominal pain. Patient was seen  at [facility], and had CT scan suggestive of colitis. Colonoscopy done there was indeterminate. Patient has continue to have weight loss and poor po intake. Multiple CT scans did not show clear etiology. Upper endoscopy was done last week, and pathology is pending. Patient comes in now with dehydration and vomiting.       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 1000 milliLiter(s) (80 mL/Hr) IV Continuous <Continuous>    MEDICATIONS  (PRN):  acetaminophen   Tablet 650 milliGRAM(s) Oral every 6 hours PRN pain fever  aluminum hydroxide/magnesium hydroxide/simethicone Suspension 30 milliLiter(s) Oral every 4 hours PRN Dyspepsia  ondansetron Injectable 4 milliGRAM(s) IV Push every 6 hours PRN Nausea      Allergies    Crestor (Muscle Pain)  shellfish (Swelling; Rash)    Intolerances        SOCIAL HISTORY:    FAMILY HISTORY:  Family history of cardiac arrest (Father)  Family history of stroke (Mother)  Family history of breast cancer in sister (Sibling)  Family history of hypertension in mother (Mother)        Vital Signs Last 24 Hrs  T(C): 36.8 (08 Aug 2018 06:59), Max: 37.3 (07 Aug 2018 17:27)  T(F): 98.3 (08 Aug 2018 06:59), Max: 99.1 (07 Aug 2018 17:27)  HR: 76 (08 Aug 2018 06:59) (76 - 103)  BP: 98/58 (08 Aug 2018 06:59) (90/61 - 103/63)  BP(mean): --  RR: 18 (08 Aug 2018 06:59) (15 - 18)  SpO2: 100% (08 Aug 2018 06:59) (100% - 100%)    PHYSICAL EXAM:    Respiratory: CTAB  Cardiovascular: S1 and S2, RRR, no M/G/R  Gastrointestinal: BS+, soft, NT/ND  Extremities: No peripheral edema    LABS:                        12.0   6.07  )-----------( 259      ( 07 Aug 2018 17:12 )             37.2     08-07    143  |  108  |  7   ----------------------------<  105<H>  4.3   |  24  |  0.69    Ca    9.0      07 Aug 2018 17:12    TPro  6.2  /  Alb  2.8<L>  /  TBili  0.6  /  DBili  x   /  AST  19  /  ALT  14  /  AlkPhos  57  08-07    PT/INR - ( 07 Aug 2018 19:34 )   PT: 14.5 sec;   INR: 1.33 ratio         PTT - ( 07 Aug 2018 19:34 )  PTT:25.3 sec  LIVER FUNCTIONS - ( 07 Aug 2018 17:12 )  Alb: 2.8 g/dL / Pro: 6.2 gm/dL / ALK PHOS: 57 U/L / ALT: 14 U/L / AST: 19 U/L / GGT: x             RADIOLOGY & ADDITIONAL STUDIES:

## 2018-08-08 NOTE — ED ADULT NURSE REASSESSMENT NOTE - NS ED NURSE REASSESS COMMENT FT1
Pt sleeping comfortably in bed in hallway. No distress at this time. Pt awaiting admission orders. VSS. Comfort and safety maintained. Will continue to monitor.

## 2018-08-08 NOTE — H&P ADULT - HISTORY OF PRESENT ILLNESS
Patient has had several weeks of poor po intake and abdominal pain. Patient was seen  at [facility], and had CT scan suggestive of colitis. Colonoscopy done there was indeterminate. Patient has continue to have weight loss and poor po intake. Multiple CT scans did not show clear etiology. Upper endoscopy was done last week, and pathology is pending. Patient comes in now with dehydration and vomiting.

## 2018-08-09 LAB
ANION GAP SERPL CALC-SCNC: 8 MMOL/L — SIGNIFICANT CHANGE UP (ref 5–17)
BUN SERPL-MCNC: 7 MG/DL — SIGNIFICANT CHANGE UP (ref 7–23)
CALCIUM SERPL-MCNC: 8.5 MG/DL — SIGNIFICANT CHANGE UP (ref 8.5–10.1)
CHLORIDE SERPL-SCNC: 108 MMOL/L — SIGNIFICANT CHANGE UP (ref 96–108)
CO2 SERPL-SCNC: 26 MMOL/L — SIGNIFICANT CHANGE UP (ref 22–31)
CREAT SERPL-MCNC: 0.47 MG/DL — LOW (ref 0.5–1.3)
GLUCOSE BLDC GLUCOMTR-MCNC: 69 MG/DL — LOW (ref 70–99)
GLUCOSE SERPL-MCNC: 68 MG/DL — LOW (ref 70–99)
HCT VFR BLD CALC: 34.2 % — LOW (ref 34.5–45)
HGB BLD-MCNC: 10.9 G/DL — LOW (ref 11.5–15.5)
MCHC RBC-ENTMCNC: 31.3 PG — SIGNIFICANT CHANGE UP (ref 27–34)
MCHC RBC-ENTMCNC: 31.9 GM/DL — LOW (ref 32–36)
MCV RBC AUTO: 98.3 FL — SIGNIFICANT CHANGE UP (ref 80–100)
NRBC # BLD: 0 /100 WBCS — SIGNIFICANT CHANGE UP (ref 0–0)
PLATELET # BLD AUTO: 223 K/UL — SIGNIFICANT CHANGE UP (ref 150–400)
POTASSIUM SERPL-MCNC: 3.7 MMOL/L — SIGNIFICANT CHANGE UP (ref 3.5–5.3)
POTASSIUM SERPL-SCNC: 3.7 MMOL/L — SIGNIFICANT CHANGE UP (ref 3.5–5.3)
RBC # BLD: 3.48 M/UL — LOW (ref 3.8–5.2)
RBC # FLD: 14 % — SIGNIFICANT CHANGE UP (ref 10.3–14.5)
SODIUM SERPL-SCNC: 142 MMOL/L — SIGNIFICANT CHANGE UP (ref 135–145)
WBC # BLD: 5.44 K/UL — SIGNIFICANT CHANGE UP (ref 3.8–10.5)
WBC # FLD AUTO: 5.44 K/UL — SIGNIFICANT CHANGE UP (ref 3.8–10.5)

## 2018-08-09 PROCEDURE — 78264 GASTRIC EMPTYING IMG STUDY: CPT | Mod: 26

## 2018-08-09 RX ORDER — METOCLOPRAMIDE HCL 10 MG
5 TABLET ORAL EVERY 6 HOURS
Qty: 0 | Refills: 0 | Status: DISCONTINUED | OUTPATIENT
Start: 2018-08-09 | End: 2018-08-11

## 2018-08-09 RX ADMIN — HEPARIN SODIUM 5000 UNIT(S): 5000 INJECTION INTRAVENOUS; SUBCUTANEOUS at 05:09

## 2018-08-09 RX ADMIN — ONDANSETRON 4 MILLIGRAM(S): 8 TABLET, FILM COATED ORAL at 15:51

## 2018-08-09 RX ADMIN — HEPARIN SODIUM 5000 UNIT(S): 5000 INJECTION INTRAVENOUS; SUBCUTANEOUS at 17:22

## 2018-08-09 RX ADMIN — Medication 5 MILLIGRAM(S): at 17:22

## 2018-08-09 RX ADMIN — Medication 5 MILLIGRAM(S): at 23:05

## 2018-08-09 RX ADMIN — SODIUM CHLORIDE 80 MILLILITER(S): 9 INJECTION INTRAMUSCULAR; INTRAVENOUS; SUBCUTANEOUS at 05:09

## 2018-08-09 NOTE — PROGRESS NOTE ADULT - SUBJECTIVE AND OBJECTIVE BOX
Patient is a 63y old  Female who presents with a chief complaint of vomiting.  8/9: + gastroparesis    Vital Signs Last 24 Hrs  T(C): 36.6 (09 Aug 2018 14:22), Max: 36.8 (08 Aug 2018 21:18)  T(F): 97.9 (09 Aug 2018 14:22), Max: 98.2 (08 Aug 2018 21:18)  HR: 73 (09 Aug 2018 14:22) (73 - 79)  BP: 102/52 (09 Aug 2018 14:22) (102/52 - 113/52)  BP(mean): --  RR: 16 (09 Aug 2018 14:22) (16 - 18)  SpO2: 98% (09 Aug 2018 14:22) (96% - 99%)    PHYSICAL EXAM:    Respiratory: CTAB  Cardiovascular: S1 and S2, RRR, no M/G/R  Gastrointestinal: BS+, soft, NT/ND  Extremities: No peripheral edema    LABS:                        12.0   6.07  )-----------( 259      ( 07 Aug 2018 17:12 )             37.2     08-07    143  |  108  |  7   ----------------------------<  105<H>  4.3   |  24  |  0.69    Ca    9.0      07 Aug 2018 17:12    TPro  6.2  /  Alb  2.8<L>  /  TBili  0.6  /  DBili  x   /  AST  19  /  ALT  14  /  AlkPhos  57  08-07    PT/INR - ( 07 Aug 2018 19:34 )   PT: 14.5 sec;   INR: 1.33 ratio         PTT - ( 07 Aug 2018 19:34 )  PTT:25.3 sec  LIVER FUNCTIONS - ( 07 Aug 2018 17:12 )  Alb: 2.8 g/dL / Pro: 6.2 gm/dL / ALK PHOS: 57 U/L / ALT: 14 U/L / AST: 19 U/L / GGT: x             A/P:    * Nausea, vomiting  - sec to gastroparesis  - reglan  - dc planning

## 2018-08-09 NOTE — PROGRESS NOTE ADULT - SUBJECTIVE AND OBJECTIVE BOX
Patient is a 63y old  Female who presents with a chief complaint of     Subective:  Still with N/V. Food doesn't stay down    PAST MEDICAL & SURGICAL HISTORY:  Anemia  Weakness  Nausea & vomiting  CHF (congestive heart failure): 12/2017 resolved  Breast cancer: left 2017, had surgery and radiation  High cholesterol: d/c&#x27;d by prescribing MD in April &quot;since I&#x27;m not eating&quot;  High blood pressure: reports medication was stopped April 2018 because BP was low  S/P colonoscopy: 2018  History of total hip replacement, left: march 2018  History of hip replacement, total, right: 11/2017  History of lumpectomy of left breast: 2017  Atypical lipoma of soft tissue: in left groin 2008  H/O foot surgery: ankle left 2004      MEDICATIONS  (STANDING):  heparin  Injectable 5000 Unit(s) SubCutaneous every 12 hours  metoclopramide Injectable 5 milliGRAM(s) IV Push every 6 hours  sodium chloride 0.9%. 1000 milliLiter(s) (80 mL/Hr) IV Continuous <Continuous>    MEDICATIONS  (PRN):  acetaminophen   Tablet 650 milliGRAM(s) Oral every 6 hours PRN pain fever  aluminum hydroxide/magnesium hydroxide/simethicone Suspension 30 milliLiter(s) Oral every 4 hours PRN Dyspepsia  ondansetron Injectable 4 milliGRAM(s) IV Push every 6 hours PRN Nausea      REVIEW OF SYSTEMS:    RESPIRATORY: No shortness of breath  CARDIOVASCULAR: No chest pain  All other review of systems is negative unless indicated above.    Vital Signs Last 24 Hrs  T(C): 36.6 (09 Aug 2018 14:22), Max: 36.8 (08 Aug 2018 21:18)  T(F): 97.9 (09 Aug 2018 14:22), Max: 98.2 (08 Aug 2018 21:18)  HR: 73 (09 Aug 2018 14:22) (73 - 79)  BP: 102/52 (09 Aug 2018 14:22) (102/52 - 113/52)  BP(mean): --  RR: 16 (09 Aug 2018 14:22) (16 - 18)  SpO2: 98% (09 Aug 2018 14:22) (96% - 99%)    PHYSICAL EXAM:    Constitutional: NAD, well-developed  Respiratory: CTAB  Cardiovascular: S1 and S2, RRR  Gastrointestinal: BS+, soft, NT/ND  Extremities: No peripheral edema  Psychiatric: Normal mood, normal affect    LABS:                        10.9   5.44  )-----------( 223      ( 09 Aug 2018 06:06 )             34.2     08-09    142  |  108  |  7   ----------------------------<  68<L>  3.7   |  26  |  0.47<L>    Ca    8.5      09 Aug 2018 06:06    TPro  6.2  /  Alb  2.8<L>  /  TBili  0.6  /  DBili  x   /  AST  19  /  ALT  14  /  AlkPhos  57  08-07    PT/INR - ( 07 Aug 2018 19:34 )   PT: 14.5 sec;   INR: 1.33 ratio         PTT - ( 07 Aug 2018 19:34 )  PTT:25.3 sec  LIVER FUNCTIONS - ( 07 Aug 2018 17:12 )  Alb: 2.8 g/dL / Pro: 6.2 gm/dL / ALK PHOS: 57 U/L / ALT: 14 U/L / AST: 19 U/L / GGT: x             RADIOLOGY & ADDITIONAL STUDIES:  < from: NM Gastric Emptying Solid (08.09.18 @ 13:12) >    EXAM:  NM GASTRIC EMPTYING SOLID                            PROCEDURE DATE:  08/09/2018          INTERPRETATION:  CLINICAL INFORMATION: 63 year-old female with difficulty   swallowing, vomiting, weight loss, gastric neoplasm, referred to evaluate   for gastroparesis.    RADIOPHARMACEUTICAL: 1.2 mCi Tc99m sulfur colloid mixed in a meal, p.o.    MEDICATIONS: Within 24 hours before the test, the patient was not taking   any medications, which could affect the test results.    TECHNIQUE: Immediately before the beginning of the study, the patient's   fasting blood sugar was measured and was 67 mg/dL. The patient consumed   45% of the radiolabeled meal consisting of 4 oz. of cooked egg whites,   two slices of toasted white bread with approximately 30 g of strawberry   jam, and 4 oz. of water over about 20 minutes. There was no postprandial   vomiting. Anterior and posterior one-minute images of the stomach were   obtained in the supine position as soon as the patient finished the meal,   and were repeated one, two, three and four hours later. After calculation   of the geometric mean, and correction for isotope decay, the percent of   activity remaining in the stomach at 1, 2, 3 and 4 hours was determined.    COMPARISON: No previous studies emptying study for comparison    FINDINGS:           TIME              % RETENTION                  NORMAL VALUES             0                           100%           1                           96                                 37-90%  2                           91                                 30-60%           3                           84                                  -----------------           4                           75                                  0-10%      IMPRESSION: Abnormal gastric emptying study compatible with gastroparesis.                  KATARINA PETTIT   This document has been electronically signed. Aug  9 2018  1:18PM                < end of copied text >

## 2018-08-10 LAB
HCT VFR BLD CALC: 32.8 % — LOW (ref 34.5–45)
HGB BLD-MCNC: 10.5 G/DL — LOW (ref 11.5–15.5)
MCHC RBC-ENTMCNC: 31.4 PG — SIGNIFICANT CHANGE UP (ref 27–34)
MCHC RBC-ENTMCNC: 32 GM/DL — SIGNIFICANT CHANGE UP (ref 32–36)
MCV RBC AUTO: 98.2 FL — SIGNIFICANT CHANGE UP (ref 80–100)
NRBC # BLD: 0 /100 WBCS — SIGNIFICANT CHANGE UP (ref 0–0)
PLATELET # BLD AUTO: 218 K/UL — SIGNIFICANT CHANGE UP (ref 150–400)
RBC # BLD: 3.34 M/UL — LOW (ref 3.8–5.2)
RBC # FLD: 13.9 % — SIGNIFICANT CHANGE UP (ref 10.3–14.5)
WBC # BLD: 4.99 K/UL — SIGNIFICANT CHANGE UP (ref 3.8–10.5)
WBC # FLD AUTO: 4.99 K/UL — SIGNIFICANT CHANGE UP (ref 3.8–10.5)

## 2018-08-10 RX ADMIN — Medication 5 MILLIGRAM(S): at 12:23

## 2018-08-10 RX ADMIN — ONDANSETRON 4 MILLIGRAM(S): 8 TABLET, FILM COATED ORAL at 10:18

## 2018-08-10 RX ADMIN — HEPARIN SODIUM 5000 UNIT(S): 5000 INJECTION INTRAVENOUS; SUBCUTANEOUS at 17:45

## 2018-08-10 RX ADMIN — SODIUM CHLORIDE 80 MILLILITER(S): 9 INJECTION INTRAMUSCULAR; INTRAVENOUS; SUBCUTANEOUS at 17:36

## 2018-08-10 RX ADMIN — Medication 5 MILLIGRAM(S): at 17:45

## 2018-08-10 RX ADMIN — SODIUM CHLORIDE 80 MILLILITER(S): 9 INJECTION INTRAMUSCULAR; INTRAVENOUS; SUBCUTANEOUS at 05:19

## 2018-08-10 RX ADMIN — Medication 5 MILLIGRAM(S): at 23:00

## 2018-08-10 RX ADMIN — Medication 5 MILLIGRAM(S): at 05:15

## 2018-08-10 RX ADMIN — HEPARIN SODIUM 5000 UNIT(S): 5000 INJECTION INTRAVENOUS; SUBCUTANEOUS at 05:15

## 2018-08-10 NOTE — PROGRESS NOTE ADULT - SUBJECTIVE AND OBJECTIVE BOX
10.5   4.99  )-----------( 218      ( 10 Aug 2018 06:02 )             32.8       Vital Signs Last 24 Hrs  T(C): 36.6 (10 Aug 2018 04:19), Max: 36.7 (09 Aug 2018 20:09)  T(F): 97.8 (10 Aug 2018 04:19), Max: 98.1 (09 Aug 2018 20:09)  HR: 79 (10 Aug 2018 04:19) (73 - 79)  BP: 106/52 (10 Aug 2018 04:19) (102/52 - 118/55)  BP(mean): --  RR: 17 (10 Aug 2018 04:19) (16 - 17)  SpO2: 99% (10 Aug 2018 04:19) (98% - 99%)    PHYSICAL EXAM:    Respiratory: CTAB  Cardiovascular: S1 and S2, RRR, no M/G/R  Gastrointestinal: BS+, soft, NT/ND  Extremities: No peripheral edema        A/P:    * Nausea, vomiting  - sec to gastroparesis  - reglan  - dc planning

## 2018-08-10 NOTE — PHYSICAL THERAPY INITIAL EVALUATION ADULT - MODALITIES TREATMENT COMMENTS
Patient left OOB in chair with CBIR., Patient instructed to call for assistance back to bed or the bathroom, understands same. Performed chair therapeutic exercises well.  RN informed of status.

## 2018-08-10 NOTE — PROGRESS NOTE ADULT - SUBJECTIVE AND OBJECTIVE BOX
Patient is a 63y old  Female who presents with a chief complaint of vomiting (08 Aug 2018 15:04)    HPI:  Patient has had several weeks of poor po intake and abdominal pain. Patient was seen  at [facility], and had CT scan suggestive of colitis. Colonoscopy done there was indeterminate. Patient has continue to have weight loss and poor po intake. Multiple CT scans did not show clear etiology. Upper endoscopy was done last week, and pathology is pending. Patient comes in now with dehydration and vomiting. (08 Aug 2018 15:04)    8/10/2018-  Pt feels fine today, with occasional nausea.  Denies any emesis.  About to eat breakfast.     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 1000 milliLiter(s) (80 mL/Hr) IV Continuous <Continuous>    MEDICATIONS  (PRN):  acetaminophen   Tablet 650 milliGRAM(s) Oral every 6 hours PRN pain fever  aluminum hydroxide/magnesium hydroxide/simethicone Suspension 30 milliLiter(s) Oral every 4 hours PRN Dyspepsia  ondansetron Injectable 4 milliGRAM(s) IV Push every 6 hours PRN Nausea    Allergies    Crestor (Muscle Pain)  shellfish (Swelling; Rash)    FAMILY HISTORY:  Family history of cardiac arrest (Father)  Family history of stroke (Mother)  Family history of breast cancer in sister (Sibling)  Family history of hypertension in mother (Mother)    REVIEW OF SYSTEMS:  CONSTITUTIONAL: No weakness, fevers or chills  RESPIRATORY: No cough, wheezing, hemoptysis; No shortness of breath  CARDIOVASCULAR: No chest pain or palpitations  GASTROINTESTINAL: Per HPI.   All other review of systems is negative unless indicated above.    Vital Signs Last 24 Hrs  T(C): 36.6 (10 Aug 2018 04:19), Max: 36.7 (09 Aug 2018 20:09)  T(F): 97.8 (10 Aug 2018 04:19), Max: 98.1 (09 Aug 2018 20:09)  HR: 79 (10 Aug 2018 04:19) (73 - 79)  BP: 106/52 (10 Aug 2018 04:19) (102/52 - 118/55)  BP(mean): --  RR: 17 (10 Aug 2018 04:19) (16 - 17)  SpO2: 99% (10 Aug 2018 04:19) (98% - 99%)    PHYSICAL EXAM:  Constitutional: NAD, well-developed  Respiratory: CTAB  Cardiovascular: S1 and S2, RRR, no M/G/R  Gastrointestinal: BS+, soft, NT/ND  LABS:                        10.5   4.99  )-----------( 218      ( 10 Aug 2018 06:02 )             32.8     08-09    142  |  108  |  7   ----------------------------<  68<L>  3.7   |  26  |  0.47<L>    Ca    8.5      09 Aug 2018 06:06            RADIOLOGY & ADDITIONAL STUDIES:

## 2018-08-10 NOTE — PHYSICAL THERAPY INITIAL EVALUATION ADULT - ACTIVE RANGE OF MOTION EXAMINATION, REHAB EVAL
deficits as listed below/bilateral upper extremity Active ROM was WFL (within functional limits)/R knee flexion 0-70*/bilateral  lower extremity Active ROM was WFL (within functional limits)

## 2018-08-10 NOTE — PHYSICAL THERAPY INITIAL EVALUATION ADULT - PERTINENT HX OF CURRENT PROBLEM, REHAB EVAL
62 yo F admitted c/o N/V. Patient has had several weeks of poor po intake and abdominal pain. Patient was seen  at [facility], and had CT scan suggestive of colitis. Colonoscopy done there was indeterminate. Patient has continue to have weight loss and poor po intake. Multiple CT scans did not show clear etiology. Upper endoscopy was done last week, and pathology is pending. Patient comes in now with dehydration and vomiting. (08 Aug 2018 15:04)

## 2018-08-10 NOTE — PHYSICAL THERAPY INITIAL EVALUATION ADULT - GENERAL OBSERVATIONS, REHAB EVAL
Patient received in bed, just post hygiene care by CNAs. +adult diaper. +IV.  Patient c/o LBP with activity, limiting mobility.

## 2018-08-10 NOTE — PROGRESS NOTE ADULT - ASSESSMENT
Imp:  N/V/JOSELIN 2/2 new diagnosis of gastroparesis.     Plan:  Slowly improving on reglan.  Advance diet as tolerated-discussed with RN.  Continue reglan 5mg OTC for new diagnose of gastroparesis.   When clinically stable, please discharge on reglan 5mg PO Q 6hrs.  Pt to f/u as outpatient in office .     Discussed with pt, nurse, Dr. Kemp.

## 2018-08-11 RX ORDER — METOCLOPRAMIDE HCL 10 MG
10 TABLET ORAL THREE TIMES A DAY
Qty: 0 | Refills: 0 | Status: DISCONTINUED | OUTPATIENT
Start: 2018-08-11 | End: 2018-08-16

## 2018-08-11 RX ADMIN — HEPARIN SODIUM 5000 UNIT(S): 5000 INJECTION INTRAVENOUS; SUBCUTANEOUS at 05:18

## 2018-08-11 RX ADMIN — SODIUM CHLORIDE 80 MILLILITER(S): 9 INJECTION INTRAMUSCULAR; INTRAVENOUS; SUBCUTANEOUS at 18:16

## 2018-08-11 RX ADMIN — Medication 10 MILLIGRAM(S): at 21:16

## 2018-08-11 RX ADMIN — Medication 5 MILLIGRAM(S): at 05:18

## 2018-08-11 RX ADMIN — SODIUM CHLORIDE 80 MILLILITER(S): 9 INJECTION INTRAMUSCULAR; INTRAVENOUS; SUBCUTANEOUS at 05:18

## 2018-08-11 RX ADMIN — HEPARIN SODIUM 5000 UNIT(S): 5000 INJECTION INTRAVENOUS; SUBCUTANEOUS at 18:15

## 2018-08-11 RX ADMIN — Medication 10 MILLIGRAM(S): at 14:33

## 2018-08-11 NOTE — PROGRESS NOTE ADULT - SUBJECTIVE AND OBJECTIVE BOX
8/10: c/w nausea        Vital Signs Last 24 Hrs  T(C): 36.7 (11 Aug 2018 04:42), Max: 36.7 (10 Aug 2018 20:51)  T(F): 98 (11 Aug 2018 04:42), Max: 98 (10 Aug 2018 20:51)  HR: 79 (11 Aug 2018 04:42) (79 - 102)  BP: 118/57 (11 Aug 2018 04:42) (105/52 - 118/57)  BP(mean): --  RR: 18 (11 Aug 2018 04:42) (16 - 18)  SpO2: 96% (11 Aug 2018 04:42) (96% - 99%)    PHYSICAL EXAM:    Respiratory: CTAB  Cardiovascular: S1 and S2, RRR, no M/G/R  Gastrointestinal: BS+, soft, NT/ND  Extremities: No peripheral edema        A/P:    * Nausea, vomiting  - sec to gastroparesis  - increase  reglan to 10 mg TID  - dc planning    * Rehab next week    * h/o Breast Ca in remission    Limited options for Rx

## 2018-08-12 ENCOUNTER — TRANSCRIPTION ENCOUNTER (OUTPATIENT)
Age: 63
End: 2018-08-12

## 2018-08-12 LAB
ANION GAP SERPL CALC-SCNC: 7 MMOL/L — SIGNIFICANT CHANGE UP (ref 5–17)
BUN SERPL-MCNC: 2 MG/DL — LOW (ref 7–23)
CALCIUM SERPL-MCNC: 8.2 MG/DL — LOW (ref 8.5–10.1)
CHLORIDE SERPL-SCNC: 110 MMOL/L — HIGH (ref 96–108)
CO2 SERPL-SCNC: 26 MMOL/L — SIGNIFICANT CHANGE UP (ref 22–31)
CREAT SERPL-MCNC: 0.47 MG/DL — LOW (ref 0.5–1.3)
GLUCOSE SERPL-MCNC: 97 MG/DL — SIGNIFICANT CHANGE UP (ref 70–99)
POTASSIUM SERPL-MCNC: 3.7 MMOL/L — SIGNIFICANT CHANGE UP (ref 3.5–5.3)
POTASSIUM SERPL-SCNC: 3.7 MMOL/L — SIGNIFICANT CHANGE UP (ref 3.5–5.3)
SODIUM SERPL-SCNC: 143 MMOL/L — SIGNIFICANT CHANGE UP (ref 135–145)

## 2018-08-12 RX ORDER — TAMOXIFEN CITRATE 20 MG/1
20 TABLET, FILM COATED ORAL DAILY
Qty: 0 | Refills: 0 | Status: DISCONTINUED | OUTPATIENT
Start: 2018-08-12 | End: 2018-08-16

## 2018-08-12 RX ADMIN — HEPARIN SODIUM 5000 UNIT(S): 5000 INJECTION INTRAVENOUS; SUBCUTANEOUS at 17:49

## 2018-08-12 RX ADMIN — SODIUM CHLORIDE 80 MILLILITER(S): 9 INJECTION INTRAMUSCULAR; INTRAVENOUS; SUBCUTANEOUS at 06:45

## 2018-08-12 RX ADMIN — Medication 10 MILLIGRAM(S): at 13:56

## 2018-08-12 RX ADMIN — TAMOXIFEN CITRATE 20 MILLIGRAM(S): 20 TABLET, FILM COATED ORAL at 13:56

## 2018-08-12 RX ADMIN — Medication 10 MILLIGRAM(S): at 05:06

## 2018-08-12 RX ADMIN — Medication 10 MILLIGRAM(S): at 22:11

## 2018-08-12 RX ADMIN — HEPARIN SODIUM 5000 UNIT(S): 5000 INJECTION INTRAVENOUS; SUBCUTANEOUS at 05:06

## 2018-08-12 NOTE — DIETITIAN INITIAL EVALUATION ADULT. - ORAL INTAKE PTA
Pt reports very poor intake over the past 3 months. Pt states she was not able to tolerate much PO during this time./poor

## 2018-08-12 NOTE — DISCHARGE NOTE ADULT - CARE PROVIDER_API CALL
Vern Aquino), Gastroenterology; Internal Medicine  [address] [place] [address]  Phone: [phone]  Fax: [phone]

## 2018-08-12 NOTE — DIETITIAN INITIAL EVALUATION ADULT. - EST PROTEIN NEEDS7
I will SWITCH the dose or number of times a day I take the medications listed below when I get home from the hospital:  None
81.6

## 2018-08-12 NOTE — DISCHARGE NOTE ADULT - HOSPITAL COURSE
Pt adm with N/V and was dx with gastroparesis; had extensive prior gi workup.      PHYSICAL EXAM:    Respiratory: CTAB  Cardiovascular: S1 and S2, RRR, no M/G/R  Gastrointestinal: BS+, soft, NT/ND  Extremities: No peripheral edema        A/P:    * Nausea, vomiting  - sec to gastroparesis  - c/w  reglan to 10 mg TID  - dc planning      * h/o Breast Ca in remission    Limited options for Rx Pt adm with N/V and was dx with gastroparesis; had extensive prior gi workup.      PHYSICAL EXAM:    Respiratory: CTAB  Cardiovascular: S1 and S2, RRR, no M/G/R  Gastrointestinal: BS+, soft, NT/ND  Extremities: No peripheral edema        A/P:    * Nausea, vomiting  - sec to gastroparesis  - c/w  reglan to 10 mg TID  - dc planning      * h/o Breast Ca in remission    *  Indeterminate anterior right lower pole renal lesion measuring 1.3 cm unchanged  - pt aware will fup as outpt CC: nausea and vomiting    HPI: Patient has had several weeks of poor po intake and abdominal pain. Patient was seen  at [facility], and had CT scan suggestive of colitis. Colonoscopy done there was indeterminate. Patient has continue to have weight loss and poor po intake. Multiple CT scans did not show clear etiology. Upper endoscopy was done last week. Patient comes in now with dehydration and vomiting.     Hosp course: found to have gastroparesis on gastric emptying study, started on higher doses of Reglan w/ improvement in GI symptoms.     Subj: Doing well, no abd pain. Last BM 2 days ago otherwise no abd pain and eating smaller meals, no further emesis.   ROS: neg unless stated above.     Vital Signs Last 24 Hrs  T(C): 36.6 (14 Aug 2018 04:29), Max: 37 (13 Aug 2018 13:16)  T(F): 97.9 (14 Aug 2018 04:29), Max: 98.6 (13 Aug 2018 13:16)  HR: 84 (14 Aug 2018 04:29) (84 - 89)  BP: 109/52 (14 Aug 2018 04:29) (101/53 - 128/53)  BP(mean): --  RR: 18 (14 Aug 2018 04:29) (16 - 18)  SpO2: 95% (14 Aug 2018 04:29) (95% - 97%)    PHYSICAL EXAM:  GEN: awake and alert, NAD doing well.   Respiratory: CTAB  Cardiovascular: S1 and S2, RRR, no M/G/R  Gastrointestinal: BS+, soft, NT/ND  Extremities: No peripheral edema  Neuro: no neuro deficits.  Skin : no signs of cellulitis.     All labs/images reviewed.     A/P:    * Nausea, vomiting- sec to gastroparesis seen on Gastric emptying study.   - c/w  reglan to 10 mg TID, doing well and tolerating meals.   - dc planning    * h/o Breast Ca in remission - continue home med Tamoxifen.     *  Indeterminate anterior right lower pole renal lesion measuring 1.3 cm unchanged  - pt aware will fup as outpt    DC to [facility] today for increased mobility and muscle strengthening.   Discussed on IDR and w/ patient.   Total time > 40 mins.

## 2018-08-12 NOTE — DISCHARGE NOTE ADULT - PLAN OF CARE
stable f/up with GI as an outpatient; take reglan before meals f/up with GI as an outpatient; take reglan before meals  - abnormal CT scan f/up as outpatient kidney lesion:  Indeterminate anterior right lower   pole renal lesion measuring 1.3 cm unchanged

## 2018-08-12 NOTE — PROGRESS NOTE ADULT - SUBJECTIVE AND OBJECTIVE BOX
8/12: nausea improved with higher dose reglan before meal; improved 80-90%    Vital Signs Last 24 Hrs  T(C): 36.7 (12 Aug 2018 04:33), Max: 36.9 (11 Aug 2018 19:48)  T(F): 98.1 (12 Aug 2018 04:33), Max: 98.4 (11 Aug 2018 19:48)  HR: 98 (12 Aug 2018 04:33) (79 - 98)  BP: 120/56 (12 Aug 2018 04:33) (106/59 - 120/56)  BP(mean): --  RR: 18 (12 Aug 2018 04:33) (18 - 18)  SpO2: 98% (12 Aug 2018 04:33) (95% - 98%)    08-12    143  |  110<H>  |  2<L>  ----------------------------<  97  3.7   |  26  |  0.47<L>    Ca    8.2<L>      12 Aug 2018 07:59        PHYSICAL EXAM:    Respiratory: CTAB  Cardiovascular: S1 and S2, RRR, no M/G/R  Gastrointestinal: BS+, soft, NT/ND  Extremities: No peripheral edema        A/P:    * Nausea, vomiting extensive work up as an outpatient  - sec to gastroparesis  - symptoms much improved with higher doses reglan before meal  - dc planning    * Rehab mon    * h/o Breast Ca in remission    * Incidentalomas:    - Indeterminate anterior right lower pole renal lesion measuring 1.3 cm unchanged  Outpt f/up 8/12: nausea improved with higher dose reglan before meal; improved 80-90%    Vital Signs Last 24 Hrs  T(C): 36.7 (12 Aug 2018 04:33), Max: 36.9 (11 Aug 2018 19:48)  T(F): 98.1 (12 Aug 2018 04:33), Max: 98.4 (11 Aug 2018 19:48)  HR: 98 (12 Aug 2018 04:33) (79 - 98)  BP: 120/56 (12 Aug 2018 04:33) (106/59 - 120/56)  BP(mean): --  RR: 18 (12 Aug 2018 04:33) (18 - 18)  SpO2: 98% (12 Aug 2018 04:33) (95% - 98%)    08-12    143  |  110<H>  |  2<L>  ----------------------------<  97  3.7   |  26  |  0.47<L>    Ca    8.2<L>      12 Aug 2018 07:59        PHYSICAL EXAM:    Respiratory: CTAB  Cardiovascular: S1 and S2, RRR, no M/G/R  Gastrointestinal: BS+, soft, NT/ND  Extremities: No peripheral edema        A/P:    * Nausea, vomiting extensive work up as an outpatient  - sec to gastroparesis  - symptoms much improved with higher doses reglan before meal  - dc planning- Rehab      * h/o Breast Ca in remission  - tamoxifen  * Gives h/o remote chf in the past\- off all meds at home b/o low BP  - Dr. Bonner is her cardio and she had recent f/up with him    * Incidentalomas:    - Indeterminate anterior right lower pole renal lesion measuring 1.3 cm unchanged  Outpt f/up

## 2018-08-12 NOTE — DIETITIAN INITIAL EVALUATION ADULT. - OTHER INFO
Pt seen for LOS, screened for malnutrition due to clinically significant weight change. Pt's pmhx noted below, sig for breast CA, Nausea and vomiting, high cholesterol and CHF. Pt admitted with gastroparesis. Pt reports over the past 3-4 months she has been unable to tolerate PO intake (not tolerating fluids or liquids). Pt states she would experience vomiting, nausea and diarrhea. Pt denies chewing and swallowing difficutly. Pt noted she tried to use oral supplements 9ensure), but was unable to tolerate them. Pt noted in MD chart, on reglan and is starting to tolerate meals. NFPE performed on pt, noted with moderate muscle wasting in clavicle, temple, deltoid and interosseous, mild muscle wasting in calves and quads. Pt Noted with mild fat wasting in triceps. Pt meets criteria for severe protein-calorie malnutrition in the context of chronic illness or injury. Recommend ensure enlive BID, encourage PO, monitor weight and intake. If pt's PO intake decreases again, may need to consider artifical nutrition. Will continue to monitor pt's nutritional status.

## 2018-08-12 NOTE — DIETITIAN INITIAL EVALUATION ADULT. - NS AS NUTRI DX NUTRIENT
Meets criteria for severe protein-calorie malnutrition in the context of chronic illness and injury/Malnutrition

## 2018-08-12 NOTE — CHART NOTE - NSCHARTNOTEFT_GEN_A_CORE
Upon Nutritional Assessment by the Registered Dietitian your patient was determined to meet criteria / has evidence of the following diagnosis/diagnoses:          [ ]  Mild Protein Calorie Malnutrition        [ ]  Moderate Protein Calorie Malnutrition        [x] Severe Protein Calorie Malnutrition        [ ] Unspecified Protein Calorie Malnutrition        [ ] Underweight / BMI <19        [ ] Morbid Obesity / BMI > 40      Findings as based on:  •  Comprehensive nutrition assessment and consultation  •  Calorie counts (nutrient intake analysis)  •  Food acceptance and intake status from observations by staff  •  Follow up  •  Patient education  •  Intervention secondary to interdisciplinary rounds  •   concerns      Treatment:    The following diet has been recommended:  -Ensure enlive BID  -encourage PO intake  -Monitor weight    PROVIDER Section:     By signing this assessment you are acknowledging and agree with the diagnosis/diagnoses assigned by the Registered Dietitian    Comments:

## 2018-08-12 NOTE — DISCHARGE NOTE ADULT - MEDICATION SUMMARY - MEDICATIONS TO TAKE
I will START or STAY ON the medications listed below when I get home from the hospital:    ondansetron 4 mg oral tablet, disintegrating  -- 1 tab(s) by mouth every 4 to 6 hours, As Needed  -- Indication: For Nausea    metoclopramide 10 mg oral tablet  -- 1 tab(s) by mouth 3 times a day  -- Indication: For GASTRoparesis    tamoxifen 20 mg oral tablet  -- 1 tab(s) by mouth once a day  -- Indication: For History of breast cacner    Pepcid 20 mg oral tablet  -- 1 tab(s) by mouth 2 times a day, As Needed  -- Indication: For reflux    Vitamin D3  -- Indication: For Vitamin D

## 2018-08-12 NOTE — DISCHARGE NOTE ADULT - CARE PLAN
Principal Discharge DX:	Gastroparesis  Goal:	stable  Assessment and plan of treatment:	f/up with GI as an outpatient; take reglan before meals Principal Discharge DX:	Gastroparesis  Goal:	stable  Assessment and plan of treatment:	f/up with GI as an outpatient; take reglan before meals  - abnormal CT scan f/up as outpatient kidney lesion:  Indeterminate anterior right lower   pole renal lesion measuring 1.3 cm unchanged

## 2018-08-12 NOTE — DISCHARGE NOTE ADULT - PATIENT PORTAL LINK FT
You can access the [facility] Patient Portal, offered by [facility], by registering with the following website: [email or web address]

## 2018-08-13 RX ADMIN — SODIUM CHLORIDE 80 MILLILITER(S): 9 INJECTION INTRAMUSCULAR; INTRAVENOUS; SUBCUTANEOUS at 21:33

## 2018-08-13 RX ADMIN — TAMOXIFEN CITRATE 20 MILLIGRAM(S): 20 TABLET, FILM COATED ORAL at 14:04

## 2018-08-13 RX ADMIN — Medication 10 MILLIGRAM(S): at 14:04

## 2018-08-13 RX ADMIN — HEPARIN SODIUM 5000 UNIT(S): 5000 INJECTION INTRAVENOUS; SUBCUTANEOUS at 05:07

## 2018-08-13 RX ADMIN — Medication 10 MILLIGRAM(S): at 05:07

## 2018-08-13 RX ADMIN — HEPARIN SODIUM 5000 UNIT(S): 5000 INJECTION INTRAVENOUS; SUBCUTANEOUS at 17:18

## 2018-08-13 RX ADMIN — Medication 10 MILLIGRAM(S): at 21:33

## 2018-08-13 NOTE — PROGRESS NOTE ADULT - SUBJECTIVE AND OBJECTIVE BOX
CC: nausea and vomiting    Subj: Feels better, able to eat w/o emesis. No abd pain. + BM yesterday, none today.   ROS: neg unless stated above.     Vital Signs Last 24 Hrs  T(C): 37 (13 Aug 2018 13:16), Max: 37 (13 Aug 2018 13:16)  T(F): 98.6 (13 Aug 2018 13:16), Max: 98.6 (13 Aug 2018 13:16)  HR: 89 (13 Aug 2018 13:16) (83 - 89)  BP: 101/53 (13 Aug 2018 13:16) (101/53 - 130/58)  BP(mean): --  RR: 16 (13 Aug 2018 13:16) (16 - 18)  SpO2: 96% (13 Aug 2018 13:16) (96% - 98%)    PHYSICAL EXAM:  Constitutional: NAD, awake and alert, well-developed  HEENT: PERR, EOMI, Normal Hearing, MMM  Neck: Soft and supple, No LAD, No JVD  Respiratory: Breath sounds are clear bilaterally, No wheezing, rales or rhonchi  Cardiovascular: S1 and S2, regular rate and rhythm, no Murmurs, gallops or rubs  Gastrointestinal: Bowel Sounds present, soft, nontender, nondistended, no guarding, no rebound  Extremities: No peripheral edema  Vascular: 2+ peripheral pulses  Neurological: A/O x 3, no focal deficits  Musculoskeletal: 5/5 strength b/l upper and lower extremities  Skin: No rashes    MEDICATIONS  (STANDING):  heparin  Injectable 5000 Unit(s) SubCutaneous every 12 hours  metoclopramide 10 milliGRAM(s) Oral three times a day  sodium chloride 0.9%. 1000 milliLiter(s) (80 mL/Hr) IV Continuous <Continuous>  tamoxifen 20 milliGRAM(s) Oral daily      LABS: All Labs Reviewed:    08-12    143  |  110<H>  |  2<L>  ----------------------------<  97  3.7   |  26  |  0.47<L>    Ca    8.2<L>      12 Aug 2018 07:59      A/P:    * Nausea, vomiting extensive work up as an outpatient  - sec to gastroparesis noted on gastric emptying study done here.   - symptoms much improved with higher doses reglan before meal  - dc planning- Rehab tomorrow.     * h/o Breast Ca in remission  - tamoxifen  * Gives h/o remote chf in the past\- off all meds at home b/o low BP  - Dr. Bonner is her cardio and she had recent f/up with him    * Incidentalomas:    - Indeterminate anterior right lower pole renal lesion measuring 1.3 cm unchanged  Outpt f/up      Dispo: remain inpatient pending [facility] placement tomorrow. Discussed w/ staff.   Total time > 35 mins.

## 2018-08-14 RX ORDER — METOCLOPRAMIDE HCL 10 MG
1 TABLET ORAL
Qty: 0 | Refills: 0 | COMMUNITY
Start: 2018-08-14

## 2018-08-14 RX ORDER — DOCUSATE SODIUM 100 MG
200 CAPSULE ORAL
Qty: 0 | Refills: 0 | Status: DISCONTINUED | OUTPATIENT
Start: 2018-08-14 | End: 2018-08-16

## 2018-08-14 RX ADMIN — Medication 200 MILLIGRAM(S): at 21:21

## 2018-08-14 RX ADMIN — TAMOXIFEN CITRATE 20 MILLIGRAM(S): 20 TABLET, FILM COATED ORAL at 11:32

## 2018-08-14 RX ADMIN — HEPARIN SODIUM 5000 UNIT(S): 5000 INJECTION INTRAVENOUS; SUBCUTANEOUS at 05:37

## 2018-08-14 RX ADMIN — Medication 650 MILLIGRAM(S): at 10:07

## 2018-08-14 RX ADMIN — Medication 10 MILLIGRAM(S): at 05:37

## 2018-08-14 RX ADMIN — Medication 200 MILLIGRAM(S): at 11:59

## 2018-08-14 RX ADMIN — Medication 10 MILLIGRAM(S): at 21:12

## 2018-08-14 RX ADMIN — HEPARIN SODIUM 5000 UNIT(S): 5000 INJECTION INTRAVENOUS; SUBCUTANEOUS at 17:48

## 2018-08-14 RX ADMIN — Medication 10 MILLIGRAM(S): at 14:21

## 2018-08-15 RX ORDER — MAGNESIUM HYDROXIDE 400 MG/1
30 TABLET, CHEWABLE ORAL DAILY
Qty: 0 | Refills: 0 | Status: DISCONTINUED | OUTPATIENT
Start: 2018-08-15 | End: 2018-08-16

## 2018-08-15 RX ADMIN — TAMOXIFEN CITRATE 20 MILLIGRAM(S): 20 TABLET, FILM COATED ORAL at 11:29

## 2018-08-15 RX ADMIN — MAGNESIUM HYDROXIDE 30 MILLILITER(S): 400 TABLET, CHEWABLE ORAL at 11:29

## 2018-08-15 RX ADMIN — Medication 10 MILLIGRAM(S): at 14:08

## 2018-08-15 RX ADMIN — HEPARIN SODIUM 5000 UNIT(S): 5000 INJECTION INTRAVENOUS; SUBCUTANEOUS at 17:39

## 2018-08-15 RX ADMIN — Medication 10 MILLIGRAM(S): at 05:54

## 2018-08-15 RX ADMIN — Medication 650 MILLIGRAM(S): at 14:08

## 2018-08-15 RX ADMIN — HEPARIN SODIUM 5000 UNIT(S): 5000 INJECTION INTRAVENOUS; SUBCUTANEOUS at 05:54

## 2018-08-15 RX ADMIN — Medication 10 MILLIGRAM(S): at 21:39

## 2018-08-15 NOTE — PROGRESS NOTE ADULT - SUBJECTIVE AND OBJECTIVE BOX
CC: nausea and vomiting    Subj: + Constipation X 3 days, no abd pain, mild nausea but no emesis. States Reglan helping.   ROS: neg unless stated above.     Vital Signs Last 24 Hrs  T(C): 36.7 (15 Aug 2018 04:10), Max: 36.9 (14 Aug 2018 21:02)  T(F): 98 (15 Aug 2018 04:10), Max: 98.4 (14 Aug 2018 21:02)  HR: 83 (15 Aug 2018 04:10) (83 - 88)  BP: 112/57 (15 Aug 2018 04:10) (112/57 - 114/47)  BP(mean): --  RR: 18 (15 Aug 2018 04:10) (18 - 18)  SpO2: 97% (15 Aug 2018 04:10) (97% - 98%)    PHYSICAL EXAM:  Constitutional: NAD, awake and alert, well-developed female.   HEENT: PERR, EOMI, Normal Hearing, MMM  Neck: Soft and supple, No LAD, No JVD  Respiratory: Breath sounds are clear bilaterally, No wheezing, rales or rhonchi  Cardiovascular: S1 and S2, regular rate and rhythm, no Murmurs, gallops or rubs  Gastrointestinal: Bowel Sounds present, soft, nontender, mildly distended, no guarding, no rebound  Extremities: No peripheral edema  Vascular: 2+ peripheral pulses  Neurological: A/O x 3, no focal deficits  Musculoskeletal: 5/5 strength b/l upper and lower extremities  Skin: No rashes    MEDICATIONS  (STANDING):  heparin  Injectable 5000 Unit(s) SubCutaneous every 12 hours  metoclopramide 10 milliGRAM(s) Oral three times a day  sodium chloride 0.9%. 1000 milliLiter(s) (80 mL/Hr) IV Continuous <Continuous>  tamoxifen 20 milliGRAM(s) Oral daily    LABS: All Labs Reviewed:    08-12    143  |  110<H>  |  2<L>  ----------------------------<  97  3.7   |  26  |  0.47<L>    Ca    8.2<L>      12 Aug 2018 07:59      A/P:    * Nausea, vomiting extensive work up as an outpatient  - sec to gastroparesis noted on gastric emptying study done here.   - symptoms much improved with higher doses reglan before meal  - dc planning- Rehab today    # Constipation - give milk of mag today.     * h/o Breast Ca in remission  - tamoxifen  * Gives h/o remote chf in the past off all meds at home b/o low BP  - Dr. Bonner is her cardio and she had recent f/up with him    * Incidentalomas:    - Indeterminate anterior right lower pole renal lesion measuring 1.3 cm unchanged  Outpt f/up      Dispo: remain inpatient pending [facility] placement today. Discussed on IDR.   Total time > 35 mins.

## 2018-08-16 VITALS
TEMPERATURE: 98 F | RESPIRATION RATE: 16 BRPM | OXYGEN SATURATION: 97 % | SYSTOLIC BLOOD PRESSURE: 116 MMHG | HEART RATE: 96 BPM | DIASTOLIC BLOOD PRESSURE: 59 MMHG

## 2018-08-16 RX ADMIN — MAGNESIUM HYDROXIDE 30 MILLILITER(S): 400 TABLET, CHEWABLE ORAL at 10:15

## 2018-08-16 RX ADMIN — HEPARIN SODIUM 5000 UNIT(S): 5000 INJECTION INTRAVENOUS; SUBCUTANEOUS at 05:13

## 2018-08-16 RX ADMIN — Medication 650 MILLIGRAM(S): at 13:59

## 2018-08-16 RX ADMIN — Medication 10 MILLIGRAM(S): at 13:59

## 2018-08-16 RX ADMIN — HEPARIN SODIUM 5000 UNIT(S): 5000 INJECTION INTRAVENOUS; SUBCUTANEOUS at 17:32

## 2018-08-16 RX ADMIN — TAMOXIFEN CITRATE 20 MILLIGRAM(S): 20 TABLET, FILM COATED ORAL at 13:59

## 2018-08-16 RX ADMIN — Medication 200 MILLIGRAM(S): at 17:32

## 2018-08-16 RX ADMIN — Medication 10 MILLIGRAM(S): at 05:13

## 2018-08-16 NOTE — PROGRESS NOTE ADULT - SUBJECTIVE AND OBJECTIVE BOX
CC: nausea and vomiting    Subj: No abd pain but still constipated, discussed Mag Citrate which patient will try. No emesis, still awaiting [facility] acceptance.     ROS: neg unless stated above.     Vital Signs Last 24 Hrs  T(C): 36.8 (16 Aug 2018 14:16), Max: 36.8 (15 Aug 2018 20:39)  T(F): 98.3 (16 Aug 2018 14:16), Max: 98.3 (15 Aug 2018 20:39)  HR: 96 (16 Aug 2018 14:16) (90 - 96)  BP: 116/59 (16 Aug 2018 14:16) (108/55 - 116/59)  BP(mean): --  RR: 16 (16 Aug 2018 14:16) (16 - 17)  SpO2: 97% (16 Aug 2018 14:16) (96% - 97%)    PHYSICAL EXAM:  Constitutional: NAD, awake and alert, well-developed female.   HEENT: PERR, EOMI, Normal Hearing, MMM  Neck: Soft and supple, No LAD, No JVD  Respiratory: Breath sounds are clear bilaterally, No wheezing, rales or rhonchi  Cardiovascular: S1 and S2, regular rate and rhythm, no Murmurs, gallops or rubs  Gastrointestinal: Bowel Sounds present, soft, nontender, mildly distended, no guarding, no rebound  Extremities: No peripheral edema  Vascular: 2+ peripheral pulses  Neurological: A/O x 3, no focal deficits  Musculoskeletal: 5/5 strength b/l upper and lower extremities  Skin: No rashes    MEDICATIONS  (STANDING):  heparin  Injectable 5000 Unit(s) SubCutaneous every 12 hours  metoclopramide 10 milliGRAM(s) Oral three times a day  tamoxifen 20 milliGRAM(s) Oral daily      LABS: All Labs Reviewed:    08-12    143  |  110<H>  |  2<L>  ----------------------------<  97  3.7   |  26  |  0.47<L>    Ca    8.2<L>      12 Aug 2018 07:59      A/P:    * Nausea, vomiting extensive work up as an outpatient  - sec to gastroparesis noted on gastric emptying study done here.   - symptoms much improved with higher doses reglan before meal  - dc planning- Rehab still pending insurance authorization.     # Constipation - give MAG CITRATE TODAY.    * h/o Breast Ca in remission  - tamoxifen  * Gives h/o remote chf in the past off all meds at home b/o low BP  - Dr. Bonner is her cardio and she had recent f/up with him    * Incidentalomas:    - Indeterminate anterior right lower pole renal lesion measuring 1.3 cm unchanged  Outpt f/up      Dispo: remain inpatient pending [facility] placement today or tomorrow. Discussed on IDR.   Total time > 35 mins.

## 2018-08-22 DIAGNOSIS — Z96.643 PRESENCE OF ARTIFICIAL HIP JOINT, BILATERAL: ICD-10-CM

## 2018-08-22 DIAGNOSIS — K31.84 GASTROPARESIS: ICD-10-CM

## 2018-08-22 DIAGNOSIS — I50.9 HEART FAILURE, UNSPECIFIED: ICD-10-CM

## 2018-08-22 DIAGNOSIS — Z85.3 PERSONAL HISTORY OF MALIGNANT NEOPLASM OF BREAST: ICD-10-CM

## 2018-08-22 DIAGNOSIS — E86.0 DEHYDRATION: ICD-10-CM

## 2018-08-22 DIAGNOSIS — Z91.013 ALLERGY TO SEAFOOD: ICD-10-CM

## 2018-08-22 DIAGNOSIS — Z88.8 ALLERGY STATUS TO OTHER DRUGS, MEDICAMENTS AND BIOLOGICAL SUBSTANCES: ICD-10-CM

## 2018-08-22 DIAGNOSIS — D64.9 ANEMIA, UNSPECIFIED: ICD-10-CM

## 2018-08-22 DIAGNOSIS — I11.0 HYPERTENSIVE HEART DISEASE WITH HEART FAILURE: ICD-10-CM

## 2018-08-22 DIAGNOSIS — N28.9 DISORDER OF KIDNEY AND URETER, UNSPECIFIED: ICD-10-CM

## 2018-08-22 DIAGNOSIS — K59.00 CONSTIPATION, UNSPECIFIED: ICD-10-CM

## 2018-08-22 DIAGNOSIS — E43 UNSPECIFIED SEVERE PROTEIN-CALORIE MALNUTRITION: ICD-10-CM

## 2018-09-13 ENCOUNTER — OUTPATIENT (OUTPATIENT)
Dept: OUTPATIENT SERVICES | Facility: HOSPITAL | Age: 63
LOS: 1 days | Discharge: HOME | End: 2018-09-13
Payer: MEDICARE

## 2018-09-13 VITALS
WEIGHT: 143.08 LBS | HEIGHT: 67 IN | SYSTOLIC BLOOD PRESSURE: 107 MMHG | DIASTOLIC BLOOD PRESSURE: 61 MMHG | TEMPERATURE: 98 F | OXYGEN SATURATION: 100 % | RESPIRATION RATE: 18 BRPM | HEART RATE: 108 BPM

## 2018-09-13 DIAGNOSIS — Z98.890 OTHER SPECIFIED POSTPROCEDURAL STATES: Chronic | ICD-10-CM

## 2018-09-13 DIAGNOSIS — D17.9 BENIGN LIPOMATOUS NEOPLASM, UNSPECIFIED: Chronic | ICD-10-CM

## 2018-09-13 DIAGNOSIS — Z96.641 PRESENCE OF RIGHT ARTIFICIAL HIP JOINT: Chronic | ICD-10-CM

## 2018-09-13 DIAGNOSIS — R63.4 ABNORMAL WEIGHT LOSS: ICD-10-CM

## 2018-09-13 DIAGNOSIS — Z90.12 ACQUIRED ABSENCE OF LEFT BREAST AND NIPPLE: Chronic | ICD-10-CM

## 2018-09-13 DIAGNOSIS — Z96.642 PRESENCE OF LEFT ARTIFICIAL HIP JOINT: Chronic | ICD-10-CM

## 2018-09-13 LAB
ANION GAP SERPL CALC-SCNC: 5 MMOL/L — SIGNIFICANT CHANGE UP (ref 5–17)
APTT BLD: 25.5 SEC — LOW (ref 27.5–37.4)
BASOPHILS # BLD AUTO: 0.04 K/UL — SIGNIFICANT CHANGE UP (ref 0–0.2)
BASOPHILS NFR BLD AUTO: 0.5 % — SIGNIFICANT CHANGE UP (ref 0–2)
BUN SERPL-MCNC: 13 MG/DL — SIGNIFICANT CHANGE UP (ref 7–23)
CALCIUM SERPL-MCNC: 9.4 MG/DL — SIGNIFICANT CHANGE UP (ref 8.5–10.1)
CHLORIDE SERPL-SCNC: 108 MMOL/L — SIGNIFICANT CHANGE UP (ref 96–108)
CO2 SERPL-SCNC: 28 MMOL/L — SIGNIFICANT CHANGE UP (ref 22–31)
CREAT SERPL-MCNC: 0.59 MG/DL — SIGNIFICANT CHANGE UP (ref 0.5–1.3)
EOSINOPHIL # BLD AUTO: 0.3 K/UL — SIGNIFICANT CHANGE UP (ref 0–0.5)
EOSINOPHIL NFR BLD AUTO: 3.8 % — SIGNIFICANT CHANGE UP (ref 0–6)
GLUCOSE SERPL-MCNC: 91 MG/DL — SIGNIFICANT CHANGE UP (ref 70–99)
HCT VFR BLD CALC: 36.6 % — SIGNIFICANT CHANGE UP (ref 34.5–45)
HGB BLD-MCNC: 11.9 G/DL — SIGNIFICANT CHANGE UP (ref 11.5–15.5)
IMM GRANULOCYTES NFR BLD AUTO: 0.4 % — SIGNIFICANT CHANGE UP (ref 0–1.5)
INR BLD: 1.14 RATIO — SIGNIFICANT CHANGE UP (ref 0.88–1.16)
LYMPHOCYTES # BLD AUTO: 2.94 K/UL — SIGNIFICANT CHANGE UP (ref 1–3.3)
LYMPHOCYTES # BLD AUTO: 37.3 % — SIGNIFICANT CHANGE UP (ref 13–44)
MCHC RBC-ENTMCNC: 32.5 GM/DL — SIGNIFICANT CHANGE UP (ref 32–36)
MCHC RBC-ENTMCNC: 33.1 PG — SIGNIFICANT CHANGE UP (ref 27–34)
MCV RBC AUTO: 101.9 FL — HIGH (ref 80–100)
MONOCYTES # BLD AUTO: 0.82 K/UL — SIGNIFICANT CHANGE UP (ref 0–0.9)
MONOCYTES NFR BLD AUTO: 10.4 % — SIGNIFICANT CHANGE UP (ref 2–14)
NEUTROPHILS # BLD AUTO: 3.76 K/UL — SIGNIFICANT CHANGE UP (ref 1.8–7.4)
NEUTROPHILS NFR BLD AUTO: 47.6 % — SIGNIFICANT CHANGE UP (ref 43–77)
NRBC # BLD: 0 /100 WBCS — SIGNIFICANT CHANGE UP (ref 0–0)
PLATELET # BLD AUTO: 352 K/UL — SIGNIFICANT CHANGE UP (ref 150–400)
POTASSIUM SERPL-MCNC: 4.3 MMOL/L — SIGNIFICANT CHANGE UP (ref 3.5–5.3)
POTASSIUM SERPL-SCNC: 4.3 MMOL/L — SIGNIFICANT CHANGE UP (ref 3.5–5.3)
PROTHROM AB SERPL-ACNC: 12.3 SEC — SIGNIFICANT CHANGE UP (ref 9.8–12.7)
RBC # BLD: 3.59 M/UL — LOW (ref 3.8–5.2)
RBC # FLD: 14.1 % — SIGNIFICANT CHANGE UP (ref 10.3–14.5)
SODIUM SERPL-SCNC: 141 MMOL/L — SIGNIFICANT CHANGE UP (ref 135–145)
WBC # BLD: 7.89 K/UL — SIGNIFICANT CHANGE UP (ref 3.8–10.5)
WBC # FLD AUTO: 7.89 K/UL — SIGNIFICANT CHANGE UP (ref 3.8–10.5)

## 2018-09-13 PROCEDURE — 93010 ELECTROCARDIOGRAM REPORT: CPT

## 2018-09-13 RX ORDER — CHOLECALCIFEROL (VITAMIN D3) 125 MCG
0 CAPSULE ORAL
Qty: 0 | Refills: 0 | COMMUNITY

## 2018-09-13 NOTE — H&P PST ADULT - ASSESSMENT
63 years old female present to PST prior to endoscopy with Dr. Kemp.  Plan  1. Expect a call from Endoscopy the day before your procedure between 11am and 3pm.  2. Follow the GI doctor's instructions for preparation  and day before procedure activities.  3. Follow the GI doctor's  instructions for medications.

## 2018-09-13 NOTE — H&P PST ADULT - PSH
Atypical lipoma of soft tissue  in left groin 2008  H/O foot surgery  ankle left 2004  History of endoscopy  7/2018  History of hip replacement, total, right  11/2017  History of lumpectomy of left breast  2017  History of total hip replacement, left  march 2018  S/P colonoscopy  2018

## 2018-09-13 NOTE — H&P PST ADULT - HISTORY OF PRESENT ILLNESS
63 years old female present to PST prior to Endoscopy. Nausea andvomiting. Weight loss of approximately 50 pounds since April. Previous Endoscopy 7/2018. nausea and vomiting persist. CT. Scan done indicating abnormality. Denies chest pain.

## 2018-09-20 ENCOUNTER — OUTPATIENT (OUTPATIENT)
Dept: OUTPATIENT SERVICES | Facility: HOSPITAL | Age: 63
LOS: 1 days | Discharge: HOME | End: 2018-09-20
Payer: MEDICARE

## 2018-09-20 ENCOUNTER — RESULT REVIEW (OUTPATIENT)
Age: 63
End: 2018-09-20

## 2018-09-20 VITALS
SYSTOLIC BLOOD PRESSURE: 104 MMHG | TEMPERATURE: 97 F | HEART RATE: 104 BPM | DIASTOLIC BLOOD PRESSURE: 62 MMHG | RESPIRATION RATE: 18 BRPM | HEIGHT: 67 IN | WEIGHT: 143.08 LBS | OXYGEN SATURATION: 100 %

## 2018-09-20 DIAGNOSIS — Z98.890 OTHER SPECIFIED POSTPROCEDURAL STATES: Chronic | ICD-10-CM

## 2018-09-20 DIAGNOSIS — Z96.641 PRESENCE OF RIGHT ARTIFICIAL HIP JOINT: Chronic | ICD-10-CM

## 2018-09-20 DIAGNOSIS — Z90.12 ACQUIRED ABSENCE OF LEFT BREAST AND NIPPLE: Chronic | ICD-10-CM

## 2018-09-20 DIAGNOSIS — D17.9 BENIGN LIPOMATOUS NEOPLASM, UNSPECIFIED: Chronic | ICD-10-CM

## 2018-09-20 DIAGNOSIS — Z96.642 PRESENCE OF LEFT ARTIFICIAL HIP JOINT: Chronic | ICD-10-CM

## 2018-09-20 PROCEDURE — 88305 TISSUE EXAM BY PATHOLOGIST: CPT | Mod: 26

## 2018-09-20 PROCEDURE — 88312 SPECIAL STAINS GROUP 1: CPT | Mod: 26

## 2018-09-20 RX ORDER — SODIUM CHLORIDE 9 MG/ML
1000 INJECTION INTRAMUSCULAR; INTRAVENOUS; SUBCUTANEOUS
Qty: 0 | Refills: 0 | Status: DISCONTINUED | OUTPATIENT
Start: 2018-09-20 | End: 2018-10-05

## 2018-09-21 LAB — SURGICAL PATHOLOGY FINAL REPORT - CH: SIGNIFICANT CHANGE UP

## 2018-09-24 DIAGNOSIS — Z79.810 LONG TERM (CURRENT) USE OF SELECTIVE ESTROGEN RECEPTOR MODULATORS (SERMS): ICD-10-CM

## 2018-09-24 DIAGNOSIS — R11.10 VOMITING, UNSPECIFIED: ICD-10-CM

## 2018-09-24 DIAGNOSIS — R53.1 WEAKNESS: ICD-10-CM

## 2018-09-24 DIAGNOSIS — Z91.013 ALLERGY TO SEAFOOD: ICD-10-CM

## 2018-09-24 DIAGNOSIS — C50.912 MALIGNANT NEOPLASM OF UNSPECIFIED SITE OF LEFT FEMALE BREAST: ICD-10-CM

## 2018-09-24 DIAGNOSIS — K29.50 UNSPECIFIED CHRONIC GASTRITIS WITHOUT BLEEDING: ICD-10-CM

## 2018-09-24 DIAGNOSIS — E78.00 PURE HYPERCHOLESTEROLEMIA, UNSPECIFIED: ICD-10-CM

## 2018-09-24 DIAGNOSIS — Z92.3 PERSONAL HISTORY OF IRRADIATION: ICD-10-CM

## 2018-09-24 DIAGNOSIS — Z82.3 FAMILY HISTORY OF STROKE: ICD-10-CM

## 2018-09-24 DIAGNOSIS — M19.90 UNSPECIFIED OSTEOARTHRITIS, UNSPECIFIED SITE: ICD-10-CM

## 2018-09-24 DIAGNOSIS — I10 ESSENTIAL (PRIMARY) HYPERTENSION: ICD-10-CM

## 2018-09-24 DIAGNOSIS — Z80.3 FAMILY HISTORY OF MALIGNANT NEOPLASM OF BREAST: ICD-10-CM

## 2018-09-24 DIAGNOSIS — Z96.643 PRESENCE OF ARTIFICIAL HIP JOINT, BILATERAL: ICD-10-CM

## 2018-09-24 DIAGNOSIS — R10.13 EPIGASTRIC PAIN: ICD-10-CM

## 2018-09-24 DIAGNOSIS — D64.9 ANEMIA, UNSPECIFIED: ICD-10-CM

## 2018-09-24 DIAGNOSIS — Z82.49 FAMILY HISTORY OF ISCHEMIC HEART DISEASE AND OTHER DISEASES OF THE CIRCULATORY SYSTEM: ICD-10-CM

## 2018-11-15 ENCOUNTER — OUTPATIENT (OUTPATIENT)
Dept: OUTPATIENT SERVICES | Facility: HOSPITAL | Age: 63
LOS: 1 days | Discharge: HOME | End: 2018-11-15
Payer: MEDICARE

## 2018-11-15 VITALS
HEIGHT: 67 IN | TEMPERATURE: 98 F | OXYGEN SATURATION: 98 % | HEART RATE: 91 BPM | RESPIRATION RATE: 18 BRPM | SYSTOLIC BLOOD PRESSURE: 113 MMHG | WEIGHT: 153 LBS | DIASTOLIC BLOOD PRESSURE: 67 MMHG

## 2018-11-15 DIAGNOSIS — Z98.890 OTHER SPECIFIED POSTPROCEDURAL STATES: Chronic | ICD-10-CM

## 2018-11-15 DIAGNOSIS — Z96.641 PRESENCE OF RIGHT ARTIFICIAL HIP JOINT: Chronic | ICD-10-CM

## 2018-11-15 DIAGNOSIS — Z90.12 ACQUIRED ABSENCE OF LEFT BREAST AND NIPPLE: Chronic | ICD-10-CM

## 2018-11-15 DIAGNOSIS — D17.9 BENIGN LIPOMATOUS NEOPLASM, UNSPECIFIED: Chronic | ICD-10-CM

## 2018-11-15 DIAGNOSIS — Z96.642 PRESENCE OF LEFT ARTIFICIAL HIP JOINT: Chronic | ICD-10-CM

## 2018-11-15 PROCEDURE — 93010 ELECTROCARDIOGRAM REPORT: CPT

## 2018-11-15 RX ORDER — SODIUM CHLORIDE 9 MG/ML
1000 INJECTION INTRAMUSCULAR; INTRAVENOUS; SUBCUTANEOUS
Qty: 0 | Refills: 0 | Status: DISCONTINUED | OUTPATIENT
Start: 2018-11-15 | End: 2018-11-30

## 2018-11-15 NOTE — ASU PATIENT PROFILE, ADULT - PMH
Anemia    Arthritis    Breast cancer  left 2017, had surgery and radiation  CHF (congestive heart failure)  12/2017 resolved  Colitis    High blood pressure  reports medication was stopped April 2018 because BP was low  High cholesterol  d/c'd by prescribing MD in April "since I'm not eating"  Nausea & vomiting    Weakness

## 2018-11-15 NOTE — ASU PATIENT PROFILE, ADULT - TEACHING/LEARNING CULTURAL CONSIDERATIONS
Patient is awake, alert, confused but cooperative and in good humor.  Vital signs are stable and he is afebrile  Cardiac and pulmonary exams are normal  No evidence of recurrent stroke     Impression and plan:  1) acute lacunar a stroke with altered mentation  2) underlying dementia deemed to be incompetent and  is following for guardianship/disposition back to [facility] or to nursing facility if furlough can be given   none

## 2018-11-20 DIAGNOSIS — K31.84 GASTROPARESIS: ICD-10-CM

## 2018-11-20 DIAGNOSIS — K52.9 NONINFECTIVE GASTROENTERITIS AND COLITIS, UNSPECIFIED: ICD-10-CM

## 2018-11-20 DIAGNOSIS — Z79.810 LONG TERM (CURRENT) USE OF SELECTIVE ESTROGEN RECEPTOR MODULATORS (SERMS): ICD-10-CM

## 2018-11-20 DIAGNOSIS — C50.912 MALIGNANT NEOPLASM OF UNSPECIFIED SITE OF LEFT FEMALE BREAST: ICD-10-CM

## 2018-11-20 DIAGNOSIS — R11.10 VOMITING, UNSPECIFIED: ICD-10-CM

## 2018-11-20 DIAGNOSIS — R68.81 EARLY SATIETY: ICD-10-CM

## 2018-11-20 DIAGNOSIS — Z91.013 ALLERGY TO SEAFOOD: ICD-10-CM

## 2018-11-20 DIAGNOSIS — E78.00 PURE HYPERCHOLESTEROLEMIA, UNSPECIFIED: ICD-10-CM

## 2018-11-20 DIAGNOSIS — M19.90 UNSPECIFIED OSTEOARTHRITIS, UNSPECIFIED SITE: ICD-10-CM

## 2018-11-20 DIAGNOSIS — Z96.643 PRESENCE OF ARTIFICIAL HIP JOINT, BILATERAL: ICD-10-CM

## 2018-11-21 ENCOUNTER — EMERGENCY (EMERGENCY)
Facility: HOSPITAL | Age: 63
LOS: 1 days | Discharge: HOME | End: 2018-11-21
Attending: EMERGENCY MEDICINE
Payer: MEDICARE

## 2018-11-21 VITALS
DIASTOLIC BLOOD PRESSURE: 71 MMHG | SYSTOLIC BLOOD PRESSURE: 111 MMHG | OXYGEN SATURATION: 98 % | HEART RATE: 97 BPM | TEMPERATURE: 98 F | RESPIRATION RATE: 18 BRPM

## 2018-11-21 VITALS
TEMPERATURE: 98 F | HEIGHT: 64 IN | OXYGEN SATURATION: 99 % | HEART RATE: 113 BPM | DIASTOLIC BLOOD PRESSURE: 54 MMHG | WEIGHT: 160.06 LBS | SYSTOLIC BLOOD PRESSURE: 94 MMHG | RESPIRATION RATE: 16 BRPM

## 2018-11-21 DIAGNOSIS — Z90.12 ACQUIRED ABSENCE OF LEFT BREAST AND NIPPLE: Chronic | ICD-10-CM

## 2018-11-21 DIAGNOSIS — Z98.890 OTHER SPECIFIED POSTPROCEDURAL STATES: Chronic | ICD-10-CM

## 2018-11-21 DIAGNOSIS — Z96.642 PRESENCE OF LEFT ARTIFICIAL HIP JOINT: Chronic | ICD-10-CM

## 2018-11-21 DIAGNOSIS — Z96.641 PRESENCE OF RIGHT ARTIFICIAL HIP JOINT: Chronic | ICD-10-CM

## 2018-11-21 DIAGNOSIS — D17.9 BENIGN LIPOMATOUS NEOPLASM, UNSPECIFIED: Chronic | ICD-10-CM

## 2018-11-21 PROBLEM — M19.90 UNSPECIFIED OSTEOARTHRITIS, UNSPECIFIED SITE: Chronic | Status: ACTIVE | Noted: 2018-11-15

## 2018-11-21 PROBLEM — K52.9 NONINFECTIVE GASTROENTERITIS AND COLITIS, UNSPECIFIED: Chronic | Status: ACTIVE | Noted: 2018-11-15

## 2018-11-21 LAB
ANION GAP SERPL CALC-SCNC: 13 MMOL/L — SIGNIFICANT CHANGE UP (ref 5–17)
APTT BLD: 26.4 SEC — LOW (ref 27.5–36.3)
BUN SERPL-MCNC: 8 MG/DL — SIGNIFICANT CHANGE UP (ref 8–20)
CALCIUM SERPL-MCNC: 9.8 MG/DL — SIGNIFICANT CHANGE UP (ref 8.6–10.2)
CHLORIDE SERPL-SCNC: 101 MMOL/L — SIGNIFICANT CHANGE UP (ref 98–107)
CK SERPL-CCNC: 70 U/L — SIGNIFICANT CHANGE UP (ref 25–170)
CK SERPL-CCNC: 99 U/L — SIGNIFICANT CHANGE UP (ref 25–170)
CO2 SERPL-SCNC: 24 MMOL/L — SIGNIFICANT CHANGE UP (ref 22–29)
CREAT SERPL-MCNC: 0.52 MG/DL — SIGNIFICANT CHANGE UP (ref 0.5–1.3)
D DIMER BLD IA.RAPID-MCNC: 462 NG/ML DDU — HIGH
GLUCOSE SERPL-MCNC: 122 MG/DL — HIGH (ref 70–115)
HCT VFR BLD CALC: 39.6 % — SIGNIFICANT CHANGE UP (ref 37–47)
HGB BLD-MCNC: 12.3 G/DL — SIGNIFICANT CHANGE UP (ref 12–16)
INR BLD: 1.23 RATIO — HIGH (ref 0.88–1.16)
MCHC RBC-ENTMCNC: 31.1 G/DL — LOW (ref 32–36)
MCHC RBC-ENTMCNC: 31.2 PG — HIGH (ref 27–31)
MCV RBC AUTO: 100.5 FL — HIGH (ref 81–99)
PLATELET # BLD AUTO: 297 K/UL — SIGNIFICANT CHANGE UP (ref 150–400)
POTASSIUM SERPL-MCNC: 5.3 MMOL/L — SIGNIFICANT CHANGE UP (ref 3.5–5.3)
POTASSIUM SERPL-SCNC: 5.3 MMOL/L — SIGNIFICANT CHANGE UP (ref 3.5–5.3)
PROTHROM AB SERPL-ACNC: 14.2 SEC — HIGH (ref 10–12.9)
RBC # BLD: 3.94 M/UL — LOW (ref 4.4–5.2)
RBC # FLD: 13.6 % — SIGNIFICANT CHANGE UP (ref 11–15.6)
SODIUM SERPL-SCNC: 138 MMOL/L — SIGNIFICANT CHANGE UP (ref 135–145)
TROPONIN T SERPL-MCNC: <0.01 NG/ML — SIGNIFICANT CHANGE UP (ref 0–0.06)
TROPONIN T SERPL-MCNC: <0.01 NG/ML — SIGNIFICANT CHANGE UP (ref 0–0.06)
WBC # BLD: 6.2 K/UL — SIGNIFICANT CHANGE UP (ref 4.8–10.8)
WBC # FLD AUTO: 6.2 K/UL — SIGNIFICANT CHANGE UP (ref 4.8–10.8)

## 2018-11-21 PROCEDURE — 71275 CT ANGIOGRAPHY CHEST: CPT | Mod: 26

## 2018-11-21 PROCEDURE — 99284 EMERGENCY DEPT VISIT MOD MDM: CPT

## 2018-11-21 PROCEDURE — 80048 BASIC METABOLIC PNL TOTAL CA: CPT

## 2018-11-21 PROCEDURE — 93010 ELECTROCARDIOGRAM REPORT: CPT

## 2018-11-21 PROCEDURE — 85610 PROTHROMBIN TIME: CPT

## 2018-11-21 PROCEDURE — 99284 EMERGENCY DEPT VISIT MOD MDM: CPT | Mod: 25

## 2018-11-21 PROCEDURE — 36000 PLACE NEEDLE IN VEIN: CPT | Mod: RT,XU

## 2018-11-21 PROCEDURE — 70450 CT HEAD/BRAIN W/O DYE: CPT

## 2018-11-21 PROCEDURE — 96374 THER/PROPH/DIAG INJ IV PUSH: CPT

## 2018-11-21 PROCEDURE — 71045 X-RAY EXAM CHEST 1 VIEW: CPT

## 2018-11-21 PROCEDURE — 36415 COLL VENOUS BLD VENIPUNCTURE: CPT

## 2018-11-21 PROCEDURE — 84484 ASSAY OF TROPONIN QUANT: CPT

## 2018-11-21 PROCEDURE — 96361 HYDRATE IV INFUSION ADD-ON: CPT

## 2018-11-21 PROCEDURE — 93005 ELECTROCARDIOGRAM TRACING: CPT

## 2018-11-21 PROCEDURE — 70450 CT HEAD/BRAIN W/O DYE: CPT | Mod: 26

## 2018-11-21 PROCEDURE — 85379 FIBRIN DEGRADATION QUANT: CPT

## 2018-11-21 PROCEDURE — 71275 CT ANGIOGRAPHY CHEST: CPT

## 2018-11-21 PROCEDURE — 71045 X-RAY EXAM CHEST 1 VIEW: CPT | Mod: 26

## 2018-11-21 PROCEDURE — 85730 THROMBOPLASTIN TIME PARTIAL: CPT

## 2018-11-21 PROCEDURE — 85027 COMPLETE CBC AUTOMATED: CPT

## 2018-11-21 PROCEDURE — 99285 EMERGENCY DEPT VISIT HI MDM: CPT | Mod: 25

## 2018-11-21 PROCEDURE — 96375 TX/PRO/DX INJ NEW DRUG ADDON: CPT

## 2018-11-21 PROCEDURE — 82550 ASSAY OF CK (CPK): CPT

## 2018-11-21 RX ORDER — SODIUM CHLORIDE 9 MG/ML
500 INJECTION INTRAMUSCULAR; INTRAVENOUS; SUBCUTANEOUS ONCE
Qty: 0 | Refills: 0 | Status: COMPLETED | OUTPATIENT
Start: 2018-11-21 | End: 2018-11-21

## 2018-11-21 RX ORDER — SODIUM CHLORIDE 9 MG/ML
3 INJECTION INTRAMUSCULAR; INTRAVENOUS; SUBCUTANEOUS ONCE
Qty: 0 | Refills: 0 | Status: COMPLETED | OUTPATIENT
Start: 2018-11-21 | End: 2018-11-21

## 2018-11-21 RX ORDER — DIPHENHYDRAMINE HCL 50 MG
25 CAPSULE ORAL ONCE
Qty: 0 | Refills: 0 | Status: COMPLETED | OUTPATIENT
Start: 2018-11-21 | End: 2018-11-21

## 2018-11-21 RX ORDER — SODIUM CHLORIDE 9 MG/ML
1000 INJECTION INTRAMUSCULAR; INTRAVENOUS; SUBCUTANEOUS
Qty: 0 | Refills: 0 | Status: DISCONTINUED | OUTPATIENT
Start: 2018-11-21 | End: 2018-11-26

## 2018-11-21 RX ADMIN — SODIUM CHLORIDE 100 MILLILITER(S): 9 INJECTION INTRAMUSCULAR; INTRAVENOUS; SUBCUTANEOUS at 11:46

## 2018-11-21 RX ADMIN — Medication 125 MILLIGRAM(S): at 19:11

## 2018-11-21 RX ADMIN — SODIUM CHLORIDE 100 MILLILITER(S): 9 INJECTION INTRAMUSCULAR; INTRAVENOUS; SUBCUTANEOUS at 21:55

## 2018-11-21 RX ADMIN — SODIUM CHLORIDE 500 MILLILITER(S): 9 INJECTION INTRAMUSCULAR; INTRAVENOUS; SUBCUTANEOUS at 10:15

## 2018-11-21 RX ADMIN — SODIUM CHLORIDE 3 MILLILITER(S): 9 INJECTION INTRAMUSCULAR; INTRAVENOUS; SUBCUTANEOUS at 09:25

## 2018-11-21 RX ADMIN — SODIUM CHLORIDE 1000 MILLILITER(S): 9 INJECTION INTRAMUSCULAR; INTRAVENOUS; SUBCUTANEOUS at 09:45

## 2018-11-21 RX ADMIN — SODIUM CHLORIDE 1000 MILLILITER(S): 9 INJECTION INTRAMUSCULAR; INTRAVENOUS; SUBCUTANEOUS at 19:08

## 2018-11-21 RX ADMIN — Medication 25 MILLIGRAM(S): at 21:55

## 2018-11-21 NOTE — ED PROVIDER NOTE - OBJECTIVE STATEMENT
64 YO FEMALE WITH ABOVE CC. STATES SHE WAS GETTING READY TO GO FOR HER ANNUAL MAMMOGRAM AND ULTRASOUND AND "BY THE TIME I GOT TO THE CAR, I THOUGHT I WAS GOING TO PASS OUT". PT STATES SHE FELT LIGHT HEADED AND DIZZY, THEN DEVELOPED NAUSEA AND PALPITATIONS. PT DID NOT EAT THIS MORNING. PT SUFFERS FROM GASTROPARESIS. PT IMPROVED AND THIS TIME AND HAS NO OTHER COMPLAINTS.  MED HX Anemia    Arthritis    Breast cancer  left 2017, had surgery and radiation  CHF (congestive heart failure)  12/2017 resolved  Colitis    High blood pressure  reports medication was stopped April 2018 because BP was low  High cholesterol  d/c'd by prescribing MD in April "since I'm not eating"  Nausea & vomiting    Weakness

## 2018-11-21 NOTE — ED ADULT TRIAGE NOTE - CHIEF COMPLAINT QUOTE
Patient comes to ed from home for reports of near syncope patient reports she felt like she was going to pass out when she changed positions. hypotensive. ekg being done.

## 2018-11-21 NOTE — ED PROVIDER NOTE - SHIFT CHANGE DETAILS
62YO FEMALE. NEAR SYNCOPE THIS AM.  SEEN BY [place] CARDIO DR ROSAS. NEW RBBB ON EKG SO DDIMER CHECKED AND WAS OVER 400. CTA ORDERED TO RO PE   IF CTA NEGATIVE CAN D/C. IF + ADMIT

## 2018-11-21 NOTE — ED PROVIDER NOTE - MEDICAL DECISION MAKING DETAILS
64 YO FEMALE WITH MULTIPLE MEDICAL ISSUES PRESENTS WITH NEAR SYNCOPE, LIGHT HEADED AND DIZZINESS, WITH NAUSEA. EKG, XCXRAY, CT HEAD AND LABS ORDERED. NO ACUTE FINDINGS AT THIS TIME OTHER THAN LOW BP AND ELEVATED HR

## 2018-11-21 NOTE — ED ADULT NURSE REASSESSMENT NOTE - NS ED NURSE REASSESS COMMENT FT1
Report received from offgoing RN, chart as noted, pt appears comfortable on stretcher relaxing and watching Report received from offgoing RN, chart as noted, pt appears comfortable on stretcher relaxing and watching television with family @ bedside. NSR on cm, HR 90's. #22G IV noted to Right hand, #20G IV noted to Right ac, site asmyp. RR even and unlabored, reports no pain @ this time. Rest promoted, updated on POC, call bell in reach, safety maintained. Appears in no apparent distress @ this time

## 2018-11-21 NOTE — ED PROVIDER NOTE - NEURO NEGATIVE STATEMENT, MLM
no loss of consciousness, no gait abnormality, no headache, no sensory deficits, and no weakness. + LIGHT HEADED AND DIZZY

## 2018-11-21 NOTE — CONSULT NOTE ADULT - SUBJECTIVE AND OBJECTIVE BOX
Patient is a 63y old  Female who presents with a chief complaint of dizziness and near syncope.     HPI: 62 y/o F HFpEF history (Echo  EF >75%), HTN (currently off BP meds due to poor PO intake), HLD, anemia, Breast Ca s/p surgery and radiation, and gastroparesis who presents to the ED with dizziness, palpitations, and near syncope. Patient has been seen multiple times for similar symptoms due to poor PO intake from gastroparesis. Patient states that she is unable to eat or drink very much due to poor appetite and abdominal discomfort with even the slightest amount of food. Patient states taht last night she ate a small amount at 8 PM, and then this morning around 8 felt lightheaded, nauseous, and palpitations when going to her appointment. Patient states that her Cardiologist is aware that her HR goes up with the slightest movement, and states it is due to her being hypovolemic. Patient had normal TTE and NST 2017. Patient states she gets some CAMPUZANO for the last 2-3 mos. Denies any fevers, chills, CP, orthopnea, PND, LE swelling, syncope, headache, V/D, or dysuria.       PAST MEDICAL & SURGICAL HISTORY:  Arthritis  Colitis  Anemia  Weakness  Nausea & vomiting  CHF (congestive heart failure): 2017 resolved  Breast cancer: left , had surgery and radiation  High cholesterol: d/c&#x27;d by prescribing MD in April &quot;since I&#x27;m not eating&quot;  High blood pressure: reports medication was stopped 2018 because BP was low  History of endoscopy: 2018  S/P colonoscopy: 2018  History of total hip replacement, left: 2018  History of hip replacement, total, right: 2017  History of lumpectomy of left breast: 2017  Atypical lipoma of soft tissue: in left groin   H/O foot surgery: ankle left       PREVIOUS DIAGNOSTIC TESTING:      ECHO  FINDINGS:< from: TTE Echo Complete w/Doppler (17 @ 22:41) >  Summary:   1. Left ventricular ejection fraction, by visual estimation, is >75%.   2. Hyperdynamic global left ventricular systolic function.   3. Spectral Doppler shows impaired relaxation pattern of left   ventricular myocardial filling (Grade I diastolic dysfunction).   4. LVOT Doppler flow pattern shows, Obstructive pattern with 16 mm Hg at   rest.   5. Thickening of the anterior and posterior mitral valve leaflets.   6. Sclerotic aortic valve with normal opening.   7. Mildly enlarged left atrium.   8. No evidence of aortic stenosis.   9. Recommend Consider contrast study to evaluate for hypertrophic   cardiomyopathy and also Valsalva if clinically indicated.     MD Tong Electronically signed on 2017 at 8:49:46 AM    < end of copied text >    < from: TTE Echo Limited or F/U (17 @ 15:32) >  Summary:   1. Endocardial visualization was enhanced with intravenous echo contrast.   2. Hyperdynamic wall motion. Left ventricular ejection fraction, by   visual estimation, is >75%.   3. Normal right ventricular size and function.   4. No significant valvular abnormality.   5. There is no evidence of pericardial effusion.   6. No cardiaccause of dyspnea identified on the echo.     MD Nevin Electronically signed on 2017 at 6:09:19 PM    < end of copied text >      STRESS  FINDINGS:< from: Nuclear Stress Test-Pharmacologic (17 @ 07:35) >  NUCLEAR FINDINGS:  Review of raw data shows: The study is of good technical  quality.  The left ventricle was normal in size. Normal myocardial  perfusion scan, with no evidence of infarction or  inducible ischemia.  ------------------------------------------------------------------------      GATED ANALYSIS:  Gated wall motion analysis is performed, and shows normal  wall motion with post stress LVEF of 74%.  ------------------------------------------------------------------------      LV/RV OBSERVATION:  Normal LV ejection fraction.  ------------------------------------------------------------------------    IMPRESSIONS:Normal Study  * Review of raw data shows: The study is of good technical  quality.  * The left ventricle was normal in size. Normal myocardial  perfusion scan, with no evidence of infarction or  inducible ischemia.  * Gated wall motion analysis is performed, and shows  normal wall motion with post stress LVEF of 74%.  * Chest Pooja: No chest pain with administration of  Regadenoson.  * Symptom: No Symptom.  * HR Response: Appropriate.  * BP Response: Appropriate.  * Heart Rhythm: Normal Sinus Rhythm - 82 BPM.  * ECG Abnormalities: There were no diagnostic changes.  * Arrhythmia: None.    ------------------------------------------------------------------------      ------------------------------------------------------------------------    Confirmed on  2017 - 13:36:21 by Vern Moise MD  Cardiology Fellow: FRANSISCO Mccormick    < end of copied text >      Allergies    Crestor (Muscle Pain)  shellfish (Swelling; Rash)    Intolerances    MEDICATIONS  (STANDING):  sodium chloride 0.9%. 1000 milliLiter(s) (100 mL/Hr) IV Continuous <Continuous>    MEDICATIONS  (PRN):    Home Medications:  metoclopramide 10 mg oral tablet: 1 tab(s) orally 3 times a day (2018 09:22)  Multiple Vitamins oral tablet: 1 tab(s) orally once a day (2018 09:22)  ondansetron 4 mg oral tablet, disintegratin tab(s) orally every 4 to 6 hours, As Needed (2018 09:22)  Pepcid 20 mg oral tablet: 1 tab(s) orally 2 times a day, As Needed (2018 09:22)  Reglan 5 mg oral tablet: 1 tab(s) orally 4 times a day (before meals and at bedtime) (2018 09:22)  tamoxifen 20 mg oral tablet: 1 tab(s) orally once a day (2018 09:22)  Vitamin C 1000 mg oral tablet: 1 tab(s) orally once a day (2018 09:22)  Vitamin D3 5000 intl units oral capsule: 1 cap(s) orally once a day (2018 09:22)      FAMILY HISTORY:  Family history of cardiac arrest (Father)  Family history of stroke (Mother)  Family history of breast cancer in sister (Sibling)  Family history of hypertension in mother (Mother)      SOCIAL HISTORY:    CIGARETTES: Denies    ALCOHOL: Denies    DRUGS: Denies    REVIEW OF SYMPTOMS:   Cardiovascular: AS PER HPI  Respiratory:   AS PER HPI  Genitourinary:  No dysuria, no hematuria;   Gastrointestinal:   AS PER HPI  Neurological: No headache, no dizziness, no slurred speech;    Psychiatric: No agitation, no anxiety.    ALL OTHER REVIEW OF SYSTEMS ARE NEGATIVE.    Vital Signs Last 24 Hrs  T(C): 36.4 (2018 08:42), Max: 36.4 (2018 08:42)  T(F): 97.5 (2018 08:42), Max: 97.5 (2018 08:42)  HR: 97 (2018 11:53) (97 - 113)  BP: 117/56 (2018 11:53) (94/54 - 117/56)  RR: 18 (2018 11:53) (16 - 18)  SpO2: 99% (2018 08:42) (99% - 99%)    Daily Height in cm: 162.56 (2018 08:42)      PHYSICAL EXAM:  Appearance: Normal, well nourished	  HEENT:   Dry oral mucosa, PERRL, EOMI, sclera non-icteric	  Lymphatic: No cervical lymphadenopathy  Cardiovascular: Normal S1 S2, No JVD, II/VI systolic murmur lsb, No carotid bruits, No peripheral edema  Respiratory: Lungs clear to auscultation	  Psychiatry: A & O x 3, Mood & affect appropriate  Gastrointestinal:  Soft, Non-tender, + BS, no bruits	  Skin: No rashes, No ecchymoses, No cyanosis  Neurologic: Grossly non-focal with full strength in all four extremities  Extremities: Normal range of motion, No clubbing, cyanosis or edema  Vascular: Peripheral pulses palpable 2+ bilaterally      INTERPRETATION OF TELEMETRY: NSR/ST    ECG: NSR, incomplete right bundle branch block, nonspecific ST/T wave changes    LABS:                        12.3   6.2   )-----------( 297      ( 2018 09:31 )             39.6     11-    138  |  101  |  8.0  ----------------------------<  122<H>  5.3   |  24.0  |  0.52    Ca    9.8      2018 09:31      CARDIAC MARKERS ( 2018 09:31 )  x     / <0.01 ng/mL / 99 U/L / x     / x          PT/INR - ( 2018 09:31 )   PT: 14.2 sec;   INR: 1.23 ratio         PTT - ( 2018 09:31 )  PTT:26.4 sec    I&O's Summary    BNP    RADIOLOGY & ADDITIONAL STUDIES:  < from: Xray Chest 1 View- PORTABLE-Urgent (18 @ 10:30) >  INTERPRETATION:  XR CHEST PORTABLE URGENT 1V    Single AP view    HISTORY:  NEAR SYNCOPE    Comparison:  Chest x-ray 2018    The cardiac silhouette is within normal limits. The lungs are clear. No   pleural abnormality.    IMPRESSION: Clear lungs.        < end of copied text > Patient is a 63y old  Female who presents with a chief complaint of dizziness and near syncope.     HPI: 62 y/o F HFpEF history (Echo  EF >75%), HTN (currently off BP meds due to poor PO intake), HLD, anemia, Breast Ca s/p surgery and radiation, and gastroparesis who presents to the ED with dizziness, palpitations, and near syncope. Patient has been seen multiple times for similar symptoms due to poor PO intake from gastroparesis. Patient states that she is unable to eat or drink very much due to poor appetite and abdominal discomfort with even the slightest amount of food. Patient states that last night she ate a small amount at 8 PM, and then this morning around 8 felt lightheaded, nauseous, and palpitations when going to her appointment. Patient states that her Cardiologist is aware that her HR goes up with the slightest movement, and states it is due to her being hypovolemic. Patient had normal TTE and NST 2017. Patient states she gets some CAMPUZANO for the last 2-3 mos. Denies any fevers, chills, CP, orthopnea, PND, LE swelling, syncope, headache, V/D, or dysuria.       PAST MEDICAL & SURGICAL HISTORY:  Arthritis  Colitis  Anemia  Weakness  Nausea & vomiting  CHF (congestive heart failure): 2017 resolved  Breast cancer: left , had surgery and radiation  High cholesterol: d/c&#x27;d by prescribing MD in April &quot;since I&#x27;m not eating&quot;  High blood pressure: reports medication was stopped 2018 because BP was low  History of endoscopy: 2018  S/P colonoscopy: 2018  History of total hip replacement, left: 2018  History of hip replacement, total, right: 2017  History of lumpectomy of left breast: 2017  Atypical lipoma of soft tissue: in left groin   H/O foot surgery: ankle left       PREVIOUS DIAGNOSTIC TESTING:      ECHO  FINDINGS:< from: TTE Echo Complete w/Doppler (17 @ 22:41) >  Summary:   1. Left ventricular ejection fraction, by visual estimation, is >75%.   2. Hyperdynamic global left ventricular systolic function.   3. Spectral Doppler shows impaired relaxation pattern of left   ventricular myocardial filling (Grade I diastolic dysfunction).   4. LVOT Doppler flow pattern shows, Obstructive pattern with 16 mm Hg at   rest.   5. Thickening of the anterior and posterior mitral valve leaflets.   6. Sclerotic aortic valve with normal opening.   7. Mildly enlarged left atrium.   8. No evidence of aortic stenosis.   9. Recommend Consider contrast study to evaluate for hypertrophic   cardiomyopathy and also Valsalva if clinically indicated.     MD Tong Electronically signed on 2017 at 8:49:46 AM    < end of copied text >    < from: TTE Echo Limited or F/U (17 @ 15:32) >  Summary:   1. Endocardial visualization was enhanced with intravenous echo contrast.   2. Hyperdynamic wall motion. Left ventricular ejection fraction, by   visual estimation, is >75%.   3. Normal right ventricular size and function.   4. No significant valvular abnormality.   5. There is no evidence of pericardial effusion.   6. No cardiaccause of dyspnea identified on the echo.     MD Nevin Electronically signed on 2017 at 6:09:19 PM    < end of copied text >      STRESS  FINDINGS:< from: Nuclear Stress Test-Pharmacologic (17 @ 07:35) >  NUCLEAR FINDINGS:  Review of raw data shows: The study is of good technical  quality.  The left ventricle was normal in size. Normal myocardial  perfusion scan, with no evidence of infarction or  inducible ischemia.  ------------------------------------------------------------------------      GATED ANALYSIS:  Gated wall motion analysis is performed, and shows normal  wall motion with post stress LVEF of 74%.  ------------------------------------------------------------------------      LV/RV OBSERVATION:  Normal LV ejection fraction.  ------------------------------------------------------------------------    IMPRESSIONS:Normal Study  * Review of raw data shows: The study is of good technical  quality.  * The left ventricle was normal in size. Normal myocardial  perfusion scan, with no evidence of infarction or  inducible ischemia.  * Gated wall motion analysis is performed, and shows  normal wall motion with post stress LVEF of 74%.  * Chest Pooja: No chest pain with administration of  Regadenoson.  * Symptom: No Symptom.  * HR Response: Appropriate.  * BP Response: Appropriate.  * Heart Rhythm: Normal Sinus Rhythm - 82 BPM.  * ECG Abnormalities: There were no diagnostic changes.  * Arrhythmia: None.    ------------------------------------------------------------------------      ------------------------------------------------------------------------    Confirmed on  2017 - 13:36:21 by Vern Moise MD  Cardiology Fellow: FRANSISCO Mccormick    < end of copied text >      Allergies    Crestor (Muscle Pain)  shellfish (Swelling; Rash)    Intolerances    MEDICATIONS  (STANDING):  sodium chloride 0.9%. 1000 milliLiter(s) (100 mL/Hr) IV Continuous <Continuous>    MEDICATIONS  (PRN):    Home Medications:  metoclopramide 10 mg oral tablet: 1 tab(s) orally 3 times a day (2018 09:22)  Multiple Vitamins oral tablet: 1 tab(s) orally once a day (2018 09:22)  ondansetron 4 mg oral tablet, disintegratin tab(s) orally every 4 to 6 hours, As Needed (2018 09:22)  Pepcid 20 mg oral tablet: 1 tab(s) orally 2 times a day, As Needed (2018 09:22)  Reglan 5 mg oral tablet: 1 tab(s) orally 4 times a day (before meals and at bedtime) (2018 09:22)  tamoxifen 20 mg oral tablet: 1 tab(s) orally once a day (2018 09:22)  Vitamin C 1000 mg oral tablet: 1 tab(s) orally once a day (2018 09:22)  Vitamin D3 5000 intl units oral capsule: 1 cap(s) orally once a day (2018 09:22)      FAMILY HISTORY:  Family history of cardiac arrest (Father)  Family history of stroke (Mother)  Family history of breast cancer in sister (Sibling)  Family history of hypertension in mother (Mother)      SOCIAL HISTORY:    CIGARETTES: Denies    ALCOHOL: Denies    DRUGS: Denies    REVIEW OF SYMPTOMS:   Cardiovascular: AS PER HPI  Respiratory:   AS PER HPI  Genitourinary:  No dysuria, no hematuria;   Gastrointestinal:   AS PER HPI  Neurological: No headache, no dizziness, no slurred speech;    Psychiatric: No agitation, no anxiety.    ALL OTHER REVIEW OF SYSTEMS ARE NEGATIVE.    Vital Signs Last 24 Hrs  T(C): 36.4 (2018 08:42), Max: 36.4 (2018 08:42)  T(F): 97.5 (2018 08:42), Max: 97.5 (2018 08:42)  HR: 97 (2018 11:53) (97 - 113)  BP: 117/56 (2018 11:53) (94/54 - 117/56)  RR: 18 (2018 11:53) (16 - 18)  SpO2: 99% (2018 08:42) (99% - 99%)    Daily Height in cm: 162.56 (2018 08:42)      PHYSICAL EXAM:  Appearance: Normal, well nourished	  HEENT:   Dry oral mucosa, PERRL, EOMI, sclera non-icteric	  Lymphatic: No cervical lymphadenopathy  Cardiovascular: Normal S1 S2, No JVD, II/VI systolic murmur lsb, No carotid bruits, No peripheral edema  Respiratory: Lungs clear to auscultation	  Psychiatry: A & O x 3, Mood & affect appropriate  Gastrointestinal:  Soft, Non-tender, + BS, no bruits	  Skin: No rashes, No ecchymoses, No cyanosis  Neurologic: Grossly non-focal with full strength in all four extremities  Extremities: Normal range of motion, No clubbing, cyanosis or edema  Vascular: Peripheral pulses palpable 2+ bilaterally      INTERPRETATION OF TELEMETRY: NSR/ST    ECG: NSR, incomplete right bundle branch block, nonspecific ST/T wave changes    LABS:                        12.3   6.2   )-----------( 297      ( 2018 09:31 )             39.6     11    138  |  101  |  8.0  ----------------------------<  122<H>  5.3   |  24.0  |  0.52    Ca    9.8      2018 09:31      CARDIAC MARKERS ( 2018 09:31 )  x     / <0.01 ng/mL / 99 U/L / x     / x          PT/INR - ( 2018 09:31 )   PT: 14.2 sec;   INR: 1.23 ratio         PTT - ( 2018 09:31 )  PTT:26.4 sec    I&O's Summary    BNP    RADIOLOGY & ADDITIONAL STUDIES:  < from: Xray Chest 1 View- PORTABLE-Urgent (.18 @ 10:30) >  INTERPRETATION:  XR CHEST PORTABLE URGENT 1V    Single AP view    HISTORY:  NEAR SYNCOPE    Comparison:  Chest x-ray 2018    The cardiac silhouette is within normal limits. The lungs are clear. No   pleural abnormality.    IMPRESSION: Clear lungs.        < end of copied text >

## 2018-11-21 NOTE — ED ADULT NURSE NOTE - OBJECTIVE STATEMENT
pt received A+Ox3 no apparent distress noted. pt states she had a syncopal episode this am at home before she left for a mammogram. pt states she felt dizzy and sweaty and sat down on her stairs and passed out. pt denies falling or hitting her head. pt states she did not eat this am due to hx of gastroparesis, and pt states she still feels full from dinner last pm. pt ambulates with a walker at home. pink bracelet placed on L. arm due to hx of mastectomy. pt placed on cardiac monitor

## 2018-11-21 NOTE — ED ADULT NURSE NOTE - NSIMPLEMENTINTERV_GEN_ALL_ED
Implemented All Fall with Harm Risk Interventions:  [place] to call system. Call bell, personal items and telephone within reach. Instruct patient to call for assistance. Room bathroom lighting operational. Non-slip footwear when patient is off stretcher. Physically safe environment: no spills, clutter or unnecessary equipment. Stretcher in lowest position, wheels locked, appropriate side rails in place. Provide visual cue, wrist band, yellow gown, etc. Monitor gait and stability. Monitor for mental status changes and reorient to person, place, and time. Review medications for side effects contributing to fall risk. Reinforce activity limits and safety measures with patient and family. Provide visual clues: red socks.

## 2018-11-21 NOTE — ED PROVIDER NOTE - PROGRESS NOTE DETAILS
LABS NORMAL AND CT SCAN NEGATIVE STROKE. WILL CALL FOR CARDIOLOGY CONSULT AND REPEAT TROPONIN 1300 AWAITING RESULTS [place]: received sign out, patient comfortable and well appearing; ct noted negative for PE; noted findings in stomach, not new as per patient, has known gastroparesis, takes reglan, zofran, and H2 blocker daily; soft abd, nt nd; ok for d/c

## 2018-11-21 NOTE — CONSULT NOTE ADULT - ATTENDING COMMENTS
Patient seen and examined.  Symptoms suggestive of orthostatic hypotension.  HR increases with sitting up during the exam.  Dehydrated due to severe gastroparesis.  New incomplete RBBB.  Recommend checking a D-dimer to exclude new PE.  If negative, hydrate, consider d/c home with outpatient follow up. GI follow up.  Could consider midodrine as an outpatient.   D/W Dr. Astorga

## 2018-11-21 NOTE — CONSULT NOTE ADULT - ASSESSMENT
A/P: 64 y/o F HFpEF history (Echo 12/17 EF >75%), HTN (currently off BP meds due to poor PO intake), HLD, anemia, Breast Ca s/p surgery and radiation, and gastroparesis who presents to the ED with dizziness, palpitations, and near syncope. Troponin neg x1. CK normal. CXR clear.    1. Lightheadedness  - Low BP and elevated HR with admission, improving with IVF  - Check and document orthostatics  - Likely due to poor PO intake  - Continue IVF A/P: 64 y/o F HFpEF history (Echo 12/17 EF >75%), HTN (currently off BP meds due to poor PO intake), HLD, anemia, Breast Ca s/p surgery and radiation, and gastroparesis who presents to the ED with dizziness, palpitations, and near syncope. Troponin neg x2. CK normal. CXR clear.    1. Lightheadedness  - Low BP and elevated HR with admission, improving with IVF  - Check and document orthostatics  - Likely due to poor PO intake  - Continue IVF  - Troponin neg x 2 A/P: 62 y/o F HFpEF history (Echo 12/17 EF >75%), HTN (currently off BP meds due to poor PO intake), HLD, anemia, Breast Ca s/p surgery and radiation, and gastroparesis who presents to the ED with dizziness, palpitations, and near syncope. Troponin neg x2. CK normal. CXR clear.    1. Lightheadedness  - Low BP and elevated HR with admission, improving with IVF  - Check and document orthostatics  - Likely due to poor PO intake  - Continue IVF  - Troponin neg x 2  - Check D-Dimer  - If vital signs improve, can D/C home with f/u with Dr. Bonner A/P: 62 y/o F HFpEF history (Echo 12/17 EF >75%), HTN (currently off BP meds due to poor PO intake), HLD, anemia, Breast Ca s/p surgery and radiation, and gastroparesis who presents to the ED with dizziness, palpitations, and near syncope. Troponin neg x2. CK normal. CXR clear.    1. Lightheadedness  - Low BP and elevated HR with admission, improving with IVF  - Check and document orthostatics  - Likely due to poor PO intake  - Continue IVF  - Troponin neg x 2  - Check D-Dimer

## 2018-11-21 NOTE — ED PROVIDER NOTE - CONSTITUTIONAL, MLM
normal... Well appearing, well nourished, awake, alert, oriented to person, place, time/situation and in no apparent distress. LOW BP NOTED

## 2018-11-21 NOTE — ED ADULT NURSE REASSESSMENT NOTE - NS ED NURSE REASSESS COMMENT FT1
pt A+Ox3 laying comfortably in bed at this time. IV fluids infusing well. /56. pt tolerating apple juice. pt remains on cardiac monitor.

## 2018-11-22 NOTE — ED ADULT NURSE REASSESSMENT NOTE - NS ED NURSE REASSESS COMMENT FT1
Second phone call made to ovidio Cummins per pt request, voice message left for pt son per pt request. Pt made aware no pickup @ this time, pending return call @ this time. MD Alonzo made aware

## 2018-11-22 NOTE — ED ADULT NURSE REASSESSMENT NOTE - NS ED NURSE REASSESS COMMENT FT1
RN contact pt son Placido per pt request, Placido reports he is in route to [facility] in approx 20mins. Pt and MD Alonzo updated, pt dressed in clothing and coat, transferred safely to w/c and to WR. Pt leave safely with Son

## 2018-11-26 NOTE — ED ADULT TRIAGE NOTE - BP NONINVASIVE SYSTOLIC (MM HG)
VITAL SIGNS: I have reviewed nursing notes and confirm.  CONSTITUTIONAL: Well-developed; well-nourished; in no acute distress.  SKIN: Agree with RN documentation regarding decubitus evaluation. Remainder of skin exam is warm and dry, no acute rash.  HEAD: Normocephalic; + scattered facial abrasions  EYES: PERRL, EOM intact; conjunctiva and sclera clear.  ENT: No nasal discharge; airway clear, + upper lip laceration/road rash  NECK: Supple; non tender.  CARD: S1, S2 normal; no murmurs, gallops, or rubs. Regular rate and rhythm.  RESP: No wheezes, rales or rhonchi.  ABD: Normal bowel sounds; soft; non-distended; non-tender; no hepatosplenomegaly.  EXT: Normal ROM. No clubbing, cyanosis or edema.  LYMPH: No acute cervical adenopathy.  NEURO: Alert, oriented. Grossly unremarkable.  PSYCH: Cooperative, appropriate.
106

## 2019-01-19 ENCOUNTER — INPATIENT (INPATIENT)
Facility: HOSPITAL | Age: 64
LOS: 12 days | Discharge: HOME | DRG: 391 | End: 2019-02-01
Attending: INTERNAL MEDICINE | Admitting: STUDENT IN AN ORGANIZED HEALTH CARE EDUCATION/TRAINING PROGRAM
Payer: MEDICARE

## 2019-01-19 VITALS
SYSTOLIC BLOOD PRESSURE: 97 MMHG | DIASTOLIC BLOOD PRESSURE: 65 MMHG | TEMPERATURE: 98 F | OXYGEN SATURATION: 100 % | RESPIRATION RATE: 18 BRPM | WEIGHT: 158.95 LBS | HEART RATE: 99 BPM | HEIGHT: 67 IN

## 2019-01-19 DIAGNOSIS — Z98.890 OTHER SPECIFIED POSTPROCEDURAL STATES: Chronic | ICD-10-CM

## 2019-01-19 DIAGNOSIS — Z96.642 PRESENCE OF LEFT ARTIFICIAL HIP JOINT: Chronic | ICD-10-CM

## 2019-01-19 DIAGNOSIS — Z90.12 ACQUIRED ABSENCE OF LEFT BREAST AND NIPPLE: Chronic | ICD-10-CM

## 2019-01-19 DIAGNOSIS — Z96.641 PRESENCE OF RIGHT ARTIFICIAL HIP JOINT: Chronic | ICD-10-CM

## 2019-01-19 DIAGNOSIS — D17.9 BENIGN LIPOMATOUS NEOPLASM, UNSPECIFIED: Chronic | ICD-10-CM

## 2019-01-19 LAB
ALBUMIN SERPL ELPH-MCNC: 3 G/DL — LOW (ref 3.3–5.2)
ALP SERPL-CCNC: 54 U/L — SIGNIFICANT CHANGE UP (ref 40–120)
ALT FLD-CCNC: 26 U/L — SIGNIFICANT CHANGE UP
ANION GAP SERPL CALC-SCNC: 12 MMOL/L — SIGNIFICANT CHANGE UP (ref 5–17)
AST SERPL-CCNC: 38 U/L — HIGH
BASOPHILS # BLD AUTO: 0 K/UL — SIGNIFICANT CHANGE UP (ref 0–0.2)
BASOPHILS NFR BLD AUTO: 0.3 % — SIGNIFICANT CHANGE UP (ref 0–2)
BILIRUB SERPL-MCNC: 0.4 MG/DL — SIGNIFICANT CHANGE UP (ref 0.4–2)
BUN SERPL-MCNC: 12 MG/DL — SIGNIFICANT CHANGE UP (ref 8–20)
CALCIUM SERPL-MCNC: 9.7 MG/DL — SIGNIFICANT CHANGE UP (ref 8.6–10.2)
CHLORIDE SERPL-SCNC: 103 MMOL/L — SIGNIFICANT CHANGE UP (ref 98–107)
CO2 SERPL-SCNC: 25 MMOL/L — SIGNIFICANT CHANGE UP (ref 22–29)
CREAT SERPL-MCNC: 0.45 MG/DL — LOW (ref 0.5–1.3)
EOSINOPHIL # BLD AUTO: 0.1 K/UL — SIGNIFICANT CHANGE UP (ref 0–0.5)
EOSINOPHIL NFR BLD AUTO: 1.6 % — SIGNIFICANT CHANGE UP (ref 0–6)
GLUCOSE SERPL-MCNC: 100 MG/DL — SIGNIFICANT CHANGE UP (ref 70–115)
HCT VFR BLD CALC: 37.1 % — SIGNIFICANT CHANGE UP (ref 37–47)
HGB BLD-MCNC: 11.8 G/DL — LOW (ref 12–16)
LIDOCAIN IGE QN: 23 U/L — SIGNIFICANT CHANGE UP (ref 22–51)
LYMPHOCYTES # BLD AUTO: 3.4 K/UL — SIGNIFICANT CHANGE UP (ref 1–4.8)
LYMPHOCYTES # BLD AUTO: 42.9 % — SIGNIFICANT CHANGE UP (ref 20–55)
MCHC RBC-ENTMCNC: 31.8 G/DL — LOW (ref 32–36)
MCHC RBC-ENTMCNC: 31.9 PG — HIGH (ref 27–31)
MCV RBC AUTO: 100.3 FL — HIGH (ref 81–99)
MONOCYTES # BLD AUTO: 0.8 K/UL — SIGNIFICANT CHANGE UP (ref 0–0.8)
MONOCYTES NFR BLD AUTO: 10 % — SIGNIFICANT CHANGE UP (ref 3–10)
NEUTROPHILS # BLD AUTO: 3.6 K/UL — SIGNIFICANT CHANGE UP (ref 1.8–8)
NEUTROPHILS NFR BLD AUTO: 45.1 % — SIGNIFICANT CHANGE UP (ref 37–73)
PLATELET # BLD AUTO: 290 K/UL — SIGNIFICANT CHANGE UP (ref 150–400)
POTASSIUM SERPL-MCNC: 5.8 MMOL/L — HIGH (ref 3.5–5.3)
POTASSIUM SERPL-SCNC: 5.8 MMOL/L — HIGH (ref 3.5–5.3)
PROT SERPL-MCNC: 6 G/DL — LOW (ref 6.6–8.7)
RBC # BLD: 3.7 M/UL — LOW (ref 4.4–5.2)
RBC # FLD: 13.9 % — SIGNIFICANT CHANGE UP (ref 11–15.6)
SODIUM SERPL-SCNC: 140 MMOL/L — SIGNIFICANT CHANGE UP (ref 135–145)
WBC # BLD: 7.9 K/UL — SIGNIFICANT CHANGE UP (ref 4.8–10.8)
WBC # FLD AUTO: 7.9 K/UL — SIGNIFICANT CHANGE UP (ref 4.8–10.8)

## 2019-01-19 PROCEDURE — 99285 EMERGENCY DEPT VISIT HI MDM: CPT

## 2019-01-19 PROCEDURE — 74177 CT ABD & PELVIS W/CONTRAST: CPT | Mod: 26

## 2019-01-19 PROCEDURE — 93010 ELECTROCARDIOGRAM REPORT: CPT

## 2019-01-19 RX ORDER — ONDANSETRON 8 MG/1
4 TABLET, FILM COATED ORAL ONCE
Qty: 0 | Refills: 0 | Status: COMPLETED | OUTPATIENT
Start: 2019-01-19 | End: 2019-01-19

## 2019-01-19 RX ORDER — METOCLOPRAMIDE HCL 10 MG
10 TABLET ORAL ONCE
Qty: 0 | Refills: 0 | Status: COMPLETED | OUTPATIENT
Start: 2019-01-19 | End: 2019-01-19

## 2019-01-19 RX ORDER — SODIUM CHLORIDE 9 MG/ML
3 INJECTION INTRAMUSCULAR; INTRAVENOUS; SUBCUTANEOUS ONCE
Qty: 0 | Refills: 0 | Status: COMPLETED | OUTPATIENT
Start: 2019-01-19 | End: 2019-01-19

## 2019-01-19 RX ORDER — SODIUM CHLORIDE 9 MG/ML
1000 INJECTION INTRAMUSCULAR; INTRAVENOUS; SUBCUTANEOUS
Qty: 0 | Refills: 0 | Status: DISCONTINUED | OUTPATIENT
Start: 2019-01-19 | End: 2019-01-23

## 2019-01-19 RX ADMIN — ONDANSETRON 4 MILLIGRAM(S): 8 TABLET, FILM COATED ORAL at 21:59

## 2019-01-19 RX ADMIN — SODIUM CHLORIDE 125 MILLILITER(S): 9 INJECTION INTRAMUSCULAR; INTRAVENOUS; SUBCUTANEOUS at 21:59

## 2019-01-19 RX ADMIN — Medication 10 MILLIGRAM(S): at 22:00

## 2019-01-19 RX ADMIN — SODIUM CHLORIDE 3 MILLILITER(S): 9 INJECTION INTRAMUSCULAR; INTRAVENOUS; SUBCUTANEOUS at 22:02

## 2019-01-19 NOTE — ED PROVIDER NOTE - MEDICAL DECISION MAKING DETAILS
64 y/o F with hx of gastroparesis, CHF, mastectomy s/p breast CA, hip replacements presents to the ED c/o nausea and diarrhea onset yesterday. 64 y/o F with hx of gastroparesis, CHF, mastectomy s/p breast CA, hip replacements presents to the ED c/o nausea and diarrhea onset yesterday - pt with gastric outlet obstruction - eval by surgery rec for NGT no surgical intervention at this time, admit to medicine for further management and GI endoscopy

## 2019-01-19 NOTE — ED ADULT NURSE NOTE - NSIMPLEMENTINTERV_GEN_ALL_ED
Implemented All Fall Risk Interventions:  [place] to call system. Call bell, personal items and telephone within reach. Instruct patient to call for assistance. Room bathroom lighting operational. Non-slip footwear when patient is off stretcher. Physically safe environment: no spills, clutter or unnecessary equipment. Stretcher in lowest position, wheels locked, appropriate side rails in place. Provide visual cue, wrist band, yellow gown, etc. Monitor gait and stability. Monitor for mental status changes and reorient to person, place, and time. Review medications for side effects contributing to fall risk. Reinforce activity limits and safety measures with patient and family.

## 2019-01-19 NOTE — ED PROVIDER NOTE - PROGRESS NOTE DETAILS
Discussed need to admit with patient & discussed risk and benefits.  Patient agreed to admission.  Discussed case w/ admitting medicine doctor - agreed to admit to their service.

## 2019-01-19 NOTE — ED ADULT NURSE NOTE - CHPI ED NUR SYMPTOMS NEG
no abdominal distension/no vomiting/no diarrhea/no chills/no blood in stool/no burning urination/no dysuria/no fever/no hematuria

## 2019-01-19 NOTE — ED PROVIDER NOTE - OBJECTIVE STATEMENT
64 y/o F with hx of gastroparesis, CHF, mastectomy s/p breast CA, hip replacements presents to the ED c/o nausea and diarrhea onset yesterday. Pt describes feeling of passing out while passing bowels, no syncopal episode; this prompted pt to seek ED evaluation today. She states frequently having abdominal sx secondary to hx of gastroparesis however presenting sx feel different. Pt uses walker for gait assistance. Breast CA in remission, pt not currently on chemotherapy. Pt denies fevers/chills, ha, loc, focal neuro deficits, cp/sob/palp, cough, vomiting, urinary symptoms, recent travel and sick contacts. Pt was on Reglan, reports no relief to sx taking it.

## 2019-01-19 NOTE — ED ADULT NURSE NOTE - OBJECTIVE STATEMENT
64yo female c/o abdominal pain and nausea x 1 year. pt states she has a hx of gastroparesis- decreased po intake because she "feels it sitting in her stomach, not moving" pt denies any v/d. completed reglan MD gave her with no relief. pt denies any change in bowel/bladder problem, fevers, chills, numbness/tingling. abd soft non tender non distended, + bs

## 2019-01-19 NOTE — ED ADULT TRIAGE NOTE - CHIEF COMPLAINT QUOTE
Pt with hx of gastroparesis presents with c/o nausea, LUQ pain, and diarrhea. Pt denies fever/vomiting.

## 2019-01-20 DIAGNOSIS — K31.1 ADULT HYPERTROPHIC PYLORIC STENOSIS: ICD-10-CM

## 2019-01-20 DIAGNOSIS — R11.2 NAUSEA WITH VOMITING, UNSPECIFIED: ICD-10-CM

## 2019-01-20 LAB
ANION GAP SERPL CALC-SCNC: 11 MMOL/L — SIGNIFICANT CHANGE UP (ref 5–17)
APPEARANCE UR: CLEAR — SIGNIFICANT CHANGE UP
BILIRUB UR-MCNC: NEGATIVE — SIGNIFICANT CHANGE UP
BUN SERPL-MCNC: 10 MG/DL — SIGNIFICANT CHANGE UP (ref 8–20)
CALCIUM SERPL-MCNC: 9.3 MG/DL — SIGNIFICANT CHANGE UP (ref 8.6–10.2)
CHLORIDE SERPL-SCNC: 105 MMOL/L — SIGNIFICANT CHANGE UP (ref 98–107)
CO2 SERPL-SCNC: 23 MMOL/L — SIGNIFICANT CHANGE UP (ref 22–29)
COLOR SPEC: YELLOW — SIGNIFICANT CHANGE UP
CREAT SERPL-MCNC: 0.47 MG/DL — LOW (ref 0.5–1.3)
DIFF PNL FLD: NEGATIVE — SIGNIFICANT CHANGE UP
GLUCOSE SERPL-MCNC: 101 MG/DL — SIGNIFICANT CHANGE UP (ref 70–115)
GLUCOSE UR QL: NEGATIVE MG/DL — SIGNIFICANT CHANGE UP
HCT VFR BLD CALC: 37.8 % — SIGNIFICANT CHANGE UP (ref 37–47)
HGB BLD-MCNC: 11.9 G/DL — LOW (ref 12–16)
KETONES UR-MCNC: ABNORMAL
LEUKOCYTE ESTERASE UR-ACNC: NEGATIVE — SIGNIFICANT CHANGE UP
MCHC RBC-ENTMCNC: 31.5 G/DL — LOW (ref 32–36)
MCHC RBC-ENTMCNC: 31.6 PG — HIGH (ref 27–31)
MCV RBC AUTO: 100.3 FL — HIGH (ref 81–99)
NITRITE UR-MCNC: NEGATIVE — SIGNIFICANT CHANGE UP
PH UR: 5 — SIGNIFICANT CHANGE UP (ref 5–8)
PLATELET # BLD AUTO: 281 K/UL — SIGNIFICANT CHANGE UP (ref 150–400)
POTASSIUM SERPL-MCNC: 4.2 MMOL/L — SIGNIFICANT CHANGE UP (ref 3.5–5.3)
POTASSIUM SERPL-SCNC: 4.2 MMOL/L — SIGNIFICANT CHANGE UP (ref 3.5–5.3)
PROT UR-MCNC: NEGATIVE MG/DL — SIGNIFICANT CHANGE UP
RBC # BLD: 3.77 M/UL — LOW (ref 4.4–5.2)
RBC # FLD: 13.9 % — SIGNIFICANT CHANGE UP (ref 11–15.6)
SODIUM SERPL-SCNC: 139 MMOL/L — SIGNIFICANT CHANGE UP (ref 135–145)
SP GR SPEC: 1.01 — SIGNIFICANT CHANGE UP (ref 1.01–1.02)
UROBILINOGEN FLD QL: NEGATIVE MG/DL — SIGNIFICANT CHANGE UP
WBC # BLD: 6.1 K/UL — SIGNIFICANT CHANGE UP (ref 4.8–10.8)
WBC # FLD AUTO: 6.1 K/UL — SIGNIFICANT CHANGE UP (ref 4.8–10.8)

## 2019-01-20 PROCEDURE — 99223 1ST HOSP IP/OBS HIGH 75: CPT

## 2019-01-20 PROCEDURE — 71045 X-RAY EXAM CHEST 1 VIEW: CPT | Mod: 26

## 2019-01-20 PROCEDURE — 12345: CPT | Mod: NC

## 2019-01-20 RX ORDER — METOCLOPRAMIDE HCL 10 MG
10 TABLET ORAL EVERY 6 HOURS
Qty: 0 | Refills: 0 | Status: DISCONTINUED | OUTPATIENT
Start: 2019-01-20 | End: 2019-01-23

## 2019-01-20 RX ORDER — PANTOPRAZOLE SODIUM 20 MG/1
40 TABLET, DELAYED RELEASE ORAL DAILY
Qty: 0 | Refills: 0 | Status: DISCONTINUED | OUTPATIENT
Start: 2019-01-20 | End: 2019-01-23

## 2019-01-20 RX ORDER — ONDANSETRON 8 MG/1
4 TABLET, FILM COATED ORAL EVERY 6 HOURS
Qty: 0 | Refills: 0 | Status: DISCONTINUED | OUTPATIENT
Start: 2019-01-20 | End: 2019-02-01

## 2019-01-20 RX ORDER — TAMOXIFEN CITRATE 20 MG/1
20 TABLET, FILM COATED ORAL DAILY
Qty: 0 | Refills: 0 | Status: DISCONTINUED | OUTPATIENT
Start: 2019-01-20 | End: 2019-02-01

## 2019-01-20 RX ORDER — ASCORBIC ACID 60 MG
500 TABLET,CHEWABLE ORAL DAILY
Qty: 0 | Refills: 0 | Status: DISCONTINUED | OUTPATIENT
Start: 2019-01-20 | End: 2019-02-01

## 2019-01-20 RX ORDER — ENOXAPARIN SODIUM 100 MG/ML
40 INJECTION SUBCUTANEOUS DAILY
Qty: 0 | Refills: 0 | Status: DISCONTINUED | OUTPATIENT
Start: 2019-01-20 | End: 2019-02-01

## 2019-01-20 RX ORDER — CHOLECALCIFEROL (VITAMIN D3) 125 MCG
5000 CAPSULE ORAL DAILY
Qty: 0 | Refills: 0 | Status: DISCONTINUED | OUTPATIENT
Start: 2019-01-20 | End: 2019-02-01

## 2019-01-20 RX ORDER — INFLUENZA VIRUS VACCINE 15; 15; 15; 15 UG/.5ML; UG/.5ML; UG/.5ML; UG/.5ML
0.5 SUSPENSION INTRAMUSCULAR ONCE
Qty: 0 | Refills: 0 | Status: COMPLETED | OUTPATIENT
Start: 2019-01-20 | End: 2019-01-20

## 2019-01-20 RX ORDER — METOCLOPRAMIDE HCL 10 MG
10 TABLET ORAL THREE TIMES A DAY
Qty: 0 | Refills: 0 | Status: DISCONTINUED | OUTPATIENT
Start: 2019-01-20 | End: 2019-01-20

## 2019-01-20 RX ADMIN — SODIUM CHLORIDE 125 MILLILITER(S): 9 INJECTION INTRAMUSCULAR; INTRAVENOUS; SUBCUTANEOUS at 12:26

## 2019-01-20 RX ADMIN — SODIUM CHLORIDE 125 MILLILITER(S): 9 INJECTION INTRAMUSCULAR; INTRAVENOUS; SUBCUTANEOUS at 21:35

## 2019-01-20 RX ADMIN — Medication 1 TABLET(S): at 12:25

## 2019-01-20 RX ADMIN — Medication 10 MILLIGRAM(S): at 23:22

## 2019-01-20 RX ADMIN — INFLUENZA VIRUS VACCINE 0.5 MILLILITER(S): 15; 15; 15; 15 SUSPENSION INTRAMUSCULAR at 12:28

## 2019-01-20 RX ADMIN — Medication 10 MILLIGRAM(S): at 12:25

## 2019-01-20 RX ADMIN — Medication 500 MILLIGRAM(S): at 12:25

## 2019-01-20 RX ADMIN — ENOXAPARIN SODIUM 40 MILLIGRAM(S): 100 INJECTION SUBCUTANEOUS at 12:26

## 2019-01-20 RX ADMIN — PANTOPRAZOLE SODIUM 40 MILLIGRAM(S): 20 TABLET, DELAYED RELEASE ORAL at 12:26

## 2019-01-20 RX ADMIN — Medication 10 MILLIGRAM(S): at 17:05

## 2019-01-20 NOTE — CONSULT NOTE ADULT - ASSESSMENT
62 y/o F h/o breast CA, CHF, HTN, gastroparesis with CT finding of gastric outlet obstruction. Currently patient is hemodynamically stable, afebrile.    Plan:  No acute surgical intervention at this time  Recommend NGT  NPO/IVF  Recommend GI consultation for repeat endoscopy  Continue PPI  ACS will continue to follow

## 2019-01-20 NOTE — CONSULT NOTE ADULT - SUBJECTIVE AND OBJECTIVE BOX
ACUTE CARE SURGERY CONSULT    Consulting surgical team: [facility] - Acute Care Surgery  Consulting attending: Dr. Marlena Barnett  Patient seen and examined: 01-20-19 @ 04:47    HPI: 62 y/o F h/o breast CA, CHF, HTN, gastroparesis that presents to the ED with nausea, LUQ pain, distention, and diarrhea for several days. Patient has also been feeling weak and dizzy at home. No decrease in oral intake compared to baseline, most of nutrition via ensure shakes. Patient presents to ED to be evaluated for dehydration. Had been receiving IV home hydration until Dec 24 2018, after which insurance stopped covering the cost. The patient has had 3 EGDs since September 2018. EGD in September 2018 with biopsy of antrum showed inflammation, negative for H. pylori. EGD in November 2018 was done for botulinum injection. Patient states it briefly helped, but symptoms now are similar to before. Patient takes Reglan for gastroparesis.    PAST MEDICAL HISTORY:  CHF (congestive heart failure)  Breast cancer  High cholesterol  High blood pressure    PAST SURGICAL HISTORY:  History of endoscopy  S/P colonoscopy  History of total hip replacement, left  History of hip replacement, total, right  History of lumpectomy of left breast  Atypical lipoma of soft tissue  H/O foot surgery    ALLERGIES:  Crestor (Muscle Pain)  shellfish (Swelling; Rash)      MEDICATIONS  (STANDING):  sodium chloride 0.9%. 1000 milliLiter(s) (125 mL/Hr) IV Continuous <Continuous>    MEDICATIONS  (PRN):      VITALS & I/Os:  Vital Signs Last 24 Hrs  T(C): 36.8 (20 Jan 2019 02:14), Max: 36.9 (19 Jan 2019 19:52)  T(F): 98.2 (20 Jan 2019 02:14), Max: 98.5 (19 Jan 2019 19:52)  HR: 66 (20 Jan 2019 02:14) (66 - 99)  BP: 95/55 (20 Jan 2019 02:14) (92/55 - 97/65)  BP(mean): --  RR: 16 (20 Jan 2019 02:14) (16 - 18)  SpO2: 100% (20 Jan 2019 02:14) (100% - 100%)  CAPILLARY BLOOD GLUCOSE        General: NAD, AOx3, comfortable on examination  HEENT: PERRLA, EOMI, dry mucous membranes  Neck: supple, nontender  Cardiovascular: heart RRR, no murmurs  Respiratory: no respiratory distress, CTAB  Abdomen: Soft, mild LUQ tenderness to palpation with abdominal distention and tympany to tap in LUQ. No guarding or rebound. Normal bowel sounds.  Extremities: no peripheral edema. Normal ROM.  Integumentary: warm, no rash  Neuro: no motor or sensory deficits          LABS:                        11.8   7.9   )-----------( 290      ( 19 Jan 2019 22:10 )             37.1     01-19    140  |  103  |  12.0  ----------------------------<  100  5.8<H>   |  25.0  |  0.45<L>    Ca    9.7      19 Jan 2019 22:10    TPro  6.0<L>  /  Alb  3.0<L>  /  TBili  0.4  /  DBili  x   /  AST  38<H>  /  ALT  26  /  AlkPhos  54  01-19 01-19 @ 22:01  2.2    IMAGING:  < from: CT Abdomen and Pelvis w/ IV Cont (01.19.19 @ 23:41) >   EXAM:  CT ABDOMEN AND PELVIS IC                          PROCEDURE DATE:  01/19/2019          INTERPRETATION:  CLINICAL INFORMATION: Abdominal pain, vomiting and   diarrhea    COMPARISON: 8/7/2018, 7/30/2018 and multiple priors    PROCEDURE:   Contiguous axial scan of the abdomen and pelvis with intravenous and oral   contrast, followed by coronal and sagittal reformation.   93 mL of   Omnipaque were administered without adverse reaction.   7 mL of contrast   were discarded.    FINDINGS:    LOWER CHEST: Within normal limits.    LIVER: Within normal limits.  BILE DUCTS: Normal caliber.  GALLBLADDER: Within normal limits.  SPLEEN: Within normal limits.  PANCREAS: Within normal limits.  ADRENALS: Within normal limits.  KIDNEYS/URETERS: A few nonobstructing right renal stones and right renal   cysts without interval progression..    BLADDER: Within normal limits.  REPRODUCTIVE ORGANS:         BOWEL: There is marked C distended stomach with concentric wall   thickening of the distal antrum/pylorus (2-53). Concentric wall   thickening with seen previously. The degree of distention has   significantly progressed since prior.  PERITONEUM: No ascites.  VESSELS: Mild atherosclerotic calcification.   RETROPERITONEUM: No lymphadenopathy.    ABDOMINAL WALL: Diffuse anasarca. Diffuse muscle atrophy. No focal   intramuscular hematoma or fluid collection.  BONES: No acute abnormality.    IMPRESSION: Markedly distended stomach with concentric wall thickening in   the gastric antrum/pylorus. Considering the patient's history, this is   most likely a combination of gastric outlet obstruction   and   gastroparesis. Underlying cause of gastric outlet obstruction would be   concentric antral mass or other benign stricture.     Findings were discussed with Dr. Cooley with read back at 12:05 AM.                REECE EDWARDS M.D., ATTENDING RADIOLOGIST  This document has been electronically signed. Jan 20 2019 12:06AM    < end of copied text >

## 2019-01-20 NOTE — PROGRESS NOTE ADULT - ASSESSMENT
64 y/o female with PMh of gastroparesis (recently diagnosed), breast CA, came to the ED complaining of nausea and vomiting that x 1 day duration. Patient also reported that her abdomen is distended.     Abdominal distention due to gastric outlet obstruction vs gastroparesis with likely Viral gastroenteritis  NG tube placed, on intermittent suction, Surgery on board; recommendation appreciated,   Continue PPI 40mg   Reglan 10mg daily   Continue hydration with NS   GI consult appreciated as well.     Nausea and vomiting 2/2 gastroparesis   Patient was recently diagnosed with gastroparesis   Gastric emptying confirmed the diagnosis   Continue Reglan 10mg daily    Hyperkalemia   K: 5.8 (moderately hemolyzed), repeat one is ok.      Breast CA   Continue Tamoxifen 20mg daily     Supportive   DVT prophylaxis: Lovenox 40mg   Diet: NPO for now.

## 2019-01-20 NOTE — ED ADULT NURSE REASSESSMENT NOTE - NS ED NURSE REASSESS COMMENT FT1
pt a&ox3, states + relief of abd pain after med regimen. pt denies any n/v at this time. pending ct results for further tx plan. pupated on plan of care, verbalize understanding. call bell in reach

## 2019-01-20 NOTE — CONSULT NOTE ADULT - PROBLEM SELECTOR RECOMMENDATION 9
May be exacerbation of gastroparesis secondary to viral gastroeneteritis. NGT suction, IV Reglan 10 mg. every 6 hours ATC, IV Pantoprazole. Repeat labs ordered for the above

## 2019-01-20 NOTE — H&P ADULT - HISTORY OF PRESENT ILLNESS
64 y/o female with PMh of gastroparesis (recently diagnosed), breast CA, came to the ED complaining of nausea and vomiting that x 1 day duration. Patient also reported that her abdomen is distended. She said her symptoms has been going on for 1 year now but worsen yesterday. She reported weakness and feeling that she wants to pass out while moving her bowel. Patient said she had an episode of diarrhea on Friday; watery diarrhea no blood but has resolved now. She was seen in [facility] in August 2018 where she was diagnosed with gastroparesis. Patient said she has been complaint with her medication and diet. She has no fever, chills, hematemesis, melena, hematochezia, sick contact, chest pain, shortness of breath.

## 2019-01-20 NOTE — CHART NOTE - NSCHARTNOTEFT_GEN_A_CORE
63F with multiple comorbidities with worsening weakness, nasuea, vomiting and abdominal distension with known hx of gastroparesis and was due to see GI doc next week. Surgical consutlation requested because CT scan imaging obtained tonight shows GOO.  Patient is alert, oriented, NAD. Abdomen is soft, mild to moderate distension, nontender. No leukocytosis. CT shows dilated staomch full of fluid.     No acute surgical intervention at this immediate time. Recommend NGT decomrpession. IVF hydration. GI consultation for repeat endoscopy. COntinue PPI.   Will continue to follow patient to determine if she ultimately has surgical needs.

## 2019-01-20 NOTE — H&P ADULT - ASSESSMENT
Abdominal distention due to gastric outlet obstruction vs gastroparesis   Admit to medical floor   NG tube placed   Surgery on board; recommendation appreciated   Continue PPI 40mg   Reglan 10mg daily   Continue hydration with NS   GI consult     Nausea and vomiting 2/2 gastroparesis   Patient was recently diagnosed with gastroparesis   Gastric emptying confirmed the diagnosis   Continue Reglan 10mg daily    Hyperkalemia   K: 5.8 (moderately hemolyzed)   Will repeat K in AM     Supportive   DVT prophylaxis: 62 y/o female with PMh of gastroparesis (recently diagnosed), breast CA, came to the ED complaining of nausea and vomiting that x 1 day duration. Patient also reported that her abdomen is distended.     Abdominal distention due to gastric outlet obstruction vs gastroparesis   Admit to medical floor   NG tube placed   Surgery on board; recommendation appreciated   Continue PPI 40mg   Reglan 10mg daily   Continue hydration with NS   GI consult     Nausea and vomiting 2/2 gastroparesis   Patient was recently diagnosed with gastroparesis   Gastric emptying confirmed the diagnosis   Continue Reglan 10mg daily    Hyperkalemia   K: 5.8 (moderately hemolyzed)   Will repeat K in AM     Breast CA   Continue Tamoxifen 20mg daily     Supportive   DVT prophylaxis: Lovenox 40mg   Diet: NPO

## 2019-01-20 NOTE — ED ADULT NURSE REASSESSMENT NOTE - NS ED NURSE REASSESS COMMENT FT1
ACS at bedside. pt continues to deny any pain/discomfort. abd soft non tender non distended. daughter at bedside. tx plan discussed with pt and family by dr loera. verbalize understanding. call bell in reach

## 2019-01-20 NOTE — CONSULT NOTE ADULT - SUBJECTIVE AND OBJECTIVE BOX
Patient is a 63y old  Female who presents with a chief complaint of Nausea/vomiting (20 Jan 2019 04:48)      HPI:  64 y/o female with PMh of gastroparesis (recently diagnosed), breast CA, came to the ED complaining of nausea and vomiting that x 1 day duration. Patient also reported that her abdomen is distended. She said her symptoms has been going on for 1 year now but worsen yesterday. She reported weakness and feeling that she wants to pass out while moving her bowel. Patient said she had an episode of diarrhea on Friday; watery diarrhea no blood but has resolved now. She was seen in [facility] in August 2018 where she was diagnosed with gastroparesis. Patient said she has been complaint with her medication and diet. She has no fever, chills, hematemesis, melena, hematochezia, sick contact, chest pain, shortness of breath. (20 Jan 2019 04:48) Dxed one year ago by a Dr. Kemp in [place] on Regalan 10 mg TID      REVIEW OF SYSTEMS:  Constitutional: No fever, weight loss or fatigue  ENMT:  No difficulty hearing, tinnitus, vertigo; No sinus or throat pain  Respiratory: No cough, wheezing, chills or hemoptysis  Cardiovascular: No chest pain, palpitations, dizziness or leg swelling  Gastrointestinal: As per HPI  Skin: No itching, burning, rashes or lesions   Musculoskeletal: No joint pain or swelling; No muscle, back or extremity pain  Patient has no cardiopulmonary, peripheral vascular, musculoskeletal, dermatological, neurological, gynecological or psychological symptoms or complaints at this time  PAST MEDICAL & SURGICAL HISTORY:  Arthritis  Colitis  Anemia  Weakness  Nausea & vomiting  CHF (congestive heart failure): 12/2017 resolved  Breast cancer: left 2017, had surgery and radiation  High cholesterol: d/c&#x27;d by prescribing MD in April &quot;since I&#x27;m not eating&quot;  High blood pressure: reports medication was stopped April 2018 because BP was low  History of endoscopy: 7/2018  S/P colonoscopy: 2018  History of total hip replacement, left: march 2018  History of hip replacement, total, right: 11/2017  History of lumpectomy of left breast: 2017  Atypical lipoma of soft tissue: in left groin 2008  H/O foot surgery: ankle left 2004      FAMILY HISTORY:  Family history of cardiac arrest  Family history of stroke  Family history of breast cancer in sister (Sibling)  Family history of hypertension in mother      SOCIAL HISTORY:  Smoking Status: [ ] Current, [ ] Former, [ ] Never  Pack Years:    MEDICATIONS:  MEDICATIONS  (STANDING):  ascorbic acid 500 milliGRAM(s) Oral daily  cholecalciferol 5000 Unit(s) Oral daily  enoxaparin Injectable 40 milliGRAM(s) SubCutaneous daily  metoclopramide 10 milliGRAM(s) Oral three times a day  multivitamin 1 Tablet(s) Oral daily  pantoprazole  Injectable 40 milliGRAM(s) IV Push daily  sodium chloride 0.9%. 1000 milliLiter(s) (125 mL/Hr) IV Continuous <Continuous>  tamoxifen 20 milliGRAM(s) Oral daily    MEDICATIONS  (PRN):      Allergies    Crestor (Muscle Pain)  shellfish (Swelling; Rash)    Intolerances        Vital Signs Last 24 Hrs  T(C): 36.8 (20 Jan 2019 02:14), Max: 36.9 (19 Jan 2019 19:52)  T(F): 98.2 (20 Jan 2019 02:14), Max: 98.5 (19 Jan 2019 19:52)  HR: 66 (20 Jan 2019 02:14) (66 - 99)  BP: 95/55 (20 Jan 2019 02:14) (92/55 - 97/65)  BP(mean): --  RR: 16 (20 Jan 2019 02:14) (16 - 18)  SpO2: 100% (20 Jan 2019 02:14) (100% - 100%)        PHYSICAL EXAM:    General: Well developed; well nourished; in no acute distress  HEENT: MMM, conjunctiva and sclera clear  Gastrointestinal: Soft, non-tender non-distended; Normal bowel sounds; No rebound or guarding  Extremities: Normal range of motion, No clubbing, cyanosis or edema  Neurological: Alert and oriented x3  Skin: Warm and dry. No obvious rash      LABS:                        11.8   7.9   )-----------( 290      ( 19 Jan 2019 22:10 )             37.1     01-19    140  |  103  |  12.0  ----------------------------<  100  5.8<H>   |  25.0  |  0.45<L>    Ca    9.7      19 Jan 2019 22:10    TPro  6.0<L>  /  Alb  3.0<L>  /  TBili  0.4  /  DBili  x   /  AST  38<H>  /  ALT  26  /  AlkPhos  54  01-19          RADIOLOGY & ADDITIONAL STUDIES: Patient is a 63y old  Female who presents with a chief complaint of Nausea/vomiting (20 Jan 2019 04:48)      HPI:  64 y/o female with PMh of gastroparesis (recently diagnosed), breast CA, came to the ED complaining of nausea and vomiting that x 1 day duration. Patient also reported that her abdomen is distended. She said her symptoms has been going on for 1 year now but worsen yesterday. She reported weakness and feeling that she wants to pass out while moving her bowel. Patient said she had an episode of diarrhea on Friday; watery diarrhea no blood but has resolved now. She was seen in [facility] in August 2018 where she was diagnosed with gastroparesis. Patient said she has been complaint with her medication and diet. She has no fever, chills, hematemesis, melena, hematochezia, sick contact, chest pain, shortness of breath. (20 Jan 2019 04:48) Dxed one year ago by a Dr. Kemp in [place] on Regalan 10 mg TID. She has had diarrhea as well with symptomatic worsening of her gastroparesis over the past two days.      REVIEW OF SYSTEMS:  Constitutional: No fever, weight loss or fatigue  ENMT:  No difficulty hearing, tinnitus, vertigo; No sinus or throat pain  Respiratory: No cough, wheezing, chills or hemoptysis  Cardiovascular: No chest pain, palpitations, dizziness or leg swelling  Gastrointestinal: As per HPI  Skin: No itching, burning, rashes or lesions   Musculoskeletal: No joint pain or swelling; No muscle, back or extremity pain  Patient has no cardiopulmonary, peripheral vascular, musculoskeletal, dermatological, neurological, gynecological or psychological symptoms or complaints at this time    PAST MEDICAL & SURGICAL HISTORY:  Arthritis  Colitis  Anemia  Weakness  Nausea & vomiting  CHF (congestive heart failure): 12/2017 resolved  Breast cancer: left 2017, had surgery and radiation  High cholesterol: d/c&#x27;d by prescribing MD in April &quot;since I&#x27;m not eating&quot;  High blood pressure: reports medication was stopped April 2018 because BP was low  History of endoscopy: 7/2018  S/P colonoscopy: 2018  History of total hip replacement, left: march 2018  History of hip replacement, total, right: 11/2017  History of lumpectomy of left breast: 2017  Atypical lipoma of soft tissue: in left groin 2008  H/O foot surgery: ankle left 2004      FAMILY HISTORY:  Family history of cardiac arrest  Family history of stroke  Family history of breast cancer in sister (Sibling)  Family history of hypertension in mother      SOCIAL HISTORY:  Smoking Status: [ ] Current, [ ] Former, [x ] Never  Pack Years:N/A, No ETOH or drug abuse history    MEDICATIONS:  MEDICATIONS  (STANDING):  ascorbic acid 500 milliGRAM(s) Oral daily  cholecalciferol 5000 Unit(s) Oral daily  enoxaparin Injectable 40 milliGRAM(s) SubCutaneous daily  metoclopramide 10 milliGRAM(s) Oral three times a day  multivitamin 1 Tablet(s) Oral daily  pantoprazole  Injectable 40 milliGRAM(s) IV Push daily  sodium chloride 0.9%. 1000 milliLiter(s) (125 mL/Hr) IV Continuous <Continuous>  tamoxifen 20 milliGRAM(s) Oral daily    MEDICATIONS  (PRN):      Allergies    Crestor (Muscle Pain)  shellfish (Swelling; Rash)    Intolerances        Vital Signs Last 24 Hrs  T(C): 36.8 (20 Jan 2019 02:14), Max: 36.9 (19 Jan 2019 19:52)  T(F): 98.2 (20 Jan 2019 02:14), Max: 98.5 (19 Jan 2019 19:52)  HR: 66 (20 Jan 2019 02:14) (66 - 99)  BP: 95/55 (20 Jan 2019 02:14) (92/55 - 97/65)  BP(mean): --  RR: 16 (20 Jan 2019 02:14) (16 - 18)  SpO2: 100% (20 Jan 2019 02:14) (100% - 100%)        PHYSICAL EXAM:    General: Well developed; well nourished; in no acute distress  HEENT: MMM, conjunctiva pink and sclera anicteric  Lungs: Clear bilaterally  Cor: RRR S1, S2 only  Gastrointestinal: Abdomen: Soft, non-tender, softly distended; Normal bowel sounds; No rebound or guarding or HSM  VALERY: Pt. refused  Extremities: Normal range of motion, No clubbing, cyanosis or edema  Neurological: Alert and oriented x3  Skin: Warm and dry. No obvious rash      LABS:                        11.8   7.9   )-----------( 290      ( 19 Jan 2019 22:10 )             37.1     01-19    140  |  103  |  12.0  ----------------------------<  100  5.8<H>   |  25.0  |  0.45<L>    Ca    9.7      19 Jan 2019 22:10    TPro  6.0<L>  /  Alb  3.0<L>  /  TBili  0.4  /  DBili  x   /  AST  38<H>  /  ALT  26  /  AlkPhos  54  01-19          RADIOLOGY & ADDITIONAL STUDIES:   CT: Distended stomach possible GOO.

## 2019-01-21 LAB
ANION GAP SERPL CALC-SCNC: 14 MMOL/L — SIGNIFICANT CHANGE UP (ref 5–17)
BASOPHILS # BLD AUTO: 0 K/UL — SIGNIFICANT CHANGE UP (ref 0–0.2)
BASOPHILS NFR BLD AUTO: 0.1 % — SIGNIFICANT CHANGE UP (ref 0–2)
BUN SERPL-MCNC: 7 MG/DL — LOW (ref 8–20)
CALCIUM SERPL-MCNC: 9.2 MG/DL — SIGNIFICANT CHANGE UP (ref 8.6–10.2)
CHLORIDE SERPL-SCNC: 106 MMOL/L — SIGNIFICANT CHANGE UP (ref 98–107)
CO2 SERPL-SCNC: 21 MMOL/L — LOW (ref 22–29)
CREAT SERPL-MCNC: 0.42 MG/DL — LOW (ref 0.5–1.3)
EOSINOPHIL # BLD AUTO: 0.1 K/UL — SIGNIFICANT CHANGE UP (ref 0–0.5)
EOSINOPHIL NFR BLD AUTO: 1.6 % — SIGNIFICANT CHANGE UP (ref 0–6)
GLUCOSE SERPL-MCNC: 75 MG/DL — SIGNIFICANT CHANGE UP (ref 70–115)
HCT VFR BLD CALC: 38.7 % — SIGNIFICANT CHANGE UP (ref 37–47)
HGB BLD-MCNC: 12.4 G/DL — SIGNIFICANT CHANGE UP (ref 12–16)
LYMPHOCYTES # BLD AUTO: 0.6 K/UL — LOW (ref 1–4.8)
LYMPHOCYTES # BLD AUTO: 8.5 % — LOW (ref 20–55)
MCHC RBC-ENTMCNC: 31.6 PG — HIGH (ref 27–31)
MCHC RBC-ENTMCNC: 32 G/DL — SIGNIFICANT CHANGE UP (ref 32–36)
MCV RBC AUTO: 98.7 FL — SIGNIFICANT CHANGE UP (ref 81–99)
MONOCYTES # BLD AUTO: 0.6 K/UL — SIGNIFICANT CHANGE UP (ref 0–0.8)
MONOCYTES NFR BLD AUTO: 8.2 % — SIGNIFICANT CHANGE UP (ref 3–10)
NEUTROPHILS # BLD AUTO: 6 K/UL — SIGNIFICANT CHANGE UP (ref 1.8–8)
NEUTROPHILS NFR BLD AUTO: 81.3 % — HIGH (ref 37–73)
PLATELET # BLD AUTO: 251 K/UL — SIGNIFICANT CHANGE UP (ref 150–400)
POTASSIUM SERPL-MCNC: 3.9 MMOL/L — SIGNIFICANT CHANGE UP (ref 3.5–5.3)
POTASSIUM SERPL-SCNC: 3.9 MMOL/L — SIGNIFICANT CHANGE UP (ref 3.5–5.3)
RBC # BLD: 3.92 M/UL — LOW (ref 4.4–5.2)
RBC # FLD: 13.8 % — SIGNIFICANT CHANGE UP (ref 11–15.6)
SODIUM SERPL-SCNC: 141 MMOL/L — SIGNIFICANT CHANGE UP (ref 135–145)
WBC # BLD: 7.3 K/UL — SIGNIFICANT CHANGE UP (ref 4.8–10.8)
WBC # FLD AUTO: 7.3 K/UL — SIGNIFICANT CHANGE UP (ref 4.8–10.8)

## 2019-01-21 PROCEDURE — 74018 RADEX ABDOMEN 1 VIEW: CPT | Mod: 26

## 2019-01-21 PROCEDURE — 99232 SBSQ HOSP IP/OBS MODERATE 35: CPT

## 2019-01-21 RX ADMIN — Medication 10 MILLIGRAM(S): at 11:18

## 2019-01-21 RX ADMIN — ENOXAPARIN SODIUM 40 MILLIGRAM(S): 100 INJECTION SUBCUTANEOUS at 11:16

## 2019-01-21 RX ADMIN — SODIUM CHLORIDE 125 MILLILITER(S): 9 INJECTION INTRAMUSCULAR; INTRAVENOUS; SUBCUTANEOUS at 11:18

## 2019-01-21 RX ADMIN — Medication 10 MILLIGRAM(S): at 17:48

## 2019-01-21 RX ADMIN — PANTOPRAZOLE SODIUM 40 MILLIGRAM(S): 20 TABLET, DELAYED RELEASE ORAL at 11:16

## 2019-01-21 RX ADMIN — SODIUM CHLORIDE 125 MILLILITER(S): 9 INJECTION INTRAMUSCULAR; INTRAVENOUS; SUBCUTANEOUS at 17:49

## 2019-01-21 RX ADMIN — SODIUM CHLORIDE 125 MILLILITER(S): 9 INJECTION INTRAMUSCULAR; INTRAVENOUS; SUBCUTANEOUS at 05:45

## 2019-01-21 RX ADMIN — Medication 10 MILLIGRAM(S): at 05:45

## 2019-01-21 NOTE — PROGRESS NOTE ADULT - ASSESSMENT
62 y/o female with PMh of gastroparesis (recently diagnosed), breast CA, came to the ED complaining of nausea and vomiting that x 1 day duration. Patient also reported that her abdomen is distended.     Abdominal distention due to gastric outlet obstruction vs gastroparesis with likely Viral gastroenteritis  NG tube placed, on intermittent suction, Surgery on board; recommendation appreciated, Continue PPI 40mg   Reglan 10mg daily, Continue hydration with NS, GI consult appreciated, XR of the abdomen being repeated to see the residual in the stomach, if loow then will clamp the NG and will give clear liquid diet to see if she could tolerate it.      Nausea and vomiting 2/2 gastroparesis   Patient was recently diagnosed with gastroparesis   Gastric emptying confirmed the diagnosis, Continue Reglan 10mg daily, NG is place.     Hyperkalemia   K: 5.8 (moderately hemolyzed), repeat one is ok.      Breast CA   Continue Tamoxifen 20mg daily     Supportive   DVT prophylaxis: Lovenox 40mg   Diet: NPO for now.

## 2019-01-21 NOTE — PROGRESS NOTE ADULT - ASSESSMENT
Patient with nausea, vomiting, distended stomach with known history of gastroparesis.    1.Will order abdominal X ray. If stomach is decompressed, clamp NG tube and give trial of clears. If tolerating clears, pull out NG tube  2. Continue reglan and PPI  3. Will follow

## 2019-01-21 NOTE — PROGRESS NOTE ADULT - ASSESSMENT
64 y/o F h/o breast CA, CHF, HTN, gastroparesis with CT finding of gastric outlet obstruction. Currently patient is hemodynamically stable, afebrile.    Plan:  NPO/IVF  NGT in place, minimal output. Ok to remove if continues to be low output   GI on board, feels this is secondary to viral gastroenterisi  Continue PPI  No surgical intervention

## 2019-01-22 PROCEDURE — 99232 SBSQ HOSP IP/OBS MODERATE 35: CPT

## 2019-01-22 RX ADMIN — Medication 10 MILLIGRAM(S): at 00:11

## 2019-01-22 RX ADMIN — ENOXAPARIN SODIUM 40 MILLIGRAM(S): 100 INJECTION SUBCUTANEOUS at 13:11

## 2019-01-22 RX ADMIN — Medication 10 MILLIGRAM(S): at 13:07

## 2019-01-22 RX ADMIN — Medication 10 MILLIGRAM(S): at 05:26

## 2019-01-22 RX ADMIN — Medication 10 MILLIGRAM(S): at 17:30

## 2019-01-22 RX ADMIN — PANTOPRAZOLE SODIUM 40 MILLIGRAM(S): 20 TABLET, DELAYED RELEASE ORAL at 13:06

## 2019-01-22 RX ADMIN — SODIUM CHLORIDE 80 MILLILITER(S): 9 INJECTION INTRAMUSCULAR; INTRAVENOUS; SUBCUTANEOUS at 13:06

## 2019-01-22 NOTE — PROGRESS NOTE ADULT - ASSESSMENT
63y woman with nausea, vomiting, distended stomach with known history of gastroparesis. Abd xray shows distended bowel loops but Minimal distention on exam.    1. advance to full liquids  2. Continue reglan and PPI  3. Will follow     Thanks

## 2019-01-22 NOTE — PROGRESS NOTE ADULT - ASSESSMENT
64 y/o female with PMh of gastroparesis (recently diagnosed), breast CA, came to the ED complaining of nausea and vomiting that x 1 day duration. Patient also reported that her abdomen is distended.     1) Abdominal Distention, Nausea and Vomiting likely due to Gastroparesis   - Tolerating clears.   - D/C NGT  - Continue Reglan and Protonix  - Zofran PRN  - GI recommendations appreciated  - Surgery recommendations appreciated   2) History of Breast CA   - Continue Tamoxifen 20mg daily   3) HTN and HLD  - Diet Controlled  DVT Prophylaxis -- Lovenox 40 mg

## 2019-01-23 ENCOUNTER — TRANSCRIPTION ENCOUNTER (OUTPATIENT)
Age: 64
End: 2019-01-23

## 2019-01-23 LAB
ANION GAP SERPL CALC-SCNC: 11 MMOL/L — SIGNIFICANT CHANGE UP (ref 5–17)
BASOPHILS # BLD AUTO: 0 K/UL — SIGNIFICANT CHANGE UP (ref 0–0.2)
BASOPHILS NFR BLD AUTO: 0.1 % — SIGNIFICANT CHANGE UP (ref 0–2)
BUN SERPL-MCNC: 5 MG/DL — LOW (ref 8–20)
CALCIUM SERPL-MCNC: 8.4 MG/DL — LOW (ref 8.6–10.2)
CHLORIDE SERPL-SCNC: 109 MMOL/L — HIGH (ref 98–107)
CO2 SERPL-SCNC: 23 MMOL/L — SIGNIFICANT CHANGE UP (ref 22–29)
CREAT SERPL-MCNC: 0.32 MG/DL — LOW (ref 0.5–1.3)
EOSINOPHIL # BLD AUTO: 0.2 K/UL — SIGNIFICANT CHANGE UP (ref 0–0.5)
EOSINOPHIL NFR BLD AUTO: 2.8 % — SIGNIFICANT CHANGE UP (ref 0–6)
GLUCOSE SERPL-MCNC: 99 MG/DL — SIGNIFICANT CHANGE UP (ref 70–115)
HCT VFR BLD CALC: 37.2 % — SIGNIFICANT CHANGE UP (ref 37–47)
HGB BLD-MCNC: 12.1 G/DL — SIGNIFICANT CHANGE UP (ref 12–16)
LYMPHOCYTES # BLD AUTO: 1.6 K/UL — SIGNIFICANT CHANGE UP (ref 1–4.8)
LYMPHOCYTES # BLD AUTO: 21 % — SIGNIFICANT CHANGE UP (ref 20–55)
MAGNESIUM SERPL-MCNC: 2 MG/DL — SIGNIFICANT CHANGE UP (ref 1.6–2.6)
MCHC RBC-ENTMCNC: 31.8 PG — HIGH (ref 27–31)
MCHC RBC-ENTMCNC: 32.5 G/DL — SIGNIFICANT CHANGE UP (ref 32–36)
MCV RBC AUTO: 97.9 FL — SIGNIFICANT CHANGE UP (ref 81–99)
MONOCYTES # BLD AUTO: 1 K/UL — HIGH (ref 0–0.8)
MONOCYTES NFR BLD AUTO: 12.9 % — HIGH (ref 3–10)
NEUTROPHILS # BLD AUTO: 4.7 K/UL — SIGNIFICANT CHANGE UP (ref 1.8–8)
NEUTROPHILS NFR BLD AUTO: 63.1 % — SIGNIFICANT CHANGE UP (ref 37–73)
PLATELET # BLD AUTO: 227 K/UL — SIGNIFICANT CHANGE UP (ref 150–400)
POTASSIUM SERPL-MCNC: 3.6 MMOL/L — SIGNIFICANT CHANGE UP (ref 3.5–5.3)
POTASSIUM SERPL-SCNC: 3.6 MMOL/L — SIGNIFICANT CHANGE UP (ref 3.5–5.3)
RBC # BLD: 3.8 M/UL — LOW (ref 4.4–5.2)
RBC # FLD: 13.8 % — SIGNIFICANT CHANGE UP (ref 11–15.6)
SODIUM SERPL-SCNC: 143 MMOL/L — SIGNIFICANT CHANGE UP (ref 135–145)
WBC # BLD: 7.5 K/UL — SIGNIFICANT CHANGE UP (ref 4.8–10.8)
WBC # FLD AUTO: 7.5 K/UL — SIGNIFICANT CHANGE UP (ref 4.8–10.8)

## 2019-01-23 PROCEDURE — 99232 SBSQ HOSP IP/OBS MODERATE 35: CPT

## 2019-01-23 RX ORDER — POLYETHYLENE GLYCOL 3350 17 G/17G
17 POWDER, FOR SOLUTION ORAL
Qty: 0 | Refills: 0 | DISCHARGE
Start: 2019-01-23 | End: 2019-01-25

## 2019-01-23 RX ORDER — NYSTATIN 500MM UNIT
500000 POWDER (EA) MISCELLANEOUS EVERY 6 HOURS
Qty: 0 | Refills: 0 | Status: COMPLETED | OUTPATIENT
Start: 2019-01-23 | End: 2019-01-26

## 2019-01-23 RX ORDER — METOCLOPRAMIDE HCL 10 MG
10 TABLET ORAL THREE TIMES A DAY
Qty: 0 | Refills: 0 | Status: DISCONTINUED | OUTPATIENT
Start: 2019-01-23 | End: 2019-02-01

## 2019-01-23 RX ORDER — SENNA PLUS 8.6 MG/1
2 TABLET ORAL
Qty: 0 | Refills: 0 | DISCHARGE
Start: 2019-01-23

## 2019-01-23 RX ORDER — PANTOPRAZOLE SODIUM 20 MG/1
40 TABLET, DELAYED RELEASE ORAL
Qty: 0 | Refills: 0 | Status: DISCONTINUED | OUTPATIENT
Start: 2019-01-23 | End: 2019-02-01

## 2019-01-23 RX ORDER — METOCLOPRAMIDE HCL 10 MG
1 TABLET ORAL
Qty: 45 | Refills: 0
Start: 2019-01-23 | End: 2019-02-06

## 2019-01-23 RX ORDER — SENNA PLUS 8.6 MG/1
2 TABLET ORAL AT BEDTIME
Qty: 0 | Refills: 0 | Status: DISCONTINUED | OUTPATIENT
Start: 2019-01-23 | End: 2019-02-01

## 2019-01-23 RX ORDER — POLYETHYLENE GLYCOL 3350 17 G/17G
17 POWDER, FOR SOLUTION ORAL DAILY
Qty: 0 | Refills: 0 | Status: DISCONTINUED | OUTPATIENT
Start: 2019-01-23 | End: 2019-02-01

## 2019-01-23 RX ORDER — METOCLOPRAMIDE HCL 10 MG
1 TABLET ORAL
Qty: 0 | Refills: 0 | DISCHARGE
Start: 2019-01-23 | End: 2019-02-06

## 2019-01-23 RX ORDER — BENZOCAINE AND MENTHOL 5; 1 G/100ML; G/100ML
1 LIQUID ORAL EVERY 4 HOURS
Qty: 0 | Refills: 0 | Status: DISCONTINUED | OUTPATIENT
Start: 2019-01-23 | End: 2019-02-01

## 2019-01-23 RX ORDER — ACETAMINOPHEN 500 MG
650 TABLET ORAL EVERY 6 HOURS
Qty: 0 | Refills: 0 | Status: DISCONTINUED | OUTPATIENT
Start: 2019-01-23 | End: 2019-02-01

## 2019-01-23 RX ORDER — PANTOPRAZOLE SODIUM 20 MG/1
1 TABLET, DELAYED RELEASE ORAL
Qty: 60 | Refills: 0
Start: 2019-01-23 | End: 2019-02-21

## 2019-01-23 RX ORDER — POLYETHYLENE GLYCOL 3350 17 G/17G
17 POWDER, FOR SOLUTION ORAL
Qty: 510 | Refills: 0
Start: 2019-01-23 | End: 2019-01-25

## 2019-01-23 RX ADMIN — Medication 10 MILLIGRAM(S): at 21:11

## 2019-01-23 RX ADMIN — SODIUM CHLORIDE 80 MILLILITER(S): 9 INJECTION INTRAMUSCULAR; INTRAVENOUS; SUBCUTANEOUS at 11:52

## 2019-01-23 RX ADMIN — Medication 500000 UNIT(S): at 23:22

## 2019-01-23 RX ADMIN — SENNA PLUS 2 TABLET(S): 8.6 TABLET ORAL at 21:11

## 2019-01-23 RX ADMIN — Medication 1 TABLET(S): at 14:12

## 2019-01-23 RX ADMIN — POLYETHYLENE GLYCOL 3350 17 GRAM(S): 17 POWDER, FOR SOLUTION ORAL at 14:10

## 2019-01-23 RX ADMIN — Medication 650 MILLIGRAM(S): at 15:40

## 2019-01-23 RX ADMIN — Medication 10 MILLIGRAM(S): at 06:52

## 2019-01-23 RX ADMIN — Medication 500 MILLIGRAM(S): at 14:13

## 2019-01-23 RX ADMIN — Medication 10 MILLIGRAM(S): at 14:13

## 2019-01-23 RX ADMIN — Medication 10 MILLIGRAM(S): at 01:31

## 2019-01-23 RX ADMIN — TAMOXIFEN CITRATE 20 MILLIGRAM(S): 20 TABLET, FILM COATED ORAL at 14:12

## 2019-01-23 RX ADMIN — SODIUM CHLORIDE 80 MILLILITER(S): 9 INJECTION INTRAMUSCULAR; INTRAVENOUS; SUBCUTANEOUS at 01:31

## 2019-01-23 RX ADMIN — ENOXAPARIN SODIUM 40 MILLIGRAM(S): 100 INJECTION SUBCUTANEOUS at 14:12

## 2019-01-23 RX ADMIN — Medication 650 MILLIGRAM(S): at 14:11

## 2019-01-23 RX ADMIN — Medication 500000 UNIT(S): at 17:22

## 2019-01-23 RX ADMIN — PANTOPRAZOLE SODIUM 40 MILLIGRAM(S): 20 TABLET, DELAYED RELEASE ORAL at 17:22

## 2019-01-23 RX ADMIN — Medication 500000 UNIT(S): at 14:11

## 2019-01-23 RX ADMIN — Medication 5000 UNIT(S): at 14:13

## 2019-01-23 NOTE — PROGRESS NOTE ADULT - ASSESSMENT
Patient with history of gastroparesis and constipation admitted with nausea, vomiting and gastric distension. Managed conservatively with NG tube placement, reglan.     1. Change reglan to 10 mg po tid  2. Continue PPI but change to po bid  3. Advance diet  4. Add miralax 17 gram po daily   5. If continues to do ok with solid diet, can be discharged with FU with primary GI

## 2019-01-23 NOTE — DISCHARGE NOTE ADULT - CARE PLAN
Principal Discharge DX:	Gastroparesis  Goal:	normal gastric function  Assessment and plan of treatment:	Continue medications as prescribed.  Follow up with GI in 1 week.  Secondary Diagnosis:	Breast cancer  Assessment and plan of treatment:	Continue Tamoxifen.   Follow up with Oncologist as scheduled. Principal Discharge DX:	Gastroparesis  Goal:	normal gastric function  Assessment and plan of treatment:	Continue medications as prescribed.  Follow up with GI in 1 week.  Secondary Diagnosis:	Breast cancer  Assessment and plan of treatment:	Continue Tamoxifen.   Follow up with Oncologist as scheduled.  Secondary Diagnosis:	Constipation, unspecified constipation type  Assessment and plan of treatment:	c/w bowel medication

## 2019-01-23 NOTE — DISCHARGE NOTE ADULT - CARE PROVIDERS DIRECT ADDRESSES
,[email or web address],[email or web address]

## 2019-01-23 NOTE — DISCHARGE NOTE ADULT - CARE PROVIDER_API CALL
Edwige Avalos (), [facility]  [address]  [place] [address]  Phone: [phone]  Fax: [phone]    Sadi Lyons), Gastroenterology; Internal Medicine  [address]  [place] [address]  Phone: [phone]  Fax: [phone]

## 2019-01-23 NOTE — DISCHARGE NOTE ADULT - HOSPITAL COURSE
62 y/o female with PMh of gastroparesis (recently diagnosed), breast CA, came to the ED complaining of nausea and vomiting that x 1 day duration. Patient also reported that her abdomen is distended.     1) Abdominal Distention, Nausea and Vomiting likely due to Gastroparesis   - Tolerating Full Liquid Diet. Advanced to Soft Diet this morning.   - Continue Reglan and Protonix  - Zofran PRN  - GI recommendations appreciated  - Surgery recommendations appreciated   2) History of Breast CA   - Continue Tamoxifen 20mg daily   3) HTN and HLD  - Diet Controlled    Time spent: 62 y/o female with PMh of gastroparesis (recently diagnosed), breast CA, came to the ED complaining of nausea and vomiting that x 1 day duration. Patient also reported that her abdomen is distended, seen by GI, started on Reglan and Protonix, initially tolerating diet, was actually discharge pending auth, report constipation, received enema, on Bowel regimen, no BM, KUB showing dilated loops, Surgery reconsulted, repeat CT abdomen Diffuse prominence of the stool filled colon From the prior study which merits and colonic ileus Decreased gastric distention compared to the previous study wall  thickening of the distal stomach suggesting gastritis/antritis, s/p enema, having BM now, denied nausea and vomiting, started on clear liquid and advance to soft diet, tolerating, denied abd. painm, nausea, vomiting, had BM yesterday.       A/P    >Constipation - resolved  KUB showed dilated BM  CT abdomen - Diffuse prominence of the stool filled colon From the prior study which merits and colonic ileus  s/p BID Mineral oil enemas and Glycerine suppositories QHS - had few BM  tolerating diet  Reglan Rx for gastroparesis.   surgery reconsulted appreciated    > Gastroparesis - nausea and vomiting due to constipation - improved  - Continue Reglan and Protonix  - Zofran PRN    > History of Breast CA   - Continue Tamoxifen 20mg daily     ICU Vital Signs Last 24 Hrs  T(C): 36.4 (01 Feb 2019 04:33), Max: 37.3 (31 Jan 2019 10:02)  T(F): 97.6 (01 Feb 2019 04:33), Max: 99.1 (31 Jan 2019 10:02)  HR: 80 (01 Feb 2019 04:33) (80 - 91)  BP: 114/65 (01 Feb 2019 04:33) (109/65 - 124/54)  RR: 20 (01 Feb 2019 04:33) (20 - 20)  SpO2: 93% (01 Feb 2019 04:33) (93% - 93%)    PHYSICAL EXAM:    GENERAL: NAD  NERVOUS SYSTEM:  Alert & Oriented X3, Good concentration; Motor Strength 5/5 B/L upper and lower extremities; DTRs 2+ intact and symmetric  CHEST/LUNG: CTA b/l   HEART: s1/s2 audible  ABDOMEN: Soft, Nontender, Nondistended; Bowel sounds present  EXTREMITIES no edema    time spent 36 minutes

## 2019-01-23 NOTE — DISCHARGE NOTE ADULT - MEDICATION SUMMARY - MEDICATIONS TO TAKE
I will START or STAY ON the medications listed below when I get home from the hospital:    magnesium hydroxide 8% oral suspension  -- 30 milliliter(s) by mouth once a day, As needed, Constipation  -- Indication: For Constipation, unspecified constipation type    ondansetron 4 mg oral tablet, disintegrating  -- 1 tab(s) by mouth every 4 to 6 hours, As Needed  -- Indication: For Gastroparesis    metoclopramide 10 mg oral tablet  -- 1 tab(s) by mouth 3 times a day  -- Indication: For Gastroparesis    tamoxifen 20 mg oral tablet  -- 1 tab(s) by mouth once a day  -- Indication: For Breast cancer    polyethylene glycol 3350 oral powder for reconstitution  -- 17 gram(s) by mouth once a day  -- Indication: For Constipation, unspecified constipation type    senna oral tablet  -- 2 tab(s) by mouth once a day (at bedtime)  -- Indication: For Constipation, unspecified constipation type    lactulose 10 g/15 mL oral syrup  -- 30 milliliter(s) by mouth 2 times a day hold if having more than 2BM  -- Indication: For Constipation, unspecified constipation type    pantoprazole 40 mg oral delayed release tablet  -- 1 tab(s) by mouth 2 times a day  -- Indication: For GI prophylaxis    Multiple Vitamins oral tablet  -- 1 tab(s) by mouth once a day  -- Indication: For supplement    Vitamin D3 5000 intl units oral capsule  -- 1 cap(s) by mouth once a day  -- Indication: For supplement    Vitamin C 1000 mg oral tablet  -- 1 tab(s) by mouth once a day  -- Indication: For supplement

## 2019-01-23 NOTE — DISCHARGE NOTE ADULT - ADDITIONAL INSTRUCTIONS
Follow up with PMD in 1 week.  Follow up with GI in 1 week.  Follow up with Oncologist as scheduled.

## 2019-01-23 NOTE — PROGRESS NOTE ADULT - ASSESSMENT
62 y/o female with PMh of gastroparesis (recently diagnosed), breast CA, came to the ED complaining of nausea and vomiting that x 1 day duration. Patient also reported that her abdomen is distended.     1) Abdominal Distention, Nausea and Vomiting likely due to Gastroparesis   - Tolerating Full Liquid Diet. Advanced to Soft Diet this morning.   - Continue Reglan and Protonix  - Zofran PRN  - GI recommendations appreciated  - Surgery recommendations appreciated   2) History of Breast CA   - Continue Tamoxifen 20mg daily   3) HTN and HLD  - Diet Controlled  DVT Prophylaxis -- Lovenox 40 mg      Dispo: If continues to tolerate PO then likely D/C tomorrow.

## 2019-01-23 NOTE — DISCHARGE NOTE ADULT - PATIENT PORTAL LINK FT
You can access the [facility], offered by [facility], by registering with the following website: [email or web address]

## 2019-01-23 NOTE — DISCHARGE NOTE ADULT - PLAN OF CARE
normal gastric function Continue medications as prescribed.  Follow up with GI in 1 week. Continue Tamoxifen.   Follow up with Oncologist as scheduled. c/w bowel medication

## 2019-01-24 PROCEDURE — 99232 SBSQ HOSP IP/OBS MODERATE 35: CPT

## 2019-01-24 RX ADMIN — TAMOXIFEN CITRATE 20 MILLIGRAM(S): 20 TABLET, FILM COATED ORAL at 12:50

## 2019-01-24 RX ADMIN — Medication 500000 UNIT(S): at 05:37

## 2019-01-24 RX ADMIN — PANTOPRAZOLE SODIUM 40 MILLIGRAM(S): 20 TABLET, DELAYED RELEASE ORAL at 05:37

## 2019-01-24 RX ADMIN — Medication 5000 UNIT(S): at 12:50

## 2019-01-24 RX ADMIN — Medication 500000 UNIT(S): at 18:49

## 2019-01-24 RX ADMIN — Medication 500000 UNIT(S): at 22:48

## 2019-01-24 RX ADMIN — Medication 10 MILLIGRAM(S): at 22:48

## 2019-01-24 RX ADMIN — Medication 1 TABLET(S): at 12:50

## 2019-01-24 RX ADMIN — Medication 10 MILLIGRAM(S): at 12:51

## 2019-01-24 RX ADMIN — SENNA PLUS 2 TABLET(S): 8.6 TABLET ORAL at 22:47

## 2019-01-24 RX ADMIN — PANTOPRAZOLE SODIUM 40 MILLIGRAM(S): 20 TABLET, DELAYED RELEASE ORAL at 18:49

## 2019-01-24 RX ADMIN — Medication 500 MILLIGRAM(S): at 12:50

## 2019-01-24 RX ADMIN — ENOXAPARIN SODIUM 40 MILLIGRAM(S): 100 INJECTION SUBCUTANEOUS at 12:51

## 2019-01-24 RX ADMIN — Medication 10 MILLIGRAM(S): at 05:37

## 2019-01-24 RX ADMIN — POLYETHYLENE GLYCOL 3350 17 GRAM(S): 17 POWDER, FOR SOLUTION ORAL at 12:51

## 2019-01-24 RX ADMIN — Medication 500000 UNIT(S): at 12:51

## 2019-01-24 NOTE — PHYSICAL THERAPY INITIAL EVALUATION ADULT - ADDITIONAL COMMENTS
Pt lives in a house with 6 DIMITRI and 6 steps inside to get to main level. Both have HRs. Owns a RW.

## 2019-01-24 NOTE — PROGRESS NOTE ADULT - ASSESSMENT
62 y/o female with PMh of gastroparesis (recently diagnosed), breast CA, came to the ED complaining of nausea and vomiting that x 1 day duration. Patient also reported that her abdomen is distended, seen by GI, cont. Reglan and Protonix, tolerating diet, seen by PT- recommend SANJAY    A/P    > Abdominal Distention, Nausea and Vomiting likely due to Gastroparesis   - Tolerating Diet   - Continue Reglan and Protonix  - Zofran PRN  - GI recommendations appreciated  - Surgery recommendations appreciated   - Pt eval appreciated - recommend SANJAY - Sw aware    > History of Breast CA   - Continue Tamoxifen 20mg daily     > HTN and HLD  - Diet Controlled      DVT Prophylaxis -- Lovenox 40 mg

## 2019-01-24 NOTE — PROGRESS NOTE ADULT - ASSESSMENT
Patient with gastroparesis, constipation. GI symptoms resolved  Discharge home with reglan and miralax  Follow up with primary GI

## 2019-01-25 PROCEDURE — 99232 SBSQ HOSP IP/OBS MODERATE 35: CPT

## 2019-01-25 RX ADMIN — Medication 10 MILLIGRAM(S): at 06:12

## 2019-01-25 RX ADMIN — SENNA PLUS 2 TABLET(S): 8.6 TABLET ORAL at 22:12

## 2019-01-25 RX ADMIN — Medication 500000 UNIT(S): at 06:12

## 2019-01-25 RX ADMIN — Medication 500000 UNIT(S): at 12:48

## 2019-01-25 RX ADMIN — ENOXAPARIN SODIUM 40 MILLIGRAM(S): 100 INJECTION SUBCUTANEOUS at 12:43

## 2019-01-25 RX ADMIN — PANTOPRAZOLE SODIUM 40 MILLIGRAM(S): 20 TABLET, DELAYED RELEASE ORAL at 19:55

## 2019-01-25 RX ADMIN — Medication 10 MILLIGRAM(S): at 12:51

## 2019-01-25 RX ADMIN — Medication 5000 UNIT(S): at 12:44

## 2019-01-25 RX ADMIN — Medication 500000 UNIT(S): at 19:56

## 2019-01-25 RX ADMIN — Medication 10 MILLIGRAM(S): at 22:12

## 2019-01-25 RX ADMIN — Medication 500 MILLIGRAM(S): at 12:44

## 2019-01-25 RX ADMIN — Medication 1 TABLET(S): at 12:43

## 2019-01-25 RX ADMIN — PANTOPRAZOLE SODIUM 40 MILLIGRAM(S): 20 TABLET, DELAYED RELEASE ORAL at 06:12

## 2019-01-25 RX ADMIN — POLYETHYLENE GLYCOL 3350 17 GRAM(S): 17 POWDER, FOR SOLUTION ORAL at 12:46

## 2019-01-25 RX ADMIN — TAMOXIFEN CITRATE 20 MILLIGRAM(S): 20 TABLET, FILM COATED ORAL at 12:44

## 2019-01-25 RX ADMIN — Medication 500000 UNIT(S): at 22:12

## 2019-01-25 NOTE — PROGRESS NOTE ADULT - ASSESSMENT
62 y/o female with PMh of gastroparesis (recently diagnosed), breast CA, came to the ED complaining of nausea and vomiting that x 1 day duration. Patient also reported that her abdomen is distended, seen by GI, cont. Reglan and Protonix, tolerating diet, seen by PT- recommend SANJAY - pending insurance auth    A/P    > Abdominal Distention, Nausea and Vomiting likely due to Gastroparesis   - Tolerating Diet   - Continue Reglan and Protonix  - Zofran PRN  - GI recommendations appreciated - Discharge home with reglan and miralax  - Surgery recommendations appreciated   - Pt eval appreciated - recommend SANJAY - pending insurance auth    > History of Breast CA   - Continue Tamoxifen 20mg daily     > HTN and HLD  - Diet Controlled      DVT Prophylaxis -- Lovenox 40 mg      Disposition - Pt recommend SANJAY - pending insurance auth

## 2019-01-26 PROCEDURE — 99232 SBSQ HOSP IP/OBS MODERATE 35: CPT

## 2019-01-26 RX ADMIN — PANTOPRAZOLE SODIUM 40 MILLIGRAM(S): 20 TABLET, DELAYED RELEASE ORAL at 17:34

## 2019-01-26 RX ADMIN — Medication 10 MILLIGRAM(S): at 21:05

## 2019-01-26 RX ADMIN — POLYETHYLENE GLYCOL 3350 17 GRAM(S): 17 POWDER, FOR SOLUTION ORAL at 11:16

## 2019-01-26 RX ADMIN — Medication 10 MILLIGRAM(S): at 11:16

## 2019-01-26 RX ADMIN — Medication 5000 UNIT(S): at 11:16

## 2019-01-26 RX ADMIN — Medication 500 MILLIGRAM(S): at 11:16

## 2019-01-26 RX ADMIN — Medication 1 TABLET(S): at 11:16

## 2019-01-26 RX ADMIN — SENNA PLUS 2 TABLET(S): 8.6 TABLET ORAL at 21:05

## 2019-01-26 RX ADMIN — Medication 500000 UNIT(S): at 05:37

## 2019-01-26 RX ADMIN — PANTOPRAZOLE SODIUM 40 MILLIGRAM(S): 20 TABLET, DELAYED RELEASE ORAL at 05:37

## 2019-01-26 RX ADMIN — TAMOXIFEN CITRATE 20 MILLIGRAM(S): 20 TABLET, FILM COATED ORAL at 11:16

## 2019-01-26 RX ADMIN — Medication 10 MILLIGRAM(S): at 05:37

## 2019-01-26 RX ADMIN — ENOXAPARIN SODIUM 40 MILLIGRAM(S): 100 INJECTION SUBCUTANEOUS at 11:16

## 2019-01-26 NOTE — PROGRESS NOTE ADULT - ASSESSMENT
62 y/o female with PMh of gastroparesis (recently diagnosed), breast CA, came to the ED complaining of nausea and vomiting that x 1 day duration. Patient also reported that her abdomen is distended, seen by GI, cont. Reglan and Protonix, tolerating diet, seen by PT- recommend SANJAY - pending insurance auth    A/P    > Abdominal Distention, Nausea and Vomiting likely due to Gastroparesis   - Tolerating Diet   - Continue Reglan and Protonix  - Zofran PRN  - GI recommendations appreciated - Discharge home with reglan and miralax  - Surgery recommendations appreciated   - Pt eval appreciated - recommend SANJAY - pending insurance auth    > History of Breast CA   - Continue Tamoxifen 20mg daily     > HTN and HLD  - Diet Controlled      DVT Prophylaxis -- Lovenox 40 mg      Disposition -Medically stable to discharge. Pt recommend SANJAY - pending insurance auth

## 2019-01-27 PROCEDURE — 99231 SBSQ HOSP IP/OBS SF/LOW 25: CPT

## 2019-01-27 RX ORDER — MAGNESIUM HYDROXIDE 400 MG/1
30 TABLET, CHEWABLE ORAL DAILY
Qty: 0 | Refills: 0 | Status: DISCONTINUED | OUTPATIENT
Start: 2019-01-27 | End: 2019-02-01

## 2019-01-27 RX ADMIN — PANTOPRAZOLE SODIUM 40 MILLIGRAM(S): 20 TABLET, DELAYED RELEASE ORAL at 05:17

## 2019-01-27 RX ADMIN — TAMOXIFEN CITRATE 20 MILLIGRAM(S): 20 TABLET, FILM COATED ORAL at 13:34

## 2019-01-27 RX ADMIN — Medication 500 MILLIGRAM(S): at 13:33

## 2019-01-27 RX ADMIN — Medication 10 MILLIGRAM(S): at 13:34

## 2019-01-27 RX ADMIN — ENOXAPARIN SODIUM 40 MILLIGRAM(S): 100 INJECTION SUBCUTANEOUS at 13:33

## 2019-01-27 RX ADMIN — Medication 10 MILLIGRAM(S): at 05:17

## 2019-01-27 RX ADMIN — SENNA PLUS 2 TABLET(S): 8.6 TABLET ORAL at 21:11

## 2019-01-27 RX ADMIN — Medication 5000 UNIT(S): at 13:34

## 2019-01-27 RX ADMIN — MAGNESIUM HYDROXIDE 30 MILLILITER(S): 400 TABLET, CHEWABLE ORAL at 13:33

## 2019-01-27 RX ADMIN — Medication 10 MILLIGRAM(S): at 21:11

## 2019-01-27 RX ADMIN — POLYETHYLENE GLYCOL 3350 17 GRAM(S): 17 POWDER, FOR SOLUTION ORAL at 13:33

## 2019-01-27 RX ADMIN — PANTOPRAZOLE SODIUM 40 MILLIGRAM(S): 20 TABLET, DELAYED RELEASE ORAL at 17:15

## 2019-01-27 RX ADMIN — Medication 1 TABLET(S): at 13:34

## 2019-01-28 PROCEDURE — 99232 SBSQ HOSP IP/OBS MODERATE 35: CPT

## 2019-01-28 RX ORDER — LACTULOSE 10 G/15ML
20 SOLUTION ORAL
Qty: 0 | Refills: 0 | Status: DISCONTINUED | OUTPATIENT
Start: 2019-01-28 | End: 2019-01-31

## 2019-01-28 RX ADMIN — SENNA PLUS 2 TABLET(S): 8.6 TABLET ORAL at 21:14

## 2019-01-28 RX ADMIN — Medication 1 ENEMA: at 11:36

## 2019-01-28 RX ADMIN — POLYETHYLENE GLYCOL 3350 17 GRAM(S): 17 POWDER, FOR SOLUTION ORAL at 13:39

## 2019-01-28 RX ADMIN — Medication 5000 UNIT(S): at 14:59

## 2019-01-28 RX ADMIN — TAMOXIFEN CITRATE 20 MILLIGRAM(S): 20 TABLET, FILM COATED ORAL at 15:00

## 2019-01-28 RX ADMIN — LACTULOSE 20 GRAM(S): 10 SOLUTION ORAL at 13:44

## 2019-01-28 RX ADMIN — Medication 1 TABLET(S): at 14:59

## 2019-01-28 RX ADMIN — PANTOPRAZOLE SODIUM 40 MILLIGRAM(S): 20 TABLET, DELAYED RELEASE ORAL at 05:18

## 2019-01-28 RX ADMIN — PANTOPRAZOLE SODIUM 40 MILLIGRAM(S): 20 TABLET, DELAYED RELEASE ORAL at 17:00

## 2019-01-28 RX ADMIN — LACTULOSE 20 GRAM(S): 10 SOLUTION ORAL at 17:00

## 2019-01-28 RX ADMIN — MAGNESIUM HYDROXIDE 30 MILLILITER(S): 400 TABLET, CHEWABLE ORAL at 05:34

## 2019-01-28 RX ADMIN — Medication 10 MILLIGRAM(S): at 14:59

## 2019-01-28 RX ADMIN — Medication 10 MILLIGRAM(S): at 05:18

## 2019-01-28 RX ADMIN — Medication 10 MILLIGRAM(S): at 21:14

## 2019-01-28 RX ADMIN — LACTULOSE 20 GRAM(S): 10 SOLUTION ORAL at 21:14

## 2019-01-28 RX ADMIN — ENOXAPARIN SODIUM 40 MILLIGRAM(S): 100 INJECTION SUBCUTANEOUS at 15:00

## 2019-01-28 RX ADMIN — ONDANSETRON 4 MILLIGRAM(S): 8 TABLET, FILM COATED ORAL at 16:32

## 2019-01-28 RX ADMIN — Medication 500 MILLIGRAM(S): at 15:00

## 2019-01-28 NOTE — CHART NOTE - NSCHARTNOTEFT_GEN_A_CORE
Upon Nutritional Assessment by the Registered Dietitian your patient was determined to meet criteria / has evidence of the following diagnosis/diagnoses:          [ ]  Mild Protein Calorie Malnutrition        [ ]  Moderate Protein Calorie Malnutrition        [x ] Severe Protein Calorie Malnutrition  chronic        [ ] Unspecified Protein Calorie Malnutrition        [ ] Underweight / BMI <19        [ ] Morbid Obesity / BMI > 40      Findings as based on:  •  Comprehensive nutrition assessment and consultation  •  Calorie counts (nutrient intake analysis)  •  Food acceptance and intake status from observations by staff  •  Follow up  •  Patient education  •  Intervention secondary to interdisciplinary rounds  •   concerns      Treatment:    The following diet has been recommended: Rec Ensure clears BID, continue soft diet.       PROVIDER Section:     By signing this assessment you are acknowledging and agree with the diagnosis/diagnoses assigned by the Registered Dietitian    Comments:

## 2019-01-28 NOTE — DIETITIAN INITIAL EVALUATION ADULT. - OTHER INFO
BMI 23.9, Pt with n, v upon admission with weight loss noted however pt now with improved symptoms and po intake wnl at this time taking meals 50-75%

## 2019-01-28 NOTE — PROGRESS NOTE ADULT - ASSESSMENT
64 y/o female with PMh of gastroparesis (recently diagnosed), breast CA, came to the ED complaining of nausea and vomiting that x 1 day duration. Patient also reported that her abdomen is distended, seen by GI, cont. Reglan and Protonix, tolerating diet, seen by PT- recommend SANJAY - pending insurance auth    A/P    > Gastroparesis   - Tolerating Diet   - Continue Reglan and Protonix  - Zofran PRN  - GI recommendations appreciated - Discharge home with reglan and miralax  - Surgery recommendations appreciated   - Pt eval appreciated - recommend SANJAY - pending insurance auth    >Constipation - c/w Miralex, colace, stewart  will give fleet enema     > History of Breast CA   - Continue Tamoxifen 20mg daily     > HTN and HLD  - Diet Controlled      DVT Prophylaxis -- Lovenox 40 mg      Disposition -Medically stable to discharge. Pt recommend SANJAY - pending insurance auth

## 2019-01-29 DIAGNOSIS — K56.41 FECAL IMPACTION: ICD-10-CM

## 2019-01-29 DIAGNOSIS — K59.00 CONSTIPATION, UNSPECIFIED: ICD-10-CM

## 2019-01-29 DIAGNOSIS — K31.84 GASTROPARESIS: ICD-10-CM

## 2019-01-29 LAB
ALBUMIN SERPL ELPH-MCNC: 2.7 G/DL — LOW (ref 3.3–5.2)
ALP SERPL-CCNC: 57 U/L — SIGNIFICANT CHANGE UP (ref 40–120)
ALT FLD-CCNC: 18 U/L — SIGNIFICANT CHANGE UP
ANION GAP SERPL CALC-SCNC: 10 MMOL/L — SIGNIFICANT CHANGE UP (ref 5–17)
AST SERPL-CCNC: 23 U/L — SIGNIFICANT CHANGE UP
BILIRUB SERPL-MCNC: 0.3 MG/DL — LOW (ref 0.4–2)
BUN SERPL-MCNC: 7 MG/DL — LOW (ref 8–20)
CALCIUM SERPL-MCNC: 9.2 MG/DL — SIGNIFICANT CHANGE UP (ref 8.6–10.2)
CHLORIDE SERPL-SCNC: 104 MMOL/L — SIGNIFICANT CHANGE UP (ref 98–107)
CO2 SERPL-SCNC: 27 MMOL/L — SIGNIFICANT CHANGE UP (ref 22–29)
CREAT SERPL-MCNC: 0.46 MG/DL — LOW (ref 0.5–1.3)
GLUCOSE SERPL-MCNC: 118 MG/DL — HIGH (ref 70–115)
HCT VFR BLD CALC: 36.1 % — LOW (ref 37–47)
HGB BLD-MCNC: 11.6 G/DL — LOW (ref 12–16)
LACTATE SERPL-SCNC: 1.2 MMOL/L — SIGNIFICANT CHANGE UP (ref 0.5–2)
MAGNESIUM SERPL-MCNC: 1.7 MG/DL — LOW (ref 1.8–2.6)
MCHC RBC-ENTMCNC: 31.5 PG — HIGH (ref 27–31)
MCHC RBC-ENTMCNC: 32.1 G/DL — SIGNIFICANT CHANGE UP (ref 32–36)
MCV RBC AUTO: 98.1 FL — SIGNIFICANT CHANGE UP (ref 81–99)
PLATELET # BLD AUTO: 344 K/UL — SIGNIFICANT CHANGE UP (ref 150–400)
POTASSIUM SERPL-MCNC: 4.4 MMOL/L — SIGNIFICANT CHANGE UP (ref 3.5–5.3)
POTASSIUM SERPL-SCNC: 4.4 MMOL/L — SIGNIFICANT CHANGE UP (ref 3.5–5.3)
PROT SERPL-MCNC: 5.8 G/DL — LOW (ref 6.6–8.7)
RBC # BLD: 3.68 M/UL — LOW (ref 4.4–5.2)
RBC # FLD: 13.9 % — SIGNIFICANT CHANGE UP (ref 11–15.6)
SODIUM SERPL-SCNC: 141 MMOL/L — SIGNIFICANT CHANGE UP (ref 135–145)
WBC # BLD: 8.2 K/UL — SIGNIFICANT CHANGE UP (ref 4.8–10.8)
WBC # FLD AUTO: 8.2 K/UL — SIGNIFICANT CHANGE UP (ref 4.8–10.8)

## 2019-01-29 PROCEDURE — 99233 SBSQ HOSP IP/OBS HIGH 50: CPT

## 2019-01-29 PROCEDURE — 74018 RADEX ABDOMEN 1 VIEW: CPT | Mod: 26

## 2019-01-29 PROCEDURE — 74177 CT ABD & PELVIS W/CONTRAST: CPT | Mod: 26

## 2019-01-29 PROCEDURE — 99232 SBSQ HOSP IP/OBS MODERATE 35: CPT

## 2019-01-29 RX ORDER — SODIUM CHLORIDE 9 MG/ML
1000 INJECTION INTRAMUSCULAR; INTRAVENOUS; SUBCUTANEOUS
Qty: 0 | Refills: 0 | Status: DISCONTINUED | OUTPATIENT
Start: 2019-01-29 | End: 2019-01-30

## 2019-01-29 RX ORDER — MAGNESIUM SULFATE 500 MG/ML
1 VIAL (ML) INJECTION ONCE
Qty: 0 | Refills: 0 | Status: COMPLETED | OUTPATIENT
Start: 2019-01-29 | End: 2019-01-29

## 2019-01-29 RX ORDER — MINERAL OIL
133 OIL (ML) MISCELLANEOUS
Qty: 0 | Refills: 0 | Status: DISCONTINUED | OUTPATIENT
Start: 2019-01-29 | End: 2019-01-31

## 2019-01-29 RX ORDER — GLYCERIN ADULT
1 SUPPOSITORY, RECTAL RECTAL AT BEDTIME
Qty: 0 | Refills: 0 | Status: DISCONTINUED | OUTPATIENT
Start: 2019-01-29 | End: 2019-01-31

## 2019-01-29 RX ADMIN — Medication 1 SUPPOSITORY(S): at 21:52

## 2019-01-29 RX ADMIN — ONDANSETRON 4 MILLIGRAM(S): 8 TABLET, FILM COATED ORAL at 17:32

## 2019-01-29 RX ADMIN — ENOXAPARIN SODIUM 40 MILLIGRAM(S): 100 INJECTION SUBCUTANEOUS at 12:12

## 2019-01-29 RX ADMIN — Medication 10 MILLIGRAM(S): at 08:20

## 2019-01-29 RX ADMIN — ONDANSETRON 4 MILLIGRAM(S): 8 TABLET, FILM COATED ORAL at 12:11

## 2019-01-29 RX ADMIN — ONDANSETRON 4 MILLIGRAM(S): 8 TABLET, FILM COATED ORAL at 05:15

## 2019-01-29 RX ADMIN — PANTOPRAZOLE SODIUM 40 MILLIGRAM(S): 20 TABLET, DELAYED RELEASE ORAL at 08:20

## 2019-01-29 RX ADMIN — Medication 133 MILLILITER(S): at 21:52

## 2019-01-29 RX ADMIN — Medication 100 GRAM(S): at 21:52

## 2019-01-29 RX ADMIN — SODIUM CHLORIDE 100 MILLILITER(S): 9 INJECTION INTRAMUSCULAR; INTRAVENOUS; SUBCUTANEOUS at 14:01

## 2019-01-29 RX ADMIN — LACTULOSE 20 GRAM(S): 10 SOLUTION ORAL at 05:08

## 2019-01-29 NOTE — PROGRESS NOTE ADULT - ASSESSMENT
64 y/o female with PMh of gastroparesis (recently diagnosed), breast CA, came to the ED complaining of nausea and vomiting that x 1 day duration. Patient also reported that her abdomen is distended, seen by GI, started on Reglan and Protonix, initially tolerating diet, was actually discharge pending auth, report constipation, received enema, on Bowel regimen, no BM, KUB showing dilated loops, Surgery reconsulted    A/P    >Constipation - KUB showed dilated BM  CT abdomen ordered   NPO, IVF  surgery reconsulted   zofran prn   c/w bowel regimen    > Gastroparesis - report nausea and vomiting toda  - Continue Reglan and Protonix  - Zofran PRN  - Surgery reconsulted      > History of Breast CA   - Continue Tamoxifen 20mg daily     > HTN and HLD  - Diet Controlled      DVT Prophylaxis -- Lovenox 40 mg

## 2019-01-29 NOTE — CONSULT NOTE ADULT - SUBJECTIVE AND OBJECTIVE BOX
ACUTE CARE SURGERY CONSULT     HPI: 63y Female w PMH of breast cancer on tamoxifen admitted on  with diagnosis of gastroparesis vs gastric outlet obstruction. She had an NGT which decompressed her stomach and resolved her nausea and vomiting. Two days ago the NGT was removed, yesterday night she had nonbilious vomiting after dinner, and this AM she had nonbilious vomit after breakfast. Her last BM was 5 days ago but she is passing flatus and urinating. Patient denies fever, chills, diarrhea, chest pain, and SOB.     ROS: 10-system review is otherwise negative except HPI above.      PAST MEDICAL & SURGICAL HISTORY:  CHF (congestive heart failure): 2017 resolved  Breast cancer: left , had surgery and radiation  High cholesterol: d/c&#x27;d by prescribing MD in April &quot;since I&#x27;m not eating&quot;  High blood pressure: reports medication was stopped 2018 because BP was low  History of endoscopy: 2018  S/P colonoscopy: 2018  History of total hip replacement, left: 2018  History of hip replacement, total, right: 2017  History of lumpectomy of left breast:   Atypical lipoma of soft tissue: in left groin   H/O foot surgery: ankle left     FAMILY HISTORY:  Family history of cardiac arrest  Family history of stroke  Family history of breast cancer in sister (Sibling)  Family history of hypertension in mother    [] Family history not pertinent as reviewed with the patient and family    SOCIAL HISTORY:      ALLERGIES: NKA Crestor (Muscle Pain)  shellfish (Swelling; Rash)      HOME MEDICATIONS:  Home Medications:  Multiple Vitamins oral tablet: 1 tab(s) orally once a day (2018 09:22)  ondansetron 4 mg oral tablet, disintegratin tab(s) orally every 4 to 6 hours, As Needed (2018 09:22)  Pepcid 20 mg oral tablet: 1 tab(s) orally 2 times a day, As Needed (2018 09:22)  Reglan 5 mg oral tablet: 1 tab(s) orally 4 times a day (before meals and at bedtime) (2018 09:22)  senna oral tablet: 2 tab(s) orally once a day (at bedtime) (2019 22:42)  tamoxifen 20 mg oral tablet: 1 tab(s) orally once a day (2018 09:22)  Vitamin C 1000 mg oral tablet: 1 tab(s) orally once a day (2018 09:22)  Vitamin D3 5000 intl units oral capsule: 1 cap(s) orally once a day (2018 09:22)      --------------------------------------------------------------------------------------------    PHYSICAL EXAM:   General: NAD, Lying in bed comfortably  Neuro: A+Ox3  HEENT: NC/AT, EOMI  Neck: Soft, supple  Cardio: RRR, nml S1/S2  Resp: Good effort, CTA b/l  Thorax: No chest wall tenderness  GI/Abd: Soft, NT, no rebound/guarding, no masses palpated. Dilated RUQ.   Rectal: Soft stool collection felt in rectal vault.     --------------------------------------------------------------------------------------------    LABS       141    |  104    |  7.0    ----------------------------<  118        ( 2019 13:23 )  4.4     |  27.0   |  0.46     Ca    9.2        ( 2019 13:23 )  Mg     1.7       ( 2019 13:23 )    TPro  5.8    /  Alb  2.7    /  TBili  0.3    /  DBili  x      /  AST  23     /  ALT  18     /  AlkPhos  57     ( 2019 13:23 )    LIVER FUNCTIONS - ( 2019 13:23 )  Alb: 2.7 g/dL / Pro: 5.8 g/dL / ALK PHOS: 57 U/L / ALT: 18 U/L / AST: 23 U/L / GGT: x               CAPILLARY BLOOD GLUCOSE              --------------------------------------------------------------------------------------------  IMAGING  KUB: persistent nonspecific gaseous distension of loops of bowel. No free air.

## 2019-01-29 NOTE — PROGRESS NOTE ADULT - PROBLEM SELECTOR PLAN 2
Reglan Rx for gastroparesis. Her principal complaint now is constipation secondary to soft rectal fecal impaction. See above management plan recommendations. Reglan Rx for gastroparesis. Her principal complaint now is constipation secondary to soft rectal fecal impaction. See above management plan recommendations. Await repeat CT of abdomen results. Repeat labs ordered for the AM.

## 2019-01-29 NOTE — CONSULT NOTE ADULT - ASSESSMENT
ASSESSMENT: Patient is a 63y old female with Gastroparesis vs gastric outlet obstruction consulted for r/o SBO in no acute distress.     PLAN:    - NPO  - IVF  - DVT ppx  - OOB/ambulate  - strict I/Os  - CT abd w oral/IV contrast  - Enema after CT abd  - Patient seen/examined with Senior Resident Dr. Germán Barnett.  - Plan to be discussed with Attending, Dr. Marlena Barnett.

## 2019-01-29 NOTE — CONSULT NOTE ADULT - ATTENDING COMMENTS
63F with known gastroparesis with worsening abdominal distension, nausea, and vomiting. Afebrile, vitals stable. abdomen is soft, distended, mild epigastri tenderness, but no guarding, no rebound, no peritonitis. No leukocytosis. CT scan shows large dilated stomach full of fluid with thickened stomach and possible GOO.    Recommend medicla admission for IVF hydration  Needs GI consutlation and repeat endosocpy  Patient needs NGT decompression in the meantime  PPI  No acute surgical intervention. Patient needs endosocpy first and possible repeat biopsy. Will continue to follow to determine if patient has surgical needs.
63F with recent diagnosis and treatment for gastroparesis and initial surgical consulattion requested 10 days ago when she presented with very large dilated stomach, nausea/vomiting. I recommended NGT decompression and repeat upper endoscopy as it is unclear the cause for her gastroparesis and gastric outlet obstruction.  Upon admission, GI was consulted, felt that she was able to tolerate diet and NGT was removed and patient expected to be discharged. However, patient complained of lower abdominal pain and constipation for now 4 days prompting another surgical consultation. Patient reports lower abdominal pain that began 4 days ago with no bowel function though does report flatus. +nausea and vomiting.  Abdomen is moderately distended, mild right lower quadrant firmness, but without peritonitis.  KUB shows very dilated colon. Suspect different problem with colon at this current time from initial admission with gastroparesis though the etiology of the gastroparesis still remains unclear as well.  Will follow [facility] serial abdominal examinations currently. Keep NPO. Awaiting CT A/P. Will continue to follow.

## 2019-01-30 LAB
ANION GAP SERPL CALC-SCNC: 9 MMOL/L — SIGNIFICANT CHANGE UP (ref 5–17)
BASOPHILS # BLD AUTO: 0 K/UL — SIGNIFICANT CHANGE UP (ref 0–0.2)
BASOPHILS NFR BLD AUTO: 0.2 % — SIGNIFICANT CHANGE UP (ref 0–2)
BUN SERPL-MCNC: 6 MG/DL — LOW (ref 8–20)
CALCIUM SERPL-MCNC: 8.8 MG/DL — SIGNIFICANT CHANGE UP (ref 8.6–10.2)
CHLORIDE SERPL-SCNC: 105 MMOL/L — SIGNIFICANT CHANGE UP (ref 98–107)
CO2 SERPL-SCNC: 25 MMOL/L — SIGNIFICANT CHANGE UP (ref 22–29)
CREAT SERPL-MCNC: 0.37 MG/DL — LOW (ref 0.5–1.3)
EOSINOPHIL # BLD AUTO: 0.2 K/UL — SIGNIFICANT CHANGE UP (ref 0–0.5)
EOSINOPHIL NFR BLD AUTO: 1.8 % — SIGNIFICANT CHANGE UP (ref 0–6)
GLUCOSE SERPL-MCNC: 102 MG/DL — SIGNIFICANT CHANGE UP (ref 70–115)
HCT VFR BLD CALC: 36.2 % — LOW (ref 37–47)
HGB BLD-MCNC: 11.7 G/DL — LOW (ref 12–16)
LYMPHOCYTES # BLD AUTO: 2.9 K/UL — SIGNIFICANT CHANGE UP (ref 1–4.8)
LYMPHOCYTES # BLD AUTO: 35.2 % — SIGNIFICANT CHANGE UP (ref 20–55)
MCHC RBC-ENTMCNC: 31.5 PG — HIGH (ref 27–31)
MCHC RBC-ENTMCNC: 32.3 G/DL — SIGNIFICANT CHANGE UP (ref 32–36)
MCV RBC AUTO: 97.6 FL — SIGNIFICANT CHANGE UP (ref 81–99)
MONOCYTES # BLD AUTO: 1.2 K/UL — HIGH (ref 0–0.8)
MONOCYTES NFR BLD AUTO: 14.8 % — HIGH (ref 3–10)
NEUTROPHILS # BLD AUTO: 3.9 K/UL — SIGNIFICANT CHANGE UP (ref 1.8–8)
NEUTROPHILS NFR BLD AUTO: 47.9 % — SIGNIFICANT CHANGE UP (ref 37–73)
PLATELET # BLD AUTO: 347 K/UL — SIGNIFICANT CHANGE UP (ref 150–400)
POTASSIUM SERPL-MCNC: 4 MMOL/L — SIGNIFICANT CHANGE UP (ref 3.5–5.3)
POTASSIUM SERPL-SCNC: 4 MMOL/L — SIGNIFICANT CHANGE UP (ref 3.5–5.3)
RBC # BLD: 3.71 M/UL — LOW (ref 4.4–5.2)
RBC # FLD: 14 % — SIGNIFICANT CHANGE UP (ref 11–15.6)
SODIUM SERPL-SCNC: 139 MMOL/L — SIGNIFICANT CHANGE UP (ref 135–145)
WBC # BLD: 8.2 K/UL — SIGNIFICANT CHANGE UP (ref 4.8–10.8)
WBC # FLD AUTO: 8.2 K/UL — SIGNIFICANT CHANGE UP (ref 4.8–10.8)

## 2019-01-30 PROCEDURE — 99231 SBSQ HOSP IP/OBS SF/LOW 25: CPT

## 2019-01-30 PROCEDURE — 99232 SBSQ HOSP IP/OBS MODERATE 35: CPT

## 2019-01-30 PROCEDURE — 99233 SBSQ HOSP IP/OBS HIGH 50: CPT

## 2019-01-30 RX ADMIN — LACTULOSE 20 GRAM(S): 10 SOLUTION ORAL at 23:33

## 2019-01-30 RX ADMIN — LACTULOSE 20 GRAM(S): 10 SOLUTION ORAL at 11:54

## 2019-01-30 RX ADMIN — Medication 500 MILLIGRAM(S): at 11:53

## 2019-01-30 RX ADMIN — Medication 1 TABLET(S): at 11:53

## 2019-01-30 RX ADMIN — Medication 133 MILLILITER(S): at 19:07

## 2019-01-30 RX ADMIN — Medication 10 MILLIGRAM(S): at 13:59

## 2019-01-30 RX ADMIN — Medication 133 MILLILITER(S): at 06:45

## 2019-01-30 RX ADMIN — LACTULOSE 20 GRAM(S): 10 SOLUTION ORAL at 17:41

## 2019-01-30 RX ADMIN — ENOXAPARIN SODIUM 40 MILLIGRAM(S): 100 INJECTION SUBCUTANEOUS at 11:54

## 2019-01-30 RX ADMIN — Medication 10 MILLIGRAM(S): at 21:45

## 2019-01-30 RX ADMIN — POLYETHYLENE GLYCOL 3350 17 GRAM(S): 17 POWDER, FOR SOLUTION ORAL at 11:54

## 2019-01-30 RX ADMIN — Medication 5000 UNIT(S): at 11:52

## 2019-01-30 RX ADMIN — Medication 1 SUPPOSITORY(S): at 21:45

## 2019-01-30 RX ADMIN — SENNA PLUS 2 TABLET(S): 8.6 TABLET ORAL at 21:45

## 2019-01-30 RX ADMIN — SODIUM CHLORIDE 100 MILLILITER(S): 9 INJECTION INTRAMUSCULAR; INTRAVENOUS; SUBCUTANEOUS at 02:03

## 2019-01-30 RX ADMIN — PANTOPRAZOLE SODIUM 40 MILLIGRAM(S): 20 TABLET, DELAYED RELEASE ORAL at 17:41

## 2019-01-30 RX ADMIN — TAMOXIFEN CITRATE 20 MILLIGRAM(S): 20 TABLET, FILM COATED ORAL at 11:52

## 2019-01-30 NOTE — PROGRESS NOTE ADULT - ASSESSMENT
64yo F w gastroparesis consulted yesterday for possible SOB w n and v that has resolved today. Patient has BM.    -ARNAV  -advance diet as tolerated.  -continue on bowel reg  -no further surgical intervention required.

## 2019-01-30 NOTE — PROGRESS NOTE ADULT - ATTENDING COMMENTS
Pain resolved.  having BM.  Abd soft, nt.      Advance diet as tolerated.  Will sign off.  please call with questions.
Pt is seen and examined, had 2BM, s/p enema, feeling better, denied nausea and vomiting  appreciate GI and surgery input -DID well with BID Mineral oil enemas and Glycerine suppositories QHS ordered.   Continue the enemas for now but will likely make prn when she is tolerating a diet  abdominal exam - soft, NT, BS +ve  start clear liquid   c/w enema   encourage ambulation

## 2019-01-30 NOTE — PROGRESS NOTE ADULT - ASSESSMENT
Assessment and Plan:   · Assessment	  62 y/o female with PMh of gastroparesis (recently diagnosed), breast CA, came to the ED complaining of nausea and vomiting that x 1 day duration. Patient also reported that her abdomen is distended, seen by GI, cont. Reglan and Protonix, tolerating diet, seen by PT- recommend SANJAY - pending insurance auth    A/P    > Gastroparesis   - Tolerating Diet   - Continue Reglan and Protonix  - Zofran PRN  - GI recommendations appreciated - Discharge home with reglan and miralax  - Surgery recommendations appreciated   - Pt eval appreciated - recommend SANJAY - pending insurance auth    >Constipation - c/w enema and stool softeners 64 y/o female with PMh of gastroparesis (recently diagnosed), breast CA, came to the ED complaining of nausea and vomiting that x 1 day duration. Patient also reported that her abdomen is distended, seen by GI, started on Reglan and Protonix, initially tolerating diet, was actually discharge pending auth, report constipation, received enema, on Bowel regimen, no BM, KUB showing dilated loops, Surgery reconsulted, repeat CT abdomen Diffuse prominence of the stool filled colon From the prior study which merits and colonic ileus Decreased gastric distention compared to the previous study wall  thickening of the distal stomach suggesting gastritis/antritis      A/P    >Constipation - KUB showed dilated BM  CT abdomen - Diffuse prominence of the stool filled colon From the prior study which merits and colonic ileus  had 2 BM  DID well with BID Mineral oil enemas and Glycerine suppositories QHS ordered.   Continue the enemas for now but will likely make prn when she is tolerating a diet  Reglan Rx for gastroparesis.   surgery reconsulted appreciated    > Gastroparesis - nausea and vomiting due to constipation - improving  - Continue Reglan and Protonix  - Zofran PRN  - start clear liquid diet    > History of Breast CA   - Continue Tamoxifen 20mg daily     > HTN and HLD  - Diet Controlled      DVT Prophylaxis -- Lovenox 40 mg

## 2019-01-30 NOTE — PROGRESS NOTE ADULT - PROBLEM SELECTOR PLAN 1
Likely contributing to her N/V which has not responded to QID Lactulose therapy. DID well with BID Mineral oil enemas and Glycerine suppositories QHS ordered.   Continue the enemas for now but will likely make prn when she is tolerating a diet  Reglan Rx for gastroparesis.
Likely contributing to her N/V which has not responded to QID Lactulose therapy. BID Mineral oil enemas and Glycerine suppositories QHS ordered. Diet as tolerated. Reglan Rx for gastroparesis. Pt. has a soft stool impaction in the rectum.

## 2019-01-31 PROCEDURE — 99232 SBSQ HOSP IP/OBS MODERATE 35: CPT

## 2019-01-31 PROCEDURE — 99231 SBSQ HOSP IP/OBS SF/LOW 25: CPT

## 2019-01-31 RX ORDER — LACTULOSE 10 G/15ML
20 SOLUTION ORAL
Qty: 0 | Refills: 0 | Status: DISCONTINUED | OUTPATIENT
Start: 2019-01-31 | End: 2019-02-01

## 2019-01-31 RX ADMIN — Medication 1 TABLET(S): at 12:19

## 2019-01-31 RX ADMIN — PANTOPRAZOLE SODIUM 40 MILLIGRAM(S): 20 TABLET, DELAYED RELEASE ORAL at 17:51

## 2019-01-31 RX ADMIN — ENOXAPARIN SODIUM 40 MILLIGRAM(S): 100 INJECTION SUBCUTANEOUS at 12:20

## 2019-01-31 RX ADMIN — Medication 133 MILLILITER(S): at 06:01

## 2019-01-31 RX ADMIN — LACTULOSE 20 GRAM(S): 10 SOLUTION ORAL at 06:01

## 2019-01-31 RX ADMIN — TAMOXIFEN CITRATE 20 MILLIGRAM(S): 20 TABLET, FILM COATED ORAL at 12:20

## 2019-01-31 RX ADMIN — Medication 10 MILLIGRAM(S): at 22:42

## 2019-01-31 RX ADMIN — Medication 5000 UNIT(S): at 12:19

## 2019-01-31 RX ADMIN — Medication 650 MILLIGRAM(S): at 12:25

## 2019-01-31 RX ADMIN — Medication 500 MILLIGRAM(S): at 12:19

## 2019-01-31 RX ADMIN — Medication 10 MILLIGRAM(S): at 12:19

## 2019-01-31 RX ADMIN — Medication 10 MILLIGRAM(S): at 06:01

## 2019-01-31 RX ADMIN — PANTOPRAZOLE SODIUM 40 MILLIGRAM(S): 20 TABLET, DELAYED RELEASE ORAL at 06:01

## 2019-01-31 RX ADMIN — Medication 650 MILLIGRAM(S): at 13:28

## 2019-01-31 RX ADMIN — SENNA PLUS 2 TABLET(S): 8.6 TABLET ORAL at 22:42

## 2019-01-31 NOTE — PROGRESS NOTE ADULT - ASSESSMENT
N/V resolved.  Gastroparesis better.  Having adequate BM's.    Agree with diet advancement to soft then regular.  Will DC the Mineral oil enemas and reduce the lactulose to BID.    Follow clinically.  Mobilize.

## 2019-01-31 NOTE — PROGRESS NOTE ADULT - ASSESSMENT
62yo F with chief complaint of constipation.     -tolerating sof diet  -continue on bowel reg  -no further surgical intervention required, surgery will sign off, re-consult if clinical status worsens

## 2019-01-31 NOTE — CHART NOTE - NSCHARTNOTEFT_GEN_A_CORE
Source: Patient [x ]  Family [ ]   other [ ]    Current Diet: clears    Patient reports [ ] nausea  [ ] vomiting [ ] diarrhea [ ] constipation  [ ]chewing problems [ ] swallowing issues  [ ] other: no appetite    PO intake:  < 50% [x ]   50-75%  [ ]   %  [ ]  other :    Source for PO intake [x ] Patient [ ] family [ ] chart [ ] staff [ ] other    Enteral /Parenteral Nutrition:     Current Weight: 146# bedscale weight 1/31  no weight change    % Weight Change     Pertinent Medications: MEDICATIONS  (STANDING):  ascorbic acid 500 milliGRAM(s) Oral daily  cholecalciferol 5000 Unit(s) Oral daily  enoxaparin Injectable 40 milliGRAM(s) SubCutaneous daily  glycerin Suppository - Adult 1 Suppository(s) Rectal at bedtime  lactulose Syrup 20 Gram(s) Oral four times a day  metoclopramide 10 milliGRAM(s) Oral three times a day  mineral oil enema 133 milliLiter(s) Rectal two times a day  multivitamin 1 Tablet(s) Oral daily  pantoprazole    Tablet 40 milliGRAM(s) Oral two times a day  polyethylene glycol 3350 17 Gram(s) Oral daily  senna 2 Tablet(s) Oral at bedtime  tamoxifen 20 milliGRAM(s) Oral daily    MEDICATIONS  (PRN):  acetaminophen   Tablet .. 650 milliGRAM(s) Oral every 6 hours PRN Mild Pain (1 - 3), Moderate Pain (4 - 6)  benzocaine 15 mG/menthol 3.6 mG Lozenge 1 Lozenge Oral every 4 hours PRN Sore Throat  magnesium hydroxide Suspension 30 milliLiter(s) Oral daily PRN Constipation  ondansetron Injectable 4 milliGRAM(s) IV Push every 6 hours PRN Nausea and/or Vomiting    Pertinent Labs: CBC Full  -  ( 30 Jan 2019 07:49 )  WBC Count : 8.2 K/uL  Hemoglobin : 11.7 g/dL  Hematocrit : 36.2 %  Platelet Count - Automated : 347 K/uL  Mean Cell Volume : 97.6 fl  Mean Cell Hemoglobin : 31.5 pg  Mean Cell Hemoglobin Concentration : 32.3 g/dL  Auto Neutrophil # : 3.9 K/uL  Auto Lymphocyte # : 2.9 K/uL  Auto Monocyte # : 1.2 K/uL  Auto Eosinophil # : 0.2 K/uL  Auto Basophil # : 0.0 K/uL  Auto Neutrophil % : 47.9 %  Auto Lymphocyte % : 35.2 %  Auto Monocyte % : 14.8 %  Auto Eosinophil % : 1.8 %  Auto Basophil % : 0.2 %      01-30 Na139 mmol/L Glu 102 mg/dL K+ 4.0 mmol/L Cr  0.37 mg/dL<L> BUN 6.0 mg/dL<L> Phos n/a   Alb n/a   PAB n/a           Skin:     Nutrition focused physical exam conducted - found signs of malnutrition [ ]absent [x ]present    Subcutaneous fat loss: [x ] Orbital fat pads region, [ ]Buccal fat region, [ ]Triceps region,  [ ]Ribs region    Muscle wasting: [x ]Temples region, [x ]Clavicle region, [ ]Shoulder region, [ ]Scapula region, [ ]Interosseous region,  [ ]thigh region, [ ]Calf region    Estimated Needs:   [x ] no change since previous assessment  [ ] recalculated:     Current Nutrition Diagnosis: Pt with Malnutrition; severe chronic related to inadequate protein energy intake in setting of gastroparesis as evidenced by 40#(20%) weight loss in 3 mo, <75% intake >1 mo, fat/muscle depletion, nausea, vomiting PTA. Pt still with poor po intake on clears. Encouraged supplement and high protein jello provided. Diet to be upgraded today. Pt denies n, v, c, d.       Recommendations: advance diet to regular soft with Ensure TID    Monitoring and Evaluation:   [x ] PO intake [x ] Tolerance to diet prescription [X] Weights  [X] Follow up per protocol [X] Labs:

## 2019-01-31 NOTE — PROGRESS NOTE ADULT - ASSESSMENT
62 y/o female with PMh of gastroparesis (recently diagnosed), breast CA, came to the ED complaining of nausea and vomiting that x 1 day duration. Patient also reported that her abdomen is distended, seen by GI, started on Reglan and Protonix, initially tolerating diet, was actually discharge pending auth, report constipation, received enema, on Bowel regimen, no BM, KUB showing dilated loops, Surgery reconsulted, repeat CT abdomen Diffuse prominence of the stool filled colon From the prior study which merits and colonic ileus Decreased gastric distention compared to the previous study wall  thickening of the distal stomach suggesting gastritis/antritis, s/p enema, having BM now, denied nausea and vomiting, started on clear liquid and will advance as tolerated      A/P    >Constipation - KUB showed dilated BM  CT abdomen - Diffuse prominence of the stool filled colon From the prior study which merits and colonic ileus  s/p BID Mineral oil enemas and Glycerine suppositories QHS - had few BM  will change enemas prn now  advance diet as tolerated  Reglan Rx for gastroparesis.   surgery reconsulted appreciated    > Gastroparesis - nausea and vomiting due to constipation - improving  - Continue Reglan and Protonix  - Zofran PRN  - advance diet as tolerated    > History of Breast CA   - Continue Tamoxifen 20mg daily     > HTN and HLD  - Diet Controlled      DVT Prophylaxis -- Lovenox 40 mg      >Disposition - will advance diet, if tolerating possible discharge in am. Pt recommend [facility] - Sw/CC working on auth

## 2019-02-01 VITALS
HEART RATE: 81 BPM | TEMPERATURE: 99 F | SYSTOLIC BLOOD PRESSURE: 114 MMHG | RESPIRATION RATE: 18 BRPM | DIASTOLIC BLOOD PRESSURE: 72 MMHG | OXYGEN SATURATION: 95 %

## 2019-02-01 PROCEDURE — 83735 ASSAY OF MAGNESIUM: CPT

## 2019-02-01 PROCEDURE — 74177 CT ABD & PELVIS W/CONTRAST: CPT

## 2019-02-01 PROCEDURE — 83605 ASSAY OF LACTIC ACID: CPT

## 2019-02-01 PROCEDURE — 83690 ASSAY OF LIPASE: CPT

## 2019-02-01 PROCEDURE — 96374 THER/PROPH/DIAG INJ IV PUSH: CPT | Mod: XU

## 2019-02-01 PROCEDURE — 90686 IIV4 VACC NO PRSV 0.5 ML IM: CPT

## 2019-02-01 PROCEDURE — 99285 EMERGENCY DEPT VISIT HI MDM: CPT | Mod: 25

## 2019-02-01 PROCEDURE — 93005 ELECTROCARDIOGRAM TRACING: CPT

## 2019-02-01 PROCEDURE — 96375 TX/PRO/DX INJ NEW DRUG ADDON: CPT

## 2019-02-01 PROCEDURE — 97110 THERAPEUTIC EXERCISES: CPT

## 2019-02-01 PROCEDURE — 71045 X-RAY EXAM CHEST 1 VIEW: CPT

## 2019-02-01 PROCEDURE — 81003 URINALYSIS AUTO W/O SCOPE: CPT

## 2019-02-01 PROCEDURE — 85027 COMPLETE CBC AUTOMATED: CPT

## 2019-02-01 PROCEDURE — 80048 BASIC METABOLIC PNL TOTAL CA: CPT

## 2019-02-01 PROCEDURE — 36415 COLL VENOUS BLD VENIPUNCTURE: CPT

## 2019-02-01 PROCEDURE — 99239 HOSP IP/OBS DSCHRG MGMT >30: CPT

## 2019-02-01 PROCEDURE — 74018 RADEX ABDOMEN 1 VIEW: CPT

## 2019-02-01 PROCEDURE — 97116 GAIT TRAINING THERAPY: CPT

## 2019-02-01 PROCEDURE — 80053 COMPREHEN METABOLIC PANEL: CPT

## 2019-02-01 RX ORDER — MAGNESIUM HYDROXIDE 400 MG/1
30 TABLET, CHEWABLE ORAL
Qty: 0 | Refills: 0 | DISCHARGE
Start: 2019-02-01

## 2019-02-01 RX ORDER — FAMOTIDINE 10 MG/ML
1 INJECTION INTRAVENOUS
Qty: 0 | Refills: 0 | COMMUNITY

## 2019-02-01 RX ORDER — LACTULOSE 10 G/15ML
30 SOLUTION ORAL
Qty: 0 | Refills: 0 | DISCHARGE
Start: 2019-02-01

## 2019-02-01 RX ORDER — LACTULOSE 10 G/15ML
30 SOLUTION ORAL
Qty: 0 | Refills: 0 | COMMUNITY
Start: 2019-02-01

## 2019-02-01 RX ORDER — METOCLOPRAMIDE HCL 10 MG
1 TABLET ORAL
Qty: 0 | Refills: 0 | COMMUNITY

## 2019-02-01 RX ADMIN — LACTULOSE 20 GRAM(S): 10 SOLUTION ORAL at 05:53

## 2019-02-01 RX ADMIN — ENOXAPARIN SODIUM 40 MILLIGRAM(S): 100 INJECTION SUBCUTANEOUS at 12:41

## 2019-02-01 RX ADMIN — Medication 500 MILLIGRAM(S): at 12:42

## 2019-02-01 RX ADMIN — Medication 1 TABLET(S): at 12:42

## 2019-02-01 RX ADMIN — Medication 10 MILLIGRAM(S): at 05:53

## 2019-02-01 RX ADMIN — PANTOPRAZOLE SODIUM 40 MILLIGRAM(S): 20 TABLET, DELAYED RELEASE ORAL at 05:53

## 2019-02-01 RX ADMIN — TAMOXIFEN CITRATE 20 MILLIGRAM(S): 20 TABLET, FILM COATED ORAL at 12:42

## 2019-02-01 RX ADMIN — POLYETHYLENE GLYCOL 3350 17 GRAM(S): 17 POWDER, FOR SOLUTION ORAL at 12:41

## 2019-02-01 RX ADMIN — Medication 5000 UNIT(S): at 12:42

## 2019-02-01 NOTE — PROGRESS NOTE ADULT - SUBJECTIVE AND OBJECTIVE BOX
INTERVAL HPI/OVERNIGHT EVENTS: She had a BM after receiving enemas. No nausea or vomiting overnight or this am so far.      ROS wnl     PAST MEDICAL & SURGICAL HISTORY:  Arthritis  Colitis  Anemia  Weakness  Nausea & vomiting  CHF (congestive heart failure): 2017 resolved  Breast cancer: left 2017, had surgery and radiation  High cholesterol: d/c&#x27;d by prescribing MD in April &quot;since I&#x27;m not eating&quot;  High blood pressure: reports medication was stopped 2018 because BP was low  History of endoscopy: 2018  S/P colonoscopy: 2018  History of total hip replacement, left: 2018  History of hip replacement, total, right: 2017  History of lumpectomy of left breast:   Atypical lipoma of soft tissue: in left groin   H/O foot surgery: ankle left       Home Medications:  Multiple Vitamins oral tablet: 1 tab(s) orally once a day (2018 09:22)  ondansetron 4 mg oral tablet, disintegratin tab(s) orally every 4 to 6 hours, As Needed (2018 09:22)  Pepcid 20 mg oral tablet: 1 tab(s) orally 2 times a day, As Needed (2018 09:22)  Reglan 5 mg oral tablet: 1 tab(s) orally 4 times a day (before meals and at bedtime) (2018 09:22)  senna oral tablet: 2 tab(s) orally once a day (at bedtime) (2019 22:42)  tamoxifen 20 mg oral tablet: 1 tab(s) orally once a day (2018 09:22)  Vitamin C 1000 mg oral tablet: 1 tab(s) orally once a day (2018 09:22)  Vitamin D3 5000 intl units oral capsule: 1 cap(s) orally once a day (2018 09:22)      MEDICATIONS  (STANDING):  ascorbic acid 500 milliGRAM(s) Oral daily  cholecalciferol 5000 Unit(s) Oral daily  enoxaparin Injectable 40 milliGRAM(s) SubCutaneous daily  glycerin Suppository - Adult 1 Suppository(s) Rectal at bedtime  lactulose Syrup 20 Gram(s) Oral four times a day  metoclopramide 10 milliGRAM(s) Oral three times a day  mineral oil enema 133 milliLiter(s) Rectal two times a day  multivitamin 1 Tablet(s) Oral daily  pantoprazole    Tablet 40 milliGRAM(s) Oral two times a day  polyethylene glycol 3350 17 Gram(s) Oral daily  senna 2 Tablet(s) Oral at bedtime  sodium chloride 0.9%. 1000 milliLiter(s) (100 mL/Hr) IV Continuous <Continuous>  tamoxifen 20 milliGRAM(s) Oral daily    MEDICATIONS  (PRN):  acetaminophen   Tablet .. 650 milliGRAM(s) Oral every 6 hours PRN Mild Pain (1 - 3), Moderate Pain (4 - 6)  benzocaine 15 mG/menthol 3.6 mG Lozenge 1 Lozenge Oral every 4 hours PRN Sore Throat  magnesium hydroxide Suspension 30 milliLiter(s) Oral daily PRN Constipation  ondansetron Injectable 4 milliGRAM(s) IV Push every 6 hours PRN Nausea and/or Vomiting      Allergies    Crestor (Muscle Pain)  shellfish (Swelling; Rash)    Intolerances          PHYSICAL EXAM:   Vital Signs:  Vital Signs Last 24 Hrs  T(C): 37 (2019 05:45), Max: 37.1 (2019 17:15)  T(F): 98.6 (2019 05:45), Max: 98.8 (2019 17:15)  HR: 90 (2019 05:45) (90 - 105)  BP: 131/78 (2019 05:45) (112/72 - 149/91)  BP(mean): --  RR: 18 (2019 05:45) (18 - 20)  SpO2: 96% (2019 05:45) (92% - 96%)  Daily     Daily     GENERAL:  no distress  HEENT:  NC/AT,  anicteric  CHEST:   no increased effort, breath sounds clear  HEART:  Regular rhythm  ABDOMEN:  Soft, non-tender, non-distended, normoactive bowel sounds,  no masses ,no hepato-splenomegaly, no signs of chronic liver disease  EXTEREMITIES:  no cyanosis      LABS:                        11.6   8.2   )-----------( 344      ( 2019 13:23 )             36.1       Hemoglobin: 11.6 g/dL (- @ 13:23)          141  |  104  |  7.0<L>  ----------------------------<  118<H>  4.4   |  27.0  |  0.46<L>    Ca    9.2      2019 13:23  Mg     1.7         TPro  5.8<L>  /  Alb  2.7<L>  /  TBili  0.3<L>  /  DBili  x   /  AST  23  /  ALT  18  /  AlkPhos  57            Alkaline Phosphatase, Serum: 57 U/L (19 @ 13:23)  Alanine Aminotransferase (ALT/SGPT): 18 U/L (19 @ 13:23)  Aspartate Aminotransferase (AST/SGOT): 23 U/L (19 @ 13:23)      RADIOLOGY & ADDITIONAL TESTS:  < from: CT Abdomen and Pelvis w/ Oral Cont and w/ IV Cont (19 @ 20:15) >  IMPRESSION:    Small left pleural effusion is new from the prior exam  Diffuse prominence of the stool filled colon  From the prior study which merits and colonic ileus  Nonobstructive right renal calculi  Mild right hydronephrosis  Decreased gastric distention compared to the previous study wall   thickening of the distal stomach suggesting gastritis/antritis      < end of copied text >
Internal Medicine Hospitalist - Dr. Shelby SAMAYOA    [id number]    63y      Female    Patient is a 63y old  Female who presents with a chief complaint of Nausea/vomiting (26 Jan 2019 12:51)    INTERVAL HPI/ OVERNIGHT EVENTS: Patient is seen and examined, no new event overnight.     REVIEW OF SYSTEMS:    Denied fever, chills, abd. pain, nausea, vomiting, chest pain, SOB, headache, dizziness    PHYSICAL EXAM:    Vital Signs Last 24 Hrs  T(C): 36.9 (27 Jan 2019 10:48), Max: 37.3 (26 Jan 2019 15:30)  T(F): 98.4 (27 Jan 2019 10:48), Max: 99.2 (26 Jan 2019 15:30)  HR: 93 (27 Jan 2019 10:48) (88 - 103)  BP: 102/67 (27 Jan 2019 10:48) (102/67 - 124/75)  BP(mean): --  RR: 18 (27 Jan 2019 10:48) (18 - 20)  SpO2: 92% (27 Jan 2019 10:48) (92% - 93%)    GENERAL: NAD  CHEST/LUNG: CTA b/l   HEART: S1S2+ audible  ABDOMEN: Soft, Nontender, Nondistended; Bowel sounds present  EXTREMITIES:  no edema  CNS: AAO X 3    LABS:         MEDICATIONS  (STANDING):  ascorbic acid 500 milliGRAM(s) Oral daily  cholecalciferol 5000 Unit(s) Oral daily  enoxaparin Injectable 40 milliGRAM(s) SubCutaneous daily  metoclopramide 10 milliGRAM(s) Oral three times a day  multivitamin 1 Tablet(s) Oral daily  pantoprazole    Tablet 40 milliGRAM(s) Oral two times a day  polyethylene glycol 3350 17 Gram(s) Oral daily  senna 2 Tablet(s) Oral at bedtime  tamoxifen 20 milliGRAM(s) Oral daily    MEDICATIONS  (PRN):  acetaminophen   Tablet .. 650 milliGRAM(s) Oral every 6 hours PRN Mild Pain (1 - 3), Moderate Pain (4 - 6)  benzocaine 15 mG/menthol 3.6 mG Lozenge 1 Lozenge Oral every 4 hours PRN Sore Throat  magnesium hydroxide Suspension 30 milliLiter(s) Oral daily PRN Constipation  ondansetron Injectable 4 milliGRAM(s) IV Push every 6 hours PRN Nausea and/or Vomiting      RADIOLOGY & ADDITIONAL TEST
[id number]  JOSEY SAMAYOA      Patient was seen and examined at the bedside by the  Hospitalist/PA team.    INTERVAL HPI/ OVERNIGHT EVENTS: Patient is seen and examined, report less constipation denied abd. pain, nausea and vomiting had bm with enema and feels better,  no bm today yet    Allergies    Crestor (Muscle Pain)  shellfish (Swelling; Rash)    Intolerances        PAST MEDICAL & SURGICAL HISTORY:  Arthritis  Colitis  Anemia  Weakness  Nausea & vomiting  CHF (congestive heart failure): 12/2017 resolved  Breast cancer: left 2017, had surgery and radiation  High cholesterol: d/c&#x27;d by prescribing MD in April &quot;since I&#x27;m not eating&quot;  High blood pressure: reports medication was stopped April 2018 because BP was low  History of endoscopy: 7/2018  S/P colonoscopy: 2018  History of total hip replacement, left: march 2018  History of hip replacement, total, right: 11/2017  History of lumpectomy of left breast: 2017  Atypical lipoma of soft tissue: in left groin 2008  H/O foot surgery: ankle left 2004      Vital Signs Last 24 Hrs  T(C): 37 (30 Jan 2019 05:45), Max: 37.1 (29 Jan 2019 17:15)  T(F): 98.6 (30 Jan 2019 05:45), Max: 98.8 (29 Jan 2019 17:15)  HR: 90 (30 Jan 2019 05:45) (90 - 105)  BP: 131/78 (30 Jan 2019 05:45) (112/72 - 149/91)  BP(mean): --  RR: 18 (30 Jan 2019 05:45) (18 - 20)  SpO2: 96% (30 Jan 2019 05:45) (92% - 96%)          PHYSICAL EXAM:    GENERAL: NAD, well-groomed, well-developed  HEAD:  Atraumatic, Normocephalic  EYES: EOMI, PERRLA, conjunctiva and sclera clear  ENMT: No tonsillar erythema, exudates, or enlargement; Moist mucous membranes, Good dentition, No lesions  NECK: Supple, No JVD, Normal thyroid  NERVOUS SYSTEM:  Alert & Oriented X3, Good concentration; Motor Strength 5/5 B/L upper and lower extremities; DTRs 2+ intact and symmetric  CHEST/LUNG: Clear to percussion bilaterally; No rales, rhonchi, wheezing, or rubs  HEART: Regular rate and rhythm; No murmurs, rubs, or gallops  ABDOMEN: Soft, Nontender, Nondistended; Bowel sounds present  EXTREMITIES:  2+ Peripheral Pulses, No clubbing, cyanosis, or edema  LYMPH: No lymphadenopathy noted  SKIN: No rashes or lesions      01-29 @ 07:01  -  01-30 @ 07:00  --------------------------------------------------------  IN: 500 mL / OUT: 600 mL / NET: -100 mL                              11.6   8.2   )-----------( 344      ( 29 Jan 2019 13:23 )             36.1     01-29    141  |  104  |  7.0<L>  ----------------------------<  118<H>  4.4   |  27.0  |  0.46<L>    Ca    9.2      29 Jan 2019 13:23  Mg     1.7     01-29    TPro  5.8<L>  /  Alb  2.7<L>  /  TBili  0.3<L>  /  DBili  x   /  AST  23  /  ALT  18  /  AlkPhos  57  01-29         LIVER FUNCTIONS - ( 29 Jan 2019 13:23 )  Alb: 2.7 g/dL / Pro: 5.8 g/dL / ALK PHOS: 57 U/L / ALT: 18 U/L / AST: 23 U/L / GGT: x                 EXAM:  CT ABDOMEN AND PELVIS OC IC                          PROCEDURE DATE:  01/29/2019      INTERPRETATION:  CLINICAL STATEMENT: abdominal pain    IMPRESSION:    Small left pleural effusion is new from the prior exam  Diffuse prominence of the stool filled colon  From the prior study which merits and colonic ileus  Nonobstructive right renal calculi  Mild right hydronephrosis  Decreased gastric distention compared to the previous study wall   thickening of the distal stomach suggesting gastritis/antritis
Acquired hypertrophic pyloric stenosis    HPI:  64 y/o female with PMh of gastroparesis (recently diagnosed), breast CA, came to the ED complaining of nausea and vomiting that x 1 day duration. Patient also reported that her abdomen is distended. She said her symptoms has been going on for 1 year now but worsen yesterday. She reported weakness and feeling that she wants to pass out while moving her bowel. Patient said she had an episode of diarrhea on Friday; watery diarrhea no blood but has resolved now. She was seen in [facility] in 2018 where she was diagnosed with gastroparesis. Patient said she has been complaint with her medication and diet. She has no fever, chills, hematemesis, melena, hematochezia, sick contact, chest pain, shortness of breath. (2019 04:48)    Interval History:  Patient was seen and examined at bedside around 8:15 am. Feeling slightly better.   Denies chest pain, palpitations, shortness of breath, headache, dizziness, visual symptoms, nausea, vomiting or abdominal pain.  No overnight issues.    ROS:  As per interval history otherwise unremarkable.    PHYSICAL EXAM:  Vital Signs   T(C): 37.3 (2019 11:14), Max: 37.3 (2019 11:14)  T(F): 99.1 (2019 11:14), Max: 99.1 (2019 11:14)  HR: 97 (2019 11:14) (97 - 109)  BP: 132/68 (2019 11:14) (132/68 - 136/75)  RR: 18 (2019 11:14) (18 - 18)  SpO2: 94% (2019 21:07) (94% - 94%)  General: Well developed. Well nourished. No acute distress  HEENT: PERRLA. EOMI. Clear conjunctivae. Moist mucus membrane. NGT in place.   Neck: Supple. No JVD.   Chest: CTA bilaterally - no wheezing, rales or rhonchi.   Heart: Normal S1 & S2. RRR.   Abdomen: Soft. Non-tender. Non-distended. + BS  Ext: No pedal edema. No calf tenderness   Neuro: AAO x 3. No focal deficit. No speech disorder  Skin: Warm and Dry  Psychiatry: Normal mood and affect    I&O's Summary    2019 07:01  -  2019 07:00  --------------------------------------------------------  IN: 3000 mL / OUT: 350 mL / NET: 2650 mL    MEDICATIONS  (STANDING):  ascorbic acid 500 milliGRAM(s) Oral daily  cholecalciferol 5000 Unit(s) Oral daily  enoxaparin Injectable 40 milliGRAM(s) SubCutaneous daily  metoclopramide Injectable 10 milliGRAM(s) IV Push every 6 hours  multivitamin 1 Tablet(s) Oral daily  pantoprazole  Injectable 40 milliGRAM(s) IV Push daily  sodium chloride 0.9%. 1000 milliLiter(s) (125 mL/Hr) IV Continuous <Continuous>  tamoxifen 20 milliGRAM(s) Oral daily    MEDICATIONS  (PRN):  ondansetron Injectable 4 milliGRAM(s) IV Push every 6 hours PRN Nausea and/or Vomiting    LABS:               12.4   7.3   )-----------( 251      ( 2019 05:53 )             38.7     01-    141  |  106  |  7.0<L>  ----------------------------<  75  3.9   |  21.0<L>  |  0.42<L>    Ca    9.2      2019 05:53    Urinalysis Basic - ( 2019 18:38 )    Color: Yellow / Appearance: Clear / S.015 / pH: x  Gluc: x / Ketone: Small  / Bili: Negative / Urobili: Negative mg/dL   Blood: x / Protein: Negative mg/dL / Nitrite: Negative   Leuk Esterase: Negative / RBC: x / WBC x   Sq Epi: x / Non Sq Epi: x / Bacteria: x    RADIOLOGY & ADDITIONAL STUDIES:  Reviewed
Acquired hypertrophic pyloric stenosis    HPI:  64 y/o female with PMh of gastroparesis (recently diagnosed), breast CA, came to the ED complaining of nausea and vomiting that x 1 day duration. Patient also reported that her abdomen is distended. She said her symptoms has been going on for 1 year now but worsen yesterday. She reported weakness and feeling that she wants to pass out while moving her bowel. Patient said she had an episode of diarrhea on Friday; watery diarrhea no blood but has resolved now. She was seen in [facility] in August 2018 where she was diagnosed with gastroparesis. Patient said she has been complaint with her medication and diet. She has no fever, chills, hematemesis, melena, hematochezia, sick contact, chest pain, shortness of breath. (20 Jan 2019 04:48)    Interval History:  Patient was seen and examined at bedside around 8:30 am. Doing well. Tolerating PO.    Denies chest pain, palpitations, shortness of breath, headache, dizziness, visual symptoms, nausea, vomiting or abdominal pain.  No overnight issues.    ROS:  As per interval history otherwise unremarkable.    PHYSICAL EXAM:  Vital Signs   T(C): 36.8 (23 Jan 2019 16:31), Max: 36.9 (23 Jan 2019 12:47)  T(F): 98.3 (23 Jan 2019 16:31), Max: 98.5 (23 Jan 2019 12:47)  HR: 97 (23 Jan 2019 16:31) (92 - 119)  BP: 90/56 (23 Jan 2019 16:31) (90/56 - 122/73)  RR: 20 (23 Jan 2019 16:31) (18 - 20)  SpO2: 94% (23 Jan 2019 12:47) (93% - 95%)  General: Well developed. Well nourished. No acute distress  HEENT: PERRLA. EOMI. Clear conjunctivae. Moist mucus membrane.   Neck: Supple. No JVD.   Chest: CTA bilaterally - no wheezing, rales or rhonchi.   Heart: Normal S1 & S2. RRR.   Abdomen: Soft. Non-tender. Non-distended. + BS  Ext: No pedal edema. No calf tenderness   Neuro: AAO x 3. No focal deficit. No speech disorder  Skin: Warm and Dry  Psychiatry: Normal mood and affect    MEDICATIONS  (STANDING):  ascorbic acid 500 milliGRAM(s) Oral daily  cholecalciferol 5000 Unit(s) Oral daily  enoxaparin Injectable 40 milliGRAM(s) SubCutaneous daily  metoclopramide 10 milliGRAM(s) Oral three times a day  multivitamin 1 Tablet(s) Oral daily  nystatin    Suspension 804659 Unit(s) Oral every 6 hours  pantoprazole    Tablet 40 milliGRAM(s) Oral two times a day  polyethylene glycol 3350 17 Gram(s) Oral daily  senna 2 Tablet(s) Oral at bedtime  sodium chloride 0.9%. 1000 milliLiter(s) (80 mL/Hr) IV Continuous <Continuous>  tamoxifen 20 milliGRAM(s) Oral daily    MEDICATIONS  (PRN):  acetaminophen   Tablet .. 650 milliGRAM(s) Oral every 6 hours PRN Mild Pain (1 - 3), Moderate Pain (4 - 6)  benzocaine 15 mG/menthol 3.6 mG Lozenge 1 Lozenge Oral every 4 hours PRN Sore Throat  ondansetron Injectable 4 milliGRAM(s) IV Push every 6 hours PRN Nausea and/or Vomiting    LABS:                        12.1   7.5   )-----------( 227      ( 23 Jan 2019 09:20 )             37.2     01-23    143  |  109<H>  |  5.0<L>  ----------------------------<  99  3.6   |  23.0  |  0.32<L>    Ca    8.4<L>      23 Jan 2019 06:44  Mg     2.0     01-23    RADIOLOGY & ADDITIONAL STUDIES:  Reviewed
HPI/OVERNIGHT EVENTS: 64yo F w gastroparesis examined at bedside and found in no distress. No overnight events. Patient has no active complaints. Patient had an enema yesterday with BM and had a BM on her own today. Patient voiding and passing gas. Denies fever, chills, nausea, vomiting, chest pain, and SOB.    Vital Signs Last 24 Hrs  T(C): 36.8 (30 Jan 2019 11:05), Max: 37.1 (29 Jan 2019 17:15)  T(F): 98.3 (30 Jan 2019 11:05), Max: 98.8 (29 Jan 2019 17:15)  HR: 100 (30 Jan 2019 11:05) (90 - 100)  BP: 115/67 (30 Jan 2019 11:05) (115/67 - 149/91)  BP(mean): --  RR: 20 (30 Jan 2019 11:05) (18 - 20)  SpO2: 96% (30 Jan 2019 05:45) (92% - 96%)    I&O's Detail    29 Jan 2019 07:01  -  30 Jan 2019 07:00  --------------------------------------------------------  IN:    sodium chloride 0.9%: 500 mL  Total IN: 500 mL    OUT:    Voided: 600 mL  Total OUT: 600 mL    Total NET: -100 mL        Constitutional: patient resting comfortably in bed, in no acute distress  HEENT: EOMI / PERRL   Neck: No JVD, full ROM without pain   Respiratory: CTAB, respirations are unlabored, no accessory muscle use, no conversational dyspnea  Cardiovascular: regular rate & rhythm  Gastrointestinal: Abdomen soft, non-tender, no rebound tenderness / guarding. Mildly distension.       LABS:                        11.7   8.2   )-----------( 347      ( 30 Jan 2019 07:49 )             36.2     01-30    139  |  105  |  6.0<L>  ----------------------------<  102  4.0   |  25.0  |  0.37<L>    Ca    8.8      30 Jan 2019 07:49  Mg     1.7     01-29    TPro  5.8<L>  /  Alb  2.7<L>  /  TBili  0.3<L>  /  DBili  x   /  AST  23  /  ALT  18  /  AlkPhos  57  01-29          MEDICATIONS  (STANDING):  ascorbic acid 500 milliGRAM(s) Oral daily  cholecalciferol 5000 Unit(s) Oral daily  enoxaparin Injectable 40 milliGRAM(s) SubCutaneous daily  glycerin Suppository - Adult 1 Suppository(s) Rectal at bedtime  lactulose Syrup 20 Gram(s) Oral four times a day  metoclopramide 10 milliGRAM(s) Oral three times a day  mineral oil enema 133 milliLiter(s) Rectal two times a day  multivitamin 1 Tablet(s) Oral daily  pantoprazole    Tablet 40 milliGRAM(s) Oral two times a day  polyethylene glycol 3350 17 Gram(s) Oral daily  senna 2 Tablet(s) Oral at bedtime  tamoxifen 20 milliGRAM(s) Oral daily    MEDICATIONS  (PRN):  acetaminophen   Tablet .. 650 milliGRAM(s) Oral every 6 hours PRN Mild Pain (1 - 3), Moderate Pain (4 - 6)  benzocaine 15 mG/menthol 3.6 mG Lozenge 1 Lozenge Oral every 4 hours PRN Sore Throat  magnesium hydroxide Suspension 30 milliLiter(s) Oral daily PRN Constipation  ondansetron Injectable 4 milliGRAM(s) IV Push every 6 hours PRN Nausea and/or Vomiting
HPI/OVERNIGHT EVENTS: Patient seen and examined at bedside this AM. No acute events overnight per nursing reports. Patient had multiple bowel movements. Pain well controlled on current regimen, voiding. Denies fever, chills, nausea, vomitting, chest pain, SOB, dizziness, abd pain or any other concerning symptoms    Vital Signs Last 24 Hrs  T(C): 37.3 (31 Jan 2019 10:02), Max: 37.3 (30 Jan 2019 22:40)  T(F): 99.1 (31 Jan 2019 10:02), Max: 99.2 (30 Jan 2019 22:40)  HR: 85 (31 Jan 2019 10:02) (83 - 97)  BP: 111/63 (31 Jan 2019 10:02) (111/63 - 130/87)  BP(mean): --  RR: 20 (31 Jan 2019 10:02) (20 - 20)  SpO2: 93% (31 Jan 2019 05:35) (93% - 94%)    I&O's Detail    30 Jan 2019 07:01  -  31 Jan 2019 07:00  --------------------------------------------------------  IN:    Oral Fluid: 480 mL  Total IN: 480 mL    OUT:  Total OUT: 0 mL    Total NET: 480 mL        Constitutional: patient resting comfortably in bed, in no acute distress  HEENT: EOMI / PERRL   Neck: No JVD, full ROM without pain   Respiratory: CTAB, respirations are unlabored, no accessory muscle use, no conversational dyspnea  Cardiovascular: regular rate & rhythm  Gastrointestinal: Abdomen soft, non-tender, no rebound tenderness / guarding. Mildly distension.     LABS:                        11.7   8.2   )-----------( 347      ( 30 Jan 2019 07:49 )             36.2     01-30    139  |  105  |  6.0<L>  ----------------------------<  102  4.0   |  25.0  |  0.37<L>    Ca    8.8      30 Jan 2019 07:49            MEDICATIONS  (STANDING):  ascorbic acid 500 milliGRAM(s) Oral daily  cholecalciferol 5000 Unit(s) Oral daily  enoxaparin Injectable 40 milliGRAM(s) SubCutaneous daily  glycerin Suppository - Adult 1 Suppository(s) Rectal at bedtime  lactulose Syrup 20 Gram(s) Oral two times a day  metoclopramide 10 milliGRAM(s) Oral three times a day  multivitamin 1 Tablet(s) Oral daily  pantoprazole    Tablet 40 milliGRAM(s) Oral two times a day  polyethylene glycol 3350 17 Gram(s) Oral daily  senna 2 Tablet(s) Oral at bedtime  tamoxifen 20 milliGRAM(s) Oral daily    MEDICATIONS  (PRN):  acetaminophen   Tablet .. 650 milliGRAM(s) Oral every 6 hours PRN Mild Pain (1 - 3), Moderate Pain (4 - 6)  benzocaine 15 mG/menthol 3.6 mG Lozenge 1 Lozenge Oral every 4 hours PRN Sore Throat  magnesium hydroxide Suspension 30 milliLiter(s) Oral daily PRN Constipation  ondansetron Injectable 4 milliGRAM(s) IV Push every 6 hours PRN Nausea and/or Vomiting      MICRO:   Cultures     STUDIES:   EKG, CXR, U/S, CT, MRI
INTERVAL HPI/OVERNIGHT EVENTS: Abd xray showed - dilated small bowel loops. Tolerated clears, no nausea or vomiting. No distention.     ROS wnl     PAST MEDICAL & SURGICAL HISTORY:  Arthritis  Colitis  Anemia  Weakness  Nausea & vomiting  CHF (congestive heart failure): 2017 resolved  Breast cancer: left 2017, had surgery and radiation  High cholesterol: d/c&#x27;d by prescribing MD in April &quot;since I&#x27;m not eating&quot;  High blood pressure: reports medication was stopped 2018 because BP was low  History of endoscopy: 2018  S/P colonoscopy: 2018  History of total hip replacement, left: 2018  History of hip replacement, total, right: 2017  History of lumpectomy of left breast:   Atypical lipoma of soft tissue: in left groin   H/O foot surgery: ankle left       Home Medications:  metoclopramide 10 mg oral tablet: 1 tab(s) orally 3 times a day (2018 09:22)  Multiple Vitamins oral tablet: 1 tab(s) orally once a day (2018 09:22)  ondansetron 4 mg oral tablet, disintegratin tab(s) orally every 4 to 6 hours, As Needed (2018 09:22)  Pepcid 20 mg oral tablet: 1 tab(s) orally 2 times a day, As Needed (2018 09:22)  Reglan 5 mg oral tablet: 1 tab(s) orally 4 times a day (before meals and at bedtime) (2018 09:22)  tamoxifen 20 mg oral tablet: 1 tab(s) orally once a day (2018 09:22)  Vitamin C 1000 mg oral tablet: 1 tab(s) orally once a day (2018 09:22)  Vitamin D3 5000 intl units oral capsule: 1 cap(s) orally once a day (2018 09:22)      MEDICATIONS  (STANDING):  ascorbic acid 500 milliGRAM(s) Oral daily  cholecalciferol 5000 Unit(s) Oral daily  enoxaparin Injectable 40 milliGRAM(s) SubCutaneous daily  metoclopramide Injectable 10 milliGRAM(s) IV Push every 6 hours  multivitamin 1 Tablet(s) Oral daily  pantoprazole  Injectable 40 milliGRAM(s) IV Push daily  sodium chloride 0.9%. 1000 milliLiter(s) (125 mL/Hr) IV Continuous <Continuous>  tamoxifen 20 milliGRAM(s) Oral daily    MEDICATIONS  (PRN):  ondansetron Injectable 4 milliGRAM(s) IV Push every 6 hours PRN Nausea and/or Vomiting      Allergies    Crestor (Muscle Pain)  shellfish (Swelling; Rash)    Intolerances          PHYSICAL EXAM:   Vital Signs:  Vital Signs Last 24 Hrs  T(C): 37.2 (2019 21:07), Max: 37.2 (2019 21:07)  T(F): 99 (2019 21:07), Max: 99 (2019 21:07)  HR: 105 (2019 21:07) (105 - 109)  BP: 136/75 (2019 21:07) (124/72 - 136/75)  BP(mean): --  RR: 18 (2019 21:07) (18 - 18)  SpO2: 94% (2019 21:07) (94% - 94%)  Daily     Daily     GENERAL:  no distress  HEENT:  NC/AT,  anicteric  CHEST:   no increased effort, breath sounds clear  HEART:  Regular rhythm  ABDOMEN:  Soft, non-tender, non-distended, normoactive bowel sounds,  no masses ,no hepato-splenomegaly, no signs of chronic liver disease  EXTEREMITIES:  no cyanosis      LABS:                        12.4   7.3   )-----------( 251      ( 2019 05:53 )             38.7       Hemoglobin: 12.4 g/dL (19 @ 05:53)  Hemoglobin: 11.9 g/dL (19 @ 08:48)  Hemoglobin: 11.8 g/dL (19 @ 22:10)          141  |  106  |  7.0<L>  ----------------------------<  75  3.9   |  21.0<L>  |  0.42<L>    Ca    9.2      2019 05:53            Aspartate Aminotransferase (AST/SGOT): 38 U/L (19 @ 22:10)  Alkaline Phosphatase, Serum: 54 U/L (19 @ 22:10)  Alanine Aminotransferase (ALT/SGPT): 26 U/L (19 @ 22:10)      Urinalysis Basic - ( 2019 18:38 )    Color: Yellow / Appearance: Clear / S.015 / pH: x  Gluc: x / Ketone: Small  / Bili: Negative / Urobili: Negative mg/dL   Blood: x / Protein: Negative mg/dL / Nitrite: Negative   Leuk Esterase: Negative / RBC: x / WBC x   Sq Epi: x / Non Sq Epi: x / Bacteria: x          RADIOLOGY & ADDITIONAL TESTS:
INTERVAL HPI/OVERNIGHT EVENTS: Had 4 BMs yesterday. Tolerated diet yesterday and this am no vomiting.     ROS wnl     PAST MEDICAL & SURGICAL HISTORY:  Arthritis  Colitis  Anemia  Weakness  Nausea & vomiting  CHF (congestive heart failure): 2017 resolved  Breast cancer: left 2017, had surgery and radiation  High cholesterol: d/c&#x27;d by prescribing MD in April &quot;since I&#x27;m not eating&quot;  High blood pressure: reports medication was stopped 2018 because BP was low  History of endoscopy: 2018  S/P colonoscopy: 2018  History of total hip replacement, left: 2018  History of hip replacement, total, right: 2017  History of lumpectomy of left breast:   Atypical lipoma of soft tissue: in left groin   H/O foot surgery: ankle left       Home Medications:  lactulose 10 g/15 mL oral syrup: 30 milliliter(s) orally 2 times a day hold if having more than 2BM (2019 09:29)  magnesium hydroxide 8% oral suspension: 30 milliliter(s) orally once a day, As needed, Constipation (2019 09:29)  Multiple Vitamins oral tablet: 1 tab(s) orally once a day (2019 18:02)  ondansetron 4 mg oral tablet, disintegratin tab(s) orally every 4 to 6 hours, As Needed (2019 18:02)  senna oral tablet: 2 tab(s) orally once a day (at bedtime) (2019 18:02)  tamoxifen 20 mg oral tablet: 1 tab(s) orally once a day (2019 18:02)  Vitamin C 1000 mg oral tablet: 1 tab(s) orally once a day (2019 18:02)  Vitamin D3 5000 intl units oral capsule: 1 cap(s) orally once a day (2019 18:02)      MEDICATIONS  (STANDING):  ascorbic acid 500 milliGRAM(s) Oral daily  cholecalciferol 5000 Unit(s) Oral daily  enoxaparin Injectable 40 milliGRAM(s) SubCutaneous daily  lactulose Syrup 20 Gram(s) Oral two times a day  metoclopramide 10 milliGRAM(s) Oral three times a day  multivitamin 1 Tablet(s) Oral daily  pantoprazole    Tablet 40 milliGRAM(s) Oral two times a day  polyethylene glycol 3350 17 Gram(s) Oral daily  senna 2 Tablet(s) Oral at bedtime  tamoxifen 20 milliGRAM(s) Oral daily    MEDICATIONS  (PRN):  acetaminophen   Tablet .. 650 milliGRAM(s) Oral every 6 hours PRN Mild Pain (1 - 3), Moderate Pain (4 - 6)  benzocaine 15 mG/menthol 3.6 mG Lozenge 1 Lozenge Oral every 4 hours PRN Sore Throat  magnesium hydroxide Suspension 30 milliLiter(s) Oral daily PRN Constipation  ondansetron Injectable 4 milliGRAM(s) IV Push every 6 hours PRN Nausea and/or Vomiting      Allergies    Crestor (Muscle Pain)  shellfish (Swelling; Rash)    Intolerances          PHYSICAL EXAM:   Vital Signs:  Vital Signs Last 24 Hrs  T(C): 36.4 (2019 04:33), Max: 36.9 (2019 16:34)  T(F): 97.6 (2019 04:33), Max: 98.5 (2019 22:39)  HR: 80 (2019 04:33) (80 - 91)  BP: 114/65 (2019 04:33) (109/65 - 124/54)  BP(mean): --  RR: 20 (2019 04:33) (20 - 20)  SpO2: 93% (2019 04:33) (93% - 93%)  Daily     Daily     GENERAL:  no distress  HEENT:  NC/AT,  anicteric  CHEST:   no increased effort, breath sounds clear  HEART:  Regular rhythm  ABDOMEN:  Soft, non-tender, non-distended, normoactive bowel sounds,  no masses ,no hepato-splenomegaly, no signs of chronic liver disease  EXTEREMITIES:  no cyanosis      LABS:      Hemoglobin: 11.7 g/dL (19 @ 07:49)  Hemoglobin: 11.6 g/dL (19 @ 13:23)    Alkaline Phosphatase, Serum: 57 U/L (19 @ 13:23)  Alanine Aminotransferase (ALT/SGPT): 18 U/L (19 @ 13:23)  Aspartate Aminotransferase (AST/SGOT): 23 U/L (19 @ 13:23)        RADIOLOGY & ADDITIONAL TESTS:  < from: CT Abdomen and Pelvis w/ Oral Cont and w/ IV Cont (19 @ 20:15) >  IMPRESSION:    Small left pleural effusion is new from the prior exam  Diffuse prominence of the stool filled colon  From the prior study which merits and colonic ileus  Nonobstructive right renal calculi  Mild right hydronephrosis  Decreased gastric distention compared to the previous study wall   thickening of the distal stomach suggesting gastritis/antritis    < end of copied text >
INTERVAL HPI/OVERNIGHT EVENTS:FU for nausea, vomiting, gastric distension. Doing well. No complaints    MEDICATIONS  (STANDING):  ascorbic acid 500 milliGRAM(s) Oral daily  cholecalciferol 5000 Unit(s) Oral daily  enoxaparin Injectable 40 milliGRAM(s) SubCutaneous daily  metoclopramide 10 milliGRAM(s) Oral three times a day  multivitamin 1 Tablet(s) Oral daily  nystatin    Suspension [id number] Unit(s) Oral every 6 hours  pantoprazole    Tablet 40 milliGRAM(s) Oral two times a day  polyethylene glycol 3350 17 Gram(s) Oral daily  senna 2 Tablet(s) Oral at bedtime  tamoxifen 20 milliGRAM(s) Oral daily    MEDICATIONS  (PRN):  acetaminophen   Tablet .. 650 milliGRAM(s) Oral every 6 hours PRN Mild Pain (1 - 3), Moderate Pain (4 - 6)  benzocaine 15 mG/menthol 3.6 mG Lozenge 1 Lozenge Oral every 4 hours PRN Sore Throat  ondansetron Injectable 4 milliGRAM(s) IV Push every 6 hours PRN Nausea and/or Vomiting      Allergies    Crestor (Muscle Pain)  shellfish (Swelling; Rash)    Intolerances        Vital Signs Last 24 Hrs  T(C): 36.8 (24 Jan 2019 04:43), Max: 36.9 (23 Jan 2019 12:47)  T(F): 98.3 (24 Jan 2019 04:43), Max: 98.5 (23 Jan 2019 12:47)  HR: 85 (24 Jan 2019 04:43) (84 - 119)  BP: 112/67 (24 Jan 2019 04:43) (90/56 - 112/67)  BP(mean): --  RR: 20 (23 Jan 2019 16:31) (18 - 20)  SpO2: 94% (23 Jan 2019 19:55) (94% - 94%)    LABS:                        12.1   7.5   )-----------( 227      ( 23 Jan 2019 09:20 )             37.2     01-23    143  |  109<H>  |  5.0<L>  ----------------------------<  99  3.6   |  23.0  |  0.32<L>    Ca    8.4<L>      23 Jan 2019 06:44  Mg     2.0     01-23            RADIOLOGY & ADDITIONAL TESTS:
INTERVAL HPI/OVERNIGHT EVENTS:FU for the gastric distension. X ray showed some ileus but no more gastric distension. Now NG tube is removed. Patient feels better. On reglan and PPI. Interested in trying the solid food.    MEDICATIONS  (STANDING):  ascorbic acid 500 milliGRAM(s) Oral daily  cholecalciferol 5000 Unit(s) Oral daily  enoxaparin Injectable 40 milliGRAM(s) SubCutaneous daily  metoclopramide Injectable 10 milliGRAM(s) IV Push every 6 hours  multivitamin 1 Tablet(s) Oral daily  pantoprazole  Injectable 40 milliGRAM(s) IV Push daily  sodium chloride 0.9%. 1000 milliLiter(s) (80 mL/Hr) IV Continuous <Continuous>  tamoxifen 20 milliGRAM(s) Oral daily    MEDICATIONS  (PRN):  ondansetron Injectable 4 milliGRAM(s) IV Push every 6 hours PRN Nausea and/or Vomiting      Allergies    Crestor (Muscle Pain)  shellfish (Swelling; Rash)    Intolerances        Vital Signs Last 24 Hrs  T(C): 36.8 (23 Jan 2019 05:24), Max: 37.3 (22 Jan 2019 11:14)  T(F): 98.2 (23 Jan 2019 05:24), Max: 99.1 (22 Jan 2019 11:14)  HR: 101 (23 Jan 2019 05:24) (92 - 101)  BP: 122/73 (23 Jan 2019 05:24) (118/72 - 132/68)  BP(mean): --  RR: 18 (23 Jan 2019 05:24) (18 - 20)  SpO2: 93% (23 Jan 2019 05:24) (93% - 95%)    LABS:    01-23    143  |  109<H>  |  5.0<L>  ----------------------------<  99  3.6   |  23.0  |  0.32<L>    Ca    8.4<L>      23 Jan 2019 06:44  Mg     2.0     01-23            RADIOLOGY & ADDITIONAL TESTS:
Internal Medicine Hospitalist - Dr. Shelby SAMAYOA    [id number]    63y      Female    Patient is a 63y old  Female who presents with a chief complaint of Nausea/vomiting (27 Jan 2019 12:54)    INTERVAL HPI/ OVERNIGHT EVENTS: Patient is seen and examined, report constipation denied abd. pain, nausea and vomiting    REVIEW OF SYSTEMS:    Denied fever, chills, abd. pain, nausea, vomiting, chest pain, SOB, headache, dizziness    PHYSICAL EXAM:    Vital Signs Last 24 Hrs  T(C): 37.3 (28 Jan 2019 11:27), Max: 37.3 (28 Jan 2019 11:27)  T(F): 99.2 (28 Jan 2019 11:27), Max: 99.2 (28 Jan 2019 11:27)  HR: 92 (28 Jan 2019 11:27) (84 - 102)  BP: 99/60 (28 Jan 2019 11:27) (99/60 - 121/77)  BP(mean): --  RR: 20 (28 Jan 2019 11:27) (18 - 20)  SpO2: 93% (28 Jan 2019 04:43) (93% - 94%)    GENERAL: NAD  CHEST/LUNG: CTA b/l   HEART: S1S2+ audible  ABDOMEN: Soft, Nontender, Nondistended; Bowel sounds present  EXTREMITIES:  no edema  CNS: AAO X 3  Psychiatry: normal mood    LABS:       MEDICATIONS  (STANDING):  ascorbic acid 500 milliGRAM(s) Oral daily  cholecalciferol 5000 Unit(s) Oral daily  enoxaparin Injectable 40 milliGRAM(s) SubCutaneous daily  metoclopramide 10 milliGRAM(s) Oral three times a day  multivitamin 1 Tablet(s) Oral daily  pantoprazole    Tablet 40 milliGRAM(s) Oral two times a day  polyethylene glycol 3350 17 Gram(s) Oral daily  senna 2 Tablet(s) Oral at bedtime  tamoxifen 20 milliGRAM(s) Oral daily    MEDICATIONS  (PRN):  acetaminophen   Tablet .. 650 milliGRAM(s) Oral every 6 hours PRN Mild Pain (1 - 3), Moderate Pain (4 - 6)  benzocaine 15 mG/menthol 3.6 mG Lozenge 1 Lozenge Oral every 4 hours PRN Sore Throat  magnesium hydroxide Suspension 30 milliLiter(s) Oral daily PRN Constipation  ondansetron Injectable 4 milliGRAM(s) IV Push every 6 hours PRN Nausea and/or Vomiting      RADIOLOGY & ADDITIONAL TEST
Internal Medicine Hospitalist - Dr. Shelby SAMAYOA    [id number]    63y      Female    Patient is a 63y old  Female who presents with a chief complaint of Nausea/vomiting (24 Jan 2019 09:15)    INTERVAL HPI/ OVERNIGHT EVENTS: Patient is seen and examined, feeling better, denied nausea, vomiting, report feeling weak/tired    REVIEW OF SYSTEMS:    Denied fever, chills, abd. pain, nausea, vomiting, chest pain, SOB, headache, dizziness    PHYSICAL EXAM:    Vital Signs Last 24 Hrs  T(C): 36.7 (24 Jan 2019 11:22), Max: 36.9 (23 Jan 2019 19:55)  T(F): 98 (24 Jan 2019 11:22), Max: 98.4 (23 Jan 2019 19:55)  HR: 92 (24 Jan 2019 11:22) (84 - 97)  BP: 108/67 (24 Jan 2019 11:22) (90/56 - 112/67)  BP(mean): --  RR: 18 (24 Jan 2019 11:22) (18 - 20)  SpO2: 94% (24 Jan 2019 11:22) (94% - 94%)    GENERAL: NAD  CHEST/LUNG: CTA b/l   HEART: S1S2+ audible  ABDOMEN: Soft, Nontender, Nondistended; Bowel sounds present  EXTREMITIES:  no edema  CNS: AAO X 3  Psychiatry: normal mood    LABS:                        12.1   7.5   )-----------( 227      ( 23 Jan 2019 09:20 )             37.2     01-23    143  |  109<H>  |  5.0<L>  ----------------------------<  99  3.6   |  23.0  |  0.32<L>    Ca    8.4<L>      23 Jan 2019 06:44  Mg     2.0     01-23              MEDICATIONS  (STANDING):  ascorbic acid 500 milliGRAM(s) Oral daily  cholecalciferol 5000 Unit(s) Oral daily  enoxaparin Injectable 40 milliGRAM(s) SubCutaneous daily  metoclopramide 10 milliGRAM(s) Oral three times a day  multivitamin 1 Tablet(s) Oral daily  nystatin    Suspension [id number] Unit(s) Oral every 6 hours  pantoprazole    Tablet 40 milliGRAM(s) Oral two times a day  polyethylene glycol 3350 17 Gram(s) Oral daily  senna 2 Tablet(s) Oral at bedtime  tamoxifen 20 milliGRAM(s) Oral daily    MEDICATIONS  (PRN):  acetaminophen   Tablet .. 650 milliGRAM(s) Oral every 6 hours PRN Mild Pain (1 - 3), Moderate Pain (4 - 6)  benzocaine 15 mG/menthol 3.6 mG Lozenge 1 Lozenge Oral every 4 hours PRN Sore Throat  ondansetron Injectable 4 milliGRAM(s) IV Push every 6 hours PRN Nausea and/or Vomiting      RADIOLOGY & ADDITIONAL TEST
Internal Medicine Hospitalist - Dr. Shelby SAMAYOA    [id number]    63y      Female    Patient is a 63y old  Female who presents with a chief complaint of Nausea/vomiting (25 Jan 2019 12:48)    INTERVAL HPI/ OVERNIGHT EVENTS: Patient is seen and examined, no new event overnight.     REVIEW OF SYSTEMS:    Denied fever, chills, abd. pain, nausea, vomiting, chest pain, SOB, headache, dizziness    PHYSICAL EXAM:    Vital Signs Last 24 Hrs  T(C): 36.7 (26 Jan 2019 04:09), Max: 37.1 (25 Jan 2019 15:56)  T(F): 98 (26 Jan 2019 04:09), Max: 98.7 (25 Jan 2019 15:56)  HR: 81 (26 Jan 2019 04:09) (81 - 89)  BP: 99/55 (26 Jan 2019 04:09) (99/55 - 111/66)  BP(mean): --  RR: 18 (26 Jan 2019 04:09) (18 - 18)  SpO2: 89% (25 Jan 2019 20:49) (89% - 89%)    GENERAL: NAD  CHEST/LUNG: CTA b/l   HEART: S1S2+ audible  ABDOMEN: Soft, Nontender, Nondistended; Bowel sounds present  EXTREMITIES:  no edema  CNS: AAO X 3  Psychiatry: normal mood    LABS:      MEDICATIONS  (STANDING):  ascorbic acid 500 milliGRAM(s) Oral daily  cholecalciferol 5000 Unit(s) Oral daily  enoxaparin Injectable 40 milliGRAM(s) SubCutaneous daily  metoclopramide 10 milliGRAM(s) Oral three times a day  multivitamin 1 Tablet(s) Oral daily  pantoprazole    Tablet 40 milliGRAM(s) Oral two times a day  polyethylene glycol 3350 17 Gram(s) Oral daily  senna 2 Tablet(s) Oral at bedtime  tamoxifen 20 milliGRAM(s) Oral daily    MEDICATIONS  (PRN):  acetaminophen   Tablet .. 650 milliGRAM(s) Oral every 6 hours PRN Mild Pain (1 - 3), Moderate Pain (4 - 6)  benzocaine 15 mG/menthol 3.6 mG Lozenge 1 Lozenge Oral every 4 hours PRN Sore Throat  ondansetron Injectable 4 milliGRAM(s) IV Push every 6 hours PRN Nausea and/or Vomiting      RADIOLOGY & ADDITIONAL TEST
Internal Medicine Hospitalist - Dr. Shelby SAMAYOA    [id number]    63y      Female    Patient is a 63y old  Female who presents with a chief complaint of Nausea/vomiting (24 Jan 2019 12:49)    INTERVAL HPI/ OVERNIGHT EVENTS: Patient is seen and examined, tolerating diet, denied nausea and vomiting    REVIEW OF SYSTEMS:    Denied fever, chills, abd. pain, nausea, vomiting, chest pain, SOB, headache, dizziness    PHYSICAL EXAM:    Vital Signs Last 24 Hrs  T(C): 36.9 (25 Jan 2019 11:22), Max: 37.2 (24 Jan 2019 16:14)  T(F): 98.5 (25 Jan 2019 11:22), Max: 98.9 (24 Jan 2019 16:14)  HR: 97 (25 Jan 2019 11:22) (90 - 97)  BP: 99/66 (25 Jan 2019 11:22) (99/66 - 113/70)  BP(mean): --  RR: 18 (25 Jan 2019 11:22) (18 - 18)  SpO2: 93% (25 Jan 2019 11:22) (92% - 96%)    GENERAL: NAD  CHEST/LUNG: CTA b/l   HEART: S1S2+ audible  ABDOMEN: Soft, Nontender, Nondistended; Bowel sounds present  EXTREMITIES:  no edema  CNS: AAO X 3    LABS:       MEDICATIONS  (STANDING):  ascorbic acid 500 milliGRAM(s) Oral daily  cholecalciferol 5000 Unit(s) Oral daily  enoxaparin Injectable 40 milliGRAM(s) SubCutaneous daily  metoclopramide 10 milliGRAM(s) Oral three times a day  multivitamin 1 Tablet(s) Oral daily  nystatin    Suspension [id number] Unit(s) Oral every 6 hours  pantoprazole    Tablet 40 milliGRAM(s) Oral two times a day  polyethylene glycol 3350 17 Gram(s) Oral daily  senna 2 Tablet(s) Oral at bedtime  tamoxifen 20 milliGRAM(s) Oral daily    MEDICATIONS  (PRN):  acetaminophen   Tablet .. 650 milliGRAM(s) Oral every 6 hours PRN Mild Pain (1 - 3), Moderate Pain (4 - 6)  benzocaine 15 mG/menthol 3.6 mG Lozenge 1 Lozenge Oral every 4 hours PRN Sore Throat  ondansetron Injectable 4 milliGRAM(s) IV Push every 6 hours PRN Nausea and/or Vomiting      RADIOLOGY & ADDITIONAL TEST
Internal Medicine Hospitalist - Dr. Shelby SAMAYOA    [id number]    63y      Female    Patient is a 63y old  Female who presents with a chief complaint of Nausea/vomiting (30 Jan 2019 15:17)          INTERVAL HPI/ OVERNIGHT EVENTS: Patient is seen and examined, no new event overnight.     REVIEW OF SYSTEMS:    Denied fever, chills, abd. pain, nausea, vomiting, chest pain, SOB, headache, dizziness    PHYSICAL EXAM:    Vital Signs Last 24 Hrs  T(C): 37.3 (31 Jan 2019 10:02), Max: 37.3 (30 Jan 2019 22:40)  T(F): 99.1 (31 Jan 2019 10:02), Max: 99.2 (30 Jan 2019 22:40)  HR: 85 (31 Jan 2019 10:02) (83 - 100)  BP: 111/63 (31 Jan 2019 10:02) (111/63 - 130/87)  BP(mean): --  RR: 20 (31 Jan 2019 10:02) (20 - 20)  SpO2: 93% (31 Jan 2019 05:35) (93% - 94%)    GENERAL: NAD  HEENT: EOMI  Neck: supple  CHEST/LUNG: CTA b/l   HEART: S1S2+ audible  ABDOMEN: Soft, Nontender, Nondistended; Bowel sounds present  EXTREMITIES:  no edema  CNS: AAO X 3  Psychiatry: normal mood    LABS:                        11.7   8.2   )-----------( 347      ( 30 Jan 2019 07:49 )             36.2     01-30    139  |  105  |  6.0<L>  ----------------------------<  102  4.0   |  25.0  |  0.37<L>    Ca    8.8      30 Jan 2019 07:49  Mg     1.7     01-29    TPro  5.8<L>  /  Alb  2.7<L>  /  TBili  0.3<L>  /  DBili  x   /  AST  23  /  ALT  18  /  AlkPhos  57  01-29    MEDICATIONS  (STANDING):  ascorbic acid 500 milliGRAM(s) Oral daily  cholecalciferol 5000 Unit(s) Oral daily  enoxaparin Injectable 40 milliGRAM(s) SubCutaneous daily  glycerin Suppository - Adult 1 Suppository(s) Rectal at bedtime  lactulose Syrup 20 Gram(s) Oral four times a day  metoclopramide 10 milliGRAM(s) Oral three times a day  mineral oil enema 133 milliLiter(s) Rectal two times a day  multivitamin 1 Tablet(s) Oral daily  pantoprazole    Tablet 40 milliGRAM(s) Oral two times a day  polyethylene glycol 3350 17 Gram(s) Oral daily  senna 2 Tablet(s) Oral at bedtime  tamoxifen 20 milliGRAM(s) Oral daily    MEDICATIONS  (PRN):  acetaminophen   Tablet .. 650 milliGRAM(s) Oral every 6 hours PRN Mild Pain (1 - 3), Moderate Pain (4 - 6)  benzocaine 15 mG/menthol 3.6 mG Lozenge 1 Lozenge Oral every 4 hours PRN Sore Throat  magnesium hydroxide Suspension 30 milliLiter(s) Oral daily PRN Constipation  ondansetron Injectable 4 milliGRAM(s) IV Push every 6 hours PRN Nausea and/or Vomiting      RADIOLOGY & ADDITIONAL TEST
Internal Medicine Hospitalist - Dr. Shelby SAMAYOA    [id number]    63y      Female    Patient is a 63y old  Female who presents with a chief complaint of Nausea/vomiting (28 Jan 2019 11:37)    INTERVAL HPI/ OVERNIGHT EVENTS: Patient is seen and examined, pt report nausea, vomiting, received enema yesterday, no BM, also c/o abdominal discomfort.    REVIEW OF SYSTEMS:    Denied fever, chills, chest pain, SOB, headache, dizziness    PHYSICAL EXAM:    Vital Signs Last 24 Hrs  T(C): 36.9 (29 Jan 2019 11:11), Max: 36.9 (29 Jan 2019 04:56)  T(F): 98.4 (29 Jan 2019 11:11), Max: 98.5 (29 Jan 2019 04:56)  HR: 105 (29 Jan 2019 11:11) (93 - 109)  BP: 112/72 (29 Jan 2019 11:11) (106/66 - 130/89)  BP(mean): --  RR: 20 (29 Jan 2019 11:11) (18 - 20)  SpO2: 94% (29 Jan 2019 04:56) (94% - 96%)    GENERAL: NAD  CHEST/LUNG: CTA b/l   HEART: S1S2+ audible  ABDOMEN: Soft, sensitive, distended; Bowel sounds present  EXTREMITIES:  no edema  CNS: AAO X 3  Psychiatry: normal mood    LABS:       MEDICATIONS  (STANDING):  ascorbic acid 500 milliGRAM(s) Oral daily  cholecalciferol 5000 Unit(s) Oral daily  enoxaparin Injectable 40 milliGRAM(s) SubCutaneous daily  lactulose Syrup 20 Gram(s) Oral four times a day  metoclopramide 10 milliGRAM(s) Oral three times a day  multivitamin 1 Tablet(s) Oral daily  pantoprazole    Tablet 40 milliGRAM(s) Oral two times a day  polyethylene glycol 3350 17 Gram(s) Oral daily  senna 2 Tablet(s) Oral at bedtime  sodium chloride 0.9%. 1000 milliLiter(s) (100 mL/Hr) IV Continuous <Continuous>  tamoxifen 20 milliGRAM(s) Oral daily    MEDICATIONS  (PRN):  acetaminophen   Tablet .. 650 milliGRAM(s) Oral every 6 hours PRN Mild Pain (1 - 3), Moderate Pain (4 - 6)  benzocaine 15 mG/menthol 3.6 mG Lozenge 1 Lozenge Oral every 4 hours PRN Sore Throat  magnesium hydroxide Suspension 30 milliLiter(s) Oral daily PRN Constipation  ondansetron Injectable 4 milliGRAM(s) IV Push every 6 hours PRN Nausea and/or Vomiting      RADIOLOGY & ADDITIONAL TEST    < from: Xray Kidney Ureter Bladder (01.29.19 @ 10:07) >    IMPRESSION:  Persistent nonspecific gaseous distention ofloops of bowel in the   abdomen.    < end of copied text >
JOSEY SAMAYOA    [id number]    63y      Female    Patient is a 63y old  Female who presents with a chief complaint of Nausea/vomiting (2019 08:06)      INTERVAL HPI/OVERNIGHT EVENTS:    Patient's nausea and vomiting is better, her abdominal discomfort is better as well, her NG was clogged now has drainage, denies fever, chills, chest pain, she has NG in and on low intermittent suction.     REVIEW OF SYSTEMS:    CONSTITUTIONAL: No fever, some fatigue  RESPIRATORY: No cough, No shortness of breath  CARDIOVASCULAR: No chest pain, palpitations  GASTROINTESTINAL: No abdominal, no nausea, no vomiting  NEUROLOGICAL: No headaches,  loss of strength.  MISCELLANEOUS: No joint swelling or pain        Vital Signs Last 24 Hrs  T(C): 36.7 (2019 04:55), Max: 37 (2019 21:33)  T(F): 98.1 (2019 04:55), Max: 98.6 (2019 21:33)  HR: 113 (2019 04:55) (74 - 113)  BP: 116/61 (2019 04:55) (98/58 - 131/78)  RR: 18 (2019 04:55) (17 - 18)  SpO2: 90% (2019 04:55) (90% - 100%)    PHYSICAL EXAM:    GENERAL: Middle age female looking comfortable   HEENT: PERRL, +EOMI, has NG tube in  NECK: soft, Supple, No JVD,   CHEST/LUNG: Clear to auscultate bilaterally; No wheezing  HEART: S1S2+, Regular rate and rhythm; No murmurs  ABDOMEN: Soft, Nontender, Nondistended; Bowel sounds present  EXTREMITIES:  2+ Peripheral Pulses, No edema  SKIN: No rashes or lesions  NEURO: AAOX3, no focal deficits, no motor r sensory loss  PSYCH: normal mood        LABS:                        12.4   7.3   )-----------( 251      ( 2019 05:53 )             38.7     -    141  |  106  |  7.0<L>  ----------------------------<  75  3.9   |  21.0<L>  |  0.42<L>    Ca    9.2      2019 05:53    TPro  6.0<L>  /  Alb  3.0<L>  /  TBili  0.4  /  DBili  x   /  AST  38<H>  /  ALT  26  /  AlkPhos  54        Urinalysis Basic - ( 2019 18:38 )    Color: Yellow / Appearance: Clear / S.015 / pH: x  Gluc: x / Ketone: Small  / Bili: Negative / Urobili: Negative mg/dL   Blood: x / Protein: Negative mg/dL / Nitrite: Negative   Leuk Esterase: Negative / RBC: x / WBC x   Sq Epi: x / Non Sq Epi: x / Bacteria: x          I&O's Summary    2019 07:01  -  2019 07:00  --------------------------------------------------------  IN: 2250 mL / OUT: 780 mL / NET: 1470 mL        MEDICATIONS  (STANDING):  ascorbic acid 500 milliGRAM(s) Oral daily  cholecalciferol 5000 Unit(s) Oral daily  enoxaparin Injectable 40 milliGRAM(s) SubCutaneous daily  metoclopramide Injectable 10 milliGRAM(s) IV Push every 6 hours  multivitamin 1 Tablet(s) Oral daily  pantoprazole  Injectable 40 milliGRAM(s) IV Push daily  sodium chloride 0.9%. 1000 milliLiter(s) (125 mL/Hr) IV Continuous <Continuous>  tamoxifen 20 milliGRAM(s) Oral daily    MEDICATIONS  (PRN):  ondansetron Injectable 4 milliGRAM(s) IV Push every 6 hours PRN Nausea and/or Vomiting
JOSEY SAMAYOA    [id number]    63y      Female    Patient is a 63y old  Female who presents with a chief complaint of Nausea/vomiting (2019 07:57)      INTERVAL HPI/OVERNIGHT EVENTS:    Patient's nausea and vomiting is better, her abdominal discomfort is better as well, denies fever, chills, chest pain, she has NG in and on low intermittent suction, she feel little bit of discomfort in the nose.     REVIEW OF SYSTEMS:    CONSTITUTIONAL: No fever, some fatigue  RESPIRATORY: No cough, No shortness of breath  CARDIOVASCULAR: No chest pain, palpitations  GASTROINTESTINAL: No abdominal improving nausea, vomiting  NEUROLOGICAL: No headaches,  loss of strength.  MISCELLANEOUS: No joint swelling or pain       Vital Signs Last 24 Hrs  T(C): 36.8 (2019 16:20), Max: 36.9 (2019 19:52)  T(F): 98.2 (2019 16:20), Max: 98.5 (2019 19:52)  HR: 100 (2019 16:20) (66 - 104)  BP: 118/70 (2019 16:20) (92/55 - 131/78)  RR: 18 (2019 16:20) (16 - 18)  SpO2: 97% (2019 16:20) (97% - 100%)    PHYSICAL EXAM:    GENERAL: Middle age female looking comfortable   HEENT: PERRL, +EOMI  NECK: soft, Supple, No JVD,   CHEST/LUNG: Clear to auscultate bilaterally; No wheezing  HEART: S1S2+, Regular rate and rhythm; No murmurs  ABDOMEN: Soft, Nontender, Nondistended; Bowel sounds present  EXTREMITIES:  2+ Peripheral Pulses, No edema  SKIN: No rashes or lesions  NEURO: AAOX3, no focal deficits, no motor r sensory loss  PSYCH: normal mood      LABS:                        11.9   6.1   )-----------( 281      ( 2019 08:48 )             37.8     -    139  |  105  |  10.0  ----------------------------<  101  4.2   |  23.0  |  0.47<L>    Ca    9.3      2019 08:48    TPro  6.0<L>  /  Alb  3.0<L>  /  TBili  0.4  /  DBili  x   /  AST  38<H>  /  ALT  26  /  AlkPhos  54        Urinalysis Basic - ( 2019 18:38 )    Color: Yellow / Appearance: Clear / S.015 / pH: x  Gluc: x / Ketone: Small  / Bili: Negative / Urobili: Negative mg/dL   Blood: x / Protein: Negative mg/dL / Nitrite: Negative   Leuk Esterase: Negative / RBC: x / WBC x   Sq Epi: x / Non Sq Epi: x / Bacteria: x          I&O's Summary    2019 07:  -  2019 19:03  --------------------------------------------------------  IN: 750 mL / OUT: 30 mL / NET: 720 mL        MEDICATIONS  (STANDING):  ascorbic acid 500 milliGRAM(s) Oral daily  cholecalciferol 5000 Unit(s) Oral daily  enoxaparin Injectable 40 milliGRAM(s) SubCutaneous daily  metoclopramide Injectable 10 milliGRAM(s) IV Push every 6 hours  multivitamin 1 Tablet(s) Oral daily  pantoprazole  Injectable 40 milliGRAM(s) IV Push daily  sodium chloride 0.9%. 1000 milliLiter(s) (125 mL/Hr) IV Continuous <Continuous>  tamoxifen 20 milliGRAM(s) Oral daily    MEDICATIONS  (PRN):  ondansetron Injectable 4 milliGRAM(s) IV Push every 6 hours PRN Nausea and/or Vomiting
Patient seen and examined;  States feeling better.  Had 4 loose BM's yesterday and 2 already today.  No bleeding.  Denies N/v.  Hungry and requesting diet advancement.  No fever.  No bleeding.      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  No dyspepsia  No abdominal pain.    No Nausea, vomiting.  No Bleeding.  No hematemesis.   No diarrhea.    No hematochesia.  No weight loss, anorexia.  No edema.      MEDICATIONS  (STANDING):  ascorbic acid 500 milliGRAM(s) Oral daily  cholecalciferol 5000 Unit(s) Oral daily  enoxaparin Injectable 40 milliGRAM(s) SubCutaneous daily  glycerin Suppository - Adult 1 Suppository(s) Rectal at bedtime  lactulose Syrup 20 Gram(s) Oral two times a day  metoclopramide 10 milliGRAM(s) Oral three times a day  multivitamin 1 Tablet(s) Oral daily  pantoprazole    Tablet 40 milliGRAM(s) Oral two times a day  polyethylene glycol 3350 17 Gram(s) Oral daily  senna 2 Tablet(s) Oral at bedtime  tamoxifen 20 milliGRAM(s) Oral daily    MEDICATIONS  (PRN):  acetaminophen   Tablet .. 650 milliGRAM(s) Oral every 6 hours PRN Mild Pain (1 - 3), Moderate Pain (4 - 6)  benzocaine 15 mG/menthol 3.6 mG Lozenge 1 Lozenge Oral every 4 hours PRN Sore Throat  magnesium hydroxide Suspension 30 milliLiter(s) Oral daily PRN Constipation  ondansetron Injectable 4 milliGRAM(s) IV Push every 6 hours PRN Nausea and/or Vomiting      Allergies    Crestor (Muscle Pain)  shellfish (Swelling; Rash)    Intolerances        Vital Signs Last 24 Hrs  T(C): 37.3 (31 Jan 2019 10:02), Max: 37.3 (30 Jan 2019 22:40)  T(F): 99.1 (31 Jan 2019 10:02), Max: 99.2 (30 Jan 2019 22:40)  HR: 85 (31 Jan 2019 10:02) (83 - 100)  BP: 111/63 (31 Jan 2019 10:02) (111/63 - 130/87)  BP(mean): --  RR: 20 (31 Jan 2019 10:02) (20 - 20)  SpO2: 93% (31 Jan 2019 05:35) (93% - 94%)    PHYSICAL EXAM:    GENERAL: NAD, well-groomed, well-developed  HEAD:  Atraumatic, Normocephalic  EYES: EOMI, PERRLA, conjunctiva and sclera clear  ENMT: No tonsillar erythema, exudates, or enlargement; Moist mucous membranes, Good dentition, No lesions  NECK: Supple, No JVD, Normal thyroid  CHEST/LUNG: Clear to percussion bilaterally; No rales, rhonchi, wheezing, or rubs  HEART: Regular rate and rhythm; No murmurs, rubs, or gallops  ABDOMEN: Soft, Nontender, Nondistended; Bowel sounds present  EXTREMITIES:  2+ Peripheral Pulses, No clubbing, cyanosis, or edema  LYMPH: No lymphadenopathy noted  SKIN: No rashes or lesions      LABS:                        11.7   8.2   )-----------( 347      ( 30 Jan 2019 07:49 )             36.2     01-30    139  |  105  |  6.0<L>  ----------------------------<  102  4.0   |  25.0  |  0.37<L>    Ca    8.8      30 Jan 2019 07:49  Mg     1.7     01-29    TPro  5.8<L>  /  Alb  2.7<L>  /  TBili  0.3<L>  /  DBili  x   /  AST  23  /  ALT  18  /  AlkPhos  57  01-29             RADIOLOGY & ADDITIONAL STUDIES:
Pt seen and examined. GI was asked to re-evaluate pt for constipation and recurrent nausea and vomiting. Her NGT was pulled one week ago and she has been waiting here for SNF placement. She has not had a BM since last Thursday despite Lactulose 20 grams PO QID. She is awaiting repeat CT of abdomen today and is presently NPO.    REVIEW OF SYSTEMS:  Constitutional: No fever, weight loss or fatigue  Cardiovascular: No chest pain, palpitations, dizziness or leg swelling  Gastrointestinal: As noted above.  Skin: No itching, burning, rashes or lesions       MEDICATIONS:  MEDICATIONS  (STANDING):  ascorbic acid 500 milliGRAM(s) Oral daily  cholecalciferol 5000 Unit(s) Oral daily  enoxaparin Injectable 40 milliGRAM(s) SubCutaneous daily  lactulose Syrup 20 Gram(s) Oral four times a day  metoclopramide 10 milliGRAM(s) Oral three times a day  mineral oil enema 133 milliLiter(s) Rectal two times a day  multivitamin 1 Tablet(s) Oral daily  pantoprazole    Tablet 40 milliGRAM(s) Oral two times a day  polyethylene glycol 3350 17 Gram(s) Oral daily  senna 2 Tablet(s) Oral at bedtime  sodium chloride 0.9%. 1000 milliLiter(s) (100 mL/Hr) IV Continuous <Continuous>  tamoxifen 20 milliGRAM(s) Oral daily    MEDICATIONS  (PRN):  acetaminophen   Tablet .. 650 milliGRAM(s) Oral every 6 hours PRN Mild Pain (1 - 3), Moderate Pain (4 - 6)  benzocaine 15 mG/menthol 3.6 mG Lozenge 1 Lozenge Oral every 4 hours PRN Sore Throat  magnesium hydroxide Suspension 30 milliLiter(s) Oral daily PRN Constipation  ondansetron Injectable 4 milliGRAM(s) IV Push every 6 hours PRN Nausea and/or Vomiting      Allergies    Crestor (Muscle Pain)  shellfish (Swelling; Rash)    Intolerances        Vital Signs Last 24 Hrs  T(C): 36.9 (29 Jan 2019 11:11), Max: 36.9 (29 Jan 2019 04:56)  T(F): 98.4 (29 Jan 2019 11:11), Max: 98.5 (29 Jan 2019 04:56)  HR: 105 (29 Jan 2019 11:11) (93 - 109)  BP: 112/72 (29 Jan 2019 11:11) (106/66 - 130/89)  BP(mean): --  RR: 20 (29 Jan 2019 11:11) (18 - 20)  SpO2: 94% (29 Jan 2019 04:56) (94% - 96%)    01-28 @ 07:01  -  01-29 @ 07:00  --------------------------------------------------------  IN: 0 mL / OUT: 350 mL / NET: -350 mL        PHYSICAL EXAM:    General: Well developed; well nourished; in no acute distress  HEENT: MMM, conjunctiva pink and sclera anicteric.  Lungs: clear to auscultation bilaterally.  Cor: RRR S1, S2 only.  Gastrointestinal: Abdomen: Soft, lower abdominal tenderness with mild soft lower abdominal distention, normal bowel sounds; No hepatosplenomegaly, no rebound or guarding or succussion splash  VLAERY: Decreased sphincter tone, + soft stool impaction rectal vault, + rectal tenderness.  Extremities: no cyanosis, clubbing or edema.  Skin: Warm and dry. No obvious rash  Neuro: Pt. a + o x 3.    LABS:  CBC Full  -  ( 29 Jan 2019 13:23 )  WBC Count : 8.2 K/uL  Hemoglobin : 11.6 g/dL  Hematocrit : 36.1 %  Platelet Count - Automated : 344 K/uL  Mean Cell Volume : 98.1 fl  Mean Cell Hemoglobin : 31.5 pg  Mean Cell Hemoglobin Concentration : 32.1 g/dL  Auto Neutrophil # : x  Auto Lymphocyte # : x  Auto Monocyte # : x  Auto Eosinophil # : x  Auto Basophil # : x  Auto Neutrophil % : x  Auto Lymphocyte % : x  Auto Monocyte % : x  Auto Eosinophil % : x  Auto Basophil % : x    01-29    141  |  104  |  7.0<L>  ----------------------------<  118<H>  4.4   |  27.0  |  0.46<L>    Ca    9.2      29 Jan 2019 13:23  Mg     1.7     01-29    TPro  5.8<L>  /  Alb  2.7<L>  /  TBili  0.3<L>  /  DBili  x   /  AST  23  /  ALT  18  /  AlkPhos  57  01-29                      RADIOLOGY & ADDITIONAL STUDIES (The following images were personally reviewed):
SUBJECTIVE: TRICE overnight. Pain is well controlled. NGT in place, denies nausea.       MEDICATIONS  (STANDING):  ascorbic acid 500 milliGRAM(s) Oral daily  cholecalciferol 5000 Unit(s) Oral daily  enoxaparin Injectable 40 milliGRAM(s) SubCutaneous daily  metoclopramide Injectable 10 milliGRAM(s) IV Push every 6 hours  multivitamin 1 Tablet(s) Oral daily  pantoprazole  Injectable 40 milliGRAM(s) IV Push daily  sodium chloride 0.9%. 1000 milliLiter(s) (125 mL/Hr) IV Continuous <Continuous>  tamoxifen 20 milliGRAM(s) Oral daily    MEDICATIONS  (PRN):  ondansetron Injectable 4 milliGRAM(s) IV Push every 6 hours PRN Nausea and/or Vomiting      Vital Signs Last 24 Hrs  T(C): 36.7 (2019 04:55), Max: 37 (2019 21:33)  T(F): 98.1 (2019 04:55), Max: 98.6 (2019 21:33)  HR: 113 (2019 04:55) (74 - 113)  BP: 116/61 (2019 04:55) (98/58 - 131/78)  BP(mean): --  RR: 18 (2019 04:55) (17 - 18)  SpO2: 90% (2019 04:55) (90% - 100%)    PE    General: NAD, AOx3, comfortable on examination  Cardiovascular: heart RRR, no murmurs  Respiratory: no respiratory distress, CTAB  Abdomen: Soft, mild LUQ tenderness to palpation with abdominal distention and tympany to tap in LUQ. No guarding or rebound. Normal bowel sounds.  Extremities: no peripheral edema. Normal ROM.      I&O's Detail    2019 07:01  -  2019 06:30  --------------------------------------------------------  IN:    sodium chloride 0.9%.: 2250 mL  Total IN: 2250 mL    OUT:    Nasoenteral Tube: 30 mL    Voided: 500 mL  Total OUT: 530 mL    Total NET: 1720 mL          LABS:                        11.9   6.1   )-----------( 281      ( 2019 08:48 )             37.8         139  |  105  |  10.0  ----------------------------<  101  4.2   |  23.0  |  0.47<L>    Ca    9.3      2019 08:48    TPro  6.0<L>  /  Alb  3.0<L>  /  TBili  0.4  /  DBili  x   /  AST  38<H>  /  ALT  26  /  AlkPhos  54        Urinalysis Basic - ( 2019 18:38 )    Color: Yellow / Appearance: Clear / S.015 / pH: x  Gluc: x / Ketone: Small  / Bili: Negative / Urobili: Negative mg/dL   Blood: x / Protein: Negative mg/dL / Nitrite: Negative   Leuk Esterase: Negative / RBC: x / WBC x   Sq Epi: x / Non Sq Epi: x / Bacteria: x        RADIOLOGY & ADDITIONAL STUDIES:
INTERVAL HPI/OVERNIGHT EVENTS:FU for distended stomach, nausea, vomiting. She has known history of gastroparesis. EGD in 2018 was normal and prepyloric botox was injected. The patient has NG tube in place. Small amount of output noted. Last BM on saturday. On PPI and reglan.     MEDICATIONS  (STANDING):  ascorbic acid 500 milliGRAM(s) Oral daily  cholecalciferol 5000 Unit(s) Oral daily  enoxaparin Injectable 40 milliGRAM(s) SubCutaneous daily  metoclopramide Injectable 10 milliGRAM(s) IV Push every 6 hours  multivitamin 1 Tablet(s) Oral daily  pantoprazole  Injectable 40 milliGRAM(s) IV Push daily  sodium chloride 0.9%. 1000 milliLiter(s) (125 mL/Hr) IV Continuous <Continuous>  tamoxifen 20 milliGRAM(s) Oral daily    MEDICATIONS  (PRN):  ondansetron Injectable 4 milliGRAM(s) IV Push every 6 hours PRN Nausea and/or Vomiting      Allergies    Crestor (Muscle Pain)  shellfish (Swelling; Rash)    Intolerances        Vital Signs Last 24 Hrs  T(C): 36.7 (2019 04:55), Max: 37 (2019 21:33)  T(F): 98.1 (2019 04:55), Max: 98.6 (2019 21:33)  HR: 113 (2019 04:55) (74 - 113)  BP: 116/61 (2019 04:55) (98/58 - 131/78)  BP(mean): --  RR: 18 (2019 04:55) (17 - 18)  SpO2: 90% (2019 04:55) (90% - 100%)    LABS:                        12.4   7.3   )-----------( 251      ( 2019 05:53 )             38.7     01-    141  |  106  |  7.0<L>  ----------------------------<  75  3.9   |  21.0<L>  |  0.42<L>    Ca    9.2      2019 05:53    TPro  6.0<L>  /  Alb  3.0<L>  /  TBili  0.4  /  DBili  x   /  AST  38<H>  /  ALT  26  /  AlkPhos  54        Urinalysis Basic - ( 2019 18:38 )    Color: Yellow / Appearance: Clear / S.015 / pH: x  Gluc: x / Ketone: Small  / Bili: Negative / Urobili: Negative mg/dL   Blood: x / Protein: Negative mg/dL / Nitrite: Negative   Leuk Esterase: Negative / RBC: x / WBC x   Sq Epi: x / Non Sq Epi: x / Bacteria: x        RADIOLOGY & ADDITIONAL TESTS:  < from: CT Abdomen and Pelvis w/ IV Cont (19 @ 23:41) >     EXAM:  CT ABDOMEN AND PELVIS IC                          PROCEDURE DATE:  2019          INTERPRETATION:  CLINICAL INFORMATION: Abdominal pain, vomiting and   diarrhea    COMPARISON: 2018, 2018 and multiple priors    PROCEDURE:   Contiguous axial scan of the abdomen and pelvis with intravenous and oral   contrast, followed by coronal and sagittal reformation.   93 mL of   Omnipaque were administered without adverse reaction.   7 mL of contrast   were discarded.    FINDINGS:    LOWER CHEST: Within normal limits.    LIVER: Within normal limits.  BILE DUCTS: Normal caliber.  GALLBLADDER: Within normal limits.  SPLEEN: Within normal limits.  PANCREAS: Within normal limits.  ADRENALS: Within normal limits.  KIDNEYS/URETERS: A few nonobstructing right renal stones and right renal   cysts without interval progression..    BLADDER: Within normal limits.  REPRODUCTIVE ORGANS:         BOWEL: There is marked C distended stomach with concentric wall   thickening of the distal antrum/pylorus (2-53). Concentric wall   thickening with seen previously. The degree of distention has   significantly progressed since prior.  PERITONEUM: No ascites.  VESSELS: Mild atherosclerotic calcification.   RETROPERITONEUM: No lymphadenopathy.    ABDOMINAL WALL: Diffuse anasarca. Diffuse muscle atrophy. No focal   intramuscular hematoma or fluid collection.  BONES: No acute abnormality.    IMPRESSION: Markedly distended stomach with concentric wall thickening in   the gastric antrum/pylorus. Considering the patient's history, this is   most likely a combination of gastric outlet obstruction   and   gastroparesis. Underlying cause of gastric outlet obstruction would be   concentric antral mass or other benign stricture.     Findings were discussed with Dr. Cooley with read back at 12:05 AM.                REECE EDWARDS M.D., ATTENDING RADIOLOGIST  This document has been electronically signed. 2019 12:06AM                  < end of copied text >

## 2019-02-01 NOTE — PROGRESS NOTE ADULT - ASSESSMENT
N/V resolved.  Gastroparesis better.  Having adequate BM's.    cont regular.    DC partient on Mineral oil enemas and lactulose BID.    encourage ambulation   encourage PO fluid intake   Will sign off. Please call with questions. Thanks

## 2019-02-01 NOTE — PROGRESS NOTE ADULT - REASON FOR ADMISSION
Nausea/vomiting

## 2019-02-01 NOTE — PROGRESS NOTE ADULT - PROVIDER SPECIALTY LIST ADULT
Gastroenterology
Hospitalist
Surgery
Hospitalist
Gastroenterology
Gastroenterology

## 2019-05-14 ENCOUNTER — EMERGENCY (EMERGENCY)
Facility: HOSPITAL | Age: 64
LOS: 1 days | Discharge: HOME | End: 2019-05-14
Attending: EMERGENCY MEDICINE
Payer: MEDICARE

## 2019-05-14 VITALS
HEART RATE: 108 BPM | RESPIRATION RATE: 20 BRPM | OXYGEN SATURATION: 99 % | SYSTOLIC BLOOD PRESSURE: 100 MMHG | TEMPERATURE: 98 F | HEIGHT: 67 IN | WEIGHT: 145.06 LBS | DIASTOLIC BLOOD PRESSURE: 63 MMHG

## 2019-05-14 VITALS
TEMPERATURE: 98 F | RESPIRATION RATE: 18 BRPM | SYSTOLIC BLOOD PRESSURE: 115 MMHG | HEART RATE: 101 BPM | DIASTOLIC BLOOD PRESSURE: 72 MMHG | OXYGEN SATURATION: 100 %

## 2019-05-14 DIAGNOSIS — Z98.890 OTHER SPECIFIED POSTPROCEDURAL STATES: Chronic | ICD-10-CM

## 2019-05-14 DIAGNOSIS — D17.9 BENIGN LIPOMATOUS NEOPLASM, UNSPECIFIED: Chronic | ICD-10-CM

## 2019-05-14 DIAGNOSIS — Z96.641 PRESENCE OF RIGHT ARTIFICIAL HIP JOINT: Chronic | ICD-10-CM

## 2019-05-14 DIAGNOSIS — Z96.642 PRESENCE OF LEFT ARTIFICIAL HIP JOINT: Chronic | ICD-10-CM

## 2019-05-14 DIAGNOSIS — Z90.12 ACQUIRED ABSENCE OF LEFT BREAST AND NIPPLE: Chronic | ICD-10-CM

## 2019-05-14 LAB
ALBUMIN SERPL ELPH-MCNC: 3.1 G/DL — LOW (ref 3.3–5.2)
ALP SERPL-CCNC: 80 U/L — SIGNIFICANT CHANGE UP (ref 40–120)
ALT FLD-CCNC: 17 U/L — SIGNIFICANT CHANGE UP
ANION GAP SERPL CALC-SCNC: 16 MMOL/L — SIGNIFICANT CHANGE UP (ref 5–17)
AST SERPL-CCNC: 25 U/L — SIGNIFICANT CHANGE UP
BILIRUB SERPL-MCNC: 0.2 MG/DL — LOW (ref 0.4–2)
BUN SERPL-MCNC: 13 MG/DL — SIGNIFICANT CHANGE UP (ref 8–20)
CALCIUM SERPL-MCNC: 9.7 MG/DL — SIGNIFICANT CHANGE UP (ref 8.6–10.2)
CHLORIDE SERPL-SCNC: 101 MMOL/L — SIGNIFICANT CHANGE UP (ref 98–107)
CO2 SERPL-SCNC: 22 MMOL/L — SIGNIFICANT CHANGE UP (ref 22–29)
CREAT SERPL-MCNC: 0.55 MG/DL — SIGNIFICANT CHANGE UP (ref 0.5–1.3)
GLUCOSE SERPL-MCNC: 113 MG/DL — SIGNIFICANT CHANGE UP (ref 70–115)
HCT VFR BLD CALC: 40.9 % — SIGNIFICANT CHANGE UP (ref 37–47)
HGB BLD-MCNC: 13.1 G/DL — SIGNIFICANT CHANGE UP (ref 12–16)
MCHC RBC-ENTMCNC: 32 G/DL — SIGNIFICANT CHANGE UP (ref 32–36)
MCHC RBC-ENTMCNC: 32.6 PG — HIGH (ref 27–31)
MCV RBC AUTO: 101.7 FL — HIGH (ref 81–99)
PLATELET # BLD AUTO: 256 K/UL — SIGNIFICANT CHANGE UP (ref 150–400)
POTASSIUM SERPL-MCNC: 4.8 MMOL/L — SIGNIFICANT CHANGE UP (ref 3.5–5.3)
POTASSIUM SERPL-SCNC: 4.8 MMOL/L — SIGNIFICANT CHANGE UP (ref 3.5–5.3)
PROT SERPL-MCNC: 6.5 G/DL — LOW (ref 6.6–8.7)
RBC # BLD: 4.02 M/UL — LOW (ref 4.4–5.2)
RBC # FLD: 13.8 % — SIGNIFICANT CHANGE UP (ref 11–15.6)
SODIUM SERPL-SCNC: 139 MMOL/L — SIGNIFICANT CHANGE UP (ref 135–145)
TROPONIN T SERPL-MCNC: <0.01 NG/ML — SIGNIFICANT CHANGE UP (ref 0–0.06)
WBC # BLD: 8.4 K/UL — SIGNIFICANT CHANGE UP (ref 4.8–10.8)
WBC # FLD AUTO: 8.4 K/UL — SIGNIFICANT CHANGE UP (ref 4.8–10.8)

## 2019-05-14 PROCEDURE — 36415 COLL VENOUS BLD VENIPUNCTURE: CPT

## 2019-05-14 PROCEDURE — 99284 EMERGENCY DEPT VISIT MOD MDM: CPT

## 2019-05-14 PROCEDURE — 80053 COMPREHEN METABOLIC PANEL: CPT

## 2019-05-14 PROCEDURE — 83690 ASSAY OF LIPASE: CPT

## 2019-05-14 PROCEDURE — 93010 ELECTROCARDIOGRAM REPORT: CPT

## 2019-05-14 PROCEDURE — 99284 EMERGENCY DEPT VISIT MOD MDM: CPT | Mod: 25

## 2019-05-14 PROCEDURE — 96374 THER/PROPH/DIAG INJ IV PUSH: CPT

## 2019-05-14 PROCEDURE — 84484 ASSAY OF TROPONIN QUANT: CPT

## 2019-05-14 PROCEDURE — 93005 ELECTROCARDIOGRAM TRACING: CPT

## 2019-05-14 PROCEDURE — 85027 COMPLETE CBC AUTOMATED: CPT

## 2019-05-14 RX ORDER — SODIUM CHLORIDE 9 MG/ML
500 INJECTION INTRAMUSCULAR; INTRAVENOUS; SUBCUTANEOUS ONCE
Refills: 0 | Status: COMPLETED | OUTPATIENT
Start: 2019-05-14 | End: 2019-05-14

## 2019-05-14 RX ORDER — ONDANSETRON 8 MG/1
1 TABLET, FILM COATED ORAL
Qty: 21 | Refills: 0
Start: 2019-05-14 | End: 2019-05-20

## 2019-05-14 RX ORDER — ONDANSETRON 8 MG/1
4 TABLET, FILM COATED ORAL ONCE
Refills: 0 | Status: COMPLETED | OUTPATIENT
Start: 2019-05-14 | End: 2019-05-14

## 2019-05-14 RX ADMIN — SODIUM CHLORIDE 500 MILLILITER(S): 9 INJECTION INTRAMUSCULAR; INTRAVENOUS; SUBCUTANEOUS at 16:48

## 2019-05-14 RX ADMIN — SODIUM CHLORIDE 500 MILLILITER(S): 9 INJECTION INTRAMUSCULAR; INTRAVENOUS; SUBCUTANEOUS at 15:22

## 2019-05-14 RX ADMIN — SODIUM CHLORIDE 500 MILLILITER(S): 9 INJECTION INTRAMUSCULAR; INTRAVENOUS; SUBCUTANEOUS at 15:01

## 2019-05-14 RX ADMIN — SODIUM CHLORIDE 1000 MILLILITER(S): 9 INJECTION INTRAMUSCULAR; INTRAVENOUS; SUBCUTANEOUS at 16:48

## 2019-05-14 RX ADMIN — ONDANSETRON 4 MILLIGRAM(S): 8 TABLET, FILM COATED ORAL at 15:00

## 2019-05-14 NOTE — ED PROVIDER NOTE - PHYSICAL EXAMINATION
VITAL SIGNS: I have reviewed nursing notes and confirm.  CONSTITUTIONAL: Well-developed; well-nourished; in no acute distress.  SKIN: Skin exam is warm and dry, no acute rash.  HEAD: Normocephalic; atraumatic.  EYES: PERRL, EOM intact; conjunctiva and sclera clear.  ENT: No nasal discharge; airway clear. Throat clear.  NECK: Supple; non tender.    CARD: S1, S2 normal; no murmurs, gallops, or rubs. Regular rate and rhythm.  RESP: No wheezes,  no rales or rhonchi.   ABD:  soft; non-distended; (+) minimal tenderness diffulsely  EXT: Normal ROM. No clubbing, cyanosis or edema.  NEURO: Alert, oriented. Grossly unremarkable. No focal deficits. no facial droop, moves all extremities,   PSYCH: Cooperative, appropriate.

## 2019-05-14 NOTE — ED ADULT NURSE NOTE - OBJECTIVE STATEMENT
pt came in for nausea and vomiting and decreased bp when she went to her cardiologist today.  she denied chest pain breaths are even and unlabored.

## 2019-05-14 NOTE — ED PROVIDER NOTE - OBJECTIVE STATEMENT
65 yo F hx of gastroperisis, CHF, and breast CA p/w nausea, vomiting, palpation. She was seen at her cardiologist office for routine check up and after she came home she felt the symptoms. No fever. No chest pain. She report feeling similar to her prior gastroparesis.

## 2019-05-14 NOTE — ED PROVIDER NOTE - NS ED ROS FT
Review of Systems  •	CONSTITUTIONAL - no  fever, no diaphoresis, no weight change  •	SKIN - no rash  •	HEMATOLOGIC - no bleeding, no bruising  •	EYES - no eye pain, no blurred vision  •	ENT - no change in hearing, no pain  •	RESPIRATORY - no shortness of breath, no cough  •	CARDIAC - no chest pain, (+)  palpitations  •	GI - no abd pain,  (+)  nausea, no vomiting, no diarrhea, no constipation, no bleeding  •	GENITO-URINARY - no discharge, no dysuria; no hematuria,   •	ENDO - no polydypsia, no polyurea, no heat/no cold intolerance  •	MUSCULOSKELETAL - no joint pain, no swelling, no redness  •	NEUROLOGIC - no weakness, no headache, no anesthesia, no paresthesias  •	PSYCH - no anxiety, non suicidal, non homicidal, no hallucination, no depression

## 2019-05-14 NOTE — ED ADULT TRIAGE NOTE - CHIEF COMPLAINT QUOTE
Pt BIBA from home for c/o palpitations with dizziness and nausea that started this morning, denies chest pain and SOB

## 2019-05-14 NOTE — ED PROVIDER NOTE - CLINICAL SUMMARY MEDICAL DECISION MAKING FREE TEXT BOX
65 yo F hx of gastroperisis p/w nausea and vomting, dizzy. EKG wnl. trop neg. patient received zofran with improvement. patient received 1L NS for hydration. She has an appointment with Gi Friday and specialist next month.

## 2019-05-17 ENCOUNTER — APPOINTMENT (OUTPATIENT)
Dept: GASTROENTEROLOGY | Facility: CLINIC | Age: 64
End: 2019-05-17
Payer: MEDICARE

## 2019-05-17 VITALS
WEIGHT: 140 LBS | HEIGHT: 67 IN | DIASTOLIC BLOOD PRESSURE: 69 MMHG | SYSTOLIC BLOOD PRESSURE: 104 MMHG | HEART RATE: 128 BPM | BODY MASS INDEX: 21.97 KG/M2

## 2019-05-17 DIAGNOSIS — K31.84 GASTROPARESIS: ICD-10-CM

## 2019-05-17 PROCEDURE — 99213 OFFICE O/P EST LOW 20 MIN: CPT

## 2019-05-17 RX ORDER — PRUCALOPRIDE 1 MG/1
1 TABLET, FILM COATED ORAL DAILY
Qty: 30 | Refills: 4 | Status: ACTIVE | COMMUNITY
Start: 2019-05-17 | End: 1900-01-01

## 2019-05-17 NOTE — HISTORY OF PRESENT ILLNESS
[de-identified] : 63 yo female with long history of gastroparesis. The patient had responded well to home IV hydration. She had a recent insurance change in his not been able to obtain it. She recently was hospitalized with dehydration. She is interested in pursuing alternative therapies.

## 2019-05-17 NOTE — PHYSICAL EXAM
[FreeTextEntry1] : Chronically ill female in NAD [Auscultation Breath Sounds / Voice Sounds] : lungs were clear to auscultation bilaterally [Heart Rate And Rhythm] : heart rate was normal and rhythm regular [Heart Sounds] : normal S1 and S2 [Heart Sounds Gallop] : no gallops [Heart Sounds Pericardial Friction Rub] : no pericardial rub [Murmurs] : no murmurs [Bowel Sounds] : normal bowel sounds [Abdomen Tenderness] : non-tender [Abdomen Soft] : soft [] : no hepato-splenomegaly [Abdomen Mass (___ Cm)] : no abdominal mass palpated

## 2019-05-17 NOTE — ASSESSMENT
[FreeTextEntry1] : 65 yo female with gastroparesis which is been refractory to other therapies. Will try to arrange home IV hydration. Patient is also being referred for possible gastric pacemaker. Follow up in one month.

## 2019-05-29 NOTE — ED ADULT TRIAGE NOTE - BP NONINVASIVE DIASTOLIC (MM HG)
I feel her poor sleep is due to her inactivity and mental health. I encouraged her to go out and walk in the park, find a hobby, do something so she is not sitting at home watching tv all day. I also encouraged her not to go to sleep so early and to try reading a book instead of looking at her phone and exposing herself to blue light. Advised to increase her trazodone to 450mg. Father requested a sleep study so referral granted and obtaining UDS. I feel her insomnia should be managed by her psychiatrist since it is closely linked to her mental health. I have messaged her therapist to see if she can be seen sooner.   
63

## 2019-06-14 ENCOUNTER — EMERGENCY (EMERGENCY)
Facility: HOSPITAL | Age: 64
LOS: 1 days | Discharge: HOME | End: 2019-06-14
Attending: EMERGENCY MEDICINE
Payer: MEDICARE

## 2019-06-14 VITALS
SYSTOLIC BLOOD PRESSURE: 121 MMHG | HEART RATE: 97 BPM | TEMPERATURE: 98 F | OXYGEN SATURATION: 98 % | RESPIRATION RATE: 17 BRPM | DIASTOLIC BLOOD PRESSURE: 61 MMHG

## 2019-06-14 VITALS
HEIGHT: 66 IN | SYSTOLIC BLOOD PRESSURE: 108 MMHG | RESPIRATION RATE: 17 BRPM | DIASTOLIC BLOOD PRESSURE: 55 MMHG | TEMPERATURE: 98 F | HEART RATE: 116 BPM | WEIGHT: 139.99 LBS | OXYGEN SATURATION: 97 %

## 2019-06-14 DIAGNOSIS — Z98.890 OTHER SPECIFIED POSTPROCEDURAL STATES: Chronic | ICD-10-CM

## 2019-06-14 DIAGNOSIS — Z90.12 ACQUIRED ABSENCE OF LEFT BREAST AND NIPPLE: Chronic | ICD-10-CM

## 2019-06-14 DIAGNOSIS — Z96.641 PRESENCE OF RIGHT ARTIFICIAL HIP JOINT: Chronic | ICD-10-CM

## 2019-06-14 DIAGNOSIS — D17.9 BENIGN LIPOMATOUS NEOPLASM, UNSPECIFIED: Chronic | ICD-10-CM

## 2019-06-14 DIAGNOSIS — Z96.642 PRESENCE OF LEFT ARTIFICIAL HIP JOINT: Chronic | ICD-10-CM

## 2019-06-14 LAB
ALBUMIN SERPL ELPH-MCNC: 2.8 G/DL — LOW (ref 3.3–5.2)
ALP SERPL-CCNC: 115 U/L — SIGNIFICANT CHANGE UP (ref 40–120)
ALT FLD-CCNC: 26 U/L — SIGNIFICANT CHANGE UP
ANION GAP SERPL CALC-SCNC: 11 MMOL/L — SIGNIFICANT CHANGE UP (ref 5–17)
AST SERPL-CCNC: 34 U/L — HIGH
BILIRUB SERPL-MCNC: 0.3 MG/DL — LOW (ref 0.4–2)
BUN SERPL-MCNC: 12 MG/DL — SIGNIFICANT CHANGE UP (ref 8–20)
CALCIUM SERPL-MCNC: 9.5 MG/DL — SIGNIFICANT CHANGE UP (ref 8.6–10.2)
CHLORIDE SERPL-SCNC: 102 MMOL/L — SIGNIFICANT CHANGE UP (ref 98–107)
CO2 SERPL-SCNC: 27 MMOL/L — SIGNIFICANT CHANGE UP (ref 22–29)
CREAT SERPL-MCNC: 0.45 MG/DL — LOW (ref 0.5–1.3)
EOSINOPHIL # BLD AUTO: 0 K/UL — SIGNIFICANT CHANGE UP (ref 0–0.5)
EOSINOPHIL NFR BLD AUTO: 0.6 % — SIGNIFICANT CHANGE UP (ref 0–6)
GLUCOSE SERPL-MCNC: 127 MG/DL — HIGH (ref 70–115)
HCT VFR BLD CALC: 41.6 % — SIGNIFICANT CHANGE UP (ref 37–47)
HGB BLD-MCNC: 13.5 G/DL — SIGNIFICANT CHANGE UP (ref 12–16)
LIDOCAIN IGE QN: 15 U/L — LOW (ref 22–51)
LYMPHOCYTES # BLD AUTO: 1.1 K/UL — SIGNIFICANT CHANGE UP (ref 1–4.8)
LYMPHOCYTES # BLD AUTO: 13.2 % — LOW (ref 20–55)
MCHC RBC-ENTMCNC: 32.5 G/DL — SIGNIFICANT CHANGE UP (ref 32–36)
MCHC RBC-ENTMCNC: 32.5 PG — HIGH (ref 27–31)
MCV RBC AUTO: 100.2 FL — HIGH (ref 81–99)
MONOCYTES # BLD AUTO: 0.6 K/UL — SIGNIFICANT CHANGE UP (ref 0–0.8)
MONOCYTES NFR BLD AUTO: 7.1 % — SIGNIFICANT CHANGE UP (ref 3–10)
NEUTROPHILS # BLD AUTO: 6.6 K/UL — SIGNIFICANT CHANGE UP (ref 1.8–8)
NEUTROPHILS NFR BLD AUTO: 78.9 % — HIGH (ref 37–73)
PLATELET # BLD AUTO: 263 K/UL — SIGNIFICANT CHANGE UP (ref 150–400)
POTASSIUM SERPL-MCNC: 4.7 MMOL/L — SIGNIFICANT CHANGE UP (ref 3.5–5.3)
POTASSIUM SERPL-SCNC: 4.7 MMOL/L — SIGNIFICANT CHANGE UP (ref 3.5–5.3)
PROT SERPL-MCNC: 6.3 G/DL — LOW (ref 6.6–8.7)
RBC # BLD: 4.15 M/UL — LOW (ref 4.4–5.2)
RBC # FLD: 13.9 % — SIGNIFICANT CHANGE UP (ref 11–15.6)
SODIUM SERPL-SCNC: 140 MMOL/L — SIGNIFICANT CHANGE UP (ref 135–145)
WBC # BLD: 8.3 K/UL — SIGNIFICANT CHANGE UP (ref 4.8–10.8)
WBC # FLD AUTO: 8.3 K/UL — SIGNIFICANT CHANGE UP (ref 4.8–10.8)

## 2019-06-14 PROCEDURE — 85027 COMPLETE CBC AUTOMATED: CPT

## 2019-06-14 PROCEDURE — 99284 EMERGENCY DEPT VISIT MOD MDM: CPT

## 2019-06-14 PROCEDURE — 96375 TX/PRO/DX INJ NEW DRUG ADDON: CPT

## 2019-06-14 PROCEDURE — 80053 COMPREHEN METABOLIC PANEL: CPT

## 2019-06-14 PROCEDURE — 96374 THER/PROPH/DIAG INJ IV PUSH: CPT

## 2019-06-14 PROCEDURE — 99284 EMERGENCY DEPT VISIT MOD MDM: CPT | Mod: 25

## 2019-06-14 PROCEDURE — 83690 ASSAY OF LIPASE: CPT

## 2019-06-14 PROCEDURE — 36415 COLL VENOUS BLD VENIPUNCTURE: CPT

## 2019-06-14 RX ORDER — SODIUM CHLORIDE 9 MG/ML
1000 INJECTION, SOLUTION INTRAVENOUS
Refills: 0 | Status: DISCONTINUED | OUTPATIENT
Start: 2019-06-14 | End: 2019-06-19

## 2019-06-14 RX ORDER — METOCLOPRAMIDE HCL 10 MG
10 TABLET ORAL ONCE
Refills: 0 | Status: COMPLETED | OUTPATIENT
Start: 2019-06-14 | End: 2019-06-14

## 2019-06-14 RX ORDER — FAMOTIDINE 10 MG/ML
20 INJECTION INTRAVENOUS ONCE
Refills: 0 | Status: COMPLETED | OUTPATIENT
Start: 2019-06-14 | End: 2019-06-14

## 2019-06-14 RX ADMIN — FAMOTIDINE 20 MILLIGRAM(S): 10 INJECTION INTRAVENOUS at 14:18

## 2019-06-14 RX ADMIN — Medication 10 MILLIGRAM(S): at 14:18

## 2019-06-14 NOTE — ED ADULT NURSE NOTE - OBJECTIVE STATEMENT
Pt states she has had nausea and diarrhea since Tuesday, hx of gastroparesis. Decreased oral intake. Pt states she has had nausea and diarrhea since Tuesday, hx of gastroparesis. Decreased oral intake. L pink band in place.

## 2019-06-14 NOTE — ED ADULT NURSE NOTE - NSIMPLEMENTINTERV_GEN_ALL_ED
Implemented All Fall Risk Interventions:  [place] to call system. Call bell, personal items and telephone within reach. Instruct patient to call for assistance. Room bathroom lighting operational. Non-slip footwear when patient is off stretcher. Physically safe environment: no spills, clutter or unnecessary equipment. Stretcher in lowest position, wheels locked, appropriate side rails in place. Provide visual cue, wrist band, yellow gown, etc. Monitor gait and stability. Monitor for mental status changes and reorient to person, place, and time. Review medications for side effects contributing to fall risk. Reinforce activity limits and safety measures with patient and family.

## 2019-06-14 NOTE — ED ADULT TRIAGE NOTE - CHIEF COMPLAINT QUOTE
pt arrive by ambulance with c/o vomiting since tuesday, hx breast Ca and gastroparesis. vomit black coffee ground appearance.
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
## 2019-06-14 NOTE — ED PROVIDER NOTE - PROGRESS NOTE DETAILS
AJM: pt feeling improved. workup neg. tolerating Po. VS improved. stable for dc. All results discussed with the patient, and a copy of results has been provided. Patient is comfortable with dc plan for home. Opportunity for questions was provided and all questions have been adressed.

## 2019-06-14 NOTE — ED PROVIDER NOTE - CLINICAL SUMMARY MEDICAL DECISION MAKING FREE TEXT BOX
pt with persistent vomiting. h/o gastroparesis. benign abd exam. will obtain labs, hydrate, symptom control. possible dc if improved. no imaging at this time as pt notes this is c/w prior gastroparesis and abd exam is benign.

## 2019-06-14 NOTE — ED ADULT NURSE NOTE - FAMILY HISTORY
Family history of hypertension in mother     Family history of stroke     Family history of cardiac arrest     Sibling  Still living? Yes, Estimated age: Age Unknown  Family history of breast cancer in sister, Age at diagnosis: Age Unknown

## 2019-06-14 NOTE — ED PROVIDER NOTE - OBJECTIVE STATEMENT
Patient is a 64 year old female with history of gastroparesis on reglan and zofran presenting with vomiting and diarrhea. Pt notes 3 days ago vomiting started. She was in [facility] and went to [facility] ER. Sent home after treatment and fluids. Symptoms retuned and today she as had 2 episodes of diarrhea. No blood in stool or vomit (despite triage note). No trauma or sick contacts. Pt had admission in february for same. Has appt next week with gastroparesis specialist. No chest pain, sob, fevers. No urinary complaints. Pt denies abd pain. No history of abd surgeries.

## 2019-06-20 ENCOUNTER — APPOINTMENT (OUTPATIENT)
Dept: GASTROENTEROLOGY | Facility: CLINIC | Age: 64
End: 2019-06-20

## 2019-06-24 ENCOUNTER — INPATIENT (INPATIENT)
Facility: HOSPITAL | Age: 64
LOS: 7 days | Discharge: NURSING, REHAB OR LONG-TERM CARE | End: 2019-07-02
Attending: INTERNAL MEDICINE | Admitting: INTERNAL MEDICINE
Payer: MEDICARE

## 2019-06-24 VITALS — WEIGHT: 139.99 LBS | HEIGHT: 66 IN

## 2019-06-24 DIAGNOSIS — Z98.890 OTHER SPECIFIED POSTPROCEDURAL STATES: Chronic | ICD-10-CM

## 2019-06-24 DIAGNOSIS — Z96.642 PRESENCE OF LEFT ARTIFICIAL HIP JOINT: Chronic | ICD-10-CM

## 2019-06-24 DIAGNOSIS — Z90.12 ACQUIRED ABSENCE OF LEFT BREAST AND NIPPLE: Chronic | ICD-10-CM

## 2019-06-24 DIAGNOSIS — D17.9 BENIGN LIPOMATOUS NEOPLASM, UNSPECIFIED: Chronic | ICD-10-CM

## 2019-06-24 DIAGNOSIS — L89.322 PRESSURE ULCER OF LEFT BUTTOCK, STAGE 2: ICD-10-CM

## 2019-06-24 DIAGNOSIS — Z96.641 PRESENCE OF RIGHT ARTIFICIAL HIP JOINT: Chronic | ICD-10-CM

## 2019-06-24 PROCEDURE — 93010 ELECTROCARDIOGRAM REPORT: CPT

## 2019-06-24 PROCEDURE — 99291 CRITICAL CARE FIRST HOUR: CPT

## 2019-06-24 RX ORDER — PANTOPRAZOLE SODIUM 20 MG/1
40 TABLET, DELAYED RELEASE ORAL ONCE
Refills: 0 | Status: COMPLETED | OUTPATIENT
Start: 2019-06-24 | End: 2019-06-24

## 2019-06-24 RX ORDER — SODIUM CHLORIDE 9 MG/ML
1000 INJECTION INTRAMUSCULAR; INTRAVENOUS; SUBCUTANEOUS ONCE
Refills: 0 | Status: COMPLETED | OUTPATIENT
Start: 2019-06-24 | End: 2019-06-24

## 2019-06-24 RX ORDER — METOCLOPRAMIDE HCL 10 MG
10 TABLET ORAL ONCE
Refills: 0 | Status: COMPLETED | OUTPATIENT
Start: 2019-06-24 | End: 2019-06-24

## 2019-06-24 NOTE — ED ADULT TRIAGE NOTE - CHIEF COMPLAINT QUOTE
pt presents to ED c/o weakness, unable to eat/drink, poor appetite, abd pain, n/v/d, burning on urination. denies fevers/chills. hx gastroparesis. pain 7/10

## 2019-06-24 NOTE — ED ADULT NURSE REASSESSMENT NOTE - NS ED NURSE REASSESS COMMENT FT1
pt still has not been seen by dr queen. unable to get an iv in pt. had two nurses try and told charge RN and they refused her and said they want ultrasound.

## 2019-06-24 NOTE — ED ADULT NURSE NOTE - OBJECTIVE STATEMENT
pt c/o diarrhea, n/v and abdominal pain. denies dysuria. pt has hx of gastroparesis. pt states this has been going on for a little over a week. pt also c/o generalized weakness and decreased PO intake. family at bedside will ctm

## 2019-06-24 NOTE — ED ADULT NURSE NOTE - BREATHING, MLM
Spontaneous, unlabored and symmetrical Scc Ka Subtype Histology Text: On low power, a keratin filled invagination of the epidermis is noted to penetrate into the dermis.  There appear to be collections of epidermal cells with nuclear atypia in and around the base of the invagination.  These dyskeratotic cells show individual cells keratinization and appear eosinophilic.  A pronounced inflammatory cell infiltrate is noted in the surrounding dermis.  The surrounding epidermis extends as a buttress over the crater formed by the invagination.  At high magnification, the epidermal proliferations at the base of the crater show enlarged epidermal cells with hyperchromatic nuclei, consistent with SCC keratoacanthoma subtype.

## 2019-06-24 NOTE — ED ADULT NURSE REASSESSMENT NOTE - NS ED NURSE REASSESS COMMENT FT1
Called to bedside by primary RN due to difficult IV access after multiple attempts by previous RNs. After introducing myself to patient and family, family refusing to allow IV attempt. Family requesting IV to be placed with sono machine only. Informed family of importance of lab work and IV access, still refusing RN to look for any veins. MD Luna made aware.

## 2019-06-25 ENCOUNTER — APPOINTMENT (OUTPATIENT)
Dept: GASTROENTEROLOGY | Facility: CLINIC | Age: 64
End: 2019-06-25

## 2019-06-25 LAB
ALBUMIN SERPL ELPH-MCNC: 2.7 G/DL — LOW (ref 3.3–5)
ALP SERPL-CCNC: 105 U/L — SIGNIFICANT CHANGE UP (ref 40–120)
ALT FLD-CCNC: 25 U/L — SIGNIFICANT CHANGE UP (ref 12–78)
ANION GAP SERPL CALC-SCNC: 7 MMOL/L — SIGNIFICANT CHANGE UP (ref 5–17)
APPEARANCE UR: ABNORMAL
APTT BLD: 20.6 SEC — LOW (ref 27.5–36.3)
AST SERPL-CCNC: 34 U/L — SIGNIFICANT CHANGE UP (ref 15–37)
BACTERIA # UR AUTO: ABNORMAL
BASOPHILS # BLD AUTO: 0.06 K/UL — SIGNIFICANT CHANGE UP (ref 0–0.2)
BASOPHILS NFR BLD AUTO: 0.4 % — SIGNIFICANT CHANGE UP (ref 0–2)
BILIRUB SERPL-MCNC: 0.3 MG/DL — SIGNIFICANT CHANGE UP (ref 0.2–1.2)
BILIRUB UR-MCNC: ABNORMAL
BLD GP AB SCN SERPL QL: SIGNIFICANT CHANGE UP
BUN SERPL-MCNC: 16 MG/DL — SIGNIFICANT CHANGE UP (ref 7–23)
CALCIUM SERPL-MCNC: 9.6 MG/DL — SIGNIFICANT CHANGE UP (ref 8.5–10.1)
CHLORIDE SERPL-SCNC: 103 MMOL/L — SIGNIFICANT CHANGE UP (ref 96–108)
CO2 SERPL-SCNC: 29 MMOL/L — SIGNIFICANT CHANGE UP (ref 22–31)
COLOR SPEC: ABNORMAL
COMMENT - URINE: SIGNIFICANT CHANGE UP
COMMENT - URINE: SIGNIFICANT CHANGE UP
CREAT SERPL-MCNC: 0.78 MG/DL — SIGNIFICANT CHANGE UP (ref 0.5–1.3)
DIFF PNL FLD: ABNORMAL
EOSINOPHIL # BLD AUTO: 0.11 K/UL — SIGNIFICANT CHANGE UP (ref 0–0.5)
EOSINOPHIL NFR BLD AUTO: 0.8 % — SIGNIFICANT CHANGE UP (ref 0–6)
EPI CELLS # UR: SIGNIFICANT CHANGE UP
GLUCOSE SERPL-MCNC: 96 MG/DL — SIGNIFICANT CHANGE UP (ref 70–99)
GLUCOSE UR QL: NEGATIVE MG/DL — SIGNIFICANT CHANGE UP
HCT VFR BLD CALC: 41 % — SIGNIFICANT CHANGE UP (ref 34.5–45)
HGB BLD-MCNC: 13.2 G/DL — SIGNIFICANT CHANGE UP (ref 11.5–15.5)
IMM GRANULOCYTES NFR BLD AUTO: 0.3 % — SIGNIFICANT CHANGE UP (ref 0–1.5)
INR BLD: 1.27 RATIO — HIGH (ref 0.88–1.16)
KETONES UR-MCNC: ABNORMAL
LACTATE SERPL-SCNC: 1.5 MMOL/L — SIGNIFICANT CHANGE UP (ref 0.7–2)
LACTATE SERPL-SCNC: 2.1 MMOL/L — HIGH (ref 0.7–2)
LACTATE SERPL-SCNC: 3.1 MMOL/L — HIGH (ref 0.7–2)
LEUKOCYTE ESTERASE UR-ACNC: ABNORMAL
LIDOCAIN IGE QN: 96 U/L — SIGNIFICANT CHANGE UP (ref 73–393)
LYMPHOCYTES # BLD AUTO: 22.9 % — SIGNIFICANT CHANGE UP (ref 13–44)
LYMPHOCYTES # BLD AUTO: 3.34 K/UL — HIGH (ref 1–3.3)
MAGNESIUM SERPL-MCNC: 1.9 MG/DL — SIGNIFICANT CHANGE UP (ref 1.6–2.6)
MCHC RBC-ENTMCNC: 32.2 GM/DL — SIGNIFICANT CHANGE UP (ref 32–36)
MCHC RBC-ENTMCNC: 32.9 PG — SIGNIFICANT CHANGE UP (ref 27–34)
MCV RBC AUTO: 102.2 FL — HIGH (ref 80–100)
MONOCYTES # BLD AUTO: 1.22 K/UL — HIGH (ref 0–0.9)
MONOCYTES NFR BLD AUTO: 8.4 % — SIGNIFICANT CHANGE UP (ref 2–14)
NEUTROPHILS # BLD AUTO: 9.83 K/UL — HIGH (ref 1.8–7.4)
NEUTROPHILS NFR BLD AUTO: 67.2 % — SIGNIFICANT CHANGE UP (ref 43–77)
NITRITE UR-MCNC: POSITIVE
PH UR: 5 — SIGNIFICANT CHANGE UP (ref 5–8)
PLATELET # BLD AUTO: 240 K/UL — SIGNIFICANT CHANGE UP (ref 150–400)
POTASSIUM SERPL-MCNC: 4.5 MMOL/L — SIGNIFICANT CHANGE UP (ref 3.5–5.3)
POTASSIUM SERPL-SCNC: 4.5 MMOL/L — SIGNIFICANT CHANGE UP (ref 3.5–5.3)
PROT SERPL-MCNC: 6.7 GM/DL — SIGNIFICANT CHANGE UP (ref 6–8.3)
PROT UR-MCNC: 100 MG/DL
PROTHROM AB SERPL-ACNC: 14.2 SEC — HIGH (ref 10–12.9)
RBC # BLD: 4.01 M/UL — SIGNIFICANT CHANGE UP (ref 3.8–5.2)
RBC # FLD: 13.6 % — SIGNIFICANT CHANGE UP (ref 10.3–14.5)
RBC CASTS # UR COMP ASSIST: ABNORMAL /HPF (ref 0–4)
SODIUM SERPL-SCNC: 139 MMOL/L — SIGNIFICANT CHANGE UP (ref 135–145)
SP GR SPEC: 1.02 — SIGNIFICANT CHANGE UP (ref 1.01–1.02)
TROPONIN I SERPL-MCNC: 0.52 NG/ML — HIGH (ref 0.01–0.04)
TROPONIN I SERPL-MCNC: 0.58 NG/ML — HIGH (ref 0.01–0.04)
TROPONIN I SERPL-MCNC: 0.64 NG/ML — HIGH (ref 0.01–0.04)
TYPE + AB SCN PNL BLD: SIGNIFICANT CHANGE UP
UROBILINOGEN FLD QL: 4 MG/DL
WBC # BLD: 14.6 K/UL — HIGH (ref 3.8–10.5)
WBC # FLD AUTO: 14.6 K/UL — HIGH (ref 3.8–10.5)
WBC UR QL: SIGNIFICANT CHANGE UP

## 2019-06-25 PROCEDURE — 93010 ELECTROCARDIOGRAM REPORT: CPT

## 2019-06-25 PROCEDURE — 74177 CT ABD & PELVIS W/CONTRAST: CPT | Mod: 26

## 2019-06-25 PROCEDURE — 74019 RADEX ABDOMEN 2 VIEWS: CPT | Mod: 26

## 2019-06-25 PROCEDURE — 71045 X-RAY EXAM CHEST 1 VIEW: CPT | Mod: 26

## 2019-06-25 PROCEDURE — 99223 1ST HOSP IP/OBS HIGH 75: CPT

## 2019-06-25 PROCEDURE — 93306 TTE W/DOPPLER COMPLETE: CPT | Mod: 26

## 2019-06-25 RX ORDER — SODIUM CHLORIDE 9 MG/ML
1000 INJECTION INTRAMUSCULAR; INTRAVENOUS; SUBCUTANEOUS ONCE
Refills: 0 | Status: COMPLETED | OUTPATIENT
Start: 2019-06-25 | End: 2019-06-25

## 2019-06-25 RX ORDER — ASPIRIN/CALCIUM CARB/MAGNESIUM 324 MG
300 TABLET ORAL ONCE
Refills: 0 | Status: COMPLETED | OUTPATIENT
Start: 2019-06-25 | End: 2019-06-25

## 2019-06-25 RX ORDER — HYDROMORPHONE HYDROCHLORIDE 2 MG/ML
0.5 INJECTION INTRAMUSCULAR; INTRAVENOUS; SUBCUTANEOUS ONCE
Refills: 0 | Status: DISCONTINUED | OUTPATIENT
Start: 2019-06-25 | End: 2019-06-25

## 2019-06-25 RX ORDER — CHOLECALCIFEROL (VITAMIN D3) 125 MCG
1 CAPSULE ORAL
Qty: 0 | Refills: 0 | DISCHARGE

## 2019-06-25 RX ORDER — ONDANSETRON 8 MG/1
4 TABLET, FILM COATED ORAL EVERY 6 HOURS
Refills: 0 | Status: DISCONTINUED | OUTPATIENT
Start: 2019-06-25 | End: 2019-07-02

## 2019-06-25 RX ORDER — METOCLOPRAMIDE HCL 10 MG
10 TABLET ORAL
Refills: 0 | Status: DISCONTINUED | OUTPATIENT
Start: 2019-06-25 | End: 2019-07-02

## 2019-06-25 RX ORDER — PANTOPRAZOLE SODIUM 20 MG/1
40 TABLET, DELAYED RELEASE ORAL
Refills: 0 | Status: DISCONTINUED | OUTPATIENT
Start: 2019-06-25 | End: 2019-06-27

## 2019-06-25 RX ORDER — SODIUM CHLORIDE 9 MG/ML
1000 INJECTION INTRAMUSCULAR; INTRAVENOUS; SUBCUTANEOUS
Refills: 0 | Status: DISCONTINUED | OUTPATIENT
Start: 2019-06-25 | End: 2019-06-27

## 2019-06-25 RX ORDER — CEFTRIAXONE 500 MG/1
1000 INJECTION, POWDER, FOR SOLUTION INTRAMUSCULAR; INTRAVENOUS ONCE
Refills: 0 | Status: COMPLETED | OUTPATIENT
Start: 2019-06-25 | End: 2019-06-25

## 2019-06-25 RX ORDER — OMEPRAZOLE 10 MG/1
1 CAPSULE, DELAYED RELEASE ORAL
Qty: 0 | Refills: 0 | DISCHARGE

## 2019-06-25 RX ORDER — CEFTRIAXONE 500 MG/1
INJECTION, POWDER, FOR SOLUTION INTRAMUSCULAR; INTRAVENOUS
Refills: 0 | Status: DISCONTINUED | OUTPATIENT
Start: 2019-06-25 | End: 2019-06-27

## 2019-06-25 RX ORDER — ACETAMINOPHEN 500 MG
650 TABLET ORAL EVERY 4 HOURS
Refills: 0 | Status: DISCONTINUED | OUTPATIENT
Start: 2019-06-25 | End: 2019-07-02

## 2019-06-25 RX ORDER — TAMOXIFEN CITRATE 20 MG/1
1 TABLET, FILM COATED ORAL
Qty: 0 | Refills: 0 | DISCHARGE

## 2019-06-25 RX ORDER — ENOXAPARIN SODIUM 100 MG/ML
40 INJECTION SUBCUTANEOUS DAILY
Refills: 0 | Status: DISCONTINUED | OUTPATIENT
Start: 2019-06-25 | End: 2019-07-01

## 2019-06-25 RX ORDER — TAMOXIFEN CITRATE 20 MG/1
20 TABLET, FILM COATED ORAL DAILY
Refills: 0 | Status: DISCONTINUED | OUTPATIENT
Start: 2019-06-25 | End: 2019-07-02

## 2019-06-25 RX ORDER — ACETAMINOPHEN 500 MG
1 TABLET ORAL
Qty: 0 | Refills: 0 | DISCHARGE

## 2019-06-25 RX ORDER — ONDANSETRON 8 MG/1
1 TABLET, FILM COATED ORAL
Qty: 0 | Refills: 0 | DISCHARGE

## 2019-06-25 RX ORDER — CEFTRIAXONE 500 MG/1
1000 INJECTION, POWDER, FOR SOLUTION INTRAMUSCULAR; INTRAVENOUS EVERY 24 HOURS
Refills: 0 | Status: DISCONTINUED | OUTPATIENT
Start: 2019-06-26 | End: 2019-06-27

## 2019-06-25 RX ORDER — ASCORBIC ACID 60 MG
1 TABLET,CHEWABLE ORAL
Qty: 0 | Refills: 0 | DISCHARGE

## 2019-06-25 RX ORDER — CEFTRIAXONE 500 MG/1
INJECTION, POWDER, FOR SOLUTION INTRAMUSCULAR; INTRAVENOUS
Refills: 0 | Status: DISCONTINUED | OUTPATIENT
Start: 2019-06-25 | End: 2019-06-25

## 2019-06-25 RX ADMIN — Medication 10 MILLIGRAM(S): at 00:16

## 2019-06-25 RX ADMIN — Medication 300 MILLIGRAM(S): at 01:24

## 2019-06-25 RX ADMIN — HYDROMORPHONE HYDROCHLORIDE 0.5 MILLIGRAM(S): 2 INJECTION INTRAMUSCULAR; INTRAVENOUS; SUBCUTANEOUS at 02:53

## 2019-06-25 RX ADMIN — SODIUM CHLORIDE 1000 MILLILITER(S): 9 INJECTION INTRAMUSCULAR; INTRAVENOUS; SUBCUTANEOUS at 09:43

## 2019-06-25 RX ADMIN — CEFTRIAXONE 1000 MILLIGRAM(S): 500 INJECTION, POWDER, FOR SOLUTION INTRAMUSCULAR; INTRAVENOUS at 12:59

## 2019-06-25 RX ADMIN — SODIUM CHLORIDE 1000 MILLILITER(S): 9 INJECTION INTRAMUSCULAR; INTRAVENOUS; SUBCUTANEOUS at 01:18

## 2019-06-25 RX ADMIN — PANTOPRAZOLE SODIUM 40 MILLIGRAM(S): 20 TABLET, DELAYED RELEASE ORAL at 00:16

## 2019-06-25 RX ADMIN — Medication 10 MILLIGRAM(S): at 17:36

## 2019-06-25 RX ADMIN — HYDROMORPHONE HYDROCHLORIDE 0.5 MILLIGRAM(S): 2 INJECTION INTRAMUSCULAR; INTRAVENOUS; SUBCUTANEOUS at 02:38

## 2019-06-25 RX ADMIN — Medication 10 MILLIGRAM(S): at 23:29

## 2019-06-25 RX ADMIN — SODIUM CHLORIDE 100 MILLILITER(S): 9 INJECTION INTRAMUSCULAR; INTRAVENOUS; SUBCUTANEOUS at 17:35

## 2019-06-25 RX ADMIN — Medication 10 MILLIGRAM(S): at 11:05

## 2019-06-25 RX ADMIN — SODIUM CHLORIDE 1000 MILLILITER(S): 9 INJECTION INTRAMUSCULAR; INTRAVENOUS; SUBCUTANEOUS at 02:18

## 2019-06-25 RX ADMIN — TAMOXIFEN CITRATE 20 MILLIGRAM(S): 20 TABLET, FILM COATED ORAL at 13:00

## 2019-06-25 RX ADMIN — PANTOPRAZOLE SODIUM 40 MILLIGRAM(S): 20 TABLET, DELAYED RELEASE ORAL at 17:36

## 2019-06-25 RX ADMIN — ENOXAPARIN SODIUM 40 MILLIGRAM(S): 100 INJECTION SUBCUTANEOUS at 11:05

## 2019-06-25 RX ADMIN — SODIUM CHLORIDE 1000 MILLILITER(S): 9 INJECTION INTRAMUSCULAR; INTRAVENOUS; SUBCUTANEOUS at 01:07

## 2019-06-25 RX ADMIN — SODIUM CHLORIDE 1000 MILLILITER(S): 9 INJECTION INTRAMUSCULAR; INTRAVENOUS; SUBCUTANEOUS at 00:16

## 2019-06-25 NOTE — H&P ADULT - ASSESSMENT
65 y/o female with PMHx of gastroparesis, CHF, breast CA 2017 on tamoxifen, and hx of hip replacement who presented to  with c/o N/V/D.  Pt notes sx have been ongoing for more than 1 year.  Pt has been worked up by GI Dr. Kemp and thought to be related to gastroparesis.  Pt admits to a 80 lb weight loss over the last year.  Pt has been in and out of ERs recently for similar sx, given IV hydration, and sent home.  Pt notes she presented this time due to persistent vomiting and unable to keep down liquids.  N/V/D is off and on.  Diarrhea this time has been present for ~1 week.  3 loose watery bowel movements a day.  No blood.  No fevers at home.      1.  Sepsis:    Elevated lactate, tachycardia, leukocytosis present on admission.    Unclear source-- urinary versus GI.    S/p 2 L NSS bolus.    Initial lactate 3.1-- repeat still 2.1.  1L bolus and repeat lactate.    Check blood cx/ urine cx.    Check stool cultures.    Start rocephin for UTI and f/u cultures.      2.  Abd pain/ N/V/D:    Ddx includes gastroparesis, viral gastroenteritis, CDIFF, bacterial source.    Check CT abd pelvis to r/o other causes than gastroparesis.    Stool GI PCR/ CDIFF r/o.    Cont reglan but change to IV QID.    Cont protonix but change to IV BID.    GI eval.  Dr. Kemp.    Zofran PRN.    Clear diet.      3.  R/o UTI:    UA positive.    Start rocephin.    Check blood/ urine cx.  F/u results.      4.  Positive Troponin:    No c/o CP-- epigastric pain only.    EKG with nonspecific T wave changes.    ASA/ Statin.    Check ECHO.    Cardio eval.      5.  HLD:  Was not on statin outpatient.      6.  Weight Loss/ Malnutrition:    Nutrition eval.    Secondary to n/v/d x1 year.      7.  Breast Cancer:    Cont tamoxifen.      8.  DVT Proph:    Lovenox.    Advanced directives  - spend 15 min  - FULL code    DVT proph -  IMPROVE VTE Individual Risk Assessment    RISK                                                                Points    [  ] Previous VTE                                                  3    [  ] Thrombophilia                                               2    [  ] Lower limb paralysis                                      2        (unable to hold up >15 seconds)      [  ] Current Cancer                                              2         (within 6 months)    [  ] Immobilization > 24 hrs                                1    [  ] ICU/CCU stay > 24 hours                              1    [  ] Age > 60                                                      1    IMPROVE VTE Score _____1__    Time spend 70 minutes

## 2019-06-25 NOTE — CONSULT NOTE ADULT - SUBJECTIVE AND OBJECTIVE BOX
Patient is a 64y old  Female who presents with a chief complaint of vomiting     HPI:64yof with pmh as stated below presented with c/o worsening vomiting.  Pt has history of gastroparesis in the past.  SHe denies any CP or pressure but c/o epigastric discomfort.        PAST MEDICAL & SURGICAL HISTORY:  Arthritis  Colitis  Anemia  Weakness  Nausea & vomiting  CHF (congestive heart failure): 2017 resolved  Breast cancer: left 2017, had surgery and radiation  High cholesterol: d/c&#x27;d by prescribing MD in April &quot;since I&#x27;m not eating&quot;  High blood pressure: reports medication was stopped 2018 because BP was low  History of endoscopy: 2018  S/P colonoscopy: 2018  History of total hip replacement, left: 2018  History of hip replacement, total, right: 2017  History of lumpectomy of left breast:   Atypical lipoma of soft tissue: in left groin   H/O foot surgery: ankle left       MEDICATIONS  (STANDING):    MEDICATIONS  (PRN):      FAMILY HISTORY:  Family history of cardiac arrest  Family history of stroke  Family history of breast cancer in sister (Sibling)  Family history of hypertension in mother      SOCIAL HISTORY:  no recent smoking     REVIEW OF SYSTEMS:  CONSTITUTIONAL:    No fatigue, malaise, lethargy.  No fever or chills.  HEENT:  Eyes:  No visual changes.     ENT:  No epistaxis.  No sinus pain.    RESPIRATORY:  No cough.  No wheeze.  No hemoptysis.  No shortness of breath.  CARDIOVASCULAR:  No chest pains.  No palpitations. No shortness of breath, No orthopnea or PND.  GASTROINTESTINAL:  No abdominal pain.  No nausea or vomiting.    GENITOURINARY:    No hematuria.    MUSCULOSKELETAL:  No musculoskeletal pain.  No joint swelling.  No arthritis.  NEUROLOGICAL:  No tingling or numbness or weakness.  PSYCHIATRIC:  No confusion  SKIN:  No rashes.    ENDOCRINE:  No unexplained weight loss.  No polydipsia.   HEMATOLOGIC:  No anemia.  No prolonged or excessive bleeding.   ALLERGIC AND IMMUNOLOGIC:  No pruritus.          Vital Signs Last 24 Hrs  T(C): 36.4 (2019 07:10), Max: 36.7 (2019 00:01)  T(F): 97.6 (2019 07:10), Max: 98 (2019 00:01)  HR: 89 (2019 07:10) (88 - 136)  BP: 124/72 (2019 07:10) (98/63 - 150/62)  BP(mean): --  RR: 18 (2019 07:10) (17 - 18)  SpO2: 99% (2019 07:10) (95% - 100%)    PHYSICAL EXAM-    Constitutional: elderly frail female in no acute distress     Head: Head is normocephalic and atraumatic.      Neck: No JVD.     Cardiovascular: Regular rate and rhythm without S3, S4. No murmurs or rubs are appreciated.      Respiratory: Breathsounds are normal. No rales. No wheezing.    Abdomen: Soft, nontender, nondistended with positive bowel sounds.      Extremity: No tenderness. No  pitting edema     Neurologic: The patient is alert and oriented.      Skin: No rash, no obvious lesions noted.      Psychiatric: The patient appears to be emotionally stable.      INTERPRETATION OF TELEMETRY: SR     ECG: Sinus rythm , normal axis, no ST T changes.     I&O's Detail      LABS:                        13.2   14.60 )-----------( 240      ( 2019 00:08 )             41.0     06-25    139  |  103  |  16  ----------------------------<  96  4.5   |  29  |  0.78    Ca    9.6      2019 00:08  Mg     1.9     06-25    TPro  6.7  /  Alb  2.7<L>  /  TBili  0.3  /  DBili  x   /  AST  34  /  ALT  25  /  AlkPhos  105  06-25    CARDIAC MARKERS ( 2019 05:45 )  0.517 ng/mL / x     / x     / x     / x      CARDIAC MARKERS ( 2019 03:13 )  0.578 ng/mL / x     / x     / x     / x      CARDIAC MARKERS ( 2019 00:08 )  0.643 ng/mL / x     / x     / x     / x          PT/INR - ( 2019 00:08 )   PT: 14.2 sec;   INR: 1.27 ratio         PTT - ( 2019 00:08 )  PTT:20.6 sec  Urinalysis Basic - ( 2019 05:00 )    Color: Brown / Appearance: Slightly Turbid / S.025 / pH: x  Gluc: x / Ketone: Small  / Bili: Small / Urobili: 4 mg/dL   Blood: x / Protein: 100 mg/dL / Nitrite: Positive   Leuk Esterase: Trace / RBC: 25-50 /HPF / WBC 3-5   Sq Epi: x / Non Sq Epi: Few / Bacteria: Few      I&O's Summary    BNP  RADIOLOGY & ADDITIONAL STUDIES:

## 2019-06-25 NOTE — H&P ADULT - NSICDXPASTMEDICALHX_GEN_ALL_CORE_FT
PAST MEDICAL HISTORY:  Anemia     Arthritis     Breast cancer left 2017, had surgery and radiation    CHF (congestive heart failure) 12/2017 resolved    Colitis     High blood pressure reports medication was stopped April 2018 because BP was low    High cholesterol d/c'd by prescribing MD in April "since I'm not eating"    Nausea & vomiting     Weakness

## 2019-06-25 NOTE — H&P ADULT - NSICDXFAMILYHX_GEN_ALL_CORE_FT
FAMILY HISTORY:  Family history of cardiac arrest  Family history of hypertension in mother  Family history of stroke    Sibling  Still living? Yes, Estimated age: Age Unknown  Family history of breast cancer in sister, Age at diagnosis: Age Unknown

## 2019-06-25 NOTE — CONSULT NOTE ADULT - ASSESSMENT
65 y/o female admitted with dehydration/abdominal pain 2/2 n/v/diarrhea.   Hx of gastroparesis/progressive weight loss and is being treated as outpatient by Dr. Kemp.     Impression:  R/o infectious process - c. diff. vs. colitis vs. viral gastroenteritis with gastroparesis playing a role.     Plan:  Clears as tolerated.   Awaiting stool studies--GI PCR vs. stool studies.   IVFs.  Agree with ct scan, awaiting results.   Continue supportive care- IV zofran/reglan.   Will review records from office today.     Discussed with pt, Dr. Aquino, and Dr. Hood.

## 2019-06-25 NOTE — ED PROVIDER NOTE - CLINICAL SUMMARY MEDICAL DECISION MAKING FREE TEXT BOX
Pt with hx chf, gastroparesis, with vomiting and diarrhea for two weeks with multiple hospital visits. Pt has high tronin today and high lactate. To be admitted to tele. Pt with hx chf, gastroparesis, with vomiting and diarrhea for two weeks with multiple hospital visits. Pt has high troponin today and high lactate. To be admitted to tele.

## 2019-06-25 NOTE — ED PROVIDER NOTE - CARE PLAN
Principal Discharge DX:	ACS (acute coronary syndrome)  Secondary Diagnosis:	Gastroparesis  Secondary Diagnosis:	Vomiting

## 2019-06-25 NOTE — H&P ADULT - HISTORY OF PRESENT ILLNESS
65 y/o female with PMHx of gastroparesis, CHF, HLD, Colitis, Breast CA 2017 on tamoxifen, and hx of hip replacement who presented to  with c/o N/V/D.  Pt notes sx have been ongoing for more than 1 year.  Pt has been worked up by GI Dr. Kemp and thought to be related to gastroparesis.  Pt admits to a 80 lb weight loss over the last year.  Pt has been in and out of ERs recently for similar sx, given IV hydration, and sent home.  Pt notes she presented this time due to persistent vomiting and unable to keep down liquids.  N/V/D is off and on.  Diarrhea this time has been present for ~1 week.  3 loose watery bowel movements a day.  No blood.  No fevers at home.      PMHx:    Arthritis  Colitis  Anemia  Weakness  Nausea & vomiting  CHF (congestive heart failure): 12/2017 resolved  Breast cancer: left 2017, had surgery and radiation  High cholesterol  High blood pressure: reports medication was stopped April 2018 because BP was low    PSHx:    History of total hip replacement, left: march 2018  History of hip replacement, total, right: 11/2017  History of lumpectomy of left breast: 2017  Atypical lipoma of soft tissue: in left groin 2008  H/O foot surgery: ankle left 2004    FAMILY HISTORY:  Family history of cardiac arrest  Family history of stroke  Family history of breast cancer in sister (Sibling)  Family history of hypertension in mother    Social History:  Denies tob/ etoh/ drug use.      Allergies  Crestor (Muscle Pain)  shellfish (Swelling; Rash)

## 2019-06-25 NOTE — H&P ADULT - NSICDXPASTSURGICALHX_GEN_ALL_CORE_FT
PAST SURGICAL HISTORY:  Atypical lipoma of soft tissue in left groin 2008    H/O foot surgery ankle left 2004    History of endoscopy 7/2018    History of hip replacement, total, right 11/2017    History of lumpectomy of left breast 2017    History of total hip replacement, left march 2018    S/P colonoscopy 2018

## 2019-06-25 NOTE — ED ADULT NURSE REASSESSMENT NOTE - NS ED NURSE REASSESS COMMENT FT1
Pt's 2nd troponin came back elevated. Compared to the 1st troponin, noted to be trending down. Dr. Mcclure hospitalist made aware. Pending cardiology consult. At this time, pt denies chest pain/SOB. NSR with heart rate 85 on tele monitor.

## 2019-06-25 NOTE — CONSULT NOTE ADULT - SUBJECTIVE AND OBJECTIVE BOX
Patient is a 64y old  Female who presents with a chief complaint of n/v/d (25 Jun 2019 09:17)    HPI:  65 y/o female with PMHx of gastroparesis, CHF, HLD, Colitis, Breast CA 2017 on tamoxifen, and hx of hip replacement who presented to  with c/o N/V/D.  Pt notes sx have been ongoing for more than 1 year.  Pt has been worked up by GI Dr. Kemp and thought to be related to gastroparesis.  Pt admits to a 80 lb weight loss over the last year.  Pt has been in and out of ERs recently for similar sx, given IV hydration, and sent home.  Pt notes she presented this time due to persistent vomiting and unable to keep down liquids.  N/V/D is off and on.  Diarrhea this time has been present for ~1 week.  3 loose watery bowel movements a day.  No blood.  No fevers at home.      6/25/2019-  Pt with abdominal pain, n/v/diarrhea.   Describes stool as watery, last episode of emesis was around 3am.   Does note some improvement after medication.   Going for ct scan today.     PMHx:    Arthritis  Colitis  Anemia  Weakness  Nausea & vomiting  CHF (congestive heart failure): 12/2017 resolved  Breast cancer: left 2017, had surgery and radiation  High cholesterol  High blood pressure: reports medication was stopped April 2018 because BP was low    PSHx:    History of total hip replacement, left: march 2018  History of hip replacement, total, right: 11/2017  History of lumpectomy of left breast: 2017  Atypical lipoma of soft tissue: in left groin 2008  H/O foot surgery: ankle left 2004    FAMILY HISTORY:  Family history of cardiac arrest  Family history of stroke  Family history of breast cancer in sister (Sibling)  Family history of hypertension in mother    Social History:  Denies tob/ etoh/ drug use.      Allergies  Crestor (Muscle Pain)  shellfish (Swelling; Rash) (25 Jun 2019 09:17)    PAST MEDICAL & SURGICAL HISTORY:  Arthritis  Colitis  Anemia  Weakness  Nausea & vomiting  CHF (congestive heart failure): 12/2017 resolved  Breast cancer: left 2017, had surgery and radiation  High cholesterol: d/c&#x27;d by prescribing MD in April &quot;since I&#x27;m not eating&quot;  High blood pressure: reports medication was stopped April 2018 because BP was low  History of endoscopy: 7/2018  S/P colonoscopy: 2018  History of total hip replacement, left: march 2018  History of hip replacement, total, right: 11/2017  History of lumpectomy of left breast: 2017  Atypical lipoma of soft tissue: in left groin 2008  H/O foot surgery: ankle left 2004    MEDICATIONS  (STANDING):  cefTRIAXone Injectable.      cefTRIAXone Injectable. 1000 milliGRAM(s) IV Push once  enoxaparin Injectable 40 milliGRAM(s) SubCutaneous daily  metoclopramide Injectable 10 milliGRAM(s) IV Push four times a day  pantoprazole  Injectable 40 milliGRAM(s) IV Push two times a day  sodium chloride 0.9%. 1000 milliLiter(s) (100 mL/Hr) IV Continuous <Continuous>  tamoxifen 20 milliGRAM(s) Oral daily    MEDICATIONS  (PRN):  acetaminophen   Tablet .. 650 milliGRAM(s) Oral every 4 hours PRN Mild Pain (1 - 3)  aluminum hydroxide/magnesium hydroxide/simethicone Suspension 30 milliLiter(s) Oral every 4 hours PRN Dyspepsia  ondansetron Injectable 4 milliGRAM(s) IV Push every 6 hours PRN Nausea    Allergies  Crestor (Muscle Pain)  shellfish (Swelling; Rash)    FAMILY HISTORY:  Family history of cardiac arrest  Family history of stroke  Family history of breast cancer in sister (Sibling)  Family history of hypertension in mother    REVIEW OF SYSTEMS:  CONSTITUTIONAL: + weakness.  No fevers or chills  RESPIRATORY: No cough, wheezing, hemoptysis; No shortness of breath  CARDIOVASCULAR: No chest pain or palpitations  GASTROINTESTINAL: Per HPI.   GENITOURINARY: No dysuria, frequency or hematuria  NEUROLOGICAL: No numbness or weakness  SKIN: No itching, burning, rashes, or lesions   PSYCH: Normal mood and affect  All other review of systems is negative unless indicated above.    Vital Signs Last 24 Hrs  T(C): 36.8 (25 Jun 2019 10:22), Max: 36.8 (25 Jun 2019 10:22)  T(F): 98.2 (25 Jun 2019 10:22), Max: 98.2 (25 Jun 2019 10:22)  HR: 103 (25 Jun 2019 10:22) (88 - 136)  BP: 99/57 (25 Jun 2019 10:22) (98/63 - 150/62)  BP(mean): --  RR: 16 (25 Jun 2019 10:22) (16 - 18)  SpO2: 98% (25 Jun 2019 10:22) (95% - 100%)    PHYSICAL EXAM:  Constitutional: Appears dehydrated, in nad.   Respiratory: CTAB  Cardiovascular: S1 and S2, RRR, no M/G/R  Gastrointestinal: Mildly distended, with mild tenderness, +BS  Extremities: No peripheral edema  Vascular: 2+ peripheral pulses  Neurological: A/O x 3, no focal deficits  Psychiatric: Normal mood, normal affect  Skin: No rashes    LABS:                        13.2   14.60 )-----------( 240      ( 25 Jun 2019 00:08 )             41.0     06-25    139  |  103  |  16  ----------------------------<  96  4.5   |  29  |  0.78    Ca    9.6      25 Jun 2019 00:08  Mg     1.9     06-25    TPro  6.7  /  Alb  2.7<L>  /  TBili  0.3  /  DBili  x   /  AST  34  /  ALT  25  /  AlkPhos  105  06-25    PT/INR - ( 25 Jun 2019 00:08 )   PT: 14.2 sec;   INR: 1.27 ratio         PTT - ( 25 Jun 2019 00:08 )  PTT:20.6 sec  LIVER FUNCTIONS - ( 25 Jun 2019 00:08 )  Alb: 2.7 g/dL / Pro: 6.7 gm/dL / ALK PHOS: 105 U/L / ALT: 25 U/L / AST: 34 U/L / GGT: x             RADIOLOGY & ADDITIONAL STUDIES:

## 2019-06-25 NOTE — CONSULT NOTE ADULT - ASSESSMENT
1. Elevated cardiac enzymes- Cardiac enzymes are mildly elevated and almost in the same range.  This could likely be demand from ongoing clinical noncardiac issues.  normal myocardial perfusion imaging in 2017.  No further ischemic workup now as inpt.  f/u as outpt with cardiologist.      2. Vomiting and gastroparesis- Management per primary team.     Other medical issues- Management per primary team.   Thank you for allowing me to participate in the care of this patient. Please feel free to contact me with any questions.

## 2019-06-25 NOTE — H&P ADULT - NSHPLABSRESULTS_GEN_ALL_CORE
13.2   14.60 )-----------( 240      ( 2019 00:08 )             41.0     06-    139  |  103  |  16  ----------------------------<  96  4.5   |  29  |  0.78    Ca    9.6      2019 00:08  Mg     1.9     06-    TPro  6.7  /  Alb  2.7<L>  /  TBili  0.3  /  DBili  x   /  AST  34  /  ALT  25  /  AlkPhos  105  06-    CAPILLARY BLOOD GLUCOSE        PT/INR - ( 2019 00:08 )   PT: 14.2 sec;   INR: 1.27 ratio         PTT - ( 2019 00:08 )  PTT:20.6 sec  Urinalysis Basic - ( 2019 05:00 )    Color: Brown / Appearance: Slightly Turbid / S.025 / pH: x  Gluc: x / Ketone: Small  / Bili: Small / Urobili: 4 mg/dL   Blood: x / Protein: 100 mg/dL / Nitrite: Positive   Leuk Esterase: Trace / RBC: 25-50 /HPF / WBC 3-5   Sq Epi: x / Non Sq Epi: Few / Bacteria: Few

## 2019-06-25 NOTE — H&P ADULT - NSHPPHYSICALEXAM_GEN_ALL_CORE
PEx  T(C): 36.4 (06-25-19 @ 07:10), Max: 36.7 (06-25-19 @ 00:01)  HR: 89 (06-25-19 @ 07:10) (88 - 136)  BP: 124/72 (06-25-19 @ 07:10) (98/63 - 150/62)  RR: 18 (06-25-19 @ 07:10) (17 - 18)  SpO2: 99% (06-25-19 @ 07:10) (95% - 100%)    General: chronically ill appearing  Skin: no rash or prominent lesions  Head: normocephalic, atraumatic     Sinuses: non-tender  Nose: no external lesions, mucosa non-inflamed, septum and turbinates normal  Throat: no erythema, exudates or lesions.  Neck: Supple without lymphadenopathy. Thyroid no thyromegaly, no palpable thyroid nodules, no palpable nodules or masses, carotid arteries no bruits.   Breasts: No palpable masses or lesions.  Heart: tachy ~100.    Lungs: CTA bilaterally, no wheezes, rhonchi, rales.  Breathing unlabored.   Chest wall: Normal insp   Abdomen:  epigastric pain.  Back: spine normal without deformity or tenderness.  Normal ROM   : Exam normal.  no inguinal hernias.  Extremities: No deformities, clubbing, cyanosis, or edema.  Musculoskeletal: Normal gait and station. No decreased range of motion, instability, atrophy or abnormal strength or tone in the head, neck, spine, ribs, pelvis or extremities.   Neurologic: CN 2-12 normal. Sensation to pain, touch and proprioception normal. DTRs normal in upper and lower extremities. No pathologic reflexes.  Motor normal.  Psychiatric: Oriented X3, intact recent and remote memory, judgement and insight, normal mood and affect.

## 2019-06-25 NOTE — PROVIDER CONTACT NOTE (OTHER) - SITUATION
Left message with service to have Dr. Mahajan return call, advised that Dr. Weaver is on call tonight.

## 2019-06-25 NOTE — H&P ADULT - NSHPREVIEWOFSYSTEMS_GEN_ALL_CORE
ROS:  General:  No fevers, chills  Skin: No rash or bothersome skin lesions  Musculoskeletal: No arthalgias, myalgias or joint swelling  Eyes: No visual changes or eye pain  Ears: No hearing loss , otorrhea or ear pain  Nose, Mouth, Throat: No nasal congestion, rhinorrhea, oral lesions, postnasal drip or sore throat  Cardio: No chest pain or palpitations. no lower extremity edema. no syncope. no claudication.   Respiratory: No cough, shortness of breath or wheezing   GI: as per HPI  : No urinary frequency, hematuria, incontinence, or dysuria  Neurologic: No headaches, parasthesias, confusion, dysarthria or gait instability  Psychiatric:  No anxiety or depression  Lymphatic:  No easy bruising, easy bleeding or swollen glands  Allergic: No itching, sneezing , watery eyes, clear rhinorrhea or recurrent infections

## 2019-06-25 NOTE — ED PROVIDER NOTE - OBJECTIVE STATEMENT
Pt is a 65 yo bf with hx chf after hip surgery, gastroparesis dxd about one year ago, who developed daily vomiting and diarrhea 2-3 daily since two Saturdays ago. Pt was seen at [facility] and given iv fluids and d/cd about one and one half weeks ago with out relief. Pt also went with family to [facility] to see specialist who had just moved to [place]. Pt was sent to [facility] Er, hydrated and sent home. Pt has had multiple meds, botox injections without relief. No fever. Has epigastric pain. Nonsmoker occ etoh no drugs. Allergic to Crestor and shellfish.  Family states pt gets sweaty clammy and weak. Has had 80 lb weight loss in past year. Ct scan at [facility] showed antral thickening.Pmd Raisa Herrera. [facility]

## 2019-06-26 LAB
ANION GAP SERPL CALC-SCNC: 9 MMOL/L — SIGNIFICANT CHANGE UP (ref 5–17)
BASOPHILS # BLD AUTO: 0.06 K/UL — SIGNIFICANT CHANGE UP (ref 0–0.2)
BASOPHILS NFR BLD AUTO: 0.6 % — SIGNIFICANT CHANGE UP (ref 0–2)
BUN SERPL-MCNC: 8 MG/DL — SIGNIFICANT CHANGE UP (ref 7–23)
CALCIUM SERPL-MCNC: 8.9 MG/DL — SIGNIFICANT CHANGE UP (ref 8.5–10.1)
CHLORIDE SERPL-SCNC: 111 MMOL/L — HIGH (ref 96–108)
CO2 SERPL-SCNC: 25 MMOL/L — SIGNIFICANT CHANGE UP (ref 22–31)
CREAT SERPL-MCNC: 0.36 MG/DL — LOW (ref 0.5–1.3)
CULTURE RESULTS: NO GROWTH — SIGNIFICANT CHANGE UP
EOSINOPHIL # BLD AUTO: 0.77 K/UL — HIGH (ref 0–0.5)
EOSINOPHIL NFR BLD AUTO: 7.9 % — HIGH (ref 0–6)
FOLATE SERPL-MCNC: 13 NG/ML — SIGNIFICANT CHANGE UP
GLUCOSE SERPL-MCNC: 91 MG/DL — SIGNIFICANT CHANGE UP (ref 70–99)
HCT VFR BLD CALC: 35.2 % — SIGNIFICANT CHANGE UP (ref 34.5–45)
HCV AB S/CO SERPL IA: 0.06 S/CO — SIGNIFICANT CHANGE UP (ref 0–0.99)
HCV AB SERPL-IMP: SIGNIFICANT CHANGE UP
HGB BLD-MCNC: 11.2 G/DL — LOW (ref 11.5–15.5)
IMM GRANULOCYTES NFR BLD AUTO: 0.3 % — SIGNIFICANT CHANGE UP (ref 0–1.5)
LYMPHOCYTES # BLD AUTO: 2.94 K/UL — SIGNIFICANT CHANGE UP (ref 1–3.3)
LYMPHOCYTES # BLD AUTO: 30.2 % — SIGNIFICANT CHANGE UP (ref 13–44)
MCHC RBC-ENTMCNC: 31.8 GM/DL — LOW (ref 32–36)
MCHC RBC-ENTMCNC: 32.4 PG — SIGNIFICANT CHANGE UP (ref 27–34)
MCV RBC AUTO: 101.7 FL — HIGH (ref 80–100)
MONOCYTES # BLD AUTO: 0.87 K/UL — SIGNIFICANT CHANGE UP (ref 0–0.9)
MONOCYTES NFR BLD AUTO: 8.9 % — SIGNIFICANT CHANGE UP (ref 2–14)
NEUTROPHILS # BLD AUTO: 5.07 K/UL — SIGNIFICANT CHANGE UP (ref 1.8–7.4)
NEUTROPHILS NFR BLD AUTO: 52.1 % — SIGNIFICANT CHANGE UP (ref 43–77)
PLATELET # BLD AUTO: 186 K/UL — SIGNIFICANT CHANGE UP (ref 150–400)
POTASSIUM SERPL-MCNC: 3.6 MMOL/L — SIGNIFICANT CHANGE UP (ref 3.5–5.3)
POTASSIUM SERPL-SCNC: 3.6 MMOL/L — SIGNIFICANT CHANGE UP (ref 3.5–5.3)
RBC # BLD: 3.46 M/UL — LOW (ref 3.8–5.2)
RBC # FLD: 13.7 % — SIGNIFICANT CHANGE UP (ref 10.3–14.5)
SODIUM SERPL-SCNC: 145 MMOL/L — SIGNIFICANT CHANGE UP (ref 135–145)
SPECIMEN SOURCE: SIGNIFICANT CHANGE UP
VIT B12 SERPL-MCNC: 868 PG/ML — SIGNIFICANT CHANGE UP (ref 232–1245)
WBC # BLD: 9.74 K/UL — SIGNIFICANT CHANGE UP (ref 3.8–10.5)
WBC # FLD AUTO: 9.74 K/UL — SIGNIFICANT CHANGE UP (ref 3.8–10.5)

## 2019-06-26 PROCEDURE — 99232 SBSQ HOSP IP/OBS MODERATE 35: CPT

## 2019-06-26 RX ORDER — ASPIRIN/CALCIUM CARB/MAGNESIUM 324 MG
81 TABLET ORAL DAILY
Refills: 0 | Status: DISCONTINUED | OUTPATIENT
Start: 2019-06-26 | End: 2019-07-01

## 2019-06-26 RX ADMIN — PANTOPRAZOLE SODIUM 40 MILLIGRAM(S): 20 TABLET, DELAYED RELEASE ORAL at 18:53

## 2019-06-26 RX ADMIN — Medication 10 MILLIGRAM(S): at 18:53

## 2019-06-26 RX ADMIN — PANTOPRAZOLE SODIUM 40 MILLIGRAM(S): 20 TABLET, DELAYED RELEASE ORAL at 05:37

## 2019-06-26 RX ADMIN — Medication 10 MILLIGRAM(S): at 05:37

## 2019-06-26 RX ADMIN — TAMOXIFEN CITRATE 20 MILLIGRAM(S): 20 TABLET, FILM COATED ORAL at 15:05

## 2019-06-26 RX ADMIN — Medication 10 MILLIGRAM(S): at 15:06

## 2019-06-26 RX ADMIN — ENOXAPARIN SODIUM 40 MILLIGRAM(S): 100 INJECTION SUBCUTANEOUS at 15:06

## 2019-06-26 RX ADMIN — Medication 81 MILLIGRAM(S): at 15:07

## 2019-06-26 RX ADMIN — CEFTRIAXONE 1000 MILLIGRAM(S): 500 INJECTION, POWDER, FOR SOLUTION INTRAMUSCULAR; INTRAVENOUS at 15:06

## 2019-06-26 NOTE — PHYSICAL THERAPY INITIAL EVALUATION ADULT - GENERAL OBSERVATIONS, REHAB EVAL
Patient refused eval, c/o fatigue. Patient received in bed, +IV, +HM.  Patient c/o chronic neck and back pain.

## 2019-06-26 NOTE — PHYSICAL THERAPY INITIAL EVALUATION ADULT - DIAGNOSIS, PT EVAL
Sepsis;  Abd pain/ N/V/D:  Ddx includes gastroparesis, viral gastroenteritis, CDIFF, bacterial source.

## 2019-06-26 NOTE — PHYSICAL THERAPY INITIAL EVALUATION ADULT - ADDITIONAL COMMENTS
Patient reports she needs assist at home with all activities due to fear of falling.  Minimal Household Ambulator.  Patient s/p B THRs in 2017, now with B knee ROM deficits. Per EMR, R knee ROM deficits noted in 8/2019.

## 2019-06-26 NOTE — PHYSICAL THERAPY INITIAL EVALUATION ADULT - PERTINENT HX OF CURRENT PROBLEM, REHAB EVAL
63 yo F admitted c/o N/V/D.  Pt has been worked up by GI Dr. Kemp :gastroparesis.  Pt admits to a 80 lb weight loss over the last year.  Abd CT: Gastric distention with nonspecific prominence of the pylorus.  Correlate for findings of gastric outlet obstruction. Diffuse anasarca.

## 2019-06-26 NOTE — DIETITIAN INITIAL EVALUATION ADULT. - PERTINENT LABORATORY DATA
06-26 Na145 mmol/L Glu 91 mg/dL K+ 3.6 mmol/L Cr  0.36 mg/dL<L> BUN 8 mg/dL Phos n/a   Alb n/a   PAB n/a

## 2019-06-26 NOTE — CHART NOTE - NSCHARTNOTEFT_GEN_A_CORE
Upon Nutritional Assessment by the Registered Dietitian your patient was determined to meet criteria / has evidence of the following diagnosis/diagnoses:          [ ]  Mild Protein Calorie Malnutrition        [ ]  Moderate Protein Calorie Malnutrition        [x] Severe Protein Calorie Malnutrition        [ ] Unspecified Protein Calorie Malnutrition        [ ] Underweight / BMI <19        [ ] Morbid Obesity / BMI > 40      Findings as based on:  •  Comprehensive nutrition assessment and consultation  •  Calorie counts (nutrient intake analysis)  •  Food acceptance and intake status from observations by staff  •  Follow up  •  Patient education  •  Intervention secondary to interdisciplinary rounds  •   concerns      Treatment:    The following diet has been recommended:  -Encourage PO intake  -Advance diet when medically feasible  -Ensure enlive BID    PROVIDER Section:     By signing this assessment you are acknowledging and agree with the diagnosis/diagnoses assigned by the Registered Dietitian    Comments:

## 2019-06-26 NOTE — PROGRESS NOTE ADULT - ASSESSMENT
65 y/o female with PMHx of gastroparesis, CHF, breast CA 2017 on tamoxifen, and hx of hip replacement who presented to  with c/o N/V/D.  Pt notes sx have been ongoing for more than 1 year.  Pt has been worked up by GI Dr. Kemp and thought to be related to gastroparesis.  Pt admits to a 80 lb weight loss over the last year.  Pt has been in and out of ERs recently for similar sx, given IV hydration, and sent home.  Pt notes she presented this time due to persistent vomiting and unable to keep down liquids.  N/V/D is off and on.  Diarrhea this time has been present for ~1 week.  3 loose watery bowel movements a day.  No blood.  No fevers at home.      1.  Sepsis:    Elevated lactate, tachycardia, leukocytosis present on admission.    Hemodynamics overall imrpoved s/p 3 L NSS.    Blood cx negative to date.    May have been more hypovolemia related to n/v/d/dehdyration.    F/u cultures.    Cont rocephin for now.    Lactate normalized.      2.  Abd pain/ N/V/D/ Gastroparesis:    Suspect acute exacerbation of chronic issues/ gastroparesis.    Cannot r/o new viral syndrome worsening sx.    D/c stool studies as no longer having diarrhea.    CT noted.    Family requesting Dr. Krishnamurthy consult-- ? Gastric PPM for severe gastroparesis.    GI f/u.    Cont reglan IV QID.    Cont protonix IV BID.    Zofran PRN.    Advance diet.      3.  R/o UTI:    UA positive.  Start rocephin.    Check blood/ urine cx.  F/u results.      4.  Positive Troponin:    Suspect demand ischemia.    ECHO with EF 70%.    Stress testing in 2017 WNL.    Cont medical management.    D/c tele.        5.  HLD:  Was not on statin outpatient.      6.  Weight Loss/ Malnutrition:    Nutrition eval.    Secondary to n/v/d x1 year.      7.  Breast Cancer:    Cont tamoxifen.      8.  DVT Proph:    Lovenox.

## 2019-06-26 NOTE — PHYSICAL THERAPY INITIAL EVALUATION ADULT - MODALITIES TREATMENT COMMENTS
Patient left OOB in chair with CBIR. Patient instructed to call for assistance back to bed or the bathroom, understands same. set up with breakfast tray. DEBRA, RN informed of session/status.

## 2019-06-26 NOTE — PROGRESS NOTE ADULT - ASSESSMENT
63 y/o female admitted with dehydration/abdominal pain 2/2 n/v/diarrhea.   Hx of gastroparesis/progressive weight loss and is being treated as outpatient by Dr. Kemp.     Impression:  R/o infectious process - c. diff. vs. colitis vs. viral gastroenteritis with gastroparesis playing a role.   Favor viral as symptoms are improving.     Plan:  Awaiting stool studies--GI PCR vs. stool studies-likely all negative as no further diarrhea, likely viral.    IVFs.  Ct scan noted.    Continue supportive care- IV zofran/reglan.   Plan to set up 1L NS weekly as outpatient to avoid dehydration.  Discussed with .     Discussed with pt, Dr. King, Dr. Kemp.

## 2019-06-26 NOTE — DIETITIAN INITIAL EVALUATION ADULT. - PHYSICAL APPEARANCE
NFPE showed severe muscle wasting in temple, clavicle, deltoid and interossous, PT with severe fat wasting in tricep and ribs./other (specify)

## 2019-06-26 NOTE — PHYSICAL THERAPY INITIAL EVALUATION ADULT - ACTIVE RANGE OF MOTION EXAMINATION, REHAB EVAL
bilateral upper extremity Active ROM was WFL (within functional limits)/except R knee flexion to 70*. L to 90*/bilateral  lower extremity Active ROM was WFL (within functional limits)

## 2019-06-26 NOTE — PROGRESS NOTE ADULT - ASSESSMENT
1. Elevated cardiac enzymes- Cardiac enzymes are mildly elevated and almost in the same range.    This could likely be demand from ongoing clinical noncardiac issues.  Likely demand ischemia in setting of dehydration.  normal myocardial perfusion imaging in 2017.  2Decho noted- Hyperdynamic, underfilled LV in setting of dehydration.   Hold on further studies for now. Cont medical mgmt.    2. Vomiting and gastroparesis- Management per GI.  Cont IVF.     3. DVT proph.

## 2019-06-26 NOTE — DIETITIAN INITIAL EVALUATION ADULT. - PERTINENT MEDS FT
MEDICATIONS  (STANDING):  aspirin  chewable 81 milliGRAM(s) Oral daily  cefTRIAXone Injectable.      cefTRIAXone Injectable. 1000 milliGRAM(s) IV Push every 24 hours  enoxaparin Injectable 40 milliGRAM(s) SubCutaneous daily  metoclopramide Injectable 10 milliGRAM(s) IV Push four times a day  pantoprazole  Injectable 40 milliGRAM(s) IV Push two times a day  sodium chloride 0.9%. 1000 milliLiter(s) (100 mL/Hr) IV Continuous <Continuous>  tamoxifen 20 milliGRAM(s) Oral daily    MEDICATIONS  (PRN):  acetaminophen   Tablet .. 650 milliGRAM(s) Oral every 4 hours PRN Mild Pain (1 - 3)  aluminum hydroxide/magnesium hydroxide/simethicone Suspension 30 milliLiter(s) Oral every 4 hours PRN Dyspepsia  ondansetron Injectable 4 milliGRAM(s) IV Push every 6 hours PRN Nausea

## 2019-06-26 NOTE — DIETITIAN INITIAL EVALUATION ADULT. - OTHER INFO
Pt's H&P 65 y/o female with PMHx of gastroparesis, CHF, HLD, Colitis, Breast CA 2017 on tamoxifen, and hx of hip replacement who presented to  with c/o N/V/D.  Pt notes sx have been ongoing for more than 1 year.  Pt has been worked up by GI Dr. Kemp and thought to be related to gastroparesis.  Pt admits to a 80 lb weight loss over the last year.  Pt has been in and out of ERs recently for similar sx, given IV hydration, and sent home.  Pt notes she presented this time due to persistent vomiting and unable to keep down liquids.  N/V/D is off and on.  Diarrhea this time has been present for ~1 week.  3 loose watery bowel movements a day.  No blood.  No fevers at home.  Pt states she has had difficulty eating over the past year due to symptoms. Pt's UBW was 220lbs. Pt denies chewing or swallowing difficulty. princess 11, stage II on buttocks. NFPE noted below. Per GI r/o . diff. vs. colitis vs. viral gastroenteritis. Pt currently on clears, encourage drinking ensure. Will continue to monitor pt's nutritional status. Pt meets criteria for severe protein-calorie malnutrition in the context of chronic illness

## 2019-06-27 PROCEDURE — 99232 SBSQ HOSP IP/OBS MODERATE 35: CPT

## 2019-06-27 RX ORDER — LACTULOSE 10 G/15ML
30 SOLUTION ORAL ONCE
Refills: 0 | Status: COMPLETED | OUTPATIENT
Start: 2019-06-27 | End: 2019-06-27

## 2019-06-27 RX ORDER — POLYETHYLENE GLYCOL 3350 17 G/17G
17 POWDER, FOR SOLUTION ORAL ONCE
Refills: 0 | Status: COMPLETED | OUTPATIENT
Start: 2019-06-27 | End: 2019-06-27

## 2019-06-27 RX ORDER — PANTOPRAZOLE SODIUM 20 MG/1
40 TABLET, DELAYED RELEASE ORAL
Refills: 0 | Status: DISCONTINUED | OUTPATIENT
Start: 2019-06-27 | End: 2019-07-02

## 2019-06-27 RX ORDER — POLYETHYLENE GLYCOL 3350 17 G/17G
17 POWDER, FOR SOLUTION ORAL
Refills: 0 | Status: DISCONTINUED | OUTPATIENT
Start: 2019-06-27 | End: 2019-07-02

## 2019-06-27 RX ADMIN — CEFTRIAXONE 1000 MILLIGRAM(S): 500 INJECTION, POWDER, FOR SOLUTION INTRAMUSCULAR; INTRAVENOUS at 12:09

## 2019-06-27 RX ADMIN — Medication 81 MILLIGRAM(S): at 12:09

## 2019-06-27 RX ADMIN — TAMOXIFEN CITRATE 20 MILLIGRAM(S): 20 TABLET, FILM COATED ORAL at 12:11

## 2019-06-27 RX ADMIN — ENOXAPARIN SODIUM 40 MILLIGRAM(S): 100 INJECTION SUBCUTANEOUS at 12:09

## 2019-06-27 RX ADMIN — Medication 10 MILLIGRAM(S): at 18:53

## 2019-06-27 RX ADMIN — PANTOPRAZOLE SODIUM 40 MILLIGRAM(S): 20 TABLET, DELAYED RELEASE ORAL at 18:53

## 2019-06-27 RX ADMIN — Medication 10 MILLIGRAM(S): at 12:10

## 2019-06-27 RX ADMIN — Medication 10 MILLIGRAM(S): at 05:15

## 2019-06-27 RX ADMIN — Medication 650 MILLIGRAM(S): at 10:20

## 2019-06-27 RX ADMIN — PANTOPRAZOLE SODIUM 40 MILLIGRAM(S): 20 TABLET, DELAYED RELEASE ORAL at 05:15

## 2019-06-27 RX ADMIN — Medication 10 MILLIGRAM(S): at 01:04

## 2019-06-27 RX ADMIN — LACTULOSE 30 GRAM(S): 10 SOLUTION ORAL at 18:51

## 2019-06-27 RX ADMIN — POLYETHYLENE GLYCOL 3350 17 GRAM(S): 17 POWDER, FOR SOLUTION ORAL at 18:52

## 2019-06-27 NOTE — PROGRESS NOTE ADULT - ASSESSMENT
1. Elevated cardiac enzymes- Cardiac enzymes are mildly elevated and almost in the same range.  This could likely be demand from ongoing clinical noncardiac issues.  No chest pain.   normal myocardial perfusion imaging in 2017.  No further ischemic workup now as inpt.  f/u as outpt with cardiologist.      2. Vomiting and gastroparesis- Management per primary team.     Other medical issues- Management per primary team.   Thank you for allowing me to participate in the care of this patient. Please feel free to contact me with any questions.

## 2019-06-27 NOTE — PROGRESS NOTE ADULT - ASSESSMENT
63 y/o female admitted with dehydration/abdominal pain 2/2 n/v/diarrhea.   Hx of gastroparesis/progressive weight loss and is being treated as outpatient by Dr. Kemp.     Impression:  Acute on chronic gastroparesis with possible viral gastroenteritis playing a role.      Plan:  Overall improving.   Stools - negative.   IVFs.  Ct scan noted.    Supportive care- IV zofran/reglan.   Plan to set up 1L NS weekly as outpatient to avoid dehydration, discussed with .   Pt to f/u with Dr. Krishnamurthy as outpatient for a possible gastric PPM for severe gastroparesis.   PT reevaluation today.     Discussed with pt, Dr. Krishnamurthy, Dr. Kemp. 63 y/o female admitted with dehydration/abdominal pain 2/2 n/v/diarrhea.   Hx of gastroparesis/progressive weight loss and is being treated as outpatient by Dr. Kemp.     Impression:  Acute on chronic gastroparesis with possible viral gastroenteritis playing a role.      Plan:  Overall improving.   Stools - negative.   IVFs.  Ct scan noted.    Supportive care- IV zofran/reglan.   Plan to set up 1L NS weekly as outpatient to avoid dehydration (she was previously receiving this as an outpatient)- discussed with .   Pt to f/u with Dr. Krishnamurthy as outpatient for a possible gastric PPM for severe gastroparesis.   PT reevaluation today.     Discussed with pt, Dr. Krishnamurthy, Dr. Kemp.

## 2019-06-28 ENCOUNTER — TRANSCRIPTION ENCOUNTER (OUTPATIENT)
Age: 64
End: 2019-06-28

## 2019-06-28 LAB
ADD ON TEST-SPECIMEN IN LAB: SIGNIFICANT CHANGE UP
MAGNESIUM SERPL-MCNC: 1.6 MG/DL — SIGNIFICANT CHANGE UP (ref 1.6–2.6)

## 2019-06-28 PROCEDURE — 99231 SBSQ HOSP IP/OBS SF/LOW 25: CPT

## 2019-06-28 RX ORDER — POTASSIUM CHLORIDE 20 MEQ
40 PACKET (EA) ORAL ONCE
Refills: 0 | Status: COMPLETED | OUTPATIENT
Start: 2019-06-28 | End: 2019-06-28

## 2019-06-28 RX ADMIN — Medication 10 MILLIGRAM(S): at 17:57

## 2019-06-28 RX ADMIN — Medication 10 MILLIGRAM(S): at 05:20

## 2019-06-28 RX ADMIN — Medication 40 MILLIEQUIVALENT(S): at 11:21

## 2019-06-28 RX ADMIN — Medication 650 MILLIGRAM(S): at 10:37

## 2019-06-28 RX ADMIN — ENOXAPARIN SODIUM 40 MILLIGRAM(S): 100 INJECTION SUBCUTANEOUS at 11:27

## 2019-06-28 RX ADMIN — Medication 81 MILLIGRAM(S): at 11:21

## 2019-06-28 RX ADMIN — Medication 10 MILLIGRAM(S): at 00:15

## 2019-06-28 RX ADMIN — TAMOXIFEN CITRATE 20 MILLIGRAM(S): 20 TABLET, FILM COATED ORAL at 11:21

## 2019-06-28 RX ADMIN — PANTOPRAZOLE SODIUM 40 MILLIGRAM(S): 20 TABLET, DELAYED RELEASE ORAL at 05:20

## 2019-06-28 RX ADMIN — Medication 10 MILLIGRAM(S): at 11:20

## 2019-06-28 NOTE — PROGRESS NOTE ADULT - ASSESSMENT
65 yo female with refractory gastroparesis. Patient going to rehab. Being evaluated for possible gastric pacemaker. Will follow up as outpatient. Thanks!

## 2019-06-28 NOTE — PROGRESS NOTE ADULT - ASSESSMENT
1. Elevated cardiac enzymes- Cardiac enzymes are mildly elevated and almost in the same range.  This could likely be demand from ongoing clinical noncardiac issues.  No chest pain.   normal myocardial perfusion imaging in 2017.  No further ischemic workup now as inpt.  f/u as outpt with cardiologist.      2. Vomiting and gastroparesis- Management per primary team. Surgery team input noted.    3. Atrial tachycardia- no events last night .  Ordered po potassium supplements.  Check magnesium level.  Goal Magnesium of 2.     Other medical issues- Management per primary team.   Thank you for allowing me to participate in the care of this patient. Please feel free to contact me with any questions.

## 2019-06-28 NOTE — DISCHARGE NOTE PROVIDER - CARE PROVIDER_API CALL
Vern Krishnamurthy)  Surgery  [address] [facility] [facility], [address]  [place] [address]  Phone: [phone]  Fax: [phone]  Follow Up Time: Vern Krishnamurthy)  Surgery  [address] [facility], [address]  [place] [address]  Phone: [phone]  Fax: [phone]  Follow Up Time:     Edwige Avalos ()  [place] [address]  [place] [address]  Phone: [phone]  Fax: [phone]  Follow Up Time:

## 2019-06-28 NOTE — DISCHARGE NOTE PROVIDER - HOSPITAL COURSE
Vital Signs Last 24 Hrs    T(C): 36.6 (28 Jun 2019 04:36), Max: 36.8 (27 Jun 2019 17:08)    T(F): 97.8 (28 Jun 2019 04:36), Max: 98.2 (27 Jun 2019 17:08)    HR: 96 (28 Jun 2019 04:36) (90 - 96)    BP: 134/68 (28 Jun 2019 04:36) (121/63 - 134/68)    BP(mean): --    RR: 18 (28 Jun 2019 04:36) (17 - 18)    SpO2: 95% (28 Jun 2019 04:36) (95% - 98%)        HEENT:   pupils equal and reactive, EOMI, no oropharyngeal lesions, erythema, exudates, oral thrush        NECK:   supple, no carotid bruits, no palpable lymph nodes, no thyromegaly        CV:  +S1, +S2, regular, no murmurs or rubs        RESP:   lungs clear to auscultation bilaterally, no wheezing, rales, rhonchi, good air entry bilaterally        BREAST:  not examined        GI:  abdomen soft, non-tender, non-distended, normal BS, no bruits, no abdominal masses, no palpable masses        RECTAL:  not examined        :  not examined        MSK:   normal muscle tone, no atrophy, no rigidity, no contractions        EXT:   no clubbing, no cyanosis, no edema, no calf pain, swelling or erythema        VASCULAR:  pulses equal and symmetric in the upper and lower extremities        NEURO:  AAOX3, no focal neurological deficits, follows all commands, able to move extremities spontaneously        SKIN:  no ulcers, lesions or rashes            Mg     1.6       28 Jun 2019 11:05                Hospital Course:    	    65 y/o female with PMHx of gastroparesis, CHF, breast CA 2017 on tamoxifen, and hx of hip replacement who presented to  with c/o N/V/D.  Pt notes sx have been ongoing for more than 1 year.  Pt has been worked up by GI Dr. Kemp and thought to be related to gastroparesis.  Pt admits to a 80 lb weight loss over the last year.  Pt has been in and out of ERs recently for similar sx, given IV hydration, and sent home.  Pt notes she presented this time due to persistent vomiting and unable to keep down liquids.  N/V/D is off and on.  Diarrhea this time has been present for ~1 week.  3 loose watery bowel movements a day.  No blood.  No fevers at home.          1-hypotension secondary to diahrea    -Elevated lactate, tachycardia, leukocytosis present on admission.  >> secondary to diahrea and dehydraion w/ hypovolemia; Not sepsis    -now resolved after resolution of diahrea                2.  Abd pain/ N/V/D/ Gastroparesis:      Suspect acute exacerbation of chronic issues/ gastroparesis.  now resolved.    Dr. Krishnamurthy consult-- Gastric PPM for severe gastroparesis.  >> outpatient Vital Signs Last 24 Hrs    T(C): 36.8 (02 Jul 2019 05:07), Max: 36.8 (01 Jul 2019 10:52)    T(F): 98.3 (02 Jul 2019 05:07), Max: 98.3 (01 Jul 2019 10:52)    HR: 92 (02 Jul 2019 05:07) (90 - 105)    BP: 122/55 (02 Jul 2019 05:07) (104/59 - 122/55)    BP(mean): --    RR: 18 (01 Jul 2019 23:25) (18 - 18)    SpO2: 98% (02 Jul 2019 05:07) (97% - 98%)    HEENT:   nc/at    NECK:   supple, no carotid bruits, no palpable lymph nodes, no thyromegaly    CV:  +S1, +S2, regular, no murmurs or rubs    RESP:   lungs clear to auscultation bilaterally, no wheezing, rales, rhonchi, good air entry bilaterally    GI:  abdomen soft, non-tender, non-distended, normal BS    RECTAL:  not examined    MSK:   normal muscle tone, no atrophy, no rigidity, no contractions    EXT:   no significant edema.  no calf tenderness b/l     NEURO:  AAOX3, no focal neurological deficits, follows all commands, able to move extremities spontaneously    < from: US Duplex Venous Lower Ext Complete, Bilateral (07.01.19 @ 16:43) >    Positive for extensive deep vein thrombus within the right femoral vein,     right popliteal vein, left popliteal vein, and left posterior tibial     veins    < end of copied text >        Hospital Course:    	    63 y/o female with PMHx of gastroparesis, CHF, breast CA 2017 on tamoxifen, and hx of hip replacement who presented to  with c/o N/V/D.  Pt notes sx have been ongoing for more than 1 year.  Pt has been worked up by GI Dr. Kemp and thought to be related to gastroparesis.  Pt admits to a 80 lb weight loss over the last year.  Pt has been in and out of ERs recently for similar sx, given IV hydration, and sent home.  Pt notes she presented this time due to persistent vomiting and unable to keep down liquids.  N/V/D is off and on.  Diarrhea this time has been present for ~1 week.  3 loose watery bowel movements a day.  No blood.  No fevers at home.          # leukocytosis and elevated d-dimer 2/2 DVT     - pt was on dvt prophylaxis but was sedentary prior to admission    - will change to eliquis    - r/b/a d/w pt    # hypotension secondary to diarrhea    -Elevated lactate, tachycardia, leukocytosis present on admission.  >> secondary to diarrhea and dehydration w/ hypovolemia; Not sepsis    -urine culture negative; patient has no symptoms and never did    -d/c abx    -now improved.    # Abd pain/ N/V/D/ Gastroparesis:      Suspect acute exacerbation of chronic issues/ gastroparesis.      Cannot r/o new viral syndrome worsening sx.      D/c stool studies as no longer having diarrhea.      CT noted.      add lactulose     Family requesting Dr. Krishnamurthy consult-- ? Gastric PPM for severe gastroparesis.  >> outpatient        # Positive Troponin:      Suspect demand ischemia.      ECHO with EF 70%.      Stress testing in 2017 WNL.      # HLD:  Was not on statin outpatient.      # Weight Loss/ Malnutrition:      Nutrition eval.      Secondary to n/v/d x1 year.      # Breast Cancer:      Cont tamoxifen.         total time spent on dc 38 mins

## 2019-06-28 NOTE — DISCHARGE NOTE PROVIDER - NSDCCPCAREPLAN_GEN_ALL_CORE_FT
PRINCIPAL DISCHARGE DIAGNOSIS  Diagnosis: Gastroparesis  Assessment and Plan of Treatment: follow up outpatient with Dr. Krishnamurthy in 2 weeks.      SECONDARY DISCHARGE DIAGNOSES  Diagnosis: Gastroparesis  Assessment and Plan of Treatment: PRINCIPAL DISCHARGE DIAGNOSIS  Diagnosis: Gastroparesis  Assessment and Plan of Treatment: follow up outpatient with Dr. Krishnamurthy in 2 weeks.      SECONDARY DISCHARGE DIAGNOSES  Diagnosis: DVT, lower extremity  Assessment and Plan of Treatment:     Diagnosis: Gastroparesis  Assessment and Plan of Treatment:

## 2019-06-28 NOTE — DISCHARGE NOTE PROVIDER - PROVIDER TOKENS
PROVIDER:[TOKEN:[[id number]]] PROVIDER:[TOKEN:[[id number]]],PROVIDER:[TOKEN:[[id number]]]

## 2019-06-29 LAB — D DIMER BLD IA.RAPID-MCNC: 2737 NG/ML DDU — HIGH

## 2019-06-29 RX ADMIN — Medication 10 MILLIGRAM(S): at 23:23

## 2019-06-29 RX ADMIN — Medication 10 MILLIGRAM(S): at 11:27

## 2019-06-29 RX ADMIN — Medication 81 MILLIGRAM(S): at 11:27

## 2019-06-29 RX ADMIN — Medication 10 MILLIGRAM(S): at 06:02

## 2019-06-29 RX ADMIN — PANTOPRAZOLE SODIUM 40 MILLIGRAM(S): 20 TABLET, DELAYED RELEASE ORAL at 06:02

## 2019-06-29 RX ADMIN — Medication 10 MILLIGRAM(S): at 05:59

## 2019-06-29 RX ADMIN — TAMOXIFEN CITRATE 20 MILLIGRAM(S): 20 TABLET, FILM COATED ORAL at 11:27

## 2019-06-29 RX ADMIN — ENOXAPARIN SODIUM 40 MILLIGRAM(S): 100 INJECTION SUBCUTANEOUS at 11:27

## 2019-06-29 RX ADMIN — POLYETHYLENE GLYCOL 3350 17 GRAM(S): 17 POWDER, FOR SOLUTION ORAL at 11:35

## 2019-06-29 RX ADMIN — Medication 10 MILLIGRAM(S): at 17:24

## 2019-06-29 NOTE — PROGRESS NOTE ADULT - ASSESSMENT
1. Elevated cardiac enzymes- Cardiac enzymes are mildly elevated and almost in the same range.  This could likely be demand from ongoing clinical noncardiac issues.  No chest pain.   normal myocardial perfusion imaging in 2017.  No further ischemic workup now as inpt.  f/u as outpt with cardiologist.  Patient has been sedentary for 1 week and is tachycardic with bordrline trop will order ddimers if elevated then CTA    2. Vomiting and gastroparesis- Management per primary team. Surgery team input noted.    3. Atrial tachycardia- no events last night .  Ordered po potassium supplements.  Check magnesium level.  Goal Magnesium of 2.     Other medical issues- Management per primary team.   Thank you for allowing me to participate in the care of this patient. Please feel free to contact me with any questions.

## 2019-06-30 RX ORDER — LACTULOSE 10 G/15ML
20 SOLUTION ORAL DAILY
Refills: 0 | Status: DISCONTINUED | OUTPATIENT
Start: 2019-06-30 | End: 2019-07-02

## 2019-06-30 RX ADMIN — PANTOPRAZOLE SODIUM 40 MILLIGRAM(S): 20 TABLET, DELAYED RELEASE ORAL at 05:52

## 2019-06-30 RX ADMIN — Medication 10 MILLIGRAM(S): at 18:17

## 2019-06-30 RX ADMIN — TAMOXIFEN CITRATE 20 MILLIGRAM(S): 20 TABLET, FILM COATED ORAL at 11:37

## 2019-06-30 RX ADMIN — POLYETHYLENE GLYCOL 3350 17 GRAM(S): 17 POWDER, FOR SOLUTION ORAL at 11:36

## 2019-06-30 RX ADMIN — Medication 10 MILLIGRAM(S): at 11:37

## 2019-06-30 RX ADMIN — ENOXAPARIN SODIUM 40 MILLIGRAM(S): 100 INJECTION SUBCUTANEOUS at 11:36

## 2019-06-30 RX ADMIN — Medication 10 MILLIGRAM(S): at 05:52

## 2019-06-30 RX ADMIN — LACTULOSE 20 GRAM(S): 10 SOLUTION ORAL at 18:56

## 2019-06-30 RX ADMIN — Medication 81 MILLIGRAM(S): at 11:37

## 2019-06-30 RX ADMIN — Medication 10 MILLIGRAM(S): at 23:05

## 2019-07-01 LAB
ANION GAP SERPL CALC-SCNC: 6 MMOL/L — SIGNIFICANT CHANGE UP (ref 5–17)
BUN SERPL-MCNC: 6 MG/DL — LOW (ref 7–23)
CALCIUM SERPL-MCNC: 9.8 MG/DL — SIGNIFICANT CHANGE UP (ref 8.5–10.1)
CHLORIDE SERPL-SCNC: 106 MMOL/L — SIGNIFICANT CHANGE UP (ref 96–108)
CO2 SERPL-SCNC: 29 MMOL/L — SIGNIFICANT CHANGE UP (ref 22–31)
CREAT SERPL-MCNC: 0.53 MG/DL — SIGNIFICANT CHANGE UP (ref 0.5–1.3)
GLUCOSE SERPL-MCNC: 99 MG/DL — SIGNIFICANT CHANGE UP (ref 70–99)
HCT VFR BLD CALC: 40.9 % — SIGNIFICANT CHANGE UP (ref 34.5–45)
HGB BLD-MCNC: 12.9 G/DL — SIGNIFICANT CHANGE UP (ref 11.5–15.5)
MCHC RBC-ENTMCNC: 31.5 GM/DL — LOW (ref 32–36)
MCHC RBC-ENTMCNC: 32.7 PG — SIGNIFICANT CHANGE UP (ref 27–34)
MCV RBC AUTO: 103.5 FL — HIGH (ref 80–100)
PLATELET # BLD AUTO: 331 K/UL — SIGNIFICANT CHANGE UP (ref 150–400)
POTASSIUM SERPL-MCNC: 4.5 MMOL/L — SIGNIFICANT CHANGE UP (ref 3.5–5.3)
POTASSIUM SERPL-SCNC: 4.5 MMOL/L — SIGNIFICANT CHANGE UP (ref 3.5–5.3)
RBC # BLD: 3.95 M/UL — SIGNIFICANT CHANGE UP (ref 3.8–5.2)
RBC # FLD: 13.5 % — SIGNIFICANT CHANGE UP (ref 10.3–14.5)
SODIUM SERPL-SCNC: 141 MMOL/L — SIGNIFICANT CHANGE UP (ref 135–145)
WBC # BLD: 11.79 K/UL — HIGH (ref 3.8–10.5)
WBC # FLD AUTO: 11.79 K/UL — HIGH (ref 3.8–10.5)

## 2019-07-01 PROCEDURE — 93970 EXTREMITY STUDY: CPT | Mod: 26

## 2019-07-01 PROCEDURE — 71275 CT ANGIOGRAPHY CHEST: CPT | Mod: 26

## 2019-07-01 RX ORDER — SODIUM CHLORIDE 9 MG/ML
1000 INJECTION INTRAMUSCULAR; INTRAVENOUS; SUBCUTANEOUS
Refills: 0 | Status: DISCONTINUED | OUTPATIENT
Start: 2019-07-01 | End: 2019-07-02

## 2019-07-01 RX ORDER — APIXABAN 2.5 MG/1
10 TABLET, FILM COATED ORAL EVERY 12 HOURS
Refills: 0 | Status: DISCONTINUED | OUTPATIENT
Start: 2019-07-01 | End: 2019-07-02

## 2019-07-01 RX ADMIN — Medication 81 MILLIGRAM(S): at 12:22

## 2019-07-01 RX ADMIN — ENOXAPARIN SODIUM 40 MILLIGRAM(S): 100 INJECTION SUBCUTANEOUS at 12:22

## 2019-07-01 RX ADMIN — Medication 10 MILLIGRAM(S): at 17:55

## 2019-07-01 RX ADMIN — PANTOPRAZOLE SODIUM 40 MILLIGRAM(S): 20 TABLET, DELAYED RELEASE ORAL at 06:21

## 2019-07-01 RX ADMIN — POLYETHYLENE GLYCOL 3350 17 GRAM(S): 17 POWDER, FOR SOLUTION ORAL at 06:26

## 2019-07-01 RX ADMIN — Medication 10 MILLIGRAM(S): at 23:26

## 2019-07-01 RX ADMIN — LACTULOSE 20 GRAM(S): 10 SOLUTION ORAL at 12:21

## 2019-07-01 RX ADMIN — Medication 10 MILLIGRAM(S): at 06:21

## 2019-07-01 RX ADMIN — TAMOXIFEN CITRATE 20 MILLIGRAM(S): 20 TABLET, FILM COATED ORAL at 12:23

## 2019-07-01 RX ADMIN — APIXABAN 10 MILLIGRAM(S): 2.5 TABLET, FILM COATED ORAL at 18:39

## 2019-07-01 RX ADMIN — Medication 10 MILLIGRAM(S): at 12:22

## 2019-07-02 ENCOUNTER — TRANSCRIPTION ENCOUNTER (OUTPATIENT)
Age: 64
End: 2019-07-02

## 2019-07-02 VITALS — WEIGHT: 149.91 LBS

## 2019-07-02 RX ORDER — POLYETHYLENE GLYCOL 3350 17 G/17G
1 POWDER, FOR SOLUTION ORAL
Qty: 0 | Refills: 0 | DISCHARGE
Start: 2019-07-02

## 2019-07-02 RX ORDER — APIXABAN 2.5 MG/1
2 TABLET, FILM COATED ORAL
Qty: 0 | Refills: 0 | DISCHARGE
Start: 2019-07-02

## 2019-07-02 RX ORDER — LACTULOSE 10 G/15ML
30 SOLUTION ORAL
Qty: 0 | Refills: 0 | DISCHARGE
Start: 2019-07-02

## 2019-07-02 RX ADMIN — LACTULOSE 20 GRAM(S): 10 SOLUTION ORAL at 06:08

## 2019-07-02 RX ADMIN — Medication 10 MILLIGRAM(S): at 11:28

## 2019-07-02 RX ADMIN — PANTOPRAZOLE SODIUM 40 MILLIGRAM(S): 20 TABLET, DELAYED RELEASE ORAL at 05:20

## 2019-07-02 RX ADMIN — TAMOXIFEN CITRATE 20 MILLIGRAM(S): 20 TABLET, FILM COATED ORAL at 11:28

## 2019-07-02 RX ADMIN — Medication 10 MILLIGRAM(S): at 05:19

## 2019-07-02 RX ADMIN — POLYETHYLENE GLYCOL 3350 17 GRAM(S): 17 POWDER, FOR SOLUTION ORAL at 05:19

## 2019-07-02 RX ADMIN — SODIUM CHLORIDE 75 MILLILITER(S): 9 INJECTION INTRAMUSCULAR; INTRAVENOUS; SUBCUTANEOUS at 05:20

## 2019-07-02 RX ADMIN — APIXABAN 10 MILLIGRAM(S): 2.5 TABLET, FILM COATED ORAL at 05:19

## 2019-07-02 NOTE — DISCHARGE NOTE NURSING/CASE MANAGEMENT/SOCIAL WORK - NSDCDPATPORTLINK_GEN_ALL_CORE
You can access the [facility] Patient Portal, offered by [facility], by registering with the following website: [email or web address]

## 2019-07-02 NOTE — PROGRESS NOTE ADULT - PROVIDER SPECIALTY LIST ADULT
Bariatric Surgery
Cardiology
Gastroenterology
Gastroenterology
Hospitalist
Gastroenterology
Hospitalist
Cardiology

## 2019-07-02 NOTE — DISCHARGE NOTE NURSING/CASE MANAGEMENT/SOCIAL WORK - NSDCVIVACCINE_GEN_ALL_CORE_FT
Influenza , 2017/12/11 14:41 , Hillary Packer (RN)  Influenza , 2019/1/20 12:28 , Desiree Orellana (RN)

## 2019-07-02 NOTE — PROGRESS NOTE ADULT - SUBJECTIVE AND OBJECTIVE BOX
CHIEF COMPLAINT/Diagnosis: hypotension / diahrea/gasteroparesis    SUBJECTIVE: constipation resolved. pt agreeable to rehab.  p2p done yesterday and rehab approved. see dc summary for details    ROS:   All 10 systems reviewed and found to be negative with the exception of what has been described above.    Vital Signs Last 24 Hrs  T(C): 36.8 (02 Jul 2019 05:07), Max: 36.8 (01 Jul 2019 17:26)  T(F): 98.3 (02 Jul 2019 05:07), Max: 98.3 (02 Jul 2019 05:07)  HR: 92 (02 Jul 2019 05:07) (90 - 105)  BP: 122/55 (02 Jul 2019 05:07) (108/59 - 122/55)  BP(mean): --  RR: 18 (01 Jul 2019 23:25) (18 - 18)  SpO2: 98% (02 Jul 2019 05:07) (97% - 98%)      Constitutional: NAD, awake and alert, well-developed  HEENT: PERR, EOMI, Normal Hearing, MMM  Neck: Soft and supple, No LAD, No JVD  Respiratory: Breath sounds are clear bilaterally, No wheezing, rales or rhonchi  Cardiovascular: S1 and S2, regular rate and rhythm, no Murmurs, gallops or rubs  Gastrointestinal: Bowel Sounds present, soft, nontender, mildly distended, no guarding, no rebound  Extremities: no calf tendernerss no edema   Vascular: 2+ peripheral pulses  Neurological: A/O x 3, no focal deficits  Musculoskeletal: 5/5 strength b/l upper and lower extremities  Skin: No rashes  LABS: All Labs Reviewed:                                12.9   11.79 )-----------( 331      ( 01 Jul 2019 12:49 )             40.9     07-01    141  |  106  |  6<L>  ----------------------------<  99  4.5   |  29  |  0.53    Ca    9.8      01 Jul 2019 12:49                  Assessment and Plan:   	  65 y/o female with PMHx of gastroparesis, CHF, breast CA 2017 on tamoxifen, and hx of hip replacement who presented to  with c/o N/V/D.  Pt notes sx have been ongoing for more than 1 year.  Pt has been worked up by GI Dr. Kemp and thought to be related to gastroparesis.  Pt admits to a 80 lb weight loss over the last year.  Pt has been in and out of ERs recently for similar sx, given IV hydration, and sent home.  Pt notes she presented this time due to persistent vomiting and unable to keep down liquids.  N/V/D is off and on.  Diarrhea this time has been present for ~1 week.  3 loose watery bowel movements a day.  No blood.  No fevers at home.      # leukocytosis and elevated d-dimer 2/2 DVT   - continue eliquis. no s/s of bleeding     # hypotension secondary to diarrhea  -Elevated lactate, tachycardia, leukocytosis present on admission.  >> secondary to diarrhea and dehydration w/ hypovolemia; Not sepsis  -urine culture negative; patient has no symptoms and never did  -d/c abx  -now improved.    # Abd pain/ N/V/D/ Gastroparesis:    Suspect acute exacerbation of chronic issues/ gastroparesis.    Cannot r/o new viral syndrome worsening sx.    D/c stool studies as no longer having diarrhea.    CT noted.    add lactulose   Family requesting Dr. Krishnamurthy consult-- ? Gastric PPM for severe gastroparesis.  >> outpatient      # Positive Troponin:    Suspect demand ischemia.    ECHO with EF 70%.    Stress testing in 2017 WNL.    Cont medical management.    D/c tele.        # HLD:  Was not on statin outpatient.      # Weight Loss/ Malnutrition:    Nutrition eval.    Secondary to n/v/d x1 year.      # Breast Cancer:    Cont tamoxifen.      #  DVT Proph:    Lovenox.      rehab today
CHIEF COMPLAINT/Diagnosis: hypotension / diahrea/gasteroparesis    SUBJECTIVE: pt still with constipation.  pt noted to have elevated d-dimer and had ct chest with PE protocol which was negative.  LE doppler done which was positive for DVT.      ROS:   All 10 systems reviewed and found to be negative with the exception of what has been described above.    Vital Signs Last 24 Hrs  T(C): 36.8 (01 Jul 2019 17:26), Max: 36.9 (01 Jul 2019 04:56)  T(F): 98.2 (01 Jul 2019 17:26), Max: 98.4 (01 Jul 2019 04:56)  HR: 105 (01 Jul 2019 17:26) (95 - 105)  BP: 111/66 (01 Jul 2019 17:26) (96/53 - 116/62)  BP(mean): --  RR: 18 (01 Jul 2019 17:26) (18 - 18)  SpO2: 98% (01 Jul 2019 17:26) (98% - 98%)      Constitutional: NAD, awake and alert, well-developed  HEENT: PERR, EOMI, Normal Hearing, MMM  Neck: Soft and supple, No LAD, No JVD  Respiratory: Breath sounds are clear bilaterally, No wheezing, rales or rhonchi  Cardiovascular: S1 and S2, regular rate and rhythm, no Murmurs, gallops or rubs  Gastrointestinal: Bowel Sounds present, soft, nontender, mildly distended, no guarding, no rebound  Extremities: + calf tenderness  Vascular: 2+ peripheral pulses  Neurological: A/O x 3, no focal deficits  Musculoskeletal: 5/5 strength b/l upper and lower extremities  Skin: No rashes  LABS: All Labs Reviewed:                              12.9   11.79 )-----------( 331      ( 01 Jul 2019 12:49 )             40.9     07-01    141  |  106  |  6<L>  ----------------------------<  99  4.5   |  29  |  0.53    Ca    9.8      01 Jul 2019 12:49          Assessment and Plan:   	  63 y/o female with PMHx of gastroparesis, CHF, breast CA 2017 on tamoxifen, and hx of hip replacement who presented to  with c/o N/V/D.  Pt notes sx have been ongoing for more than 1 year.  Pt has been worked up by GI Dr. Kemp and thought to be related to gastroparesis.  Pt admits to a 80 lb weight loss over the last year.  Pt has been in and out of ERs recently for similar sx, given IV hydration, and sent home.  Pt notes she presented this time due to persistent vomiting and unable to keep down liquids.  N/V/D is off and on.  Diarrhea this time has been present for ~1 week.  3 loose watery bowel movements a day.  No blood.  No fevers at home.      # leukocytosis and elevated d-dimer 2/2 DVT   - pt was on dvt prophylaxis but was sedentary prior to admission  - will change to eliquis  - r/b/a d/w pt    # hypotension secondary to diarrhea  -Elevated lactate, tachycardia, leukocytosis present on admission.  >> secondary to diarrhea and dehydration w/ hypovolemia; Not sepsis  -urine culture negative; patient has no symptoms and never did  -d/c abx  -now improved.    # Abd pain/ N/V/D/ Gastroparesis:    Suspect acute exacerbation of chronic issues/ gastroparesis.    Cannot r/o new viral syndrome worsening sx.    D/c stool studies as no longer having diarrhea.    CT noted.    add lactulose   Family requesting Dr. Krishnamurthy consult-- ? Gastric PPM for severe gastroparesis.  >> outpatient      # Positive Troponin:    Suspect demand ischemia.    ECHO with EF 70%.    Stress testing in 2017 WNL.    Cont medical management.    D/c tele.        # HLD:  Was not on statin outpatient.      # Weight Loss/ Malnutrition:    Nutrition eval.    Secondary to n/v/d x1 year.      # Breast Cancer:    Cont tamoxifen.      #  DVT Proph:    Lovenox.        waiting placement for rehab.
CHIEF COMPLAINT/diagnosis: gastaroparesis/ dehydration w/ hypovolemia/ demand mediated ischemia/     SUBJECTIVE: no complaints    REVIEW OF SYSTEMS:    CONSTITUTIONAL: No weakness, fevers or chills  EYES/ENT: No visual changes;  No vertigo or throat pain   NECK: No pain or stiffness  RESPIRATORY: No cough, wheezing, hemoptysis; No shortness of breath  CARDIOVASCULAR: No chest pain or palpitations  GASTROINTESTINAL: No abdominal or epigastric pain. No nausea, vomiting, or hematemesis; No diarrhea or constipation. No melena or hematochezia.  GENITOURINARY: No dysuria, frequency or hematuria  NEUROLOGICAL: No numbness or weakness  SKIN: No itching, burning, rashes, or lesions   All other review of systems is negative unless indicated above    Vital Signs Last 24 Hrs  T(C): 36.8 (27 Jun 2019 17:08), Max: 36.8 (27 Jun 2019 17:08)  T(F): 98.2 (27 Jun 2019 17:08), Max: 98.2 (27 Jun 2019 17:08)  HR: 90 (27 Jun 2019 17:08) (90 - 112)  BP: 121/63 (27 Jun 2019 17:08) (98/59 - 121/63)  BP(mean): --  RR: 17 (27 Jun 2019 17:08) (17 - 17)  SpO2: 98% (27 Jun 2019 17:08) (95% - 99%)    I&O's Summary      CAPILLARY BLOOD GLUCOSE          PHYSICAL EXAM:    Constitutional: NAD, awake and alert, well-developed  HEENT: PERR, EOMI, Normal Hearing, MMM  Neck: Soft and supple, No LAD, No JVD  Respiratory: Breath sounds are clear bilaterally, No wheezing, rales or rhonchi  Cardiovascular: S1 and S2, regular rate and rhythm, no Murmurs, gallops or rubs  Gastrointestinal: Bowel Sounds present, soft, nontender, nondistended, no guarding, no rebound  Extremities: No peripheral edema  Vascular: 2+ peripheral pulses  Neurological: A/O x 3, no focal deficits  Musculoskeletal: 5/5 strength b/l upper and lower extremities  Skin: No rashes    MEDICATIONS:  MEDICATIONS  (STANDING):  aspirin  chewable 81 milliGRAM(s) Oral daily  cefTRIAXone Injectable.      cefTRIAXone Injectable. 1000 milliGRAM(s) IV Push every 24 hours  enoxaparin Injectable 40 milliGRAM(s) SubCutaneous daily  lactulose Syrup 30 Gram(s) Oral once  metoclopramide Injectable 10 milliGRAM(s) IV Push four times a day  pantoprazole    Tablet 40 milliGRAM(s) Oral before breakfast  polyethylene glycol 3350 17 Gram(s) Oral once  tamoxifen 20 milliGRAM(s) Oral daily      LABS: All Labs Reviewed:                        11.2   9.74  )-----------( 186      ( 26 Jun 2019 06:33 )             35.2     06-26    145  |  111<H>  |  8   ----------------------------<  91  3.6   |  25  |  0.36<L>    Ca    8.9      26 Jun 2019 06:33            Blood Culture: 06-25 @ 09:11  Organism --  Gram Stain Blood -- Gram Stain --  Specimen Source .Blood None  Culture-Blood --    06-25 @ 05:00  Organism --  Gram Stain Blood -- Gram Stain --  Specimen Source .Urine Clean Catch (Midstream)  Culture-Blood --    06-25 @ 00:09  Organism --  Gram Stain Blood -- Gram Stain --  Specimen Source .Blood Blood  Culture-Blood --          Assessment and Plan:   	  63 y/o female with PMHx of gastroparesis, CHF, breast CA 2017 on tamoxifen, and hx of hip replacement who presented to  with c/o N/V/D.  Pt notes sx have been ongoing for more than 1 year.  Pt has been worked up by GI Dr. Kemp and thought to be related to gastroparesis.  Pt admits to a 80 lb weight loss over the last year.  Pt has been in and out of ERs recently for similar sx, given IV hydration, and sent home.  Pt notes she presented this time due to persistent vomiting and unable to keep down liquids.  N/V/D is off and on.  Diarrhea this time has been present for ~1 week.  3 loose watery bowel movements a day.  No blood.  No fevers at home.      1-hypotension secondary to diahrea  -Elevated lactate, tachycardia, leukocytosis present on admission.  >> secondary to diahrea and dehydraion w/ hypovolemia; Not sepsis  -urine culture negative; patient has no symptoms and never did  -d/c abx    2.  Abd pain/ N/V/D/ Gastroparesis:    Suspect acute exacerbation of chronic issues/ gastroparesis.    Cannot r/o new viral syndrome worsening sx.    D/c stool studies as no longer having diarrhea.    CT noted.    Family requesting Dr. Krishnamurthy consult-- ? Gastric PPM for severe gastroparesis.  >> outpatient      4.  Positive Troponin:    Suspect demand ischemia.    ECHO with EF 70%.    Stress testing in 2017 WNL.    Cont medical management.    D/c tele.        5.  HLD:  Was not on statin outpatient.      6.  Weight Loss/ Malnutrition:    Nutrition eval.    Secondary to n/v/d x1 year.      7.  Breast Cancer:    Cont tamoxifen.      8.  DVT Proph:    Lovenox.
Patient is a 64y old  Female who presents with a chief complaint of n/v/d (25 Jun 2019 10:52)    HPI:  65 y/o female with PMHx of gastroparesis, CHF, HLD, Colitis, Breast CA 2017 on tamoxifen, and hx of hip replacement who presented to  with c/o N/V/D.  Pt notes sx have been ongoing for more than 1 year.  Pt has been worked up by GI Dr. Kemp and thought to be related to gastroparesis.  Pt admits to a 80 lb weight loss over the last year.  Pt has been in and out of ERs recently for similar sx, given IV hydration, and sent home.  Pt notes she presented this time due to persistent vomiting and unable to keep down liquids.  N/V/D is off and on.  Diarrhea this time has been present for ~1 week.  3 loose watery bowel movements a day.  No blood.  No fevers at home.      6/26/2019-  Pt reports feeling better.   No N/V, SOB, or CP.   Denies any diarrhea for past 2 days.     PMHx:    Arthritis  Colitis  Anemia  Weakness  Nausea & vomiting  CHF (congestive heart failure): 12/2017 resolved  Breast cancer: left 2017, had surgery and radiation  High cholesterol  High blood pressure: reports medication was stopped April 2018 because BP was low    PSHx:    History of total hip replacement, left: march 2018  History of hip replacement, total, right: 11/2017  History of lumpectomy of left breast: 2017  Atypical lipoma of soft tissue: in left groin 2008  H/O foot surgery: ankle left 2004    FAMILY HISTORY:  Family history of cardiac arrest  Family history of stroke  Family history of breast cancer in sister (Sibling)  Family history of hypertension in mother    Social History:  Denies tob/ etoh/ drug use.      Allergies  Crestor (Muscle Pain)  shellfish (Swelling; Rash) (25 Jun 2019 09:17)    PAST MEDICAL & SURGICAL HISTORY:  Arthritis  Colitis  Anemia  Weakness  Nausea & vomiting  CHF (congestive heart failure): 12/2017 resolved  Breast cancer: left 2017, had surgery and radiation  High cholesterol: d/c&#x27;d by prescribing MD in April &quot;since I&#x27;m not eating&quot;  High blood pressure: reports medication was stopped April 2018 because BP was low  History of endoscopy: 7/2018  S/P colonoscopy: 2018  History of total hip replacement, left: march 2018  History of hip replacement, total, right: 11/2017  History of lumpectomy of left breast: 2017  Atypical lipoma of soft tissue: in left groin 2008  H/O foot surgery: ankle left 2004    MEDICATIONS  (STANDING):  aspirin  chewable 81 milliGRAM(s) Oral daily  cefTRIAXone Injectable.      cefTRIAXone Injectable. 1000 milliGRAM(s) IV Push every 24 hours  enoxaparin Injectable 40 milliGRAM(s) SubCutaneous daily  metoclopramide Injectable 10 milliGRAM(s) IV Push four times a day  pantoprazole  Injectable 40 milliGRAM(s) IV Push two times a day  sodium chloride 0.9%. 1000 milliLiter(s) (100 mL/Hr) IV Continuous <Continuous>  tamoxifen 20 milliGRAM(s) Oral daily    MEDICATIONS  (PRN):  acetaminophen   Tablet .. 650 milliGRAM(s) Oral every 4 hours PRN Mild Pain (1 - 3)  aluminum hydroxide/magnesium hydroxide/simethicone Suspension 30 milliLiter(s) Oral every 4 hours PRN Dyspepsia  ondansetron Injectable 4 milliGRAM(s) IV Push every 6 hours PRN Nausea    Allergies  Crestor (Muscle Pain)  shellfish (Swelling; Rash)    FAMILY HISTORY:  Family history of cardiac arrest  Family history of stroke  Family history of breast cancer in sister (Sibling)  Family history of hypertension in mother    REVIEW OF SYSTEMS:  CONSTITUTIONAL: No weakness, fevers or chills  RESPIRATORY: No cough, wheezing, hemoptysis; No shortness of breath  CARDIOVASCULAR: No chest pain or palpitations  GASTROINTESTINAL: Per HPI.     Vital Signs Last 24 Hrs  T(C): 36.5 (26 Jun 2019 04:44), Max: 36.8 (25 Jun 2019 10:22)  T(F): 97.7 (26 Jun 2019 04:44), Max: 98.2 (25 Jun 2019 10:22)  HR: 87 (26 Jun 2019 04:44) (84 - 103)  BP: 97/46 (26 Jun 2019 04:44) (97/46 - 99/57)  BP(mean): --  RR: 17 (26 Jun 2019 04:44) (16 - 17)  SpO2: 97% (26 Jun 2019 04:44) (97% - 98%)    PHYSICAL EXAM:  Constitutional: Appears dehydrated, in nad  Respiratory: CTAB  Cardiovascular: S1 and S2, RRR, no M/G/R  Gastrointestinal: BS+, soft, NT/ND    LABS:                        11.2   9.74  )-----------( 186      ( 26 Jun 2019 06:33 )             35.2     06-26    145  |  111<H>  |  8   ----------------------------<  91  3.6   |  25  |  0.36<L>    Ca    8.9      26 Jun 2019 06:33  Mg     1.9     06-25    TPro  6.7  /  Alb  2.7<L>  /  TBili  0.3  /  DBili  x   /  AST  34  /  ALT  25  /  AlkPhos  105  06-25    PT/INR - ( 25 Jun 2019 00:08 )   PT: 14.2 sec;   INR: 1.27 ratio         PTT - ( 25 Jun 2019 00:08 )  PTT:20.6 sec  LIVER FUNCTIONS - ( 25 Jun 2019 00:08 )  Alb: 2.7 g/dL / Pro: 6.7 gm/dL / ALK PHOS: 105 U/L / ALT: 25 U/L / AST: 34 U/L / GGT: x             RADIOLOGY & ADDITIONAL STUDIES:
63 y/o female with PMHx of gastroparesis, CHF, HLD, Colitis, Breast CA 2017 on tamoxifen, and hx of hip replacement who presented to  with c/o N/V/D.  Pt notes sx have been ongoing for more than 1 year.  Pt has been worked up by GI Dr. Kemp and thought to be related to gastroparesis.  Pt admits to a 80 lb weight loss over the last year.  Pt has been in and out of ERs recently for similar sx, given IV hydration, and sent home.  Pt notes she presented this time due to persistent vomiting and unable to keep down liquids.  N/V/D is off and on.  Diarrhea this time has been present for ~1 week.  3 loose watery bowel movements a day.  No blood.  No fevers at home.      :  Pt seen.  Overall improved.  No further diarrhea as per patient since monday.  Still with nausea.  Tolerating clear liquids now.  No vomiting.  D/w family at bedside.      Review of system- Rest of the review of system are negative except mentioned in HPI    Vital Signs Last 24 Hrs  T(C): 36.9 (2019 17:04), Max: 36.9 (2019 17:04)  T(F): 98.4 (2019 17:04), Max: 98.4 (2019 17:04)  HR: 87 (2019 17:04) (67 - 87)  BP: 97/52 (2019 17:04) (97/46 - 103/58)  BP(mean): --  RR: 18 (2019 17:04) (17 - 18)  SpO2: 97% (2019 17:04) (97% - 98%)    PHYSICAL EXAM:  GENERAL: NAD  NERVOUS SYSTEM:  Alert & Oriented X3, non- focal exam, Motor Strength 5/5 B/L upper and lower extremities; DTRs 2+ intact and symmetric  HEAD:  Atraumatic, Normocephalic  EYES: EOMI, PERRLA, conjunctiva and sclera clear  HEENT: Moist mucous membranes  NECK: Supple, No JVD  CHEST/LUNG: Clear to auscultation bilaterally; No rales, no rhonchi, no wheezing, or rubs  HEART: Regular rate and rhythm; No murmurs, rubs, or gallops  ABDOMEN: Soft, Nontender, Nondistended; Bowel sounds present  GENITOURINARY- Voiding, no suprapubic tenderness  EXTREMITIES:  2+ Peripheral Pulses, No clubbing, cyanosis, or edema  MUSCULOSKELETAL:- No muscle tenderness, Muscle tone normal, No joint tenderness, no Joint swelling, Joint range of motion-normal  SKIN-no rash, no lesion    LABS:                        11.2   9.74  )-----------( 186      ( 2019 06:33 )             35.2     06-26    145  |  111<H>  |  8   ----------------------------<  91  3.6   |  25  |  0.36<L>    Ca    8.9      2019 06:33  Mg     1.9         TPro  6.7  /  Alb  2.7<L>  /  TBili  0.3  /  DBili  x   /  AST  34  /  ALT  25  /  AlkPhos  105  06-25  PT/INR - ( 2019 00:08 )   PT: 14.2 sec;   INR: 1.27 ratio      PTT - ( 2019 00:08 )  PTT:20.6 sec  CARDIAC MARKERS ( 2019 05:45 )  0.517 ng/mL / x     / x     / x     / x      CARDIAC MARKERS ( 2019 03:13 )  0.578 ng/mL / x     / x     / x     / x      CARDIAC MARKERS ( 2019 00:08 )  0.643 ng/mL / x     / x     / x     / x          Urinalysis Basic - ( 2019 05:00 )  Color: Brown / Appearance: Slightly Turbid / S.025 / pH: x  Gluc: x / Ketone: Small  / Bili: Small / Urobili: 4 mg/dL   Blood: x / Protein: 100 mg/dL / Nitrite: Positive   Leuk Esterase: Trace / RBC: 25-50 /HPF / WBC 3-5   Sq Epi: x / Non Sq Epi: Few / Bacteria: Few    CAPILLARY BLOOD GLUCOSE    RADIOLOGY & ADDITIONAL TESTS:  < from: CT Abdomen and Pelvis w/ Oral Cont and w/ IV Cont (19 @ 11:56) >    IMPRESSION:     *  Gastric distention with nonspecific prominence of the pylorus.   Correlate for findings of gastric outlet obstruction.  *  No evidence of small bowel obstruction.  *  Diffuse anasarca.    MEDICATIONS  (STANDING):  aspirin  chewable 81 milliGRAM(s) Oral daily  cefTRIAXone Injectable.      cefTRIAXone Injectable. 1000 milliGRAM(s) IV Push every 24 hours  enoxaparin Injectable 40 milliGRAM(s) SubCutaneous daily  metoclopramide Injectable 10 milliGRAM(s) IV Push four times a day  pantoprazole  Injectable 40 milliGRAM(s) IV Push two times a day  sodium chloride 0.9%. 1000 milliLiter(s) (100 mL/Hr) IV Continuous <Continuous>  tamoxifen 20 milliGRAM(s) Oral daily    MEDICATIONS  (PRN):  acetaminophen   Tablet .. 650 milliGRAM(s) Oral every 4 hours PRN Mild Pain (1 - 3)  aluminum hydroxide/magnesium hydroxide/simethicone Suspension 30 milliLiter(s) Oral every 4 hours PRN Dyspepsia  ondansetron Injectable 4 milliGRAM(s) IV Push every 6 hours PRN Nausea
CHIEF COMPLAINT/Diagnosis: hypotension / diahrea/gasteroparesis    SUBJECTIVE: no complaints    REVIEW OF SYSTEMS:    CONSTITUTIONAL: No weakness, fevers or chills  EYES/ENT: No visual changes;  No vertigo or throat pain   NECK: No pain or stiffness  RESPIRATORY: No cough, wheezing, hemoptysis; No shortness of breath  CARDIOVASCULAR: No chest pain or palpitations  GASTROINTESTINAL: No abdominal or epigastric pain. No nausea, vomiting, or hematemesis; No diarrhea or constipation. No melena or hematochezia.  GENITOURINARY: No dysuria, frequency or hematuria  NEUROLOGICAL: No numbness or weakness  SKIN: No itching, burning, rashes, or lesions   All other review of systems is negative unless indicated above    Vital Signs Last 24 Hrs  T(C): 36.7 (29 Jun 2019 05:10), Max: 36.9 (28 Jun 2019 17:22)  T(F): 98 (29 Jun 2019 05:10), Max: 98.4 (28 Jun 2019 17:22)  HR: 102 (29 Jun 2019 05:10) (93 - 102)  BP: 123/64 (29 Jun 2019 05:10) (123/64 - 131/63)  BP(mean): --  RR: 18 (29 Jun 2019 05:10) (17 - 18)  SpO2: 98% (29 Jun 2019 05:10) (98% - 98%)    I&O's Summary      CAPILLARY BLOOD GLUCOSE          PHYSICAL EXAM:    Constitutional: NAD, awake and alert, well-developed  HEENT: PERR, EOMI, Normal Hearing, MMM  Neck: Soft and supple, No LAD, No JVD  Respiratory: Breath sounds are clear bilaterally, No wheezing, rales or rhonchi  Cardiovascular: S1 and S2, regular rate and rhythm, no Murmurs, gallops or rubs  Gastrointestinal: Bowel Sounds present, soft, nontender, nondistended, no guarding, no rebound  Extremities: No peripheral edema  Vascular: 2+ peripheral pulses  Neurological: A/O x 3, no focal deficits  Musculoskeletal: 5/5 strength b/l upper and lower extremities  Skin: No rashes    MEDICATIONS:  MEDICATIONS  (STANDING):  aspirin  chewable 81 milliGRAM(s) Oral daily  enoxaparin Injectable 40 milliGRAM(s) SubCutaneous daily  metoclopramide Injectable 10 milliGRAM(s) IV Push four times a day  pantoprazole    Tablet 40 milliGRAM(s) Oral before breakfast  tamoxifen 20 milliGRAM(s) Oral daily      LABS: All Labs Reviewed:      Mg     1.6     06-28            Blood Culture: 06-25 @ 09:11  Organism --  Gram Stain Blood -- Gram Stain --  Specimen Source .Blood None  Culture-Blood --    06-25 @ 05:00  Organism --  Gram Stain Blood -- Gram Stain --  Specimen Source .Urine Clean Catch (Midstream)  Culture-Blood --    06-25 @ 00:09  Organism --  Gram Stain Blood -- Gram Stain --  Specimen Source .Blood Blood  Culture-Blood --          Assessment and Plan:   	  63 y/o female with PMHx of gastroparesis, CHF, breast CA 2017 on tamoxifen, and hx of hip replacement who presented to  with c/o N/V/D.  Pt notes sx have been ongoing for more than 1 year.  Pt has been worked up by GI Dr. Kemp and thought to be related to gastroparesis.  Pt admits to a 80 lb weight loss over the last year.  Pt has been in and out of ERs recently for similar sx, given IV hydration, and sent home.  Pt notes she presented this time due to persistent vomiting and unable to keep down liquids.  N/V/D is off and on.  Diarrhea this time has been present for ~1 week.  3 loose watery bowel movements a day.  No blood.  No fevers at home.      1-hypotension secondary to diahrea  -Elevated lactate, tachycardia, leukocytosis present on admission.  >> secondary to diahrea and dehydration w/ hypovolemia; Not sepsis  -urine culture negative; patient has no symptoms and never did  -d/c abx  -now improved.    2.  Abd pain/ N/V/D/ Gastroparesis:    Suspect acute exacerbation of chronic issues/ gastroparesis.    Cannot r/o new viral syndrome worsening sx.    D/c stool studies as no longer having diarrhea.    CT noted.    Family requesting Dr. Krishnamurthy consult-- ? Gastric PPM for severe gastroparesis.  >> outpatient      4.  Positive Troponin:    Suspect demand ischemia.    ECHO with EF 70%.    Stress testing in 2017 WNL.    Cont medical management.    D/c tele.        5.  HLD:  Was not on statin outpatient.      6.  Weight Loss/ Malnutrition:    Nutrition eval.    Secondary to n/v/d x1 year.      7.  Breast Cancer:    Cont tamoxifen.      8.  DVT Proph:    Lovenox.        waiting placement for rehab.
Cardiology Progress Note    HPI:64yof with pmh as stated below presented with c/o worsening vomiting.  Pt has history of gastroparesis in the past.  SHe denies any CP or pressure but c/o epigastric discomfort.    . Case d/w GI, pt at bedside. No CP. Feels weak/tired.   SR on tele.     PAST MEDICAL & SURGICAL HISTORY:  Arthritis  Colitis  Anemia  Weakness  Nausea & vomiting  CHF (congestive heart failure): 2017 resolved  Breast cancer: left , had surgery and radiation  High cholesterol: d/c&#x27;d by prescribing MD in April &quot;since I&#x27;m not eating&quot;  High blood pressure: reports medication was stopped 2018 because BP was low  History of endoscopy: 2018  S/P colonoscopy:   History of total hip replacement, left: 2018  History of hip replacement, total, right: 2017  History of lumpectomy of left breast:   Atypical lipoma of soft tissue: in left groin   H/O foot surgery: ankle left       MEDICATIONS  (STANDING):    MEDICATIONS  (PRN):      FAMILY HISTORY:  Family history of cardiac arrest  Family history of stroke  Family history of breast cancer in sister (Sibling)  Family history of hypertension in mother      SOCIAL HISTORY:  no recent smoking     REVIEW OF SYSTEMS:  CONSTITUTIONAL:    No fatigue, malaise, lethargy.  No fever or chills.  HEENT:  Eyes:  No visual changes.     ENT:  No epistaxis.  No sinus pain.    RESPIRATORY:  No cough.  No wheeze.  No hemoptysis.  No shortness of breath.  CARDIOVASCULAR:  No chest pains.  No palpitations. No shortness of breath, No orthopnea or PND.  GASTROINTESTINAL:  No abdominal pain.  No nausea or vomiting.    GENITOURINARY:    No hematuria.    MUSCULOSKELETAL:  No musculoskeletal pain.  No joint swelling.  No arthritis.  NEUROLOGICAL:  No tingling or numbness or weakness.  PSYCHIATRIC:  No confusion  SKIN:  No rashes.    ENDOCRINE:  No unexplained weight loss.  No polydipsia.   HEMATOLOGIC:  No anemia.  No prolonged or excessive bleeding.   ALLERGIC AND IMMUNOLOGIC:  No pruritus.          Vital Signs Last 24 Hrs  T(C): 36.4 (2019 07:10), Max: 36.7 (2019 00:01)  T(F): 97.6 (2019 07:10), Max: 98 (2019 00:01)  HR: 89 (2019 07:10) (88 - 136)  BP: 124/72 (2019 07:10) (98/63 - 150/62)  BP(mean): --  RR: 18 (2019 07:10) (17 - 18)  SpO2: 99% (2019 07:10) (95% - 100%)    PHYSICAL EXAM-    Constitutional: elderly frail female in no acute distress     Head: Head is normocephalic and atraumatic.      Neck: No JVD.     Cardiovascular: Regular rate and rhythm without S3, S4. No murmurs or rubs are appreciated.      Respiratory: Breathsounds are normal. No rales. No wheezing.    Abdomen: Soft, nontender, nondistended with positive bowel sounds.      Extremity: No tenderness. No  pitting edema     Neurologic: The patient is alert and oriented.      Skin: No rash, no obvious lesions noted.      Psychiatric: The patient appears to be emotionally stable.      INTERPRETATION OF TELEMETRY: SR     ECG: Sinus rythm , normal axis, no ST T changes.     I&O's Detail      LABS:                        13.2   14.60 )-----------( 240      ( 2019 00:08 )             41.0     06-25    139  |  103  |  16  ----------------------------<  96  4.5   |  29  |  0.78    Ca    9.6      2019 00:08  Mg     1.9     06-25    TPro  6.7  /  Alb  2.7<L>  /  TBili  0.3  /  DBili  x   /  AST  34  /  ALT  25  /  AlkPhos  105  06-25    CARDIAC MARKERS ( 2019 05:45 )  0.517 ng/mL / x     / x     / x     / x      CARDIAC MARKERS ( 2019 03:13 )  0.578 ng/mL / x     / x     / x     / x      CARDIAC MARKERS ( 2019 00:08 )  0.643 ng/mL / x     / x     / x     / x          PT/INR - ( 2019 00:08 )   PT: 14.2 sec;   INR: 1.27 ratio         PTT - ( 2019 00:08 )  PTT:20.6 sec  Urinalysis Basic - ( 2019 05:00 )    Color: Brown / Appearance: Slightly Turbid / S.025 / pH: x  Gluc: x / Ketone: Small  / Bili: Small / Urobili: 4 mg/dL   Blood: x / Protein: 100 mg/dL / Nitrite: Positive   Leuk Esterase: Trace / RBC: 25-50 /HPF / WBC 3-5   Sq Epi: x / Non Sq Epi: Few / Bacteria: Few      I&O's Summary    BNP  RADIOLOGY & ADDITIONAL STUDIES:  < from: Transthoracic Echocardiogram (19 @ 14:34) >   Mild concentric left ventricular hypertrophy is present. Estimated left   ventricular ejection fraction is 70%. Mild late peaking LVOT gradient.   Normal appearing right ventricle structure and function.   Mild aortic sclerosis is present with normal valvular opening.   Fibrocalcific changes noted to the mitral valve leaflets with preserved   leaflet excursion. Mild (1+) mitral regurgitation is present.   Mild to moderate tricuspid regurgitation is present.   Mild to moderate pulmonic valvular regurgitation is present.   Trace pericardial effusion is present.    < end of copied text >
Patient has gastroparesis  patient can follow up with me as an outpatient to discuss surgical treatment with a gastric pacemaker, Enterra therapy  This needs to be done as an outpatient procedure.  The pacemaker needs to be pre-ordered and authorized.  [address] [place] [id number]  [phone]    Thanks dr. tiffany mooney
Patient is a 64y old  Female who presents with a chief complaint of n/v/d (26 Jun 2019 17:42)    HPI:  63 y/o female with PMHx of gastroparesis, CHF, HLD, Colitis, Breast CA 2017 on tamoxifen, and hx of hip replacement who presented to  with c/o N/V/D.  Pt notes sx have been ongoing for more than 1 year.  Pt has been worked up by GI Dr. Kemp and thought to be related to gastroparesis.  Pt admits to a 80 lb weight loss over the last year.  Pt has been in and out of ERs recently for similar sx, given IV hydration, and sent home.  Pt notes she presented this time due to persistent vomiting and unable to keep down liquids.  N/V/D is off and on.  Diarrhea this time has been present for ~1 week.  3 loose watery bowel movements a day.  No blood.  No fevers at home.      6/27/2019-  Pt feels much better today.   Still with mild nausea but tolerable.   No further diarrhea or emesis.     PMHx:    Arthritis  Colitis  Anemia  Weakness  Nausea & vomiting  CHF (congestive heart failure): 12/2017 resolved  Breast cancer: left 2017, had surgery and radiation  High cholesterol  High blood pressure: reports medication was stopped April 2018 because BP was low    PSHx:    History of total hip replacement, left: march 2018  History of hip replacement, total, right: 11/2017  History of lumpectomy of left breast: 2017  Atypical lipoma of soft tissue: in left groin 2008  H/O foot surgery: ankle left 2004    FAMILY HISTORY:  Family history of cardiac arrest  Family history of stroke  Family history of breast cancer in sister (Sibling)  Family history of hypertension in mother    Social History:  Denies tob/ etoh/ drug use.      Allergies  Crestor (Muscle Pain)  shellfish (Swelling; Rash) (25 Jun 2019 09:17)    PAST MEDICAL & SURGICAL HISTORY:  Arthritis  Colitis  Anemia  Weakness  Nausea & vomiting  CHF (congestive heart failure): 12/2017 resolved  Breast cancer: left 2017, had surgery and radiation  High cholesterol: d/c&#x27;d by prescribing MD in April &quot;since I&#x27;m not eating&quot;  High blood pressure: reports medication was stopped April 2018 because BP was low  History of endoscopy: 7/2018  S/P colonoscopy: 2018  History of total hip replacement, left: march 2018  History of hip replacement, total, right: 11/2017  History of lumpectomy of left breast: 2017  Atypical lipoma of soft tissue: in left groin 2008  H/O foot surgery: ankle left 2004    MEDICATIONS  (STANDING):  aspirin  chewable 81 milliGRAM(s) Oral daily  cefTRIAXone Injectable.      cefTRIAXone Injectable. 1000 milliGRAM(s) IV Push every 24 hours  enoxaparin Injectable 40 milliGRAM(s) SubCutaneous daily  metoclopramide Injectable 10 milliGRAM(s) IV Push four times a day  pantoprazole  Injectable 40 milliGRAM(s) IV Push two times a day  sodium chloride 0.9%. 1000 milliLiter(s) (100 mL/Hr) IV Continuous <Continuous>  tamoxifen 20 milliGRAM(s) Oral daily    MEDICATIONS  (PRN):  acetaminophen   Tablet .. 650 milliGRAM(s) Oral every 4 hours PRN Mild Pain (1 - 3)  aluminum hydroxide/magnesium hydroxide/simethicone Suspension 30 milliLiter(s) Oral every 4 hours PRN Dyspepsia  ondansetron Injectable 4 milliGRAM(s) IV Push every 6 hours PRN Nausea    Allergies  Crestor (Muscle Pain)  shellfish (Swelling; Rash)    FAMILY HISTORY:  Family history of cardiac arrest  Family history of stroke  Family history of breast cancer in sister (Sibling)  Family history of hypertension in mother    REVIEW OF SYSTEMS:  CONSTITUTIONAL: No weakness, fevers or chills  RESPIRATORY: No cough, wheezing, hemoptysis; No shortness of breath  CARDIOVASCULAR: No chest pain or palpitations  GASTROINTESTINAL: Per HPI.   GENITOURINARY: No dysuria, frequency or hematuria  NEUROLOGICAL: No numbness or weakness  SKIN: No itching, burning, rashes, or lesions   PSYCH: Normal mood and affect  All other review of systems is negative unless indicated above.    Vital Signs Last 24 Hrs  T(C): 36.6 (27 Jun 2019 04:47), Max: 36.9 (26 Jun 2019 17:04)  T(F): 97.9 (27 Jun 2019 04:47), Max: 98.4 (26 Jun 2019 17:04)  HR: 102 (27 Jun 2019 04:47) (67 - 102)  BP: 114/53 (27 Jun 2019 04:47) (97/52 - 114/53)  BP(mean): --  RR: 17 (27 Jun 2019 04:47) (17 - 18)  SpO2: 95% (27 Jun 2019 04:47) (95% - 98%)    PHYSICAL EXAM:  Constitutional: Appears dehydrated but much improved.   Respiratory: CTAB  Cardiovascular: S1 and S2, RRR, no M/G/R  Gastrointestinal: BS+, soft, NT/ND  Extremities: No peripheral edema  Vascular: 2+ peripheral pulses  Neurological: A/O x 3, no focal deficits  Psychiatric: Normal mood, normal affect  Skin: No rashes    LABS:                        11.2   9.74  )-----------( 186      ( 26 Jun 2019 06:33 )             35.2     06-26    145  |  111<H>  |  8   ----------------------------<  91  3.6   |  25  |  0.36<L>    Ca    8.9      26 Jun 2019 06:33            RADIOLOGY & ADDITIONAL STUDIES:
Patient is a 64y old  Female who presents with a chief complaint of n/v/d (28 Jun 2019 08:02)      HPI:  63 y/o female with PMHx of gastroparesis, CHF, HLD, Colitis, Breast CA 2017 on tamoxifen, and hx of hip replacement who presented to  with c/o N/V/D.  Pt notes sx have been ongoing for more than 1 year.  Pt has been worked up by GI Dr. Kemp and thought to be related to gastroparesis.  Pt admits to a 80 lb weight loss over the last year.  Pt has been in and out of ERs recently for similar sx, given IV hydration, and sent home.  Pt notes she presented this time due to persistent vomiting and unable to keep down liquids.  N/V/D is off and on.  Diarrhea this time has been present for ~1 week.  3 loose watery bowel movements a day.  No blood.  No fevers at home.      Patient feels better. Going to rehab today. Will follow up as outpatient.     PMHx:    Arthritis  Colitis  Anemia  Weakness  Nausea & vomiting  CHF (congestive heart failure): 12/2017 resolved  Breast cancer: left 2017, had surgery and radiation  High cholesterol  High blood pressure: reports medication was stopped April 2018 because BP was low    PSHx:    History of total hip replacement, left: march 2018  History of hip replacement, total, right: 11/2017  History of lumpectomy of left breast: 2017  Atypical lipoma of soft tissue: in left groin 2008  H/O foot surgery: ankle left 2004    FAMILY HISTORY:  Family history of cardiac arrest  Family history of stroke  Family history of breast cancer in sister (Sibling)  Family history of hypertension in mother    Social History:  Denies tob/ etoh/ drug use.      Allergies  Crestor (Muscle Pain)  shellfish (Swelling; Rash) (25 Jun 2019 09:17)      PAST MEDICAL & SURGICAL HISTORY:  Arthritis  Colitis  Anemia  Weakness  Nausea & vomiting  CHF (congestive heart failure): 12/2017 resolved  Breast cancer: left 2017, had surgery and radiation  High cholesterol: d/c&#x27;d by prescribing MD in April &quot;since I&#x27;m not eating&quot;  High blood pressure: reports medication was stopped April 2018 because BP was low  History of endoscopy: 7/2018  S/P colonoscopy: 2018  History of total hip replacement, left: march 2018  History of hip replacement, total, right: 11/2017  History of lumpectomy of left breast: 2017  Atypical lipoma of soft tissue: in left groin 2008  H/O foot surgery: ankle left 2004      MEDICATIONS  (STANDING):  aspirin  chewable 81 milliGRAM(s) Oral daily  enoxaparin Injectable 40 milliGRAM(s) SubCutaneous daily  metoclopramide Injectable 10 milliGRAM(s) IV Push four times a day  pantoprazole    Tablet 40 milliGRAM(s) Oral before breakfast  potassium chloride    Tablet ER 40 milliEquivalent(s) Oral once  tamoxifen 20 milliGRAM(s) Oral daily    MEDICATIONS  (PRN):  acetaminophen   Tablet .. 650 milliGRAM(s) Oral every 4 hours PRN Mild Pain (1 - 3)  aluminum hydroxide/magnesium hydroxide/simethicone Suspension 30 milliLiter(s) Oral every 4 hours PRN Dyspepsia  ondansetron Injectable 4 milliGRAM(s) IV Push every 6 hours PRN Nausea  polyethylene glycol 3350 17 Gram(s) Oral two times a day PRN Constipation      Allergies    Crestor (Muscle Pain)  shellfish (Swelling; Rash)    Intolerances        Vital Signs Last 24 Hrs  T(C): 36.6 (28 Jun 2019 04:36), Max: 36.8 (27 Jun 2019 17:08)  T(F): 97.8 (28 Jun 2019 04:36), Max: 98.2 (27 Jun 2019 17:08)  HR: 96 (28 Jun 2019 04:36) (90 - 112)  BP: 134/68 (28 Jun 2019 04:36) (98/59 - 134/68)  BP(mean): --  RR: 18 (28 Jun 2019 04:36) (17 - 18)  SpO2: 95% (28 Jun 2019 04:36) (95% - 99%)    PHYSICAL EXAM:      Respiratory: CTAB  Cardiovascular: S1 and S2, RRR, no M/G/R  Gastrointestinal: BS+, soft, NT/ND    LABS:                RADIOLOGY & ADDITIONAL STUDIES:
Patient is a 64y old  Female who presents with a chief complaint of vomiting     HPI:64yof with pmh as stated below presented with c/o worsening vomiting.  Pt has history of gastroparesis in the past.  SHe denies any CP or pressure but c/o epigastric discomfort.    - pt seen and examined by me today. Pt denies any CP or SOB.    - pt seen and examined by me today. Pt states her nausea improved.     PAST MEDICAL & SURGICAL HISTORY:  Arthritis  Colitis  Anemia  Weakness  Nausea & vomiting  CHF (congestive heart failure): 2017 resolved  Breast cancer: left , had surgery and radiation  High cholesterol: d/c&#x27;d by prescribing MD in April &quot;since I&#x27;m not eating&quot;  High blood pressure: reports medication was stopped 2018 because BP was low  History of endoscopy: 2018  S/P colonoscopy: 2018  History of total hip replacement, left: 2018  History of hip replacement, total, right: 2017  History of lumpectomy of left breast:   Atypical lipoma of soft tissue: in left groin   H/O foot surgery: ankle left       MEDICATIONS  (STANDING):    MEDICATIONS  (PRN):      FAMILY HISTORY:  Family history of cardiac arrest  Family history of stroke  Family history of breast cancer in sister (Sibling)  Family history of hypertension in mother      SOCIAL HISTORY:  no recent smoking     REVIEW OF SYSTEMS:  CONSTITUTIONAL:    No fatigue, malaise, lethargy.  No fever or chills.  HEENT:  Eyes:  No visual changes.     ENT:  No epistaxis.  No sinus pain.    RESPIRATORY:  No cough.  No wheeze.  No hemoptysis.  No shortness of breath.  CARDIOVASCULAR:  No chest pains.  No palpitations. No shortness of breath, No orthopnea or PND.  GASTROINTESTINAL:  No abdominal pain.  no nausea   GENITOURINARY:    No hematuria.    MUSCULOSKELETAL:  No musculoskeletal pain.  No joint swelling.  No arthritis.  NEUROLOGICAL:  No tingling or numbness or weakness.  PSYCHIATRIC:  No confusion  SKIN:  No rashes.    ENDOCRINE:  No unexplained weight loss.  No polydipsia.   HEMATOLOGIC:  No anemia.  No prolonged or excessive bleeding.   ALLERGIC AND IMMUNOLOGIC:  No pruritus.      ICU Vital Signs Last 24 Hrs  T(C): 36.6 (2019 04:36), Max: 36.8 (2019 17:08)  T(F): 97.8 (2019 04:36), Max: 98.2 (2019 17:08)  HR: 96 (2019 04:36) (90 - 112)  BP: 134/68 (2019 04:36) (98/59 - 134/68)  BP(mean): --  ABP: --  ABP(mean): --  RR: 18 (2019 04:36) (17 - 18)  SpO2: 95% (2019 04:36) (95% - 99%)        PHYSICAL EXAM-    Constitutional: elderly frail female in no acute distress     Head: Head is normocephalic and atraumatic.      Neck: No JVD.     Cardiovascular: Regular rate and rhythm without S3, S4. No murmurs or rubs are appreciated.      Respiratory: Breath sounds are normal. No rales. No wheezing.    Abdomen: Soft, nontender, nondistended with positive bowel sounds.      Extremity: No tenderness. No  pitting edema     Neurologic: The patient is alert and oriented.      Skin: No rash, no obvious lesions noted.      Psychiatric: The patient appears to be emotionally stable.      INTERPRETATION OF TELEMETRY: SR , atrial tachycardia    ECG: Sinus rythm , normal axis, no ST T changes.     I&O's Detail       PTT - ( 2019 00:08 )  PTT:20.6 sec  Urinalysis Basic - ( 2019 05:00 )    Color: Brown / Appearance: Slightly Turbid / S.025 / pH: x  Gluc: x / Ketone: Small  / Bili: Small / Urobili: 4 mg/dL   Blood: x / Protein: 100 mg/dL / Nitrite: Positive   Leuk Esterase: Trace / RBC: 25-50 /HPF / WBC 3-5   Sq Epi: x / Non Sq Epi: Few / Bacteria: Few      I&O's Summary    BNP  RADIOLOGY & ADDITIONAL STUDIES:  < from: Xray Chest 1 View- PORTABLE-Urgent (19 @ 03:25) >    EXAM:  XR CHEST PORTABLE URGENT 1V                            PROCEDURE DATE:  2019          INTERPRETATION:    EXAMINATION: AP chest    CLINICAL INFORMATION:  Nausea and vomiting, abdominal pain, sepsis.  COMPARISONS:  Chest radiograph 2019. CT abdomen and pelvis 2019.    IMPRESSION:   EKG leads overlie the chest.  No lobar consolidation. The cardiac silhouette is not enlarged. No   pleural effusion. Nonspecific lucency at the left lower chest overlying   the left hemidiaphragm, question projectional artifact versus   diaphragmatic hernia, less likely pneumothorax. If clinically indicated,   correlation with CT scan can be performed for further evaluation. There   is gaseous distention of the stomach. No definite free air is seen in the   abdomen. No pulmonary edema.      < end of copied text >
Patient is a 64y old  Female who presents with a chief complaint of vomiting     HPI:64yof with pmh as stated below presented with c/o worsening vomiting.  Pt has history of gastroparesis in the past.  SHe denies any CP or pressure but c/o epigastric discomfort.    - pt seen and examined by me today. Pt denies any CP or SOB.    PAST MEDICAL & SURGICAL HISTORY:  Arthritis  Colitis  Anemia  Weakness  Nausea & vomiting  CHF (congestive heart failure): 2017 resolved  Breast cancer: left , had surgery and radiation  High cholesterol: d/c&#x27;d by prescribing MD in April &quot;since I&#x27;m not eating&quot;  High blood pressure: reports medication was stopped 2018 because BP was low  History of endoscopy: 2018  S/P colonoscopy:   History of total hip replacement, left: 2018  History of hip replacement, total, right: 2017  History of lumpectomy of left breast:   Atypical lipoma of soft tissue: in left groin   H/O foot surgery: ankle left       MEDICATIONS  (STANDING):    MEDICATIONS  (PRN):      FAMILY HISTORY:  Family history of cardiac arrest  Family history of stroke  Family history of breast cancer in sister (Sibling)  Family history of hypertension in mother      SOCIAL HISTORY:  no recent smoking     REVIEW OF SYSTEMS:  CONSTITUTIONAL:    No fatigue, malaise, lethargy.  No fever or chills.  HEENT:  Eyes:  No visual changes.     ENT:  No epistaxis.  No sinus pain.    RESPIRATORY:  No cough.  No wheeze.  No hemoptysis.  No shortness of breath.  CARDIOVASCULAR:  No chest pains.  No palpitations. No shortness of breath, No orthopnea or PND.  GASTROINTESTINAL:  No abdominal pain.  c/o nausea   GENITOURINARY:    No hematuria.    MUSCULOSKELETAL:  No musculoskeletal pain.  No joint swelling.  No arthritis.  NEUROLOGICAL:  No tingling or numbness or weakness.  PSYCHIATRIC:  No confusion  SKIN:  No rashes.    ENDOCRINE:  No unexplained weight loss.  No polydipsia.   HEMATOLOGIC:  No anemia.  No prolonged or excessive bleeding.   ALLERGIC AND IMMUNOLOGIC:  No pruritus.      ICU Vital Signs Last 24 Hrs  T(C): 36.6 (2019 04:47), Max: 36.9 (2019 17:04)  T(F): 97.9 (2019 04:47), Max: 98.4 (2019 17:04)  HR: 102 (2019 04:47) (67 - 102)  BP: 114/53 (2019 04:47) (97/52 - 114/53)  BP(mean): --  ABP: --  ABP(mean): --  RR: 17 (2019 04:47) (17 - 18)  SpO2: 95% (2019 04:47) (95% - 98%)      PHYSICAL EXAM-    Constitutional: elderly frail female in no acute distress     Head: Head is normocephalic and atraumatic.      Neck: No JVD.     Cardiovascular: Regular rate and rhythm without S3, S4. No murmurs or rubs are appreciated.      Respiratory: Breath sounds are normal. No rales. No wheezing.    Abdomen: Soft, nontender, nondistended with positive bowel sounds.      Extremity: No tenderness. No  pitting edema     Neurologic: The patient is alert and oriented.      Skin: No rash, no obvious lesions noted.      Psychiatric: The patient appears to be emotionally stable.      INTERPRETATION OF TELEMETRY: SR , atrial tachycardia    ECG: Sinus rythm , normal axis, no ST T changes.     I&O's Detail                            11.2   9.74  )-----------( 186      ( 2019 06:33 )             35.2     06-26    145  |  111<H>  |  8   ----------------------------<  91  3.6   |  25  |  0.36<L>    Ca    8.9      2019 06:33         PTT - ( 2019 00:08 )  PTT:20.6 sec  Urinalysis Basic - ( 2019 05:00 )    Color: Brown / Appearance: Slightly Turbid / S.025 / pH: x  Gluc: x / Ketone: Small  / Bili: Small / Urobili: 4 mg/dL   Blood: x / Protein: 100 mg/dL / Nitrite: Positive   Leuk Esterase: Trace / RBC: 25-50 /HPF / WBC 3-5   Sq Epi: x / Non Sq Epi: Few / Bacteria: Few      I&O's Summary    BNP  RADIOLOGY & ADDITIONAL STUDIES:  < from: Xray Chest 1 View- PORTABLE-Urgent (19 @ 03:25) >    EXAM:  XR CHEST PORTABLE URGENT 1V                            PROCEDURE DATE:  2019          INTERPRETATION:    EXAMINATION: AP chest    CLINICAL INFORMATION:  Nausea and vomiting, abdominal pain, sepsis.  COMPARISONS:  Chest radiograph 2019. CT abdomen and pelvis 2019.    IMPRESSION:   EKG leads overlie the chest.  No lobar consolidation. The cardiac silhouette is not enlarged. No   pleural effusion. Nonspecific lucency at the left lower chest overlying   the left hemidiaphragm, question projectional artifact versus   diaphragmatic hernia, less likely pneumothorax. If clinically indicated,   correlation with CT scan can be performed for further evaluation. There   is gaseous distention of the stomach. No definite free air is seen in the   abdomen. No pulmonary edema.                  < end of copied text >
CHIEF COMPLAINT/Diagnosis: hypotension / diahrea/gasteroparesis    SUBJECTIVE: c/o constipation and requests lactulose     REVIEW OF SYSTEMS:    CONSTITUTIONAL: No weakness, fevers or chills  EYES/ENT: No visual changes;  No vertigo or throat pain   NECK: No pain or stiffness  RESPIRATORY: No cough, wheezing, hemoptysis; No shortness of breath  CARDIOVASCULAR: No chest pain or palpitations  GASTROINTESTINAL: as per hpi   GENITOURINARY: No dysuria, frequency or hematuria  NEUROLOGICAL: No numbness or weakness  SKIN: No itching, burning, rashes, or lesions   All other review of systems is negative unless indicated above  Vital Signs Last 24 Hrs  T(C): 37 (30 Jun 2019 10:46), Max: 37.1 (29 Jun 2019 16:11)  T(F): 98.6 (30 Jun 2019 10:46), Max: 98.8 (29 Jun 2019 16:11)  HR: 99 (30 Jun 2019 10:46) (98 - 102)  BP: 108/59 (30 Jun 2019 10:46) (106/62 - 138/65)  BP(mean): --  RR: 16 (30 Jun 2019 10:46) (16 - 18)  SpO2: 98% (30 Jun 2019 10:46) (97% - 98%)  I&O's Summary      CAPILLARY BLOOD GLUCOSE          PHYSICAL EXAM:    Constitutional: NAD, awake and alert, well-developed  HEENT: PERR, EOMI, Normal Hearing, MMM  Neck: Soft and supple, No LAD, No JVD  Respiratory: Breath sounds are clear bilaterally, No wheezing, rales or rhonchi  Cardiovascular: S1 and S2, regular rate and rhythm, no Murmurs, gallops or rubs  Gastrointestinal: Bowel Sounds present, soft, nontender, mildly distended, no guarding, no rebound  Extremities: No peripheral edema  Vascular: 2+ peripheral pulses  Neurological: A/O x 3, no focal deficits  Musculoskeletal: 5/5 strength b/l upper and lower extremities  Skin: No rashes  LABS: All Labs Reviewed:      Blood Culture: 06-25 @ 09:11  Organism --  Gram Stain Blood -- Gram Stain --  Specimen Source .Blood None  Culture-Blood --    06-25 @ 05:00  Organism --  Gram Stain Blood -- Gram Stain --  Specimen Source .Urine Clean Catch (Midstream)  Culture-Blood --    06-25 @ 00:09  Organism --  Gram Stain Blood -- Gram Stain --  Specimen Source .Blood Blood  Culture-Blood --          Assessment and Plan:   	  65 y/o female with PMHx of gastroparesis, CHF, breast CA 2017 on tamoxifen, and hx of hip replacement who presented to  with c/o N/V/D.  Pt notes sx have been ongoing for more than 1 year.  Pt has been worked up by GI Dr. Kemp and thought to be related to gastroparesis.  Pt admits to a 80 lb weight loss over the last year.  Pt has been in and out of ERs recently for similar sx, given IV hydration, and sent home.  Pt notes she presented this time due to persistent vomiting and unable to keep down liquids.  N/V/D is off and on.  Diarrhea this time has been present for ~1 week.  3 loose watery bowel movements a day.  No blood.  No fevers at home.      1-hypotension secondary to diarrhea  -Elevated lactate, tachycardia, leukocytosis present on admission.  >> secondary to diarrhea and dehydration w/ hypovolemia; Not sepsis  -urine culture negative; patient has no symptoms and never did  -d/c abx  -now improved.    2.  Abd pain/ N/V/D/ Gastroparesis:    Suspect acute exacerbation of chronic issues/ gastroparesis.    Cannot r/o new viral syndrome worsening sx.    D/c stool studies as no longer having diarrhea.    CT noted.    add lactulose   Family requesting Dr. Krishnamurthy consult-- ? Gastric PPM for severe gastroparesis.  >> outpatient      4.  Positive Troponin:    Suspect demand ischemia.    ECHO with EF 70%.    Stress testing in 2017 WNL.    Cont medical management.    D/c tele.        5.  HLD:  Was not on statin outpatient.      6.  Weight Loss/ Malnutrition:    Nutrition eval.    Secondary to n/v/d x1 year.      7.  Breast Cancer:    Cont tamoxifen.      8.  DVT Proph:    Lovenox.        waiting placement for rehab.
Patient is a 64y old  Female who presents with a chief complaint of n/v/d (28 Jun 2019 13:13)    6/29- denies CP or SOB     MEDICATIONS  (STANDING):  aspirin  chewable 81 milliGRAM(s) Oral daily  enoxaparin Injectable 40 milliGRAM(s) SubCutaneous daily  metoclopramide Injectable 10 milliGRAM(s) IV Push four times a day  pantoprazole    Tablet 40 milliGRAM(s) Oral before breakfast  tamoxifen 20 milliGRAM(s) Oral daily    MEDICATIONS  (PRN):  acetaminophen   Tablet .. 650 milliGRAM(s) Oral every 4 hours PRN Mild Pain (1 - 3)  aluminum hydroxide/magnesium hydroxide/simethicone Suspension 30 milliLiter(s) Oral every 4 hours PRN Dyspepsia  ondansetron Injectable 4 milliGRAM(s) IV Push every 6 hours PRN Nausea  polyethylene glycol 3350 17 Gram(s) Oral two times a day PRN Constipation            Vital Signs Last 24 Hrs  T(C): 36.7 (29 Jun 2019 05:10), Max: 36.9 (28 Jun 2019 17:22)  T(F): 98 (29 Jun 2019 05:10), Max: 98.4 (28 Jun 2019 17:22)  HR: 102 (29 Jun 2019 05:10) (93 - 102)  BP: 123/64 (29 Jun 2019 05:10) (123/64 - 131/63)  BP(mean): --  RR: 18 (29 Jun 2019 05:10) (17 - 18)  SpO2: 98% (29 Jun 2019 05:10) (98% - 98%)            INTERPRETATION OF TELEMETRY: SR ST     ECG:        LABS:      Mg     1.6     06-28              I&O's Summary    BNP  RADIOLOGY & ADDITIONAL STUDIES:

## 2019-07-05 NOTE — DISCHARGE NOTE ADULT - PROVIDER RX CONTACT NUMBER
Anesthesia Post Evaluation    Patient: Roseanne Dallas    Procedure(s) Performed: Procedure(s) (LRB):  COLONOSCOPY (N/A)  EGD (ESOPHAGOGASTRODUODENOSCOPY) (N/A)    Final Anesthesia Type: general  Patient location during evaluation: PACU  Patient participation: Yes- Able to Participate  Level of consciousness: awake and alert, oriented and awake  Post-procedure vital signs: reviewed and stable  Pain management: adequate  Airway patency: patent  PONV status at discharge: No PONV  Anesthetic complications: no      Cardiovascular status: blood pressure returned to baseline, hemodynamically stable and stable  Respiratory status: unassisted and spontaneous ventilation  Hydration status: euvolemic  Follow-up not needed.          Vitals Value Taken Time   /73 7/5/2019  7:55 AM   Temp 36.3 °C (97.3 °F) 7/5/2019  7:55 AM   Pulse 68 7/5/2019  7:55 AM   Resp 10 7/5/2019  7:55 AM   SpO2 99 % 7/5/2019  7:55 AM         No case tracking events are documented in the log.      Pain/Joseph Score: Joseph Score: 9 (7/5/2019  7:55 AM)  Modified Joseph Score: 19 (7/5/2019  7:55 AM)        
[phone]

## 2019-07-09 DIAGNOSIS — E43 UNSPECIFIED SEVERE PROTEIN-CALORIE MALNUTRITION: ICD-10-CM

## 2019-07-09 DIAGNOSIS — I82.442 ACUTE EMBOLISM AND THROMBOSIS OF LEFT TIBIAL VEIN: ICD-10-CM

## 2019-07-09 DIAGNOSIS — I82.411 ACUTE EMBOLISM AND THROMBOSIS OF RIGHT FEMORAL VEIN: ICD-10-CM

## 2019-07-09 DIAGNOSIS — E86.0 DEHYDRATION: ICD-10-CM

## 2019-07-09 DIAGNOSIS — I24.8 OTHER FORMS OF ACUTE ISCHEMIC HEART DISEASE: ICD-10-CM

## 2019-07-09 DIAGNOSIS — R19.7 DIARRHEA, UNSPECIFIED: ICD-10-CM

## 2019-07-09 DIAGNOSIS — I82.432 ACUTE EMBOLISM AND THROMBOSIS OF LEFT POPLITEAL VEIN: ICD-10-CM

## 2019-07-09 DIAGNOSIS — K31.84 GASTROPARESIS: ICD-10-CM

## 2019-07-09 DIAGNOSIS — Z96.643 PRESENCE OF ARTIFICIAL HIP JOINT, BILATERAL: ICD-10-CM

## 2019-07-09 DIAGNOSIS — M19.90 UNSPECIFIED OSTEOARTHRITIS, UNSPECIFIED SITE: ICD-10-CM

## 2019-07-09 DIAGNOSIS — A08.4 VIRAL INTESTINAL INFECTION, UNSPECIFIED: ICD-10-CM

## 2019-07-09 DIAGNOSIS — Z92.3 PERSONAL HISTORY OF IRRADIATION: ICD-10-CM

## 2019-07-09 DIAGNOSIS — D72.829 ELEVATED WHITE BLOOD CELL COUNT, UNSPECIFIED: ICD-10-CM

## 2019-07-09 DIAGNOSIS — I95.9 HYPOTENSION, UNSPECIFIED: ICD-10-CM

## 2019-07-09 DIAGNOSIS — Z88.8 ALLERGY STATUS TO OTHER DRUGS, MEDICAMENTS AND BIOLOGICAL SUBSTANCES: ICD-10-CM

## 2019-07-09 DIAGNOSIS — I82.431 ACUTE EMBOLISM AND THROMBOSIS OF RIGHT POPLITEAL VEIN: ICD-10-CM

## 2019-07-09 DIAGNOSIS — Z85.3 PERSONAL HISTORY OF MALIGNANT NEOPLASM OF BREAST: ICD-10-CM

## 2019-07-09 DIAGNOSIS — R00.0 TACHYCARDIA, UNSPECIFIED: ICD-10-CM

## 2019-07-09 DIAGNOSIS — E78.5 HYPERLIPIDEMIA, UNSPECIFIED: ICD-10-CM

## 2019-07-09 DIAGNOSIS — R11.2 NAUSEA WITH VOMITING, UNSPECIFIED: ICD-10-CM

## 2019-07-09 DIAGNOSIS — E78.00 PURE HYPERCHOLESTEROLEMIA, UNSPECIFIED: ICD-10-CM

## 2019-07-19 ENCOUNTER — INPATIENT (INPATIENT)
Facility: HOSPITAL | Age: 64
LOS: 4 days | Discharge: HOME | DRG: 312 | End: 2019-07-24
Attending: HOSPITALIST | Admitting: HOSPITALIST
Payer: MEDICARE

## 2019-07-19 VITALS
HEART RATE: 106 BPM | RESPIRATION RATE: 20 BRPM | SYSTOLIC BLOOD PRESSURE: 104 MMHG | DIASTOLIC BLOOD PRESSURE: 65 MMHG | TEMPERATURE: 98 F | OXYGEN SATURATION: 97 %

## 2019-07-19 DIAGNOSIS — Z98.890 OTHER SPECIFIED POSTPROCEDURAL STATES: Chronic | ICD-10-CM

## 2019-07-19 DIAGNOSIS — Z90.12 ACQUIRED ABSENCE OF LEFT BREAST AND NIPPLE: Chronic | ICD-10-CM

## 2019-07-19 DIAGNOSIS — D17.9 BENIGN LIPOMATOUS NEOPLASM, UNSPECIFIED: Chronic | ICD-10-CM

## 2019-07-19 DIAGNOSIS — Z96.641 PRESENCE OF RIGHT ARTIFICIAL HIP JOINT: Chronic | ICD-10-CM

## 2019-07-19 DIAGNOSIS — Z96.642 PRESENCE OF LEFT ARTIFICIAL HIP JOINT: Chronic | ICD-10-CM

## 2019-07-19 LAB
ALBUMIN SERPL ELPH-MCNC: 2.6 G/DL — LOW (ref 3.3–5.2)
ALP SERPL-CCNC: 120 U/L — SIGNIFICANT CHANGE UP (ref 40–120)
ALT FLD-CCNC: 26 U/L — SIGNIFICANT CHANGE UP
ANION GAP SERPL CALC-SCNC: 9 MMOL/L — SIGNIFICANT CHANGE UP (ref 5–17)
APTT BLD: 22.7 SEC — LOW (ref 27.5–36.3)
AST SERPL-CCNC: 32 U/L — HIGH
BASOPHILS # BLD AUTO: 0.02 K/UL — SIGNIFICANT CHANGE UP (ref 0–0.2)
BASOPHILS NFR BLD AUTO: 0.2 % — SIGNIFICANT CHANGE UP (ref 0–2)
BILIRUB SERPL-MCNC: <0.2 MG/DL — LOW (ref 0.4–2)
BLD GP AB SCN SERPL QL: SIGNIFICANT CHANGE UP
BUN SERPL-MCNC: 11 MG/DL — SIGNIFICANT CHANGE UP (ref 8–20)
CALCIUM SERPL-MCNC: 9.5 MG/DL — SIGNIFICANT CHANGE UP (ref 8.6–10.2)
CHLORIDE SERPL-SCNC: 103 MMOL/L — SIGNIFICANT CHANGE UP (ref 98–107)
CK SERPL-CCNC: 53 U/L — SIGNIFICANT CHANGE UP (ref 25–170)
CO2 SERPL-SCNC: 26 MMOL/L — SIGNIFICANT CHANGE UP (ref 22–29)
CREAT SERPL-MCNC: 0.44 MG/DL — LOW (ref 0.5–1.3)
EOSINOPHIL # BLD AUTO: 0.26 K/UL — SIGNIFICANT CHANGE UP (ref 0–0.5)
EOSINOPHIL NFR BLD AUTO: 2.9 % — SIGNIFICANT CHANGE UP (ref 0–6)
GLUCOSE SERPL-MCNC: 107 MG/DL — SIGNIFICANT CHANGE UP (ref 70–115)
HCT VFR BLD CALC: 34.2 % — LOW (ref 34.5–45)
HGB BLD-MCNC: 10.8 G/DL — LOW (ref 11.5–15.5)
IMM GRANULOCYTES NFR BLD AUTO: 0.3 % — SIGNIFICANT CHANGE UP (ref 0–1.5)
INR BLD: 1.44 RATIO — HIGH (ref 0.88–1.16)
LYMPHOCYTES # BLD AUTO: 2.82 K/UL — SIGNIFICANT CHANGE UP (ref 1–3.3)
LYMPHOCYTES # BLD AUTO: 31.5 % — SIGNIFICANT CHANGE UP (ref 13–44)
MCHC RBC-ENTMCNC: 31.6 GM/DL — LOW (ref 32–36)
MCHC RBC-ENTMCNC: 32.2 PG — SIGNIFICANT CHANGE UP (ref 27–34)
MCV RBC AUTO: 102.1 FL — HIGH (ref 80–100)
MONOCYTES # BLD AUTO: 0.88 K/UL — SIGNIFICANT CHANGE UP (ref 0–0.9)
MONOCYTES NFR BLD AUTO: 9.8 % — SIGNIFICANT CHANGE UP (ref 2–14)
NEUTROPHILS # BLD AUTO: 4.95 K/UL — SIGNIFICANT CHANGE UP (ref 1.8–7.4)
NEUTROPHILS NFR BLD AUTO: 55.3 % — SIGNIFICANT CHANGE UP (ref 43–77)
PLATELET # BLD AUTO: 361 K/UL — SIGNIFICANT CHANGE UP (ref 150–400)
POTASSIUM SERPL-MCNC: 4.8 MMOL/L — SIGNIFICANT CHANGE UP (ref 3.5–5.3)
POTASSIUM SERPL-SCNC: 4.8 MMOL/L — SIGNIFICANT CHANGE UP (ref 3.5–5.3)
PROT SERPL-MCNC: 5.7 G/DL — LOW (ref 6.6–8.7)
PROTHROM AB SERPL-ACNC: 16.7 SEC — HIGH (ref 10–12.9)
RBC # BLD: 3.35 M/UL — LOW (ref 3.8–5.2)
RBC # FLD: 14.3 % — SIGNIFICANT CHANGE UP (ref 10.3–14.5)
SODIUM SERPL-SCNC: 138 MMOL/L — SIGNIFICANT CHANGE UP (ref 135–145)
TROPONIN T SERPL-MCNC: <0.01 NG/ML — SIGNIFICANT CHANGE UP (ref 0–0.06)
WBC # BLD: 8.96 K/UL — SIGNIFICANT CHANGE UP (ref 3.8–10.5)
WBC # FLD AUTO: 8.96 K/UL — SIGNIFICANT CHANGE UP (ref 3.8–10.5)

## 2019-07-19 PROCEDURE — 72125 CT NECK SPINE W/O DYE: CPT | Mod: 26

## 2019-07-19 PROCEDURE — 73502 X-RAY EXAM HIP UNI 2-3 VIEWS: CPT | Mod: 26,LT

## 2019-07-19 PROCEDURE — 73562 X-RAY EXAM OF KNEE 3: CPT | Mod: 26,LT

## 2019-07-19 PROCEDURE — 71045 X-RAY EXAM CHEST 1 VIEW: CPT | Mod: 26

## 2019-07-19 PROCEDURE — 99218: CPT

## 2019-07-19 PROCEDURE — 93010 ELECTROCARDIOGRAM REPORT: CPT

## 2019-07-19 PROCEDURE — 70450 CT HEAD/BRAIN W/O DYE: CPT | Mod: 26

## 2019-07-19 RX ORDER — PANTOPRAZOLE SODIUM 20 MG/1
40 TABLET, DELAYED RELEASE ORAL
Refills: 0 | Status: DISCONTINUED | OUTPATIENT
Start: 2019-07-19 | End: 2019-07-24

## 2019-07-19 RX ORDER — TAMOXIFEN CITRATE 20 MG/1
20 TABLET, FILM COATED ORAL ONCE
Refills: 0 | Status: COMPLETED | OUTPATIENT
Start: 2019-07-19 | End: 2019-07-19

## 2019-07-19 RX ORDER — APIXABAN 2.5 MG/1
5 TABLET, FILM COATED ORAL EVERY 12 HOURS
Refills: 0 | Status: DISCONTINUED | OUTPATIENT
Start: 2019-07-19 | End: 2019-07-24

## 2019-07-19 RX ORDER — ACETAMINOPHEN 500 MG
650 TABLET ORAL EVERY 6 HOURS
Refills: 0 | Status: DISCONTINUED | OUTPATIENT
Start: 2019-07-19 | End: 2019-07-24

## 2019-07-19 RX ADMIN — Medication 650 MILLIGRAM(S): at 23:39

## 2019-07-19 RX ADMIN — APIXABAN 5 MILLIGRAM(S): 2.5 TABLET, FILM COATED ORAL at 23:39

## 2019-07-19 NOTE — ED ADULT NURSE NOTE - OBJECTIVE STATEMENT
Patient reports recently being discharged from [facility] for B/L Lower extremity DVT. Reports going to Sub acute rehab and being discharged from there this morning. Patient went home and fell. Reports hitting head, but denies losing consciousness. Patient walks with walker and states that she did not feel dizzy, chest pain or SOB prior to falling. Reports generalized weakness

## 2019-07-19 NOTE — ED CDU PROVIDER INITIAL DAY NOTE - OBJECTIVE STATEMENT
63 y/o F with pmhx h/o gastroparesis, eliquis for dvt, hypotension, s/p left hip replacement bib ems after she had a syncopal episode while using restroom today. Pt notes hitting head, unsure if lost consciousness Pt was discharged today to home from subacute rehab for gastroparesis admission and weakness following that. Pt noting generalized weakness, left knee pain. Pt lives alone at home, ambulates with rolling walker. No futher complaints at this time.

## 2019-07-19 NOTE — ED ADULT NURSE NOTE - CHIEF COMPLAINT QUOTE
pt a+ox3, BIBA s/p fall in bathroom this morning. reports hitting head, denies LOC. pt reports left hip pain. takes Plavix daily. MD Tay called to bedside, priority CT called @ [phone]

## 2019-07-19 NOTE — ED ADULT NURSE NOTE - INTEGUMENTARY WDL
Color consistent with ethnicity/race, warm, dry intact, resilient. old pressure ulcer, healed, to sacrum

## 2019-07-19 NOTE — ED CDU PROVIDER INITIAL DAY NOTE - ATTENDING CONTRIBUTION TO CARE
I performed a face to face history and physical exam of the patient and discussed their management with the resident/ACP. I reviewed the resident/ACP's note and agree with the documented findings and plan of care.    Pt with syncopal episode at home.  initial workup negative.  will put in cdu for serial enzymes and PT eval.

## 2019-07-19 NOTE — ED ADULT TRIAGE NOTE - CHIEF COMPLAINT QUOTE
pt a+ox3, BIBA s/p fall in bathroom this morning. reports hitting head, denies LOC. pt reports left hip pain. takes Plavix daily. MD Tay called to bedside, priority CT called @ [phone]

## 2019-07-19 NOTE — ED PROVIDER NOTE - CLINICAL SUMMARY MEDICAL DECISION MAKING FREE TEXT BOX
given pt is on eliquis, will do priority ct head and neck, will check labs, ekg, r/o cause of syncope, likely vasovagal episode

## 2019-07-19 NOTE — ED PROVIDER NOTE - OBJECTIVE STATEMENT
65 y/o F with h/o gastroparesis, eliquis for dvt, hypotension, s/p left hip replacement bibems after she had a syncopal episode while using restroom and she hit her head with loc and left knee and hip. Pt was discharged today to home from subacute rehba for gastroparesis admission and weakness following that. Pt has slight nausea but no vomiting, headache, blurred vision, chest pain, cough. Pt states that she has no idea what happened and she was usign restroom and then fell and hit her head. No bleeding

## 2019-07-19 NOTE — ED ADULT NURSE NOTE - INTERVENTIONS DEFINITIONS
Call bell, personal items and telephone within reach/Monitor for mental status changes and reorient to person, place, and time/Reinforce activity limits and safety measures with patient and family/Provide visual clues: red socks/Physically safe environment: no spills, clutter or unnecessary equipment/Monitor gait and stability

## 2019-07-19 NOTE — ED CDU PROVIDER INITIAL DAY NOTE - MEDICAL DECISION MAKING DETAILS
CT head, neck negative. Xrays negative. Pt comfortable at this time. Had normal echo within past year. Will check trops and PT eval in AM.

## 2019-07-19 NOTE — ED PROVIDER NOTE - PROGRESS NOTE DETAILS
pt place din cdu for pt eval and serial ck, trop and echo given rehab stay and discharge to home recently
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
## 2019-07-20 LAB — TROPONIN T SERPL-MCNC: <0.01 NG/ML — SIGNIFICANT CHANGE UP (ref 0–0.06)

## 2019-07-20 PROCEDURE — 93010 ELECTROCARDIOGRAM REPORT: CPT

## 2019-07-20 PROCEDURE — 99217: CPT

## 2019-07-20 PROCEDURE — 99223 1ST HOSP IP/OBS HIGH 75: CPT

## 2019-07-20 PROCEDURE — 73620 X-RAY EXAM OF FOOT: CPT | Mod: 26,LT

## 2019-07-20 RX ORDER — ACETAMINOPHEN 500 MG
975 TABLET ORAL EVERY 8 HOURS
Refills: 0 | Status: DISCONTINUED | OUTPATIENT
Start: 2019-07-20 | End: 2019-07-24

## 2019-07-20 RX ORDER — LIDOCAINE 4 G/100G
1 CREAM TOPICAL ONCE
Refills: 0 | Status: COMPLETED | OUTPATIENT
Start: 2019-07-20 | End: 2019-07-21

## 2019-07-20 RX ORDER — SODIUM CHLORIDE 9 MG/ML
1000 INJECTION INTRAMUSCULAR; INTRAVENOUS; SUBCUTANEOUS
Refills: 0 | Status: DISCONTINUED | OUTPATIENT
Start: 2019-07-20 | End: 2019-07-24

## 2019-07-20 RX ORDER — IBUPROFEN 200 MG
600 TABLET ORAL ONCE
Refills: 0 | Status: COMPLETED | OUTPATIENT
Start: 2019-07-20 | End: 2019-07-20

## 2019-07-20 RX ORDER — TAMOXIFEN CITRATE 20 MG/1
20 TABLET, FILM COATED ORAL DAILY
Refills: 0 | Status: DISCONTINUED | OUTPATIENT
Start: 2019-07-20 | End: 2019-07-24

## 2019-07-20 RX ORDER — IBUPROFEN 200 MG
800 TABLET ORAL ONCE
Refills: 0 | Status: COMPLETED | OUTPATIENT
Start: 2019-07-20 | End: 2019-07-20

## 2019-07-20 RX ORDER — POLYETHYLENE GLYCOL 3350 17 G/17G
17 POWDER, FOR SOLUTION ORAL
Refills: 0 | Status: DISCONTINUED | OUTPATIENT
Start: 2019-07-20 | End: 2019-07-24

## 2019-07-20 RX ORDER — METOCLOPRAMIDE HCL 10 MG
10 TABLET ORAL
Refills: 0 | Status: DISCONTINUED | OUTPATIENT
Start: 2019-07-20 | End: 2019-07-24

## 2019-07-20 RX ADMIN — POLYETHYLENE GLYCOL 3350 17 GRAM(S): 17 POWDER, FOR SOLUTION ORAL at 19:29

## 2019-07-20 RX ADMIN — Medication 600 MILLIGRAM(S): at 09:24

## 2019-07-20 RX ADMIN — Medication 650 MILLIGRAM(S): at 22:00

## 2019-07-20 RX ADMIN — PANTOPRAZOLE SODIUM 40 MILLIGRAM(S): 20 TABLET, DELAYED RELEASE ORAL at 06:06

## 2019-07-20 RX ADMIN — Medication 650 MILLIGRAM(S): at 21:29

## 2019-07-20 RX ADMIN — APIXABAN 5 MILLIGRAM(S): 2.5 TABLET, FILM COATED ORAL at 21:28

## 2019-07-20 RX ADMIN — APIXABAN 5 MILLIGRAM(S): 2.5 TABLET, FILM COATED ORAL at 12:22

## 2019-07-20 RX ADMIN — Medication 650 MILLIGRAM(S): at 13:56

## 2019-07-20 RX ADMIN — Medication 800 MILLIGRAM(S): at 02:30

## 2019-07-20 RX ADMIN — Medication 650 MILLIGRAM(S): at 00:20

## 2019-07-20 RX ADMIN — SODIUM CHLORIDE 85 MILLILITER(S): 9 INJECTION INTRAMUSCULAR; INTRAVENOUS; SUBCUTANEOUS at 19:29

## 2019-07-20 RX ADMIN — Medication 800 MILLIGRAM(S): at 01:52

## 2019-07-20 RX ADMIN — Medication 600 MILLIGRAM(S): at 12:24

## 2019-07-20 NOTE — ED CDU PROVIDER DISPOSITION NOTE - CLINICAL COURSE
63 y/o F with pmhx h/o gastroparesis, eliquis for dvt, hypotension, s/p left hip replacement bib ems after she had a syncopal episode while using restroom today. Pt notes hitting head, unsure if lost consciousness Pt was discharged today to home from subacute rehab for gastroparesis admission and weakness following that. Pt noting generalized weakness, left knee pain. No acute findings on imaging. PT recommending [facility]. Pt to be admitted pending [facility] placement.

## 2019-07-20 NOTE — ED CDU PROVIDER DISPOSITION NOTE - ATTENDING CONTRIBUTION TO CARE
pt s/p fall  very deconditioned  seen by pt and rec [facility] placement  agree with treatment plan  I, Cristy Astorga, performed the initial face to face bedside interview with this patient regarding history of present illness, review of symptoms and relevant past medical, social and family history.  I completed an independent physical examination.  I was the initial provider who evaluated this patient. I have signed out the follow up of any pending tests (i.e. labs, radiological studies) to the ACP.  I have communicated the patient’s plan of care and disposition with the ACP.

## 2019-07-20 NOTE — H&P ADULT - RS GEN PE MLT RESP DETAILS PC
breath sounds equal/airway patent/clear to auscultation bilaterally/diminished breath sounds, L/diminished breath sounds, R/respirations non-labored

## 2019-07-20 NOTE — PHYSICAL THERAPY INITIAL EVALUATION ADULT - PERTINENT HX OF CURRENT PROBLEM, REHAB EVAL
65 y/o female presents to ED s/p fall at home. Pt discharged home from [facility], was home less than 2 hours and fell.  LOC, Hit left hip and knee as well. xrays (-). CTA: (-) PE. Pt was admitted earlier this month and was (+) bilateral DVTs.

## 2019-07-20 NOTE — H&P ADULT - HISTORY OF PRESENT ILLNESS
65 y/o F with pmhx h/o gastroparesis, eliquis for dvt, hypotension, s/p left hip replacement brought to the ED by  ems on 7/19 after falling at home  while using restroom today. She reports hitting her head and neck,   unsure if lost consciousness Pt was discharged home from [facility] the day of fall .  Pt is noting generalized weakness, left knee pain. No acute findings on imaging. PT recommending [facility]. She was changed to inpatient admission from observation pending [facility] placement

## 2019-07-20 NOTE — H&P ADULT - ASSESSMENT
63 yo female with DVT on eliquis recently discharged from [facility] home had fall getting out of bathroom , denies SOB or chest pain   evaluated by PT not safe to get discharged home rec [facility]     1- Fall unsteady gait   PT  evaluation noted deconditioned    fall rsik lives alone on anticoagulation   ANANYA GRACE referal   will check orthostatic BP changes   2- DVT recently dx ed   cont eliquis   3- Left foot knee pain   xray ordered   pain meds as needed

## 2019-07-20 NOTE — PHYSICAL THERAPY INITIAL EVALUATION ADULT - CRITERIA FOR SKILLED THERAPEUTIC INTERVENTIONS
impairments found/anticipated discharge recommendation/risk reduction/prevention/rehab potential/functional limitations in following categories

## 2019-07-20 NOTE — PROVIDER CONTACT NOTE (OTHER) - RECOMMENDATIONS
PT GOALS:  Within 2 weeks, pt will be able to perform:  Bed mobility, Independently.  Transfers with supervision and RW, Ambulation x 50 feet with supervision and RW.

## 2019-07-20 NOTE — PHYSICAL THERAPY INITIAL EVALUATION ADULT - LEVEL OF INDEPENDENCE: SIT/STAND, REHAB EVAL
Patient called stating that she had a dental appointment this morning that she went to but her dentist cancelled the appointment as she was supposed to have medication before her appointment and she did not have it to take. She said it is an antibiotic that she takes before any dental procedure. She does not know of the name of the medication. Please call Linda Mark [phone].     [facility] on [address]. maximum assist (25% patients effort)

## 2019-07-20 NOTE — PHYSICAL THERAPY INITIAL EVALUATION ADULT - ACTIVE RANGE OF MOTION EXAMINATION, REHAB EVAL
right knee flexion to approx 60 degrees (pt reports this is chronic since right DEMARIO), left LE WFL/bilateral upper extremity Active ROM was WFL (within functional limits)

## 2019-07-20 NOTE — PHYSICAL THERAPY INITIAL EVALUATION ADULT - ADDITIONAL COMMENTS
Pt reports living in a private house with 5 steps to enter, and 5 up to main floor. (+) rails.  Her son lives on lower level in separate apartment.  Pt reports that she was ambulating ~100 feet at rehab with RW, independently.  Pt's son works t/o the day. Her sister comes in and out t/o the day to assist as needed. Pt reports that she can swap living spaces with her son, if needed.    *Pt's sister pulled PT aside and informed her that she is having difficulty assisting pt due to her own medical issues, as well as her spouse's.  She also reports that she believes pt has no intention of switching living spaces with son, and often tells healthcare providers this in order to get discharged home sooner.

## 2019-07-20 NOTE — ED ADULT NURSE REASSESSMENT NOTE - NS ED NURSE REASSESS COMMENT FT1
Patient sleeping comfortably in bed. VSS. Patient medicated with ibuprofen for Left knee pain with good effects. Awaiting PT eval. Will continue to monitor.
assumed care of pt. pt AOx4, NAD. family at bedside. plan of care updated/questions addressed.
Assumed care of the patient at 0730. Patient A&Ox3. No s/s of distress. Patient states recently dc from rehab and s/p fall at home. Patient was in rehab for weakness. States fell in bathroom, as she started feeling weak. Using walker at home after rehab dc. Now patient resting comfortably in bed. Awaiting PT eval. Meal tray provided. Denies any pain or discomfort. Patient in understanding of plan of care. Patient with no further questions for the nurse. Will continue to monitor.

## 2019-07-20 NOTE — H&P ADULT - NSHPLABSRESULTS_GEN_ALL_CORE
10.8   8.96  )-----------( 361      ( 19 Jul 2019 18:09 )             34.2   07-19    138  |  103  |  11.0  ----------------------------<  107  4.8   |  26.0  |  0.44<L>    Ca    9.5      19 Jul 2019 18:09    TPro  5.7<L>  /  Alb  2.6<L>  /  TBili  <0.2<L>  /  DBili  x   /  AST  32<H>  /  ALT  26  /  AlkPhos  120  07-19    < from: Xray Hip 2-3 Views, Left (07.19.19 @ 17:07) >    MPRESSION: Lower lumbar degeneration and bilateral hip replacements   again noted.      < end of copied text >

## 2019-07-20 NOTE — PROVIDER CONTACT NOTE (OTHER) - ACTION/TREATMENT ORDERED:
Pt/sister educated regarding Role of Physical Therapist, plan of care, and discharge plan. Verbalized good understanding

## 2019-07-20 NOTE — PROVIDER CONTACT NOTE (OTHER) - SITUATION
PT orders received. Chart reviewed, contents noted. Pt seen in CDU for PT evaluation, see consult for details. Pt with 4-5/10 pain pre/post session in left knee and low back. Medicated for pain by RN:

## 2019-07-21 LAB
APPEARANCE UR: ABNORMAL
BACTERIA # UR AUTO: ABNORMAL
BILIRUB UR-MCNC: NEGATIVE — SIGNIFICANT CHANGE UP
COLOR SPEC: YELLOW — SIGNIFICANT CHANGE UP
COMMENT - URINE: SIGNIFICANT CHANGE UP
DIFF PNL FLD: ABNORMAL
EPI CELLS # UR: SIGNIFICANT CHANGE UP
FERRITIN SERPL-MCNC: 263 NG/ML — HIGH (ref 15–150)
GLUCOSE UR QL: NEGATIVE MG/DL — SIGNIFICANT CHANGE UP
IRON SATN MFR SERPL: 14 % — SIGNIFICANT CHANGE UP (ref 14–50)
IRON SATN MFR SERPL: 27 UG/DL — LOW (ref 37–145)
KETONES UR-MCNC: NEGATIVE — SIGNIFICANT CHANGE UP
LEUKOCYTE ESTERASE UR-ACNC: NEGATIVE — SIGNIFICANT CHANGE UP
NITRITE UR-MCNC: NEGATIVE — SIGNIFICANT CHANGE UP
PH UR: 7 — SIGNIFICANT CHANGE UP (ref 5–8)
PROT UR-MCNC: NEGATIVE MG/DL — SIGNIFICANT CHANGE UP
RBC CASTS # UR COMP ASSIST: ABNORMAL /HPF (ref 0–4)
SP GR SPEC: 1.01 — SIGNIFICANT CHANGE UP (ref 1.01–1.02)
TIBC SERPL-MCNC: 189 UG/DL — LOW (ref 220–430)
TRANSFERRIN SERPL-MCNC: 132 MG/DL — LOW (ref 192–382)
UROBILINOGEN FLD QL: NEGATIVE MG/DL — SIGNIFICANT CHANGE UP
VIT B12 SERPL-MCNC: 563 PG/ML — SIGNIFICANT CHANGE UP (ref 232–1245)
WBC UR QL: SIGNIFICANT CHANGE UP

## 2019-07-21 PROCEDURE — 99232 SBSQ HOSP IP/OBS MODERATE 35: CPT

## 2019-07-21 RX ORDER — ASCORBIC ACID 60 MG
500 TABLET,CHEWABLE ORAL DAILY
Refills: 0 | Status: DISCONTINUED | OUTPATIENT
Start: 2019-07-21 | End: 2019-07-24

## 2019-07-21 RX ORDER — PREGABALIN 225 MG/1
1000 CAPSULE ORAL DAILY
Refills: 0 | Status: DISCONTINUED | OUTPATIENT
Start: 2019-07-21 | End: 2019-07-24

## 2019-07-21 RX ORDER — IRON SUCROSE 20 MG/ML
100 INJECTION, SOLUTION INTRAVENOUS EVERY 24 HOURS
Refills: 0 | Status: COMPLETED | OUTPATIENT
Start: 2019-07-21 | End: 2019-07-23

## 2019-07-21 RX ADMIN — Medication 650 MILLIGRAM(S): at 05:17

## 2019-07-21 RX ADMIN — Medication 10 MILLIGRAM(S): at 17:04

## 2019-07-21 RX ADMIN — LIDOCAINE 1 PATCH: 4 CREAM TOPICAL at 19:00

## 2019-07-21 RX ADMIN — LIDOCAINE 1 PATCH: 4 CREAM TOPICAL at 13:07

## 2019-07-21 RX ADMIN — SODIUM CHLORIDE 85 MILLILITER(S): 9 INJECTION INTRAMUSCULAR; INTRAVENOUS; SUBCUTANEOUS at 05:21

## 2019-07-21 RX ADMIN — PANTOPRAZOLE SODIUM 40 MILLIGRAM(S): 20 TABLET, DELAYED RELEASE ORAL at 08:49

## 2019-07-21 RX ADMIN — Medication 500 MILLIGRAM(S): at 17:04

## 2019-07-21 RX ADMIN — APIXABAN 5 MILLIGRAM(S): 2.5 TABLET, FILM COATED ORAL at 05:17

## 2019-07-21 RX ADMIN — IRON SUCROSE 210 MILLIGRAM(S): 20 INJECTION, SOLUTION INTRAVENOUS at 17:04

## 2019-07-21 RX ADMIN — PREGABALIN 1000 MICROGRAM(S): 225 CAPSULE ORAL at 17:04

## 2019-07-21 RX ADMIN — SODIUM CHLORIDE 85 MILLILITER(S): 9 INJECTION INTRAMUSCULAR; INTRAVENOUS; SUBCUTANEOUS at 21:10

## 2019-07-21 RX ADMIN — POLYETHYLENE GLYCOL 3350 17 GRAM(S): 17 POWDER, FOR SOLUTION ORAL at 05:17

## 2019-07-21 RX ADMIN — Medication 10 MILLIGRAM(S): at 13:05

## 2019-07-21 RX ADMIN — TAMOXIFEN CITRATE 20 MILLIGRAM(S): 20 TABLET, FILM COATED ORAL at 13:05

## 2019-07-21 RX ADMIN — APIXABAN 5 MILLIGRAM(S): 2.5 TABLET, FILM COATED ORAL at 17:04

## 2019-07-21 RX ADMIN — Medication 10 MILLIGRAM(S): at 08:49

## 2019-07-21 NOTE — PROGRESS NOTE ADULT - ASSESSMENT
63 yo female with DVT on eliquis recently discharged from [facility] home had fall getting out of bathroom , denies SOB or chest pain   evaluated by PT not safe to get discharged home rec [facility]   pt is with cronic anemia , iron studies reviewed     1- Unsteady gait - fall at home , denies LOC   PT ambulate

## 2019-07-21 NOTE — PROGRESS NOTE ADULT - SUBJECTIVE AND OBJECTIVE BOX
Internal Medicine Hospitalist Progress Note    Follow up visit for fall , weakness unsteady gait   pt is seen examined , c/o pain in the legs on off controlled  poor appetite  reported           ROS: as above, all remaining ROS are negative.       BACKGROUND:  MEDICATIONS  (STANDING):  apixaban 5 milliGRAM(s) Oral every 12 hours  iron sucrose IVPB 100 milliGRAM(s) IV Intermittent every 24 hours  metoclopramide 10 milliGRAM(s) Oral three times a day before meals  pantoprazole    Tablet 40 milliGRAM(s) Oral before breakfast  polyethylene glycol 3350 17 Gram(s) Oral two times a day  sodium chloride 0.9%. 1000 milliLiter(s) (85 mL/Hr) IV Continuous <Continuous>  tamoxifen 20 milliGRAM(s) Oral daily    MEDICATIONS  (PRN):  acetaminophen   Tablet .. 650 milliGRAM(s) Oral every 6 hours PRN Mild Pain (1 - 3)  acetaminophen   Tablet .. 975 milliGRAM(s) Oral every 8 hours PRN Moderate Pain (4 - 6)    Allergies    Crestor (Muscle Pain)  shellfish (Swelling; Rash)    Intolerances            VITALS:  Vital Signs Last 24 Hrs  T(C): 36.7 (21 Jul 2019 05:12), Max: 37.3 (20 Jul 2019 15:32)  T(F): 98.1 (21 Jul 2019 05:12), Max: 99.1 (20 Jul 2019 15:32)  HR: 89 (21 Jul 2019 05:12) (87 - 94)  BP: 102/56 (21 Jul 2019 05:12) (96/65 - 117/73)  BP(mean): --  RR: 17 (21 Jul 2019 05:12) (16 - 18)  SpO2: 98% (21 Jul 2019 05:12) (97% - 98%) Daily     Daily   CAPILLARY BLOOD GLUCOSE        I&O's Summary      PHYSICAL EXAM:      Constitutional: pale color  cachectic     Neck: supple, no  JVD     Respiratory: cta bilateral     Cardiovascular: regular rate S1 /S2    Gastrointestinal: soft no tenderness                               10.8   8.96  )-----------( 361      ( 19 Jul 2019 18:09 )             34.2   07-19    138  |  103  |  11.0  ----------------------------<  107  4.8   |  26.0  |  0.44<L>    Ca    9.5      19 Jul 2019 18:09    TPro  5.7<L>  /  Alb  2.6<L>  /  TBili  <0.2<L>  /  DBili  x   /  AST  32<H>  /  ALT  26  /  AlkPhos  120  07-19

## 2019-07-22 ENCOUNTER — TRANSCRIPTION ENCOUNTER (OUTPATIENT)
Age: 64
End: 2019-07-22

## 2019-07-22 LAB — 24R-OH-CALCIDIOL SERPL-MCNC: 43.9 NG/ML — SIGNIFICANT CHANGE UP (ref 30–80)

## 2019-07-22 PROCEDURE — 99232 SBSQ HOSP IP/OBS MODERATE 35: CPT

## 2019-07-22 RX ADMIN — SODIUM CHLORIDE 85 MILLILITER(S): 9 INJECTION INTRAMUSCULAR; INTRAVENOUS; SUBCUTANEOUS at 21:07

## 2019-07-22 RX ADMIN — PREGABALIN 1000 MICROGRAM(S): 225 CAPSULE ORAL at 10:24

## 2019-07-22 RX ADMIN — Medication 10 MILLIGRAM(S): at 17:05

## 2019-07-22 RX ADMIN — Medication 10 MILLIGRAM(S): at 05:41

## 2019-07-22 RX ADMIN — LIDOCAINE 1 PATCH: 4 CREAM TOPICAL at 01:00

## 2019-07-22 RX ADMIN — IRON SUCROSE 210 MILLIGRAM(S): 20 INJECTION, SOLUTION INTRAVENOUS at 17:05

## 2019-07-22 RX ADMIN — POLYETHYLENE GLYCOL 3350 17 GRAM(S): 17 POWDER, FOR SOLUTION ORAL at 05:40

## 2019-07-22 RX ADMIN — PANTOPRAZOLE SODIUM 40 MILLIGRAM(S): 20 TABLET, DELAYED RELEASE ORAL at 05:41

## 2019-07-22 RX ADMIN — TAMOXIFEN CITRATE 20 MILLIGRAM(S): 20 TABLET, FILM COATED ORAL at 10:23

## 2019-07-22 RX ADMIN — Medication 10 MILLIGRAM(S): at 10:23

## 2019-07-22 RX ADMIN — APIXABAN 5 MILLIGRAM(S): 2.5 TABLET, FILM COATED ORAL at 17:05

## 2019-07-22 RX ADMIN — APIXABAN 5 MILLIGRAM(S): 2.5 TABLET, FILM COATED ORAL at 05:41

## 2019-07-22 RX ADMIN — Medication 500 MILLIGRAM(S): at 10:24

## 2019-07-22 NOTE — DISCHARGE NOTE PROVIDER - CARE PROVIDER_API CALL
Edwige Avalos (DO)  [facility]  [address] [place] [address]  Phone: [phone]  Fax: [phone]  Follow Up Time: 1 week

## 2019-07-22 NOTE — PROGRESS NOTE ADULT - ASSESSMENT
65 y/o F with medical history significant for gastroparesis, Eliquis for DVT, hypotension, s/p left hip replacement brought to the ED by ems on 7/19 after falling at home while using restroom 2 days ago. She reports hitting her head and neck, but unsure if she lost consciousness. Pt was discharged home from [facility] the day of fall. Pt is noting generalized weakness and left knee pain. No acute findings on imaging. Physical therapy recommending [facility]. She was changed to inpatient admission from observation pending [facility] placement.    1) Fall, unsteady gait  -Fall risk  -Awaiting [facility] authorization for disposition  2) DVT, recent diagnosis  -Continue Eliquis  3) Left knee pain  -Continue with pain control    DISPOSITION: Awaiting authorization for [employer]. 65 y/o F with medical history significant for gastroparesis, DVT on Eliquis, baseline hypotension, s/p left hip replacement, breast cancer on tamoxifen, BIBEMS to the ED on 7/19 after falling at home while using restroom 2 days ago. She reports hitting her head and neck, but unsure if she lost consciousness. Off note Pt was discharged home from [facility] the day of fall. Pt is noting generalized weakness and left knee pain. No acute findings on imaging. Physical therapy recommending [facility]. She was changed to inpatient admission from observation pending [facility] placement.    >Fall, unsteady gait  -Fall risk  -Awaiting [facility] authorization for disposition    >DVT, recent diagnosis  -Continue Eliquis    >Left knee pain likely arthritis  -Continue with pain control    >Gastroparesis/ GERD- C/w PPI/ Reglan.     >DISPOSITION: Awaiting authorization for [facility].

## 2019-07-22 NOTE — PROGRESS NOTE ADULT - ATTENDING COMMENTS
I have seen and examined the patient with PA and agree with the above assessment and plan.  Exam- Pt AOX3, resting comfortably, lungs CTAB, heart sounds regular, abd soft, ND/ NT, no LE edema.   > Generalized weakness/ Fall- Pending [facility] placement.

## 2019-07-22 NOTE — DISCHARGE NOTE PROVIDER - HOSPITAL COURSE
63 y/o F with pmhx h/o gastroparesis, eliquis for dvt, hypotension, s/p left hip replacement brought to the ED by  ems on 7/19 after falling at home  while using restroom today. She reports hitting her head and neck,   unsure if lost consciousness Pt was discharged home from [facility] the day of fall .  Pt is noting generalized weakness, left knee pain. No acute findings on imaging. PT recommending [facility]. She was changed to inpatient admission from observation pending [facility] placement.        Vital Signs Last 24 Hrs    T(C): 37.2 (22 Jul 2019 08:01), Max: 37.2 (22 Jul 2019 08:01)    T(F): 98.9 (22 Jul 2019 08:01), Max: 98.9 (22 Jul 2019 08:01)    HR: 99 (22 Jul 2019 08:01) (86 - 99)    BP: 117/75 (22 Jul 2019 08:01) (102/58 - 123/74)    BP(mean): --    RR: 18 (22 Jul 2019 08:01) (17 - 18)    SpO2: 95% (22 Jul 2019 08:01) (92% - 98%)        PHYSICAL EXAM:    GENERAL: NAD, lying in bed comfortably    HEAD:  Atraumatic, Normocephalic    EYES: EOMI, PERRLA, conjunctiva and sclera clear    ENT: Moist mucous membranes    NECK: Supple, No JVD    CHEST/LUNG: Clear to auscultation bilaterally; No rales, rhonchi, wheezing, or rubs. Unlabored respirations    HEART: Regular rate and rhythm; No murmurs, rubs, or gallops    ABDOMEN: Bowel sounds present; Soft, Nontender, Nondistended     EXTREMITIES:  2+ Peripheral Pulses, brisk capillary refill. No clubbing, cyanosis, or edema    NERVOUS SYSTEM:  Alert & Oriented X3, speech clear. No deficits     MSK: FROM all 4 extremities, full and equal strength    SKIN: No rashes or lesions        Stable for discharge to [facility]    Discharge time 35 mintues 63 y/o F with medical history significant for gastroparesis, DVT on Eliquis, baseline hypotension, s/p left hip replacement, breast cancer on tamoxifen, chronic left knee pain, BIBEMS to the ED on 7/19 after falling at home while using restroom 2 days prior. She reports hitting her head and neck, but unsure if she lost consciousness. Off note Pt was discharged home from [facility] the day of fall. Pt is noting generalized weakness and left knee pain. No acute findings on imaging. Her home medicine resumed. Physical therapy recommending [facility]. Pt stable for discharge to [facility].     Off note she has chronic left knee pain, xray showed degenrative changes, no acute finding.                 Vital Signs Last 24 Hrs    T(C): 36.7 (24 Jul 2019 08:04), Max: 36.8 (23 Jul 2019 15:48)    T(F): 98.1 (24 Jul 2019 08:04), Max: 98.3 (23 Jul 2019 15:48)    HR: 96 (24 Jul 2019 08:04) (85 - 96)    BP: 129/75 (24 Jul 2019 08:04) (114/73 - 129/75)    BP(mean): --    RR: 18 (24 Jul 2019 08:04) (18 - 18)    SpO2: 95% (24 Jul 2019 08:04) (93% - 97%)        PHYSICAL EXAM:        GENERAL: Pt lying comfortably, NAD.    CHEST/LUNG: Clear to auscultation bilaterally; No wheezing.    HEART: S1S2+, Regular rate and rhythm; No murmurs.    ABDOMEN: Soft, Nontender, Nondistended; Bowel sounds present.    Extremities: No LE edema, pulses+, no left knee erythema/warm or tenderness.     NEURO: AAOX3, no focal deficits, no motor r sensory loss.    PSYCH: normal mood.        Time spent on discharge time 35 mintues

## 2019-07-22 NOTE — DISCHARGE NOTE PROVIDER - NSDCCPCAREPLAN_GEN_ALL_CORE_FT
PRINCIPAL DISCHARGE DIAGNOSIS  Diagnosis: Fall  Assessment and Plan of Treatment: Stable for d/c to [facility]. Fall precautions      SECONDARY DISCHARGE DIAGNOSES  Diagnosis: Deep vein thrombosis (DVT)  Assessment and Plan of Treatment: continue Eliquis    Diagnosis: Knee pain  Assessment and Plan of Treatment: Imaging negative for fracture. Pain controlled    Diagnosis: Chronic iron deficiency anemia  Assessment and Plan of Treatment: Continue supplements PRINCIPAL DISCHARGE DIAGNOSIS  Diagnosis: Fall  Assessment and Plan of Treatment: Fall precautions  C/w rehab at [facility]      SECONDARY DISCHARGE DIAGNOSES  Diagnosis: Knee pain  Assessment and Plan of Treatment: Imaging negative for fracture.   C/w pain meds for pain Pain control    Diagnosis: Chronic iron deficiency anemia  Assessment and Plan of Treatment: Continue supplements    Diagnosis: Deep vein thrombosis (DVT)  Assessment and Plan of Treatment: continue Eliquis

## 2019-07-22 NOTE — PROGRESS NOTE ADULT - SUBJECTIVE AND OBJECTIVE BOX
CC: follow up fall  INTERVAL HPI / OVERNIGHT EVENTS: Patient examined at bedside. No acute events overnight. Patient reports unchanged left knee discomfort since fall, otherwise no complaints of pain, SOB, or chest pain. Patient is having regular bowel movements since on bowel regimen. Patient ambulates with walker at baseline.    REVIEW OF SYSTEMS:  CONSTITUTIONAL: No fever, weight loss, or fatigue  EYES: No eye pain, visual disturbances, or discharge  ENT:  No difficulty hearing, tinnitus, vertigo; No sinus or throat pain  NECK: No pain or stiffness  RESPIRATORY: No cough, wheezing, chills or hemoptysis; No shortness of breath  CARDIOVASCULAR: No chest pain, palpitations, dizziness, or leg swelling  GASTROINTESTINAL: No abdominal or epigastric pain. No nausea, vomiting, or hematemesis; No diarrhea or constipation. No melena or hematochezia.  GENITOURINARY: No dysuria, frequency, hematuria, or incontinence  NEUROLOGICAL: No headaches, memory loss, loss of strength, numbness, or tremors  SKIN: No itching, burning, rashes, or lesions   MUSCULOSKELETAL: No swelling; No muscle, back, or extremity pain    Allergies    Crestor (Muscle Pain)  shellfish (Swelling; Rash)      PAST MEDICAL & SURGICAL HISTORY:  Arthritis  Colitis  Anemia  Weakness  Nausea & vomiting  CHF (congestive heart failure): 2017 resolved  Breast cancer: left , had surgery and radiation  High cholesterol: d/c&#x27;d by prescribing MD in April &quot;since I&#x27;m not eating&quot;  High blood pressure: reports medication was stopped 2018 because BP was low  History of endoscopy: 2018  S/P colonoscopy: 2018  History of total hip replacement, left: 2018  History of hip replacement, total, right: 2017  History of lumpectomy of left breast:   Atypical lipoma of soft tissue: in left groin   H/O foot surgery: ankle left     VITAL SIGNS:  T(C): 37.2 (19 @ 08:01), Max: 37.2 (19 @ 08:01)  HR: 99 (19 @ 08:01) (86 - 99)  BP: 117/75 (19 @ 08:01) (102/58 - 123/74)  RR: 18 (19 @ 08:01) (17 - 18)  SpO2: 95% (19 @ 08:01) (92% - 98%)  Wt(kg): --Vital Signs Last 24 Hrs  T(C): 37.2 (2019 08:01), Max: 37.2 (2019 08:01)  T(F): 98.9 (2019 08:01), Max: 98.9 (2019 08:01)  HR: 99 (2019 08:01) (86 - 99)  BP: 117/75 (2019 08:01) (102/58 - 123/74)  BP(mean): --  RR: 18 (2019 08:01) (17 - 18)  SpO2: 95% (2019 08:01) (92% - 98%)    PHYSICAL EXAM:  GENERAL: Nontoxic appearing, lying in bed comfortably in NAD  NECK: Supple, No JVD  CHEST/LUNG: Clear to auscultation bilaterally  ABDOMEN: soft, nontender, nondistended  MUSCULOSKELETAL: left knee slightly tender to palpation and minimal edema, no ecchymosis or deformities appreciated  NEURO: A&Ox3, motor and sensation intact, strength equal bilaterally    Urinalysis Basic - ( 2019 21:34 )    Color: Yellow / Appearance: Slightly Turbid / S.010 / pH: x  Gluc: x / Ketone: Negative  / Bili: Negative / Urobili: Negative mg/dL   Blood: x / Protein: Negative mg/dL / Nitrite: Negative   Leuk Esterase: Negative / RBC: 3-5 /HPF / WBC 0-2   Sq Epi: x / Non Sq Epi: Few / Bacteria: Few

## 2019-07-23 ENCOUNTER — APPOINTMENT (OUTPATIENT)
Dept: GASTROENTEROLOGY | Facility: CLINIC | Age: 64
End: 2019-07-23

## 2019-07-23 PROCEDURE — 99232 SBSQ HOSP IP/OBS MODERATE 35: CPT

## 2019-07-23 RX ORDER — LIDOCAINE 4 G/100G
1 CREAM TOPICAL ONCE
Refills: 0 | Status: COMPLETED | OUTPATIENT
Start: 2019-07-23 | End: 2019-07-23

## 2019-07-23 RX ADMIN — IRON SUCROSE 210 MILLIGRAM(S): 20 INJECTION, SOLUTION INTRAVENOUS at 17:03

## 2019-07-23 RX ADMIN — APIXABAN 5 MILLIGRAM(S): 2.5 TABLET, FILM COATED ORAL at 05:05

## 2019-07-23 RX ADMIN — Medication 975 MILLIGRAM(S): at 23:46

## 2019-07-23 RX ADMIN — POLYETHYLENE GLYCOL 3350 17 GRAM(S): 17 POWDER, FOR SOLUTION ORAL at 05:05

## 2019-07-23 RX ADMIN — APIXABAN 5 MILLIGRAM(S): 2.5 TABLET, FILM COATED ORAL at 17:03

## 2019-07-23 RX ADMIN — TAMOXIFEN CITRATE 20 MILLIGRAM(S): 20 TABLET, FILM COATED ORAL at 13:28

## 2019-07-23 RX ADMIN — LIDOCAINE 1 PATCH: 4 CREAM TOPICAL at 14:52

## 2019-07-23 RX ADMIN — Medication 500 MILLIGRAM(S): at 13:28

## 2019-07-23 RX ADMIN — Medication 10 MILLIGRAM(S): at 13:28

## 2019-07-23 RX ADMIN — Medication 10 MILLIGRAM(S): at 05:05

## 2019-07-23 RX ADMIN — Medication 10 MILLIGRAM(S): at 17:03

## 2019-07-23 RX ADMIN — LIDOCAINE 1 PATCH: 4 CREAM TOPICAL at 19:00

## 2019-07-23 RX ADMIN — POLYETHYLENE GLYCOL 3350 17 GRAM(S): 17 POWDER, FOR SOLUTION ORAL at 17:03

## 2019-07-23 RX ADMIN — Medication 975 MILLIGRAM(S): at 11:00

## 2019-07-23 RX ADMIN — Medication 975 MILLIGRAM(S): at 09:58

## 2019-07-23 RX ADMIN — PANTOPRAZOLE SODIUM 40 MILLIGRAM(S): 20 TABLET, DELAYED RELEASE ORAL at 05:05

## 2019-07-23 RX ADMIN — PREGABALIN 1000 MICROGRAM(S): 225 CAPSULE ORAL at 13:28

## 2019-07-23 RX ADMIN — SODIUM CHLORIDE 85 MILLILITER(S): 9 INJECTION INTRAMUSCULAR; INTRAVENOUS; SUBCUTANEOUS at 22:09

## 2019-07-23 NOTE — DIETITIAN INITIAL EVALUATION ADULT. - OTHER INFO
63 y/o F with medical history significant for gastroparesis, DVT, baseline hypotension, s/p left hip replacement, breast cancer on tamoxifen, chronic left knee pain, presents s/p fall. Pt reports prolonged poor appetite/po intake. States she typically consumes soft foods due to history of gastroparesis. Reports 80 lb wt loss over the last year. C/o constipation, also experiences nausea, vomiting, and diarrhea on and off. Consult received for pressure injury, however, no pressure injury documented- noted with skin tear to medial back and right flank area and IAD.

## 2019-07-23 NOTE — PROGRESS NOTE ADULT - SUBJECTIVE AND OBJECTIVE BOX
JOSEY SAMAYOA Female 64y [id number]    Patient is a 64y old  Female who presents with a chief complaint of fall unsteady gait (2019 12:41)      Subjective/objective:  Pt seen and examined at bedside, no over night event reported by night staff. Pt with chronic left knee pain, no other complaints. Pending [facility] placement.     Review of system:  No fever, chills, nausea, vomiting, headache, dizziness, chest pain, SOB or palpitation.      PHYSICAL EXAM:    Vital Signs Last 24 Hrs  T(C): 36.8 (2019 08:00), Max: 37.1 (2019 15:16)  T(F): 98.2 (2019 08:00), Max: 98.8 (2019 15:16)  HR: 95 (2019 08:00) (91 - 104)  BP: 108/72 (2019 08:00) (103/71 - 112/68)  BP(mean): --  RR: 18 (2019 08:00) (18 - 18)  SpO2: 94% (2019 08:00) (93% - 95%)    GENERAL: Pt lying comfortably, NAD.  CHEST/LUNG: Clear to auscultation bilaterally; No wheezing.  HEART: S1S2+, Regular rate and rhythm; No murmurs.  ABDOMEN: Soft, Nontender, Nondistended; Bowel sounds present.  Extremities: No LE edema, pulses+, no left knee erythema/warm or tenderness.   NEURO: AAOX3, no focal deficits, no motor r sensory loss.  PSYCH: normal mood.          MEDICATIONS  (STANDING):  apixaban 5 milliGRAM(s) Oral every 12 hours  ascorbic acid 500 milliGRAM(s) Oral daily  cyanocobalamin 1000 MICROGram(s) Oral daily  iron sucrose IVPB 100 milliGRAM(s) IV Intermittent every 24 hours  metoclopramide 10 milliGRAM(s) Oral three times a day before meals  pantoprazole    Tablet 40 milliGRAM(s) Oral before breakfast  polyethylene glycol 3350 17 Gram(s) Oral two times a day  sodium chloride 0.9%. 1000 milliLiter(s) (85 mL/Hr) IV Continuous <Continuous>  tamoxifen 20 milliGRAM(s) Oral daily    MEDICATIONS  (PRN):  acetaminophen   Tablet .. 650 milliGRAM(s) Oral every 6 hours PRN Mild Pain (1 - 3)  acetaminophen   Tablet .. 975 milliGRAM(s) Oral every 8 hours PRN Moderate Pain (4 - 6)        Labs:  LABS:                Urinalysis Basic - ( 2019 21:34 )    Color: Yellow / Appearance: Slightly Turbid / S.010 / pH: x  Gluc: x / Ketone: Negative  / Bili: Negative / Urobili: Negative mg/dL   Blood: x / Protein: Negative mg/dL / Nitrite: Negative   Leuk Esterase: Negative / RBC: 3-5 /HPF / WBC 0-2   Sq Epi: x / Non Sq Epi: Few / Bacteria: Few

## 2019-07-23 NOTE — CHART NOTE - NSCHARTNOTEFT_GEN_A_CORE
Upon Nutritional Assessment by the Registered Dietitian your patient was determined to meet criteria / has evidence of the following diagnosis/diagnoses:          [ ]  Mild Protein Calorie Malnutrition        [ ]  Moderate Protein Calorie Malnutrition        [x] Severe Protein Calorie Malnutrition        [ ] Unspecified Protein Calorie Malnutrition        [ ] Underweight / BMI <19        [ ] Morbid Obesity / BMI > 40      Pt presents at high nutrition risk secondary to malnutrition (severe, chronic) related to inability to meet sufficient protein-energy in setting of altered GI function and prolonged poor appetite as evidenced by meeting <75% nutrient needs >1 month, severe muscle loss of temples, clavicles, shoulders, severe fat loss of orbitals and triceps, and 34.8% wt loss x 1 yr.     Findings as based on:  •  Comprehensive nutrition assessment and consultation  •  Calorie counts (nutrient intake analysis)  •  Food acceptance and intake status from observations by staff  •  Follow up  •  Patient education  •  Intervention secondary to interdisciplinary rounds  •   concerns      Treatment:    The following has been recommended:    1) Continue diet as tolerated.  2) Add Ensure Enlive TID to optimize po intake and provide an additional 350 kcal, 20g protein per serving.  3) Rx: MVI daily, continue vitamin C supplementation.       PROVIDER Section:     By signing this assessment you are acknowledging and agree with the diagnosis/diagnoses assigned by the Registered Dietitian    Comments:

## 2019-07-23 NOTE — PROGRESS NOTE ADULT - ASSESSMENT
65 y/o F with medical history significant for gastroparesis, DVT on Eliquis, baseline hypotension, s/p left hip replacement, breast cancer on tamoxifen, chronic left knee pain, BIBEMS to the ED on 7/19 after falling at home while using restroom 2 days prior. She reports hitting her head and neck, but unsure if she lost consciousness. Off note Pt was discharged home from [facility] the day of fall. Pt is noting generalized weakness and left knee pain. No acute findings on imaging. Physical therapy recommending [facility]. She was changed to inpatient admission from observation pending [facility] placement.    >Fall, unsteady gait:  -Fall risk  -Awaiting insurance authorization for [facility].     >DVT, recent diagnosis, Continue Eliquis  >Chronic Left knee pain likely arthritis-Continue with pain control  >Gastroparesis/ GERD- C/w PPI/ Reglan.   >DISPOSITION: Awaiting authorization for [facility].

## 2019-07-23 NOTE — DIETITIAN INITIAL EVALUATION ADULT. - ETIOLOGY
related to inability to meet sufficient protein-energy in setting of altered GI function and prolonged poor appetite

## 2019-07-23 NOTE — DIETITIAN INITIAL EVALUATION ADULT. - MALNUTRITION
NFPE: severe muscle loss of temples, clavicles, shoulders, and severe fat loss of orbitals and triceps severe, chronic

## 2019-07-24 ENCOUNTER — TRANSCRIPTION ENCOUNTER (OUTPATIENT)
Age: 64
End: 2019-07-24

## 2019-07-24 VITALS
RESPIRATION RATE: 18 BRPM | TEMPERATURE: 99 F | SYSTOLIC BLOOD PRESSURE: 102 MMHG | HEART RATE: 92 BPM | DIASTOLIC BLOOD PRESSURE: 58 MMHG | OXYGEN SATURATION: 93 %

## 2019-07-24 PROCEDURE — 83550 IRON BINDING TEST: CPT

## 2019-07-24 PROCEDURE — 84466 ASSAY OF TRANSFERRIN: CPT

## 2019-07-24 PROCEDURE — 83540 ASSAY OF IRON: CPT

## 2019-07-24 PROCEDURE — 73502 X-RAY EXAM HIP UNI 2-3 VIEWS: CPT

## 2019-07-24 PROCEDURE — 71045 X-RAY EXAM CHEST 1 VIEW: CPT

## 2019-07-24 PROCEDURE — 73560 X-RAY EXAM OF KNEE 1 OR 2: CPT | Mod: 26,LT

## 2019-07-24 PROCEDURE — 86850 RBC ANTIBODY SCREEN: CPT

## 2019-07-24 PROCEDURE — 85027 COMPLETE CBC AUTOMATED: CPT

## 2019-07-24 PROCEDURE — 93005 ELECTROCARDIOGRAM TRACING: CPT

## 2019-07-24 PROCEDURE — 86900 BLOOD TYPING SEROLOGIC ABO: CPT

## 2019-07-24 PROCEDURE — 97116 GAIT TRAINING THERAPY: CPT

## 2019-07-24 PROCEDURE — 72125 CT NECK SPINE W/O DYE: CPT

## 2019-07-24 PROCEDURE — 80053 COMPREHEN METABOLIC PANEL: CPT

## 2019-07-24 PROCEDURE — 36415 COLL VENOUS BLD VENIPUNCTURE: CPT

## 2019-07-24 PROCEDURE — 70450 CT HEAD/BRAIN W/O DYE: CPT

## 2019-07-24 PROCEDURE — G0378: CPT

## 2019-07-24 PROCEDURE — 99285 EMERGENCY DEPT VISIT HI MDM: CPT | Mod: 25

## 2019-07-24 PROCEDURE — 86901 BLOOD TYPING SEROLOGIC RH(D): CPT

## 2019-07-24 PROCEDURE — 73562 X-RAY EXAM OF KNEE 3: CPT

## 2019-07-24 PROCEDURE — 85610 PROTHROMBIN TIME: CPT

## 2019-07-24 PROCEDURE — 73560 X-RAY EXAM OF KNEE 1 OR 2: CPT

## 2019-07-24 PROCEDURE — 73620 X-RAY EXAM OF FOOT: CPT

## 2019-07-24 PROCEDURE — 99239 HOSP IP/OBS DSCHRG MGMT >30: CPT

## 2019-07-24 PROCEDURE — 82607 VITAMIN B-12: CPT

## 2019-07-24 PROCEDURE — 85730 THROMBOPLASTIN TIME PARTIAL: CPT

## 2019-07-24 PROCEDURE — 84484 ASSAY OF TROPONIN QUANT: CPT

## 2019-07-24 PROCEDURE — 82728 ASSAY OF FERRITIN: CPT

## 2019-07-24 PROCEDURE — 82306 VITAMIN D 25 HYDROXY: CPT

## 2019-07-24 PROCEDURE — 72170 X-RAY EXAM OF PELVIS: CPT

## 2019-07-24 PROCEDURE — 81001 URINALYSIS AUTO W/SCOPE: CPT

## 2019-07-24 PROCEDURE — 82550 ASSAY OF CK (CPK): CPT

## 2019-07-24 PROCEDURE — 97530 THERAPEUTIC ACTIVITIES: CPT

## 2019-07-24 RX ORDER — IBUPROFEN 200 MG
1 TABLET ORAL
Qty: 0 | Refills: 0 | DISCHARGE
Start: 2019-07-24

## 2019-07-24 RX ORDER — ASCORBIC ACID 60 MG
1 TABLET,CHEWABLE ORAL
Qty: 0 | Refills: 0 | DISCHARGE
Start: 2019-07-24

## 2019-07-24 RX ORDER — IBUPROFEN 200 MG
400 TABLET ORAL ONCE
Refills: 0 | Status: COMPLETED | OUTPATIENT
Start: 2019-07-24 | End: 2019-07-24

## 2019-07-24 RX ORDER — PREGABALIN 225 MG/1
1 CAPSULE ORAL
Qty: 0 | Refills: 0 | DISCHARGE
Start: 2019-07-24

## 2019-07-24 RX ADMIN — Medication 10 MILLIGRAM(S): at 06:22

## 2019-07-24 RX ADMIN — LIDOCAINE 1 PATCH: 4 CREAM TOPICAL at 02:00

## 2019-07-24 RX ADMIN — Medication 1 TABLET(S): at 12:15

## 2019-07-24 RX ADMIN — PREGABALIN 1000 MICROGRAM(S): 225 CAPSULE ORAL at 12:15

## 2019-07-24 RX ADMIN — TAMOXIFEN CITRATE 20 MILLIGRAM(S): 20 TABLET, FILM COATED ORAL at 12:15

## 2019-07-24 RX ADMIN — Medication 400 MILLIGRAM(S): at 12:13

## 2019-07-24 RX ADMIN — APIXABAN 5 MILLIGRAM(S): 2.5 TABLET, FILM COATED ORAL at 06:22

## 2019-07-24 RX ADMIN — Medication 10 MILLIGRAM(S): at 12:15

## 2019-07-24 RX ADMIN — Medication 400 MILLIGRAM(S): at 13:00

## 2019-07-24 RX ADMIN — Medication 975 MILLIGRAM(S): at 00:46

## 2019-07-24 RX ADMIN — Medication 500 MILLIGRAM(S): at 12:15

## 2019-07-24 RX ADMIN — POLYETHYLENE GLYCOL 3350 17 GRAM(S): 17 POWDER, FOR SOLUTION ORAL at 06:22

## 2019-07-24 RX ADMIN — PANTOPRAZOLE SODIUM 40 MILLIGRAM(S): 20 TABLET, DELAYED RELEASE ORAL at 06:22

## 2019-07-24 NOTE — DISCHARGE NOTE NURSING/CASE MANAGEMENT/SOCIAL WORK - NSDCDPATPORTLINK_GEN_ALL_CORE
You can access the [facility], offered by [facility], by registering with the following website: [email or web address]

## 2019-07-26 NOTE — ED ADULT TRIAGE NOTE - LOCATION:
Clinical abstract for records.  Paige Villalpando RN [facility] Case Manager   Left arm;

## 2019-09-08 NOTE — OCCUPATIONAL THERAPY INITIAL EVALUATION ADULT - HOME MANAGEMENT SKILLS, PREVIOUS LEVEL OF FUNCTION, OT EVAL
"[facility] Psychiatric Consult Follow-up Note      TIME SPENT IN PSYCHIATRY CONSULT: 15 MINUTES.     Interim History     The patient's care was discussed, patient seen and chart notes were reviewed. Pt examined on 90/08/19 by Dr. Jasmine. The patient continues to endorse struggling and physically. \"I can't breath well and I'm highly anxious.\" She admits to being an anxious and pervasive worrier. She demonstrates a severely depressed mood, exhibiting poor levels of energy, motivation, concentration and focus. She is agitated and withdrawn. Despite her continued depressive symptoms, the patient denies suicidal ideation or a plan of action.        Medications     Current Facility-Administered Medications Ordered in Epic   Medication Dose Route Frequency Last Rate Last Dose     acetaminophen (TYLENOL) tablet 1,000 mg  1,000 mg Oral TID   1,000 mg at 09/07/19 2307     acetaminophen (TYLENOL) tablet 650 mg  650 mg Oral Q4H PRN         bisacodyl (DULCOLAX) Suppository 10 mg  10 mg Rectal Daily PRN         buPROPion (WELLBUTRIN XL) 24 hr tablet 150 mg  150 mg Oral QAM   150 mg at 09/07/19 1115     clonazePAM (klonoPIN) tablet 0.5 mg  0.5 mg Oral BID   0.5 mg at 09/08/19 1015     enoxaparin (LOVENOX) injection 40 mg  40 mg Subcutaneous Q24H   40 mg at 09/07/19 2116     heparin 100 UNIT/ML injection 5 mL  5 mL Intracatheter Q28 Days   5 mL at 09/08/19 0121     heparin lock flush 10 UNIT/ML injection 5-10 mL  5-10 mL Intracatheter Q24H   5 mL at 09/07/19 2212     heparin lock flush 10 UNIT/ML injection 5-10 mL  5-10 mL Intracatheter Q1H PRN   5 mL at 09/08/19 1043     ibuprofen (ADVIL/MOTRIN) tablet 800 mg  800 mg Oral Q8H PRN   800 mg at 09/07/19 2325     iopamidol (ISOVUE-370) solution 79 mL  79 mL Intravenous Once         ipratropium - albuterol 0.5 mg/2.5 mg/3 mL (DUONEB) neb solution 3 mL  3 mL Nebulization Q4H PRN   3 mL at 09/08/19 0946     ipratropium - albuterol 0.5 mg/2.5 mg/3 mL (DUONEB) neb solution 3 mL "  3 mL Nebulization Q4H   3 mL at 09/08/19 0658     Lidocaine (LIDOCARE) 4 % Patch 1 patch  1 patch Transdermal Q24h   1 patch at 09/07/19 2120    And     lidocaine patch REMOVAL   Transdermal Q24H        And     lidocaine patch in PLACE   Transdermal Q8H         lidocaine (LMX4) cream   Topical Q1H PRN         lidocaine 1 % 0.1-1 mL  0.1-1 mL Other Q1H PRN         magnesium sulfate 4 g in 100 mL sterile water (premade)  4 g Intravenous Q4H PRN         melatonin tablet 1 mg  1 mg Oral At Bedtime PRN   1 mg at 09/07/19 2325     naloxone (NARCAN) injection 0.1-0.4 mg  0.1-0.4 mg Intravenous Q2 Min PRN         ondansetron (ZOFRAN-ODT) ODT tab 4 mg  4 mg Oral Q6H PRN   4 mg at 09/07/19 1121    Or     ondansetron (ZOFRAN) injection 4 mg  4 mg Intravenous Q6H PRN         pantoprazole (PROTONIX) EC tablet 40 mg  40 mg Oral QAM AC   40 mg at 09/08/19 0557     piperacillin-tazobactam (ZOSYN) 3.375 g vial to attach to  mL bag  3.375 g Intravenous Q6H 200 mL/hr at 09/07/19 1115 3.375 g at 09/08/19 1006     polyethylene glycol (MIRALAX/GLYCOLAX) Packet 17 g  17 g Oral Daily PRN         potassium chloride (KLOR-CON) Packet 20-40 mEq  20-40 mEq Oral or Feeding Tube Q2H PRN         potassium chloride 10 mEq in 100 mL intermittent infusion with 10 mg lidocaine  10 mEq Intravenous Q1H PRN         potassium chloride 10 mEq in 100 mL sterile water intermittent infusion (premix)  10 mEq Intravenous Q1H PRN         potassium chloride 20 mEq in 50 mL intermittent infusion  20 mEq Intravenous Q1H PRN         potassium chloride ER (K-DUR/KLOR-CON M) CR tablet 20 mEq  20 mEq Oral Once         potassium chloride ER (K-DUR/KLOR-CON M) CR tablet 20-40 mEq  20-40 mEq Oral Q2H PRN         prochlorperazine (COMPAZINE) injection 10 mg  10 mg Intravenous Q6H PRN        Or     prochlorperazine (COMPAZINE) tablet 10 mg  10 mg Oral Q6H PRN        Or     prochlorperazine (COMPAZINE) Suppository 25 mg  25 mg Rectal Q12H PRN         ranitidine  "(ZANTAC) tablet 150 mg  150 mg Oral Daily   150 mg at 09/06/19 1945     senna-docusate (SENOKOT-S/PERICOLACE) 8.6-50 MG per tablet 1 tablet  1 tablet Oral BID PRN        Or     senna-docusate (SENOKOT-S/PERICOLACE) 8.6-50 MG per tablet 2 tablet  2 tablet Oral BID PRN         sertraline (ZOLOFT) tablet 150 mg  150 mg Oral Daily   150 mg at 09/07/19 1115     sodium chloride (PF) 0.9% PF flush 10 mL  10 mL Intracatheter Q8H   10 mL at 09/08/19 0557     sodium chloride (PF) 0.9% PF flush 10-20 mL  10-20 mL Intracatheter q1 min prn         sodium chloride (PF) 0.9% PF flush 10-20 mL  10-20 mL Intracatheter Q1H PRN   10 mL at 09/08/19 0148     sodium chloride (PF) 0.9% PF flush 3 mL  3 mL Intracatheter q1 min prn         sodium chloride 0.9 % bag 500mL for CT scan flush use  101 mL Intravenous Once         No current Epic-ordered outpatient medications on file.         Allergies      Allergies   Allergen Reactions     Hydrocodone-Acetaminophen Itching     Patient states she is able to take it with Benadryl.       Oxycodone Itching and Rash        Medical Review of Systems     /71 (BP Location: Right arm)   Pulse 116   Temp 95.9  F (35.5  C) (Oral)   Resp 24   Ht 1.727 m (5' 8\")   Wt 95.5 kg (210 lb 8 oz)   LMP 12/07/2017   SpO2 98%   BMI 32.01 kg/m    Body mass index is 32.01 kg/m .  A 10-point review of systems was performed by Nicole Damon and is negative, no new findings.      Psychiatric Examination     Appearance Lying in bed, dressed in gown. Appears stated age.   Attitude Cooperative   Orientation Oriented to person, place, time   Eye Contact Poor   Speech Regular rate, rhythm, volume and tone   Language Normal   Psychomotor Behavior Normal   Mood Depressed, anxious   Affect Flat, withdrawn   Thought Process Goal Oriented, Intact   Associations Intact   Thought Content Patient is currently negative for suicidal ideation, negative for plan or intent, able to contract no self harm and identify barriers " to suicide.  Negative for obsessions, compulsions or psychosis.     Fund of Knowledge Intact   Insight Intact   Judgement Intact   Attention Span & Concentration Alert   Recent & Remote Memory Intact   Gait Not tested   Muscle Tone Intact on visual inspection       Labs     Labs reviewed.  Recent Results (from the past 24 hour(s))   Basic metabolic panel    Collection Time: 09/08/19  6:00 AM   Result Value Ref Range    Sodium 136 133 - 144 mmol/L    Potassium 3.4 3.4 - 5.3 mmol/L    Chloride 98 94 - 109 mmol/L    Carbon Dioxide 31 20 - 32 mmol/L    Anion Gap 7 3 - 14 mmol/L    Glucose 95 70 - 99 mg/dL    Urea Nitrogen 9 7 - 30 mg/dL    Creatinine 0.61 0.52 - 1.04 mg/dL    GFR Estimate >90 >60 mL/min/[1.73_m2]    GFR Estimate If Black >90 >60 mL/min/[1.73_m2]    Calcium 8.8 8.5 - 10.1 mg/dL   CBC with platelets    Collection Time: 09/08/19  6:00 AM   Result Value Ref Range    WBC 8.1 4.0 - 11.0 10e9/L    RBC Count 4.40 3.8 - 5.2 10e12/L    Hemoglobin 10.9 (L) 11.7 - 15.7 g/dL    Hematocrit 35.4 35.0 - 47.0 %    MCV 81 78 - 100 fl    MCH 24.8 (L) 26.5 - 33.0 pg    MCHC 30.8 (L) 31.5 - 36.5 g/dL    RDW 15.4 (H) 10.0 - 15.0 %    Platelet Count 351 150 - 450 10e9/L   Lactic acid level STAT for sepsis protocol    Collection Time: 09/08/19  9:30 AM   Result Value Ref Range    Lactate for Sepsis Protocol 1.2 0.7 - 2.0 mmol/L        Impression     This is a 51 year old female who continues to present with signs and symptoms of severe depression and anxiety. Despite medication management, she continues to exhibit impaired levels of energy and motivation. She is agitated, irritable, and withdrawn. Despite her linger symptoms, the patient currently denies active suicidal ideation or a plan of action. Due to her symptoms, will reinitiate Cymbalta 30mg qam due to her positive response while on Seroquel, will increase and schedule the medication to 50mg tid. Patient is in agreement. Additionally, will discontinue Wellbutrin XL. No  other medication changes.      Diagnoses     1. Major depression, recurrent, severe, without psychotic features.   2. Post-traumatic stress disorder  3. Anxiety NOS    4. Sedative hypnotic dependence.   5. Borderline Personality Disorder.      Plan     1. Explained Side effects, benefits and complications of medications to the patient.   2. Medication changes: Will reinitiate Cymbalta 30mg qam and will increase and schedule Seroquel to 50mg tid. Additionally, will discontinue Wellbutrin XL. No other medication changes.   3. Discussed treatment plan with patient and team.      TIME SPENT IN PSYCHIATRY CONSULT: 15 MINUTES.    Attestation:   I, Nicole Damon, am serving as a scribe at 11:16 AM on 9/8/2019 to document services personally performed by Dr. Jasmine based on my observations and the provider's statements to me.      Patient ID:  Name: Megha Campbell    MRN: [id number]  Admission: 9/6/2019   YOB: 1968   independent

## 2019-09-16 NOTE — DISCHARGE NOTE ADULT - FUNCTIONAL SCREEN CURRENT LEVEL: AMBULATION, MLM
needed for Pain Yes Saud Martin APRN - CNP   DiphenhydrAMINE HCl (BENADRYL ALLERGY PO) Take by mouth Yes Historical Provider, MD   cloNIDine (CATAPRES) 0.1 MG tablet TAKE 1 TABLET BY MOUTH TWICE DAILY  Patient not taking: Reported on 9/16/2019  Katy Hill MD   naproxen (NAPROSYN) 500 MG tablet Take 1 tablet by mouth 2 times daily as needed for Pain  Shelbi Lopes PA-C     Health Maintenance   Topic Date Due    Pneumococcal 0-64 years Vaccine (1 of 1 - PPSV23) 08/19/1994    Varicella Vaccine (1 of 2 - 13+ 2-dose series) 08/19/2001    Cervical cancer screen  08/19/2009    Flu vaccine (1) 09/01/2019    DTaP/Tdap/Td vaccine (2 - Td) 06/29/2026    HIV screen  Completed     Past Medical History:   Diagnosis Date    Anxiety     Bipolar 1 disorder ([facility] [address]     Depression     Insomnia      Social History     Socioeconomic History    Marital status: Single     Spouse name: Not on file    Number of children: Not on file    Years of education: Not on file    Highest education level: Not on file   Occupational History    Not on file   Social Needs    Financial resource strain: Not on file    Food insecurity:     Worry: Not on file     Inability: Not on file    Transportation needs:     Medical: Not on file     Non-medical: Not on file   Tobacco Use    Smoking status: Current Every Day Smoker     Packs/day: 0.50     Types: Cigarettes    Smokeless tobacco: Never Used   Substance and Sexual Activity    Alcohol use: No     Comment: socially; not during pregnancy    Drug use: Yes     Types: Marijuana     Comment: today    Sexual activity: Yes     Partners: Male   Lifestyle    Physical activity:     Days per week: Not on file     Minutes per session: Not on file    Stress: Not on file   Relationships    Social connections:     Talks on phone: Not on file     Gets together: Not on file     Attends [employer] service: Not on file     Active member of club or organization: Not on file     Attends (3) assistive equipment and person (1) assistive equipment

## 2020-02-13 ENCOUNTER — INPATIENT (INPATIENT)
Facility: HOSPITAL | Age: 65
LOS: 2 days | DRG: 283 | End: 2020-02-16
Attending: INTERNAL MEDICINE | Admitting: HOSPITALIST
Payer: MEDICARE

## 2020-02-13 VITALS
SYSTOLIC BLOOD PRESSURE: 112 MMHG | HEART RATE: 108 BPM | HEIGHT: 66 IN | TEMPERATURE: 98 F | RESPIRATION RATE: 16 BRPM | OXYGEN SATURATION: 96 % | WEIGHT: 139.99 LBS | DIASTOLIC BLOOD PRESSURE: 75 MMHG

## 2020-02-13 DIAGNOSIS — Z98.890 OTHER SPECIFIED POSTPROCEDURAL STATES: Chronic | ICD-10-CM

## 2020-02-13 DIAGNOSIS — D17.9 BENIGN LIPOMATOUS NEOPLASM, UNSPECIFIED: Chronic | ICD-10-CM

## 2020-02-13 DIAGNOSIS — Z96.641 PRESENCE OF RIGHT ARTIFICIAL HIP JOINT: Chronic | ICD-10-CM

## 2020-02-13 DIAGNOSIS — Z96.642 PRESENCE OF LEFT ARTIFICIAL HIP JOINT: Chronic | ICD-10-CM

## 2020-02-13 DIAGNOSIS — Z90.12 ACQUIRED ABSENCE OF LEFT BREAST AND NIPPLE: Chronic | ICD-10-CM

## 2020-02-13 DIAGNOSIS — I21.4 NON-ST ELEVATION (NSTEMI) MYOCARDIAL INFARCTION: ICD-10-CM

## 2020-02-13 LAB
ALBUMIN SERPL ELPH-MCNC: 2.3 G/DL — LOW (ref 3.3–5.2)
ALP SERPL-CCNC: 187 U/L — HIGH (ref 40–120)
ALT FLD-CCNC: 26 U/L — SIGNIFICANT CHANGE UP
ANION GAP SERPL CALC-SCNC: 11 MMOL/L — SIGNIFICANT CHANGE UP (ref 5–17)
APTT BLD: 26 SEC — LOW (ref 27.5–36.3)
AST SERPL-CCNC: 50 U/L — HIGH
BASOPHILS # BLD AUTO: 0.02 K/UL — SIGNIFICANT CHANGE UP (ref 0–0.2)
BASOPHILS NFR BLD AUTO: 0.2 % — SIGNIFICANT CHANGE UP (ref 0–2)
BILIRUB SERPL-MCNC: 0.2 MG/DL — LOW (ref 0.4–2)
BLD GP AB SCN SERPL QL: SIGNIFICANT CHANGE UP
BUN SERPL-MCNC: 11 MG/DL — SIGNIFICANT CHANGE UP (ref 8–20)
CALCIUM SERPL-MCNC: 8.8 MG/DL — SIGNIFICANT CHANGE UP (ref 8.6–10.2)
CHLORIDE SERPL-SCNC: 96 MMOL/L — LOW (ref 98–107)
CO2 SERPL-SCNC: 28 MMOL/L — SIGNIFICANT CHANGE UP (ref 22–29)
CREAT SERPL-MCNC: 0.4 MG/DL — LOW (ref 0.5–1.3)
EOSINOPHIL # BLD AUTO: 0.01 K/UL — SIGNIFICANT CHANGE UP (ref 0–0.5)
EOSINOPHIL NFR BLD AUTO: 0.1 % — SIGNIFICANT CHANGE UP (ref 0–6)
GLUCOSE SERPL-MCNC: 141 MG/DL — HIGH (ref 70–99)
HCT VFR BLD CALC: 38.2 % — SIGNIFICANT CHANGE UP (ref 34.5–45)
HGB BLD-MCNC: 12 G/DL — SIGNIFICANT CHANGE UP (ref 11.5–15.5)
IMM GRANULOCYTES NFR BLD AUTO: 0.4 % — SIGNIFICANT CHANGE UP (ref 0–1.5)
INR BLD: 1.36 RATIO — HIGH (ref 0.88–1.16)
LYMPHOCYTES # BLD AUTO: 1.75 K/UL — SIGNIFICANT CHANGE UP (ref 1–3.3)
LYMPHOCYTES # BLD AUTO: 19.4 % — SIGNIFICANT CHANGE UP (ref 13–44)
MCHC RBC-ENTMCNC: 31.4 GM/DL — LOW (ref 32–36)
MCHC RBC-ENTMCNC: 32.7 PG — SIGNIFICANT CHANGE UP (ref 27–34)
MCV RBC AUTO: 104.1 FL — HIGH (ref 80–100)
MONOCYTES # BLD AUTO: 0.7 K/UL — SIGNIFICANT CHANGE UP (ref 0–0.9)
MONOCYTES NFR BLD AUTO: 7.7 % — SIGNIFICANT CHANGE UP (ref 2–14)
NEUTROPHILS # BLD AUTO: 6.52 K/UL — SIGNIFICANT CHANGE UP (ref 1.8–7.4)
NEUTROPHILS NFR BLD AUTO: 72.2 % — SIGNIFICANT CHANGE UP (ref 43–77)
NT-PROBNP SERPL-SCNC: 6687 PG/ML — HIGH (ref 0–300)
PLATELET # BLD AUTO: 349 K/UL — SIGNIFICANT CHANGE UP (ref 150–400)
POTASSIUM SERPL-MCNC: 4.2 MMOL/L — SIGNIFICANT CHANGE UP (ref 3.5–5.3)
POTASSIUM SERPL-SCNC: 4.2 MMOL/L — SIGNIFICANT CHANGE UP (ref 3.5–5.3)
PROT SERPL-MCNC: 5.3 G/DL — LOW (ref 6.6–8.7)
PROTHROM AB SERPL-ACNC: 15.5 SEC — HIGH (ref 10–12.9)
RBC # BLD: 3.67 M/UL — LOW (ref 3.8–5.2)
RBC # FLD: 14.1 % — SIGNIFICANT CHANGE UP (ref 10.3–14.5)
SODIUM SERPL-SCNC: 135 MMOL/L — SIGNIFICANT CHANGE UP (ref 135–145)
T4 AB SER-ACNC: 5.6 UG/DL — SIGNIFICANT CHANGE UP (ref 4.5–12)
TROPONIN T SERPL-MCNC: 0.2 NG/ML — HIGH (ref 0–0.06)
TROPONIN T SERPL-MCNC: 0.3 NG/ML — HIGH (ref 0–0.06)
TROPONIN T SERPL-MCNC: 0.32 NG/ML — HIGH (ref 0–0.06)
TSH SERPL-MCNC: 4.33 UIU/ML — HIGH (ref 0.27–4.2)
WBC # BLD: 9.04 K/UL — SIGNIFICANT CHANGE UP (ref 3.8–10.5)
WBC # FLD AUTO: 9.04 K/UL — SIGNIFICANT CHANGE UP (ref 3.8–10.5)

## 2020-02-13 PROCEDURE — 93010 ELECTROCARDIOGRAM REPORT: CPT

## 2020-02-13 PROCEDURE — 71045 X-RAY EXAM CHEST 1 VIEW: CPT | Mod: 26

## 2020-02-13 PROCEDURE — 99223 1ST HOSP IP/OBS HIGH 75: CPT

## 2020-02-13 PROCEDURE — 70491 CT SOFT TISSUE NECK W/DYE: CPT | Mod: 26

## 2020-02-13 PROCEDURE — 99285 EMERGENCY DEPT VISIT HI MDM: CPT

## 2020-02-13 PROCEDURE — 71275 CT ANGIOGRAPHY CHEST: CPT | Mod: 26

## 2020-02-13 RX ORDER — METOCLOPRAMIDE HCL 10 MG
10 TABLET ORAL
Refills: 0 | Status: COMPLETED | OUTPATIENT
Start: 2020-02-13 | End: 2020-02-15

## 2020-02-13 RX ORDER — ENOXAPARIN SODIUM 100 MG/ML
40 INJECTION SUBCUTANEOUS DAILY
Refills: 0 | Status: DISCONTINUED | OUTPATIENT
Start: 2020-02-13 | End: 2020-02-13

## 2020-02-13 RX ORDER — INFLUENZA VIRUS VACCINE 15; 15; 15; 15 UG/.5ML; UG/.5ML; UG/.5ML; UG/.5ML
0.5 SUSPENSION INTRAMUSCULAR ONCE
Refills: 0 | Status: DISCONTINUED | OUTPATIENT
Start: 2020-02-13 | End: 2020-02-16

## 2020-02-13 RX ORDER — ONDANSETRON 8 MG/1
4 TABLET, FILM COATED ORAL ONCE
Refills: 0 | Status: COMPLETED | OUTPATIENT
Start: 2020-02-13 | End: 2020-02-13

## 2020-02-13 RX ORDER — MORPHINE SULFATE 50 MG/1
4 CAPSULE, EXTENDED RELEASE ORAL ONCE
Refills: 0 | Status: DISCONTINUED | OUTPATIENT
Start: 2020-02-13 | End: 2020-02-13

## 2020-02-13 RX ORDER — PANTOPRAZOLE SODIUM 20 MG/1
40 TABLET, DELAYED RELEASE ORAL
Refills: 0 | Status: DISCONTINUED | OUTPATIENT
Start: 2020-02-13 | End: 2020-02-16

## 2020-02-13 RX ORDER — FUROSEMIDE 40 MG
40 TABLET ORAL DAILY
Refills: 0 | Status: DISCONTINUED | OUTPATIENT
Start: 2020-02-13 | End: 2020-02-15

## 2020-02-13 RX ORDER — ENOXAPARIN SODIUM 100 MG/ML
65 INJECTION SUBCUTANEOUS
Refills: 0 | Status: DISCONTINUED | OUTPATIENT
Start: 2020-02-13 | End: 2020-02-16

## 2020-02-13 RX ORDER — ACETAMINOPHEN 500 MG
650 TABLET ORAL EVERY 6 HOURS
Refills: 0 | Status: DISCONTINUED | OUTPATIENT
Start: 2020-02-13 | End: 2020-02-16

## 2020-02-13 RX ORDER — PREGABALIN 225 MG/1
1000 CAPSULE ORAL DAILY
Refills: 0 | Status: DISCONTINUED | OUTPATIENT
Start: 2020-02-13 | End: 2020-02-16

## 2020-02-13 RX ORDER — TAMOXIFEN CITRATE 20 MG/1
20 TABLET, FILM COATED ORAL DAILY
Refills: 0 | Status: DISCONTINUED | OUTPATIENT
Start: 2020-02-13 | End: 2020-02-14

## 2020-02-13 RX ORDER — CHOLECALCIFEROL (VITAMIN D3) 125 MCG
1000 CAPSULE ORAL DAILY
Refills: 0 | Status: DISCONTINUED | OUTPATIENT
Start: 2020-02-13 | End: 2020-02-16

## 2020-02-13 RX ORDER — ASCORBIC ACID 60 MG
500 TABLET,CHEWABLE ORAL DAILY
Refills: 0 | Status: DISCONTINUED | OUTPATIENT
Start: 2020-02-13 | End: 2020-02-16

## 2020-02-13 RX ORDER — SODIUM CHLORIDE 9 MG/ML
1000 INJECTION INTRAMUSCULAR; INTRAVENOUS; SUBCUTANEOUS
Refills: 0 | Status: DISCONTINUED | OUTPATIENT
Start: 2020-02-13 | End: 2020-02-14

## 2020-02-13 RX ORDER — MORPHINE SULFATE 50 MG/1
2 CAPSULE, EXTENDED RELEASE ORAL EVERY 4 HOURS
Refills: 0 | Status: DISCONTINUED | OUTPATIENT
Start: 2020-02-13 | End: 2020-02-15

## 2020-02-13 RX ORDER — METOCLOPRAMIDE HCL 10 MG
10 TABLET ORAL
Refills: 0 | Status: DISCONTINUED | OUTPATIENT
Start: 2020-02-13 | End: 2020-02-13

## 2020-02-13 RX ADMIN — MORPHINE SULFATE 4 MILLIGRAM(S): 50 CAPSULE, EXTENDED RELEASE ORAL at 12:42

## 2020-02-13 RX ADMIN — MORPHINE SULFATE 2 MILLIGRAM(S): 50 CAPSULE, EXTENDED RELEASE ORAL at 23:03

## 2020-02-13 RX ADMIN — Medication 10 MILLIGRAM(S): at 15:16

## 2020-02-13 RX ADMIN — MORPHINE SULFATE 4 MILLIGRAM(S): 50 CAPSULE, EXTENDED RELEASE ORAL at 08:50

## 2020-02-13 RX ADMIN — ONDANSETRON 4 MILLIGRAM(S): 8 TABLET, FILM COATED ORAL at 12:42

## 2020-02-13 RX ADMIN — ENOXAPARIN SODIUM 65 MILLIGRAM(S): 100 INJECTION SUBCUTANEOUS at 15:36

## 2020-02-13 RX ADMIN — MORPHINE SULFATE 4 MILLIGRAM(S): 50 CAPSULE, EXTENDED RELEASE ORAL at 12:49

## 2020-02-13 RX ADMIN — MORPHINE SULFATE 2 MILLIGRAM(S): 50 CAPSULE, EXTENDED RELEASE ORAL at 23:15

## 2020-02-13 RX ADMIN — SODIUM CHLORIDE 75 MILLILITER(S): 9 INJECTION INTRAMUSCULAR; INTRAVENOUS; SUBCUTANEOUS at 12:42

## 2020-02-13 NOTE — ED PROVIDER NOTE - CLINICAL SUMMARY MEDICAL DECISION MAKING FREE TEXT BOX
63 y/o F pt with hx of CHF, HTN, HLD, and breast cancer presenting with neck mass and pain that has been worsening for several months. Associated difficulty swallowing with no difficulty breathing. Will evaluate neck mass with CT neck soft tisse, labs, thyroid function, coags, morphine for pain and will reeval.

## 2020-02-13 NOTE — H&P ADULT - HISTORY OF PRESENT ILLNESS
Patient is a 64 yof with pmh of CHF, DVT on eliquis, HTN, HLD, and breast cancer on tamoxifen presents from home with daughter with complaints of chest and neck pain, weakness and nausea which started at 4am this morning.  Patient is not the best historian and much of the history was taken from daughter. As per daughter, patient has not been eating well for the past several weeks due to gastroparesis and has lost weight. Patient then had left sided chest pain along with n marnie pain and family grew concerned and brought her to the hospital. Patient was worked up in the er and found to have an elevated troponin and a neck mass on ct scan. Of note,  patient follows with Dr Lozoya and is aware of the neck mass and was due to see the ent physician next week.

## 2020-02-13 NOTE — ED ADULT NURSE REASSESSMENT NOTE - NS ED NURSE REASSESS COMMENT FT1
report handed off at this time to ANASTACIO Williamson RN at this time. pt awake and alert, fatigued but reports pain controlled at this time. pt taken over to holding area.

## 2020-02-13 NOTE — ED PROVIDER NOTE - CARE PLAN
Principal Discharge DX:	NSTEMI (non-ST elevated myocardial infarction)  Secondary Diagnosis:	Neck mass

## 2020-02-13 NOTE — H&P ADULT - NSHPLABSRESULTS_GEN_ALL_CORE
12.0   9.04   )----------(  349       ( 13 Feb 2020 08:51 )               38.2      135    |  96     |  11.0   ----------------------------<  141        ( 13 Feb 2020 08:51 )  4.2     |  28.0   |  0.40     Ca    8.8        ( 13 Feb 2020 08:51 )    TPro  5.3    /  Alb  2.3    /  TBili  0.2    /  DBili  x      /  AST  50     /  ALT  26     /  AlkPhos  187    ( 13 Feb 2020 08:51 )    LIVER FUNCTIONS - ( 13 Feb 2020 08:51 )  Alb: 2.3 g/dL / Pro: 5.3 g/dL / ALK PHOS: 187 U/L / ALT: 26 U/L / AST: 50 U/L / GGT: x           PT/INR -  15.5 sec / 1.36 ratio   ( 13 Feb 2020 08:51 )       PTT -  26.0 sec   ( 13 Feb 2020 08:51 )  CAPILLARY BLOOD GLUCOSE    CARDIAC MARKERS ( 13 Feb 2020 13:33 )  x     / 0.32 ng/mL / x     / x     / x      CARDIAC MARKERS ( 13 Feb 2020 08:51 )  x     / 0.20 ng/mL / x     / x     / x

## 2020-02-13 NOTE — ED PROVIDER NOTE - PHYSICAL EXAMINATION
Gen: no acute distress  Head: normocephalic, atraumatic  Neck: large, firm, nontender mass protruding from beneath the jaw elevating the floor of the mouth, posterior oropharynx unable to be visualized due to tongue elevation, no c-spine tenderness, limited ROM of neck 2/2 pain  Lung: CTAB, no respiratory distress, no wheezing, rales, rhonchi  CV: normal s1/s2, rrr, chest wall nontender  Abd: soft, non-tender, non-distended  MSK: No edema, no visible deformities, full range of motion in all 4 extremities  Neuro: No focal neurologic deficits  Skin: No rash   Psych: normal affect

## 2020-02-13 NOTE — ED ADULT TRIAGE NOTE - CHIEF COMPLAINT QUOTE
patient comes to ed from home after reporting midsternal chest pressure that woke her  from sleep at 4 am. Patient + nausea (also has gastroparesis), diaphoresis and was hypotensive with ems 80s systolic. patient had 4 mg zofran and 20 in right ac by ems.

## 2020-02-13 NOTE — H&P ADULT - NSHPREVIEWOFSYSTEMS_GEN_ALL_CORE
REVIEW OF SYSTEMS:    CONSTITUTIONAL: fatigue  EYES: No eye pain, visual disturbances, or discharge  ENMT:  No difficulty hearing, tinnitus, vertigo; No sinus or throat pain  NECK: No pain or stiffness  BREASTS: No pain, masses, or nipple discharge  RESPIRATORY: No cough, wheezing, chills or hemoptysis; No shortness of breath  CARDIOVASCULAR: No chest pain, palpitations, dizziness, or leg swelling  GASTROINTESTINAL: vomiting  GENITOURINARY: No dysuria, frequency, hematuria, or incontinence  NEUROLOGICAL: No headaches, memory loss, loss of strength, numbness, or tremors  SKIN: No itching, burning, rashes, or lesions   LYMPH NODES: No enlarged glands  ENDOCRINE: No heat or cold intolerance; No hair loss  MUSCULOSKELETAL: No joint pain or swelling; No muscle, back, or extremity pain  PSYCHIATRIC: No depression, anxiety, mood swings, or difficulty sleeping  HEME/LYMPH: No easy bruising, or bleeding gums  ALLERY AND IMMUNOLOGIC: No hives or eczema

## 2020-02-13 NOTE — CONSULT NOTE ADULT - ASSESSMENT
Pt is 63 y/o female with medical history of HFpEF, HTN, breast cancer s/p left breast mastectomy, bilateral lower extremity DVT, who presents to [facility]-ED for neck pain. Pt states that she has been experiencing neck pain radiating down arm and left side of chest for a couple of weeks. Pt describes pain as constant, radiating, 8/10, unable to qualify pain. Pt states that pain worsens with movement, no pain relieving factors identified. Pt also has intermittent associated symptoms of nausea, SOB, and palpitations. Pt was brought to [facility]-ED for work up and found to have ST HR @ with diffuse low voltage on ECG and elevated Trop 0.20.CT Angio revealed no PE but +small bilateral pleural effusions. CT Neck showed presence of heterogenous mass in floor of mouth/tongue measuring 5.6x3.2cm. Pt currently c/o neck pain and chest pain, no associated symptoms at this time. Pt Denies fever, chills, cough, phlegm production, , PND, irregular, fast heart beat, nausea, vomiting, melena, rectal bleed, hematuria, lightheadedness, dizziness, syncope, near syncope.     Chest Pain  -Trop #1-0.20, trop#2 pending  -ECG- ST with PVC and RBBB HR @ 111  -CT Angio- No PE, + small bilateral pleural effusions  -Echo-ordered and pending  -Cont. to trend trop  - If trop cont. to elevate and abnormal wall motion or reduced EF noted on echo- will re-evalute for intervention Pt is 65 y/o female with medical history of HFpEF, HTN, breast cancer s/p left breast mastectomy, bilateral lower extremity DVT, who presents to [facility]-ED for neck pain. Pt states that she has been experiencing neck pain radiating down arm and left side of chest for a couple of weeks. Pt describes pain as constant, radiating, 8/10, unable to qualify pain. Pt states that pain worsens with movement, no pain relieving factors identified. Pt also has intermittent associated symptoms of nausea, SOB, and palpitations. Pt was brought to [facility]-ED for work up and found to have ST HR @ with diffuse low voltage on ECG and elevated Trop 0.20.CT Angio revealed no PE but +small bilateral pleural effusions. CT Neck showed presence of heterogenous mass in floor of mouth/tongue measuring 5.6x3.2cm. Pt currently c/o neck pain and chest pain, no associated symptoms at this time. Pt Denies fever, chills, cough, phlegm production, , PND, irregular, fast heart beat, nausea, vomiting, melena, rectal bleed, hematuria, lightheadedness, dizziness, syncope, near syncope.     Chest Pain  -Trop #1-0.20, trop#2 pending  -ECG- ST with PVC and RBBB HR @ 111  -CT Angio- No PE, + small bilateral pleural effusions  -Echo-ordered and pending  -Cont. to trend trop  - If trop cont. to elevate and abnormal wall motion or reduced EF noted on echo- will re-evalute for intervention    CHF  -Bilateral lower extremity pttting edema +3/+3  -BNP-ordered and pending  -Start Lasix IVP 40mg once daily for diuresis with parameters

## 2020-02-13 NOTE — ED ADULT NURSE REASSESSMENT NOTE - NS ED NURSE REASSESS COMMENT FT1
pt seen by dr garcia, medical resident at this time. pt awake alert and oriented, even and unlabored resps, NSR on monitor at this time. skin warm and dry, IV site patent. no current chest pain but reports neck pain. will medicate as ordered with subsequent pain meds as well as nausea medication.

## 2020-02-13 NOTE — ED ADULT NURSE REASSESSMENT NOTE - NS ED NURSE REASSESS COMMENT FT1
pt aware of plan of care and need for admission. awaiting orders. family at bedside and aware of plan of care.

## 2020-02-13 NOTE — ED ADULT NURSE NOTE - OBJECTIVE STATEMENT
pt comes to ED with reports of neck pain and diff swallowing with pain radiating down to her chest. pt awake alert and oriented, states supposed to follow up with ENT regarding the neck swelling and diff swallowing this week. pt with no SOB, states hx breast ca. afebrile. no SOB noted, MELÉNDEZ with strength and purpose. pt with palpable mass to her neck on exam.

## 2020-02-13 NOTE — CONSULT NOTE ADULT - SUBJECTIVE AND OBJECTIVE BOX
HPI: Patient is a 64y Female seen on consultation for the evaluation and management of hx of DCIS left breast, post surgery, Mammocite tx 7/17, treated with Tamoxifen.  Pt was last seen in office 10/3/2018. Al so has hx of HTN, elevated cholesterol, DVT on Eliquis, gastroparesis with marked weight loss and debilitation.  Presented to ER with c/o fatigue, chest pain.  Found to have elevated Troponins.  Seen by cardiology.    has right-sided neck mass; was to see ENT. CT  done documents 5 cm mass, suspicious for malignancy, likely head and neck primary.     PAST MEDICAL & SURGICAL HISTORY:  Arthritis  Colitis  Anemia  Weakness  Nausea & vomiting  CHF (congestive heart failure): 12/2017 resolved  Breast cancer: left 2017, had surgery and radiation  High cholesterol: d/c&#x27;d by prescribing MD in April &quot;since I&#x27;m not eating&quot;  High blood pressure: reports medication was stopped April 2018 because BP was low  History of endoscopy: 7/2018  S/P colonoscopy: 2018  History of total hip replacement, left: march 2018  History of hip replacement, total, right: 11/2017  History of lumpectomy of left breast: 2017  Atypical lipoma of soft tissue: in left groin 2008  H/O foot surgery: ankle left 2004      REVIEW OF SYSTEMS      General:weak, anorexic, unable to walk	    Skin/Breast:hypopigmented skin  	  Ophthalmologic:no diplopia  	  ENMT:	dry     Respiratory and Thorax:no SOB  	  Cardiovascular:	chest pain    nausea and abdominal pain          	    Hematology/Lymphatics:	right neck mass cervical mass  	        MEDICATIONS  (STANDING):  ascorbic acid 500 milliGRAM(s) Oral daily  cholecalciferol 1000 Unit(s) Oral daily  cyanocobalamin 1000 MICROGram(s) Oral daily  enoxaparin Injectable 65 milliGRAM(s) SubCutaneous two times a day  furosemide   Injectable 40 milliGRAM(s) IV Push daily  influenza   Vaccine 0.5 milliLiter(s) IntraMuscular once  metoclopramide Injectable 10 milliGRAM(s) IV Push <User Schedule>  multivitamin 1 Tablet(s) Oral daily  pantoprazole    Tablet 40 milliGRAM(s) Oral two times a day  sodium chloride 0.9%. 1000 milliLiter(s) (75 mL/Hr) IV Continuous <Continuous>  tamoxifen 20 milliGRAM(s) Oral daily    MEDICATIONS  (PRN):  acetaminophen   Tablet .. 650 milliGRAM(s) Oral every 6 hours PRN Temp greater or equal to 38C (100.4F), Mild Pain (1 - 3)  morphine  - Injectable 2 milliGRAM(s) IV Push every 4 hours PRN Moderate Pain (4 - 6)      Allergies    Crestor (Muscle Pain)  shellfish (Swelling; Rash)    Intolerances        SOCIAL HISTORY:    Smoking Status:non-smoker  Alcohol:denies  Marital Status:Single      FAMILY HISTORY:  Family history of cardiac arrest  Family history of stroke  Family history of breast cancer in sister (Sibling)  Family history of hypertension in mother        Vital Signs Last 24 Hrs  T(C): 36.4 (13 Feb 2020 19:07), Max: 36.7 (13 Feb 2020 16:21)  T(F): 97.6 (13 Feb 2020 19:07), Max: 98 (13 Feb 2020 16:21)  HR: 114 (13 Feb 2020 19:07) (102 - 114)  BP: 92/62 (13 Feb 2020 19:07) (92/62 - 112/75)  BP(mean): --  RR: 19 (13 Feb 2020 19:07) (16 - 19)  SpO2: 96% (13 Feb 2020 19:07) (93% - 97%)    PHYSICAL EXAM:      Constitutional:appears ill, weak, debilitated    Eyes:pale, anicteric    ENMT:firm right sided neck mass    Neck:right cervical mass, firm, non-tender     Breasts:NE        Respiratory:clear    Cardiovascular:RRR    Gastrointestinal:soft, non-tender            Extremities:bilat LE edema              LABS:                        12.0   9.04  )-----------( 349      ( 13 Feb 2020 08:51 )             38.2     02-13    135  |  96<L>  |  11.0  ----------------------------<  141<H>  4.2   |  28.0  |  0.40<L>    Ca    8.8      13 Feb 2020 08:51    TPro  5.3<L>  /  Alb  2.3<L>  /  TBili  0.2<L>  /  DBili  x   /  AST  50<H>  /  ALT  26  /  AlkPhos  187<H>  02-13    PT/INR - ( 13 Feb 2020 08:51 )   PT: 15.5 sec;   INR: 1.36 ratio         PTT - ( 13 Feb 2020 08:51 )  PTT:26.0 sec      RADIOLOGY & ADDITIONAL STUDIES:

## 2020-02-13 NOTE — PATIENT PROFILE ADULT - FUNCTIONAL SCREEN CURRENT LEVEL: BATHING, MLM
Occupational Therapy  Facility/Department: [facility]  Daily Treatment Note  NAME: Moses Carroll  : 1944  MRN: [id number]    Date of Service: 2019    Discharge Recommendations:  Home with Home health OT, Home with assist PRN  OT Equipment Recommendations  Equipment Needed: Yes  Mobility Devices: ADL Assistive Devices  ADL Assistive Devices: Sock-Aid Hard;Long-handled Shoe Horn;Long-handled Sponge;Dressing Stick  Other: may need AE- will cont to assess pending progress    Assessment   Performance deficits / Impairments: Decreased functional mobility ; Decreased safe awareness;Decreased balance;Decreased ADL status; Decreased endurance;Decreased strength;Decreased cognition;Decreased ROM  Assessment: Patient continues to demo poor safety, endurance, balance, and strength limiting independence with RW for mobility/transfers and ADLs/IADLs at this time. Consistently need cues for safety, mechanics within the RW, and use of RW safely during transitions in room and gym with poor carryover and resistance to OT education. Pt has very poor STM, sequencing and problem solving but lacks very poor insight to deficits. Pt is able to consistently tolerate 5-10 minutes of standing tasks however does need rest breaks intermittently (unsure of true fatigue vs. motivation.)  Attempted kitchen simulation this date (pt refusing to complete cooking task despite max encouragement and education on OT POC) but patient still remains close SBA with mod-max cues for safety with reaching various locations and gathering items for IADL management. Pt is able to tolerate BUE ex however continues to need demo and cues for attn to task. Cont to rec 24 hour SPV with HHOT at home. Pt has been trialed with AE for LB dressing however remains once again resistant to OT education but does limit back px during ADLs. Cont OT POC.   Prognosis: Fair  Patient Education: role of OT, OT POC, safety, body mechanics, IPR schedule, DME, DC recs  REQUIRES OT FOLLOW UP: Yes  Activity Tolerance  Activity Tolerance: Patient Tolerated treatment well;Patient limited by fatigue  Safety Devices  Safety Devices in place: Yes  Type of devices: Left in chair;Gait belt(left with SLP)  Restraints  Initially in place: No         Patient Diagnosis(es): There were no encounter diagnoses. has a past medical history of Hyperlipemia, Hypertension, Prostate enlargement, and Unspecified cerebral artery occlusion with cerebral infarction. has a past surgical history that includes Carotid endarterectomy; Foot surgery; Skin cancer excision; Eye surgery; Facial reconstruction surgery; Tonsillectomy; Colonoscopy; and Colonoscopy (2014). Restrictions  Restrictions/Precautions  Restrictions/Precautions: Up as Tolerated, General Precautions  Position Activity Restriction  Spinal Precautions: No Bending, No Lifting, No Twisting  Spinal Precautions: no lifting >10 lbs, pt may shower, open to air  Other position/activity restrictions: up as tolerated, may shower, no BLT  Subjective   General  Chart Reviewed: Yes  Patient assessed for rehabilitation services?: Yes  Family / Caregiver Present: No  Referring Practitioner: Dr. Marah Santo  Diagnosis: T8-T9 disectomy and fusion  Subjective  Subjective: Pt seated in WC, min encouragement to participate. General Comment  Comments: RN clearing patient for tx  Vital Signs  Patient Currently in Pain: No   Orientation  Orientation  Overall Orientation Status: Within Functional Limits  Objective    ADL  Feeding: Modified independent ; Beverage management(sitting in [facility])  Grooming: Setup; Increased time to complete(in stance sinkside)  UE Dressing: Verbal cueing; Increased time to complete;Stand by assistance(close SBA in stance with RW to keyur vest)  Toileting: Increased time to complete;Supervision(with RW, cues for safety and problem solving)  Additional Comments: Increased time, cues PRN for safety with limited receptivity to education  Instrumental ADL's  Instrumental ADLs: Yes  Light Housekeeping  Light Housekeeping Level: [facility] Housekeeping Level of Assistance: Stand by assistance  Light Housekeeping: Pt gathered cones from various places and heights in ADL kitchen with MAX cues for safety, mechanics within RW and energy conservation techniques. Pt continues to be resistant to OT education and continues to lack proper mecahnics for safe transitions in the kitchen. Educated patient to only prepare light meals, use microwave, and place items within reaching distance. Unsure of carryover of education. Balance  Sitting Balance: Modified independent (WC)  Standing Balance: Stand by assistance(RW level with MAX cues, poor carryover of safety)  Standing Balance  Time: x7 minutes, 2x2 mnutes, x5 minutes, x8 minutes  Activity: mobility WC to sinkside to grooming, standing x2 for toileting/LB dressing managemnet, mobility to gym from room, mobility in ADL kitchen for simulation task  Comment: use of RW throughout due to poor balance, poor carryover demonstrated and poor understanding of education verbalized  Functional Mobility  Functional - Mobility Device: Rolling Walker  Activity: To/from bathroom; To/From therapy gym;Transport items; Retrieve items  Assist Level: Stand by assistance  Functional Mobility Comments: SBA with mod-max cues for safety, pt still not maintaining within RW during mobility with poor carryover and resistant to OT education. Pt swinging RW away before sitting and OT extensively educated and demonstrated proper mechanics with turning nad sitting. Pt with very close SBA in kitchen to gather cones from various appliances and cabinets with poor safety.   Extensively educated on home safety and management to prevent falls and sliding objects on countertop vs. attempting to carry items with RW.   Bed mobility  Supine to Sit: Unable to assess  Sit to Supine: Unable to assess  Scooting: Unable to assess  Comment: Pt in R Madeline Simms 23 upon arrival and departure from room  Transfers  Sit to stand: Stand by assistance  Stand to sit: Stand by assistance  Transfer Comments: cues for hand placement and body mechanics, fatigue towards end of session SBA for sit <> stands                       Cognition  Overall Cognitive Status: Exceptions  Arousal/Alertness: Appropriate responses to stimuli  Following Commands: Follows multistep commands with repitition; Follows one step commands consistently  Memory: Decreased recall of precautions  Safety Judgement: Decreased awareness of need for assistance  Problem Solving: Assistance required to identify errors made;Assistance required to implement solutions;Assistance required to correct errors made  Insights: Decreased awareness of deficits  Initiation: Does not require cues  Sequencing: Does not require cues  Cognition Comment: Patient with very poor safety awareness, problem solving, and insight to deficits throughout therapy session, bringing RW very far forward and poor carryover to bring closer to body with mobility.                     Type of ROM/Therapeutic Exercise  Type of ROM/Therapeutic Exercise: Free weights  Comment: seated in WC 2x15 with 2# free weight LUE, 1# on RUE  Exercises  Scapular Protraction: x10 no weight  Scapular Retraction: x10 no weight  Shoulder Depression: x10 no weight  Shoulder Flexion: 2x15 2# to 80*  Elbow Flexion: 2x15 2# LUE, 1# RUE  Elbow Extension: 2x15 2# LUE, 1# RUE  Supination: 2x15 2# LUE, 1# RUE  Pronation: 2x15 2# LUE, 1# RUE  Wrist Flexion: 2x15 2# LUE, 1# RUE  Wrist Extension: 2x15 2# LUE, 1# RUE  Grasp/Release: 2x15 with 3 second holds  Other: 2x15 2# free weight LUE, 1# RUE chest press, circles forwards/backwards, scaption, horizontal abduction to 80*, tricep press                    Plan   Plan  Times per week: 5 out of 7 days  Times per day: Daily  Plan weeks: 8 days  Current Treatment Recommendations: Strengthening, Endurance Training, Patient/Caregiver Education & Training, ROM, Cognitive Reorientation, Equipment Evaluation, Education, & procurement, Self-Care / ADL, Balance Training, Gait Training, Pain Management, Home Management Training, Functional Mobility Training, Safety Education & Training, Positioning          Goals  Short term goals  Time Frame for Short term goals: 5/21/19  Short term goal 1: Pt will complete toileting/toilet transfer SBA with LRAD- GOAL MET 5/22  Short term goal 2: Pt will complete LB dressing with Yaquelin and AE PRN using LRAD for standing balance  Short term goal 3: Pt will tolerate 10 minutes standing balance activities and/or ADLs with LRAD SBA- GOAL MET 5/22  Short term goal 4: Pt will verbalize and demo understanding of BUE HEP to increase strength and endurance for transfers/ADLs- cont to progress  Short term goal 5: Pt will verbalize 3/3 back precautions 100% when asked throughout therapy sessions with no cues- GOAL MET 5/24  Long term goals  Time Frame for Long term goals : 5/25/19  Long term goal 1: Pt will complete funcitonal mobility in room/bathroom with LRAD to gather all items for bathing/dressing- cont to address, SPV at this time  Long term goal 2: Pt will complete shower transfer/bathing Heladio with LRAD and DME PRN  Long term goal 3: Pt will complete LB/UE dressing Heladio with LRAD and AE PRN  Long term goal 4: Pt will complete 1 simple meal prep and/or IADL task with LRAD Heladio- continues to be SBA 5/24  Patient Goals   Patient goals :  \"To go home and be independent\"       Therapy Time   Individual Concurrent Group Co-treatment   Time In 0930         Time Out 1030         Minutes 60         Timed Code Treatment Minutes: [address], OTR/L 4 = completely dependent

## 2020-02-13 NOTE — CONSULT NOTE ADULT - ATTENDING COMMENTS
Pt is seen, examined, chart reviewed, d/w NP/PA.      Elevated troponin. Pt has h/o elevated troponin. Trend trop.  Chest pain.   CT Angio- No PE, + small bilateral pleural effusions  Echo.  Eventual CARMONA to exclude ischemia.     CHF  LE Edema. IV lasix wilt holding parameters.

## 2020-02-13 NOTE — CONSULT NOTE ADULT - SUBJECTIVE AND OBJECTIVE BOX
[place] [facility]                                                               Office:  [address] [address]                                                              Telephone: [phone]. Fax:[phone]                                                                        CARDIOLOGY CONSULTATION NOTE                                                                                             Consult requested by:  Dr. Yun  Reason for Consultation: Elevated Troponin  History obtained by: Patient and medical record   obtained: No    Chief complaint:    Patient is a 64y old  Female who presents with a chief complaint of neck pain      HPI: Pt is 63 y/o female with medical history of HFpEF, HTN, breast cancer s/p left breast mastectomy, bilateral lower extremity DVT, who presents to [facility] for neck pain. Pt states that she has been experiencing neck pain radiating down arm and left side of chest for a couple of weeks. Pt describes pain as constant, radiating, 8/10, unable to qualify pain. Pt states that pain worsens with movement, no pain relieving factors identified. Pt also has intermittent associated symptoms of nausea, SOB, and palpitations. Pt was brought to [facility] for work up and found to have ST HR @ with diffuse low voltage on ECG and elevated Trop 0.20.CT Angio revealed no PE but +small bilateral pleural effusions. CT Neck showed presence of heterogenous mass in floor of mouth/tongue measuring 5.6x3.2cm. Pt currently c/o neck pain and chest pain, no associated symptoms at this time. Pt Denies fever, chills, cough, phlegm production, , PND, irregular, fast heart beat, nausea, vomiting, melena, rectal bleed, hematuria, lightheadedness, dizziness, syncope, near syncope.         REVIEW OF SYMPTOMS:     CONSTITUTIONAL: No fever, weight loss, or fatigue  ENMT:  No difficulty hearing, tinnitus, vertigo; No sinus or throat pain  NECK: No pain or stiffness  CARDIOVASCULAR: See HPI  RESPIRATORY: No Dyspnea on exertion, Shortness of breath, cough, wheezing  : No dysuria, no hematuria   GI: No dark color stool, no melena, no diarrhea, no constipation, no abdominal pain   NEURO: No headache, no dizziness, no slurred speech   MUSCULOSKELETAL: No joint pain or swelling; No muscle, back, or extremity pain  PSYCH: No agitation, no anxiety.    ALL OTHER REVIEW OF SYSTEMS ARE NEGATIVE.      PREVIOUS DIAGNOSTIC TESTING  ECHO FINDINGS: PENDING  < from: Transthoracic Echocardiogram (06.25.19 @ 14:34) >   Summary     Mild concentric left ventricular hypertrophy is present. Estimated left   ventricular ejection fraction is 70%. Mild late peaking LVOT gradient.   Normal appearing right ventricle structure and function.   Mild aortic sclerosis is present with normal valvular opening.   Fibrocalcific changes noted to the mitral valve leaflets with preserved   leaflet excursion. Mild (1+) mitral regurgitation is present.   Mild to moderate tricuspid regurgitation is present.   Mild to moderate pulmonic valvular regurgitation is present.   Trace pericardial effusion is present.    < end of copied text >        STRESS FINDINGS: < from: Nuclear Stress Test-Pharmacologic (12.08.17 @ 07:35) >  IMPRESSIONS:Normal Study  * Review of raw data shows: The study is of good technical  quality.  * The left ventricle was normal in size. Normal myocardial  perfusion scan, with no evidence of infarction or  inducible ischemia.  * Gated wall motion analysis is performed, and shows  normal wall motion with post stress LVEF of 74%.  * Chest Pooja: No chest pain with administration of  Regadenoson.  * Symptom: No Symptom.  * HR Response: Appropriate.  * BP Response: Appropriate.  * Heart Rhythm: Normal Sinus Rhythm - 82 BPM.  * ECG Abnormalities: There were no diagnostic changes.  * Arrhythmia: None.    < end of copied text >      ALLERGIES: Allergies    Crestor (Muscle Pain)  shellfish (Swelling; Rash)    Intolerances      PAST MEDICAL HISTORY  Arthritis  Colitis  Anemia  Weakness  Nausea & vomiting  CHF (congestive heart failure)  Breast cancer  High cholesterol  High blood pressure      PAST SURGICAL HISTORY  History of endoscopy  S/P colonoscopy  History of total hip replacement, left  History of hip replacement, total, right  History of lumpectomy of left breast  Atypical lipoma of soft tissue  H/O foot surgery  No significant past surgical history      FAMILY HISTORY:  Family history of cardiac arrest  CAD with PCI-SISTER AGE 50s  Family history of stroke  Family history of breast cancer in sister (Sibling)  Family history of hypertension in mother      SOCIAL HISTORY:  Denies smoking/alcohol/drugs    CURRENT MEDICATIONS:     morphine  - Injectable  ondansetron Injectable  sodium chloride 0.9%.        HOME MEDICATIONS:      Vital Signs Last 24 Hrs  T(C): 36.5 (13 Feb 2020 12:28), Max: 36.5 (13 Feb 2020 12:28)  T(F): 97.7 (13 Feb 2020 12:28), Max: 97.7 (13 Feb 2020 12:28)  HR: 104 (13 Feb 2020 12:28) (102 - 108)  BP: 94/63 (13 Feb 2020 12:28) (94/63 - 112/75)  RR: 16 (13 Feb 2020 12:28) (16 - 16)  SpO2: 95% (13 Feb 2020 12:28) (95% - 97%)      PHYSICAL EXAM:  Constitutional: Comfortable . No acute distress.   HEENT: Atraumatic and normocephalic , neck is supple . no JVD. No carotid bruit. PEERL   CNS: A&Ox3. No focal deficits. EOMI.    Lymph Nodes: Cervical : Not palpable.  Respiratory: CTAB  Cardiovascular: S1S2 tachycardia, RRR. No murmur/rubs or gallop.  Gastrointestinal: Soft non-tender and non distended . +Bowel sounds. negative Wright's sign.  Extremities: +3/+3 pitting bilateral edema  Psychiatric: Calm . no agitation.  Skin: No skin rash/ulcers visualized to face, hands or feet.    Intake and output:     LABS:                        12.0   9.04  )-----------( 349      ( 13 Feb 2020 08:51 )             38.2     02-13    135  |  96<L>  |  11.0  ----------------------------<  141<H>  4.2   |  28.0  |  0.40<L>    Ca    8.8      13 Feb 2020 08:51    TPro  5.3<L>  /  Alb  2.3<L>  /  TBili  0.2<L>  /  DBili  x   /  AST  50<H>  /  ALT  26  /  AlkPhos  187<H>  02-13    CARDIAC MARKERS ( 13 Feb 2020 08:51 )  x     / 0.20 ng/mL / x     / x     / x        ;p-BNP=  PT/INR - ( 13 Feb 2020 08:51 )   PT: 15.5 sec;   INR: 1.36 ratio         PTT - ( 13 Feb 2020 08:51 )  PTT:26.0 sec      INTERPRETATION OF TELEMETRY: ST HR @ 110  ECG:  ST with PVC and RBBB HR @ 111    RADIOLOGY & ADDITIONAL STUDIES:    X-ray: < from: Xray Chest 1 View- PORTABLE-Urgent (02.13.20 @ 09:11) >  Impression:    Clear lungs.    < end of copied text >    CT scan: < from: CT Neck Soft Tissue w/ IV Cont (02.13.20 @ 10:41) >  IMPRESSION:  Moderate diffuse edema on the bilateral submandibular spaces and submental region. The bilateral submandibular glands are edematous, enlarged and heterogeneously enhancing in appearance.  Findings suggest bilateral sialoadenitis of the submandibular glands. However, there is also suggestion of heterogeneous mass in the floor the mouth/base of the tongue on image 140, series 3, measuring 5.6 x 3.2 cm. Outpatient PET/CT or MRI is recommended to exclude underlying mass/neoplasm.    < end of copied text >    < from: CT Angio Chest w/ IV Cont (02.13.20 @ 10:40) >    IMPRESSION:     No pulmonary embolism.    Small bilateral pleural effusions with compressive atelectasis of the adjacent lung parenchyma.        < end of copied text >

## 2020-02-13 NOTE — PROGRESS NOTE ADULT - SUBJECTIVE AND OBJECTIVE BOX
ENT consult - Clinically suggestive of tongue and floor of mouth mass, but CT not helpful. For MRI. See dictated consult report.

## 2020-02-13 NOTE — ED PROVIDER NOTE - OBJECTIVE STATEMENT
65 y/o F pt with hx of CHF, HTN, HLD, and breast cancer who presents today with cc of R-sided neck pain for the past several weeks. Pt reports that her neck has been swollen for the past several months and progressively worsening with associated difficulty swallowing 65 y/o F pt with hx of CHF, HTN, HLD, and breast cancer who presents today with cc of L-sided neck pain for the past several weeks. Pt reports that her neck has been swollen for the past several months and progressively worsening with associated difficulty swallowing. This morning, the pt reports that her pain acutely worsened and woke her from her sleep. Pain radiates down her L neck and into the L side of her chest. Pt endorses decreased appetite and recent weight loss. Pt denies any difficulty breathing. Pt denies any dyspnea, fevers, n/v/d, abdominal pain, dysuria, headache, cough, congestion, sore, throat, back pain, numbness, tingling, dizziness, syncope, or other complaint. 63 y/o F pt with hx of CHF, HTN, HLD, and breast cancer who presents today with cc of L-sided neck pain for the past several weeks. Pt reports that her neck has been swollen for the past several months and progressively worsening with associated difficulty swallowing. This morning, the pt reports that her pain acutely worsened and woke her from her sleep. Pain radiates down her L neck and into the L side of her chest. Pt endorses decreased appetite and recent weight loss. Pt denies any difficulty breathing. Pt denies any dyspnea, fevers, n/v/d, abdominal pain, dysuria, headache, cough, congestion, sore, throat, back pain, numbness, tingling, dizziness, syncope, or other complaint.    Pt reports that she has an appt with ENT office in [place] next Tues.

## 2020-02-13 NOTE — H&P ADULT - ASSESSMENT
1) Nstemi - admit to tele  > cardio consult appreciated  --> trend  --> tte, hemoglobin a1c, lipid panel for am   --> may need cath?    2) history of dvt - hold eliquis  --> lovenox full dose    3) history of breast cancer - follows with dr patterson   --> consult placed  --> patient and daughter aware of "abnormal mass" in throat and was planned to see ent next week   tamoxifen    4) gastroparesis - reglan atc 1) Nstemi - admit to tele  > cardio consult appreciated  --> trend  --> tte, hemoglobin a1c, lipid panel for am   --> may need cath?    2) history of dvt - hold eliquis  --> lovenox full dose    3) history of breast cancer - follows with dr patterson   --> consult placed  --> patient and daughter aware of "abnormal mass" in throat and was planned to see ent next week   tamoxifen    4) gastroparesis - reglan atc    misc - hold morning lovenox if planned for cath

## 2020-02-13 NOTE — CONSULT NOTE ADULT - ASSESSMENT
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  admitted with chest pain, elevated troponins  Has new neck mass, seen by ENT; needs biopsy for definitive dx; further rec's pending dx  Will follow

## 2020-02-14 LAB
ALBUMIN SERPL ELPH-MCNC: 2.5 G/DL — LOW (ref 3.3–5.2)
ALP SERPL-CCNC: 184 U/L — HIGH (ref 40–120)
ALT FLD-CCNC: 27 U/L — SIGNIFICANT CHANGE UP
ANION GAP SERPL CALC-SCNC: 16 MMOL/L — SIGNIFICANT CHANGE UP (ref 5–17)
AST SERPL-CCNC: 73 U/L — HIGH
BILIRUB SERPL-MCNC: 0.2 MG/DL — LOW (ref 0.4–2)
BUN SERPL-MCNC: 15 MG/DL — SIGNIFICANT CHANGE UP (ref 8–20)
CALCIUM SERPL-MCNC: 9.3 MG/DL — SIGNIFICANT CHANGE UP (ref 8.6–10.2)
CHLORIDE SERPL-SCNC: 98 MMOL/L — SIGNIFICANT CHANGE UP (ref 98–107)
CHOLEST SERPL-MCNC: 172 MG/DL — SIGNIFICANT CHANGE UP (ref 110–199)
CO2 SERPL-SCNC: 23 MMOL/L — SIGNIFICANT CHANGE UP (ref 22–29)
CREAT SERPL-MCNC: 0.34 MG/DL — LOW (ref 0.5–1.3)
GLUCOSE SERPL-MCNC: 126 MG/DL — HIGH (ref 70–99)
HBA1C BLD-MCNC: 5.1 % — SIGNIFICANT CHANGE UP (ref 4–5.6)
HCT VFR BLD CALC: 38.9 % — SIGNIFICANT CHANGE UP (ref 34.5–45)
HDLC SERPL-MCNC: 37 MG/DL — LOW
HGB BLD-MCNC: 12.3 G/DL — SIGNIFICANT CHANGE UP (ref 11.5–15.5)
LIPID PNL WITH DIRECT LDL SERPL: 116 MG/DL — SIGNIFICANT CHANGE UP
MAGNESIUM SERPL-MCNC: 1.6 MG/DL — LOW (ref 1.8–2.6)
MCHC RBC-ENTMCNC: 31.6 GM/DL — LOW (ref 32–36)
MCHC RBC-ENTMCNC: 32.9 PG — SIGNIFICANT CHANGE UP (ref 27–34)
MCV RBC AUTO: 104 FL — HIGH (ref 80–100)
PLATELET # BLD AUTO: 298 K/UL — SIGNIFICANT CHANGE UP (ref 150–400)
POTASSIUM SERPL-MCNC: 5.1 MMOL/L — SIGNIFICANT CHANGE UP (ref 3.5–5.3)
POTASSIUM SERPL-SCNC: 5.1 MMOL/L — SIGNIFICANT CHANGE UP (ref 3.5–5.3)
PROT SERPL-MCNC: 5.4 G/DL — LOW (ref 6.6–8.7)
RBC # BLD: 3.74 M/UL — LOW (ref 3.8–5.2)
RBC # FLD: 14.1 % — SIGNIFICANT CHANGE UP (ref 10.3–14.5)
SODIUM SERPL-SCNC: 137 MMOL/L — SIGNIFICANT CHANGE UP (ref 135–145)
TOTAL CHOLESTEROL/HDL RATIO MEASUREMENT: 5 RATIO — SIGNIFICANT CHANGE UP (ref 3.3–7.1)
TRIGL SERPL-MCNC: 93 MG/DL — SIGNIFICANT CHANGE UP (ref 10–200)
TROPONIN T SERPL-MCNC: 0.46 NG/ML — HIGH (ref 0–0.06)
TSH SERPL-MCNC: 3.61 UIU/ML — SIGNIFICANT CHANGE UP (ref 0.27–4.2)
WBC # BLD: 10.79 K/UL — HIGH (ref 3.8–10.5)
WBC # FLD AUTO: 10.79 K/UL — HIGH (ref 3.8–10.5)

## 2020-02-14 PROCEDURE — 93010 ELECTROCARDIOGRAM REPORT: CPT

## 2020-02-14 PROCEDURE — 78452 HT MUSCLE IMAGE SPECT MULT: CPT | Mod: 26

## 2020-02-14 PROCEDURE — 93306 TTE W/DOPPLER COMPLETE: CPT | Mod: 26

## 2020-02-14 PROCEDURE — 93016 CV STRESS TEST SUPVJ ONLY: CPT

## 2020-02-14 PROCEDURE — 93018 CV STRESS TEST I&R ONLY: CPT

## 2020-02-14 PROCEDURE — 99232 SBSQ HOSP IP/OBS MODERATE 35: CPT

## 2020-02-14 PROCEDURE — 99233 SBSQ HOSP IP/OBS HIGH 50: CPT

## 2020-02-14 RX ORDER — NITROGLYCERIN 6.5 MG
0.4 CAPSULE, EXTENDED RELEASE ORAL
Refills: 0 | Status: COMPLETED | OUTPATIENT
Start: 2020-02-14 | End: 2020-02-16

## 2020-02-14 RX ORDER — MAGNESIUM SULFATE 500 MG/ML
2 VIAL (ML) INJECTION ONCE
Refills: 0 | Status: COMPLETED | OUTPATIENT
Start: 2020-02-14 | End: 2020-02-14

## 2020-02-14 RX ORDER — METOPROLOL TARTRATE 50 MG
12.5 TABLET ORAL
Refills: 0 | Status: DISCONTINUED | OUTPATIENT
Start: 2020-02-14 | End: 2020-02-15

## 2020-02-14 RX ORDER — ATORVASTATIN CALCIUM 80 MG/1
40 TABLET, FILM COATED ORAL AT BEDTIME
Refills: 0 | Status: DISCONTINUED | OUTPATIENT
Start: 2020-02-14 | End: 2020-02-16

## 2020-02-14 RX ORDER — ASPIRIN/CALCIUM CARB/MAGNESIUM 324 MG
81 TABLET ORAL DAILY
Refills: 0 | Status: DISCONTINUED | OUTPATIENT
Start: 2020-02-14 | End: 2020-02-16

## 2020-02-14 RX ADMIN — Medication 10 MILLIGRAM(S): at 06:46

## 2020-02-14 RX ADMIN — MORPHINE SULFATE 2 MILLIGRAM(S): 50 CAPSULE, EXTENDED RELEASE ORAL at 17:07

## 2020-02-14 RX ADMIN — MORPHINE SULFATE 2 MILLIGRAM(S): 50 CAPSULE, EXTENDED RELEASE ORAL at 07:36

## 2020-02-14 RX ADMIN — SODIUM CHLORIDE 75 MILLILITER(S): 9 INJECTION INTRAMUSCULAR; INTRAVENOUS; SUBCUTANEOUS at 02:18

## 2020-02-14 RX ADMIN — MORPHINE SULFATE 2 MILLIGRAM(S): 50 CAPSULE, EXTENDED RELEASE ORAL at 17:45

## 2020-02-14 RX ADMIN — Medication 50 GRAM(S): at 08:40

## 2020-02-14 RX ADMIN — SODIUM CHLORIDE 75 MILLILITER(S): 9 INJECTION INTRAMUSCULAR; INTRAVENOUS; SUBCUTANEOUS at 17:03

## 2020-02-14 RX ADMIN — Medication 10 MILLIGRAM(S): at 17:01

## 2020-02-14 RX ADMIN — Medication 12.5 MILLIGRAM(S): at 17:35

## 2020-02-14 RX ADMIN — MORPHINE SULFATE 2 MILLIGRAM(S): 50 CAPSULE, EXTENDED RELEASE ORAL at 08:29

## 2020-02-14 NOTE — PROGRESS NOTE ADULT - SUBJECTIVE AND OBJECTIVE BOX
[place] [facility]                                                               Office: [address] [address]                                                              Telephone: [phone]. Fax:[phone]                                                                             PROGRESS NOTE  Reason for follow up: Chest Pain  Overnight: No new events.   Update: As of 2/14/2020, serial troponins continue to elevate. Pt states overnight she woke with worsening chest pain felt like "someone sitting on my chest". Pain somewhat relieved with morphine but still constant. Current chest pain 6/10. No cardiac events on monitor.         Subjective: " Chest Pain felt like someone was sitting on my chest"    Pt is 65 y/o female with medical history of HFpEF, HTN, breast cancer s/p left breast mastectomy, bilateral lower extremity DVT, who presents to [facility] for neck pain. Pt states that she has been experiencing neck pain radiating down arm and left side of chest for a couple of weeks. Pt describes pain as constant, radiating, 8/10, unable to qualify pain. Pt states that pain worsens with movement, no pain relieving factors identified. Pt also has intermittent associated symptoms of nausea, SOB, and palpitations. Pt was brought to [facility] for work up and found to have ST HR @ with diffuse low voltage on ECG and elevated Trop 0.20.CT Angio revealed no PE but +small bilateral pleural effusions. CT Neck showed presence of heterogenous mass in floor of mouth/tongue measuring 5.6x3.2cm. Pt currently c/o neck pain and chest pain, no associated symptoms at this time. Pt Denies fever, chills, cough, phlegm production, , PND, irregular, fast heart beat, nausea, vomiting, melena, rectal bleed, hematuria, lightheadedness, dizziness, syncope, near syncope.   As of 2/14/2020, serial troponins continue to elevate. Pt states overnight she woke with worsening chest pain felt like "someone sitting on my chest". Pain somewhat relieved with morphine but still constant. Current chest pain 6/10. No cardiac events on monitor.     	  Vitals:  T(C): 36.8 (02-14-20 @ 07:38), Max: 36.8 (02-14-20 @ 07:38)  HR: 105 (02-14-20 @ 07:38) (99 - 114)  BP: 96/64 (02-14-20 @ 07:38) (92/62 - 109/78)  RR: 16 (02-14-20 @ 07:38) (16 - 19)  SpO2: 98% (02-14-20 @ 07:38) (93% - 98%)  Wt(kg): --  I&O's Summary    13 Feb 2020 07:01  -  14 Feb 2020 07:00  --------------------------------------------------------  IN: 525 mL / OUT: 0 mL / NET: 525 mL      Weight (kg): 60.7 (02-14 @ 02:03)      PHYSICAL EXAM:  Appearance: Comfortable. No acute distress  HEENT:  Head and neck: Atraumatic. Normocephalic.  Normal oral mucosa, PERRL, Neck is supple. No JVD, No carotid bruit.   Neurologic: A & O x 3, no focal deficits. EOMI.  Lymphatic: No cervical lymphadenopathy  Cardiovascular: Normal S1 S2, No murmur, rubs/gallops. No JVD, No edema  Respiratory: Lungs clear to auscultation  Gastrointestinal:  Soft, Non-tender, + BS  Lower Extremities: bilateral lower extremities +2/+2 pitting  Psychiatry: Patient is calm. No agitation. Mood & affect appropriate  Skin: No rashes/ ecchymoses/cyanosis/ulcers visualized on the face, hands or feet.      CURRENT MEDICATIONS:  furosemide   Injectable 40 milliGRAM(s) IV Push daily  metoclopramide Injectable  pantoprazole    Tablet  ascorbic acid  cholecalciferol  cyanocobalamin  enoxaparin Injectable  influenza   Vaccine  multivitamin  sodium chloride 0.9%.  tamoxifen      DIAGNOSTIC TESTING:  [ ] Echocardiogram: pending  [ ]  Catheterization:  [ ] Stress Test:  pending  OTHER: 	  < from: CT Neck Soft Tissue w/ IV Cont (02.13.20 @ 10:41) >  IMPRESSION:  Moderate diffuse edema on the bilateral submandibular spaces and submental region. The bilateral submandibular glands are edematous, enlarged and heterogeneously enhancing in appearance.  Findings suggest bilateral sialoadenitis of the submandibular glands. However, there is also suggestion of heterogeneous mass in the floor the mouth/base of the tongue on image 140, series 3, measuring 5.6 x 3.2 cm. Outpatient PET/CT or MRI is recommended to exclude underlying mass/neoplasm.    < end of copied text >    LABS:	 	  CARDIAC MARKERS ( 14 Feb 2020 06:03 )  x     / 0.46 ng/mL / x     / x     / x      p-BNP 14 Feb 2020 06:03: x    , CARDIAC MARKERS ( 13 Feb 2020 23:09 )  x     / 0.30 ng/mL / x     / x     / x      p-BNP 13 Feb 2020 23:09: x    , CARDIAC MARKERS ( 13 Feb 2020 15:29 )  x     / x     / x     / x     / x      p-BNP 13 Feb 2020 15:29: 6687 pg/mL, CARDIAC MARKERS ( 13 Feb 2020 13:33 )  x     / 0.32 ng/mL / x     / x     / x      p-BNP 13 Feb 2020 13:33: x    , CARDIAC MARKERS ( 13 Feb 2020 08:51 )  x     / 0.20 ng/mL / x     / x     / x      p-BNP 13 Feb 2020 08:51: x                              12.3   10.79 )-----------( 298      ( 14 Feb 2020 06:03 )             38.9     02-14    137  |  98  |  15.0  ----------------------------<  126<H>  5.1   |  23.0  |  0.34<L>    Ca    9.3      14 Feb 2020 06:03  Mg     1.6     02-14    TPro  5.4<L>  /  Alb  2.5<L>  /  TBili  0.2<L>  /  DBili  x   /  AST  73<H>  /  ALT  27  /  AlkPhos  184<H>  02-14    proBNP: Serum Pro-Brain Natriuretic Peptide: 6687 pg/mL (02-13 @ 15:29)    Lipid Profile: Date: 02-14 @ 06:03  Total cholesterol 172; Direct LDL: 116; HDL: 37; Triglycerides:93    HgA1c: Hemoglobin A1C, Whole Blood: 5.1 %    TSH: Thyroid Stimulating Hormone, Serum: 3.61 uIU/mL  Thyroid Stimulating Hormone, Serum: 4.33 uIU/mL        TELEMETRY: ST HR @ 108

## 2020-02-14 NOTE — PROGRESS NOTE ADULT - ATTENDING COMMENTS
Pt is seen, examined, chart reviewed, d/w NP/PA.    Pharm NST:  There are large, moderate defects in anterior, apical walls that are predominantly fixed, suggestive of infarct. Post Stress LVEF 26%.     Echo:    1. Endocardial visualization was enhanced with intravenous echo contrast.   2. Severely decreased global left ventricular systolic function.   3. Left ventricular ejection fraction, by visual estimation, is 25 to 30%.   4. Mildly increased posterior wall thickness. Severe global left ventricle hypokinesis with akinesis of the apex. Spectral Doppler suggests Grade 2 diastolic dysfunction.   5. Normal right ventricle size with moderately reduced systolic function.   6. Moderately enlarged left atrium.   7. The right atrium appears normal in size.   8. Mild thickening of the anterior and posterior mitral valve leaflets.   9. Sclerotic aortic valve with normal opening.  10. Mild to moderate pulmonic valve regurgitation.  11. Small pericardial effusion.  12. No significant early right ventricle diastolic collapse visualized. Pt is seen, examined, chart reviewed, d/w NP/PA.    NSTEMI  Small Pericardial Effusion  Acute systolic heart failure  Moderately Reduced RV Systolic Function  Results of Echo and Stress test d/w Interventional cardiology (Dr. Gupta): No [facility] planned   Hypotension    Echo and Stress test results d/w the pt, pt's daughter in attendance, and with he  sister over the phone.   Advance HF meds as tolerated  Soft BP, add ACEI as tolerated, Cont BB      Pharm NST:  There are large, moderate defects in anterior, apical walls that are predominantly fixed, suggestive of infarct. Post Stress LVEF 26%.     Echo:    1. Endocardial visualization was enhanced with intravenous echo contrast.   2. Severely decreased global left ventricular systolic function.   3. Left ventricular ejection fraction, by visual estimation, is 25 to 30%.   4. Mildly increased posterior wall thickness. Severe global left ventricle hypokinesis with akinesis of the apex. Spectral Doppler suggests Grade 2 diastolic dysfunction.   5. Normal right ventricle size with moderately reduced systolic function.   6. Moderately enlarged left atrium.   7. The right atrium appears normal in size.   8. Mild thickening of the anterior and posterior mitral valve leaflets.   9. Sclerotic aortic valve with normal opening.  10. Mild to moderate pulmonic valve regurgitation.  11. Small pericardial effusion.  12. No significant early right ventricle diastolic collapse visualized.

## 2020-02-14 NOTE — PROGRESS NOTE ADULT - SUBJECTIVE AND OBJECTIVE BOX
Non-ST elevation myocardial infarction (NSTEMI)    HPI:  Patient is a 64 yof with pmh of CHF, DVT on eliquis, HTN, HLD, and breast cancer on tamoxifen presents from home with daughter with complaints of chest and neck pain, weakness and nausea which started at 4am this morning.  Patient is not the best historian and much of the history was taken from daughter. As per daughter, patient has not been eating well for the past several weeks due to gastroparesis and has lost weight. Patient then had left sided chest pain along with n marnie pain and family grew concerned and brought her to the hospital. Patient was worked up in the er and found to have an elevated troponin and a neck mass on ct scan. Of note,  patient follows with Dr Lozoya and is aware of the neck mass and was due to see the ent physician next week. (13 Feb 2020 14:35)    Interval History:  Patient was seen and examined at bedside. Complaining of left sided chest, neck and arm pain associated with shortness of breath, palpitations and nausea at times.     ROS:  As per interval history otherwise unremarkable.    PHYSICAL EXAM:  Vital Signs   T(C): 36.8 (14 Feb 2020 07:38), Max: 36.8 (14 Feb 2020 07:38)  T(F): 98.2 (14 Feb 2020 07:38), Max: 98.2 (14 Feb 2020 07:38)  HR: 105 (14 Feb 2020 07:38) (99 - 114)  BP: 96/64 (14 Feb 2020 07:38) (92/62 - 109/78)  RR: 16 (14 Feb 2020 07:38) (16 - 19)  SpO2: 98% (14 Feb 2020 07:38) (93% - 98%)  General: Elderly female lying in bed comfortably. No acute distress. Debilitated   HEENT: PERRLA. EOMI. Clear conjunctivae. Moist mucus membrane  Neck: Supple.   Chest: CTA bilaterally - no wheezing, rales or rhonchi. No chest wall tenderness.  Heart: S1 & S2 with tachycardia.   Abdomen: Soft. Non-tender. Non-distended. + BS  Ext: 2+ pedal edema (mostly on right side). No calf tenderness   Neuro: AAO x 3. No focal deficit. No speech disorder  Skin: Warm and Dry  Psychiatry: Normal mood and affect    I&O's Summary    13 Feb 2020 07:01  -  14 Feb 2020 07:00  --------------------------------------------------------  IN: 525 mL / OUT: 0 mL / NET: 525 mL    MEDICATIONS  (STANDING):  ascorbic acid 500 milliGRAM(s) Oral daily  cholecalciferol 1000 Unit(s) Oral daily  cyanocobalamin 1000 MICROGram(s) Oral daily  enoxaparin Injectable 65 milliGRAM(s) SubCutaneous two times a day  furosemide   Injectable 40 milliGRAM(s) IV Push daily  influenza   Vaccine 0.5 milliLiter(s) IntraMuscular once  metoclopramide Injectable 10 milliGRAM(s) IV Push <User Schedule>  multivitamin 1 Tablet(s) Oral daily  pantoprazole    Tablet 40 milliGRAM(s) Oral two times a day  sodium chloride 0.9%. 1000 milliLiter(s) (75 mL/Hr) IV Continuous <Continuous>  tamoxifen 20 milliGRAM(s) Oral daily    MEDICATIONS  (PRN):  acetaminophen   Tablet .. 650 milliGRAM(s) Oral every 6 hours PRN Temp greater or equal to 38C (100.4F), Mild Pain (1 - 3)  morphine  - Injectable 2 milliGRAM(s) IV Push every 4 hours PRN Moderate Pain (4 - 6)      LABS:                        12.3   10.79 )-----------( 298      ( 14 Feb 2020 06:03 )             38.9     02-14    137  |  98  |  15.0  ----------------------------<  126<H>  5.1   |  23.0  |  0.34<L>    Ca    9.3      14 Feb 2020 06:03  Mg     1.6     02-14    TPro  5.4<L>  /  Alb  2.5<L>  /  TBili  0.2<L>  /  DBili  x   /  AST  73<H>  /  ALT  27  /  AlkPhos  184<H>  02-14    PT/INR - ( 13 Feb 2020 08:51 )   PT: 15.5 sec;   INR: 1.36 ratio      PTT - ( 13 Feb 2020 08:51 )  PTT:26.0 sec      RADIOLOGY & ADDITIONAL STUDIES:  Reviewed

## 2020-02-14 NOTE — PROGRESS NOTE ADULT - ASSESSMENT
Pt is 63 y/o female with medical history of HFpEF, HTN, breast cancer s/p left breast mastectomy, bilateral lower extremity DVT, who presents to [facility]ED for neck pain. Pt states that she has been experiencing neck pain radiating down arm and left side of chest for a couple of weeks. Pt describes pain as constant, radiating, 8/10, unable to qualify pain. Pt states that pain worsens with movement, no pain relieving factors identified. Pt also has intermittent associated symptoms of nausea, SOB, and palpitations. Pt was brought to [facility]ED for work up and found to have ST HR @ with diffuse low voltage on ECG and elevated Trop 0.20.CT Angio revealed no PE but +small bilateral pleural effusions. CT Neck showed presence of heterogenous mass in floor of mouth/tongue measuring 5.6x3.2cm. Pt currently c/o neck pain and chest pain, no associated symptoms at this time. Pt Denies fever, chills, cough, phlegm production, , PND, irregular, fast heart beat, nausea, vomiting, melena, rectal bleed, hematuria, lightheadedness, dizziness, syncope, near syncope.   As of 2/14/2020, serial troponins continue to elevate. Pt states overnight she woke with worsening chest pain felt like "someone sitting on my chest". Pain somewhat relieved with morphine but still constant. Current chest pain 6/10. No cardiac events on monitor.     Chest Pain  -Tropx3 elevated and trending up 0.20,0.30,0.46  -ECG- ST with PVC and RBBB HR @ 111  -CT Angio- No PE, + small bilateral pleural effusions  -Echo-ordered and pending  -NST- ordered and pending  -If abnormal wall motion or reduced EF noted on echo or abnormal stress- will re-evalute for intervention    CHF  -Bilateral lower extremity pttting edema +3/+3  -BNP-2313  -Start Lasix IVP 40mg once daily for diuresis with parameters

## 2020-02-15 DIAGNOSIS — R22.1 LOCALIZED SWELLING, MASS AND LUMP, NECK: ICD-10-CM

## 2020-02-15 DIAGNOSIS — I50.9 HEART FAILURE, UNSPECIFIED: ICD-10-CM

## 2020-02-15 DIAGNOSIS — I21.4 NON-ST ELEVATION (NSTEMI) MYOCARDIAL INFARCTION: ICD-10-CM

## 2020-02-15 LAB
ANION GAP SERPL CALC-SCNC: 12 MMOL/L — SIGNIFICANT CHANGE UP (ref 5–17)
BASOPHILS # BLD AUTO: 0.01 K/UL — SIGNIFICANT CHANGE UP (ref 0–0.2)
BASOPHILS NFR BLD AUTO: 0.1 % — SIGNIFICANT CHANGE UP (ref 0–2)
BUN SERPL-MCNC: 24 MG/DL — HIGH (ref 8–20)
CALCIUM SERPL-MCNC: 9.1 MG/DL — SIGNIFICANT CHANGE UP (ref 8.6–10.2)
CHLORIDE SERPL-SCNC: 100 MMOL/L — SIGNIFICANT CHANGE UP (ref 98–107)
CO2 SERPL-SCNC: 26 MMOL/L — SIGNIFICANT CHANGE UP (ref 22–29)
CREAT SERPL-MCNC: 0.37 MG/DL — LOW (ref 0.5–1.3)
EOSINOPHIL # BLD AUTO: 0 K/UL — SIGNIFICANT CHANGE UP (ref 0–0.5)
EOSINOPHIL NFR BLD AUTO: 0 % — SIGNIFICANT CHANGE UP (ref 0–6)
GLUCOSE SERPL-MCNC: 138 MG/DL — HIGH (ref 70–99)
HCT VFR BLD CALC: 40.4 % — SIGNIFICANT CHANGE UP (ref 34.5–45)
HGB BLD-MCNC: 12.9 G/DL — SIGNIFICANT CHANGE UP (ref 11.5–15.5)
IMM GRANULOCYTES NFR BLD AUTO: 0.6 % — SIGNIFICANT CHANGE UP (ref 0–1.5)
LYMPHOCYTES # BLD AUTO: 1.52 K/UL — SIGNIFICANT CHANGE UP (ref 1–3.3)
LYMPHOCYTES # BLD AUTO: 10.6 % — LOW (ref 13–44)
MAGNESIUM SERPL-MCNC: 1.9 MG/DL — SIGNIFICANT CHANGE UP (ref 1.6–2.6)
MCHC RBC-ENTMCNC: 31.9 GM/DL — LOW (ref 32–36)
MCHC RBC-ENTMCNC: 33.2 PG — SIGNIFICANT CHANGE UP (ref 27–34)
MCV RBC AUTO: 104.1 FL — HIGH (ref 80–100)
MONOCYTES # BLD AUTO: 0.84 K/UL — SIGNIFICANT CHANGE UP (ref 0–0.9)
MONOCYTES NFR BLD AUTO: 5.9 % — SIGNIFICANT CHANGE UP (ref 2–14)
NEUTROPHILS # BLD AUTO: 11.86 K/UL — HIGH (ref 1.8–7.4)
NEUTROPHILS NFR BLD AUTO: 82.8 % — HIGH (ref 43–77)
PLATELET # BLD AUTO: 258 K/UL — SIGNIFICANT CHANGE UP (ref 150–400)
POTASSIUM SERPL-MCNC: 5.2 MMOL/L — SIGNIFICANT CHANGE UP (ref 3.5–5.3)
POTASSIUM SERPL-SCNC: 5.2 MMOL/L — SIGNIFICANT CHANGE UP (ref 3.5–5.3)
RBC # BLD: 3.88 M/UL — SIGNIFICANT CHANGE UP (ref 3.8–5.2)
RBC # FLD: 14.3 % — SIGNIFICANT CHANGE UP (ref 10.3–14.5)
SODIUM SERPL-SCNC: 138 MMOL/L — SIGNIFICANT CHANGE UP (ref 135–145)
WBC # BLD: 14.32 K/UL — HIGH (ref 3.8–10.5)
WBC # FLD AUTO: 14.32 K/UL — HIGH (ref 3.8–10.5)

## 2020-02-15 PROCEDURE — 99233 SBSQ HOSP IP/OBS HIGH 50: CPT

## 2020-02-15 PROCEDURE — 93010 ELECTROCARDIOGRAM REPORT: CPT

## 2020-02-15 RX ORDER — ACETAMINOPHEN 500 MG
650 TABLET ORAL EVERY 6 HOURS
Refills: 0 | Status: DISCONTINUED | OUTPATIENT
Start: 2020-02-15 | End: 2020-02-16

## 2020-02-15 RX ORDER — METOPROLOL TARTRATE 50 MG
25 TABLET ORAL
Refills: 0 | Status: DISCONTINUED | OUTPATIENT
Start: 2020-02-15 | End: 2020-02-15

## 2020-02-15 RX ORDER — METOPROLOL TARTRATE 50 MG
12.5 TABLET ORAL ONCE
Refills: 0 | Status: COMPLETED | OUTPATIENT
Start: 2020-02-15 | End: 2020-02-15

## 2020-02-15 RX ORDER — OXYCODONE AND ACETAMINOPHEN 5; 325 MG/1; MG/1
2 TABLET ORAL EVERY 6 HOURS
Refills: 0 | Status: DISCONTINUED | OUTPATIENT
Start: 2020-02-15 | End: 2020-02-16

## 2020-02-15 RX ORDER — METOPROLOL TARTRATE 50 MG
50 TABLET ORAL
Refills: 0 | Status: DISCONTINUED | OUTPATIENT
Start: 2020-02-15 | End: 2020-02-16

## 2020-02-15 RX ORDER — NITROGLYCERIN 6.5 MG
0.5 CAPSULE, EXTENDED RELEASE ORAL ONCE
Refills: 0 | Status: COMPLETED | OUTPATIENT
Start: 2020-02-15 | End: 2020-02-15

## 2020-02-15 RX ORDER — CLOPIDOGREL BISULFATE 75 MG/1
600 TABLET, FILM COATED ORAL ONCE
Refills: 0 | Status: COMPLETED | OUTPATIENT
Start: 2020-02-15 | End: 2020-02-15

## 2020-02-15 RX ORDER — FUROSEMIDE 40 MG
40 TABLET ORAL
Refills: 0 | Status: DISCONTINUED | OUTPATIENT
Start: 2020-02-15 | End: 2020-02-16

## 2020-02-15 RX ORDER — CLOPIDOGREL BISULFATE 75 MG/1
75 TABLET, FILM COATED ORAL DAILY
Refills: 0 | Status: DISCONTINUED | OUTPATIENT
Start: 2020-02-16 | End: 2020-02-16

## 2020-02-15 RX ORDER — OXYCODONE AND ACETAMINOPHEN 5; 325 MG/1; MG/1
1 TABLET ORAL EVERY 6 HOURS
Refills: 0 | Status: DISCONTINUED | OUTPATIENT
Start: 2020-02-15 | End: 2020-02-16

## 2020-02-15 RX ORDER — MORPHINE SULFATE 50 MG/1
2 CAPSULE, EXTENDED RELEASE ORAL EVERY 6 HOURS
Refills: 0 | Status: DISCONTINUED | OUTPATIENT
Start: 2020-02-15 | End: 2020-02-16

## 2020-02-15 RX ADMIN — ENOXAPARIN SODIUM 65 MILLIGRAM(S): 100 INJECTION SUBCUTANEOUS at 05:14

## 2020-02-15 RX ADMIN — Medication 0.4 MILLIGRAM(S): at 02:38

## 2020-02-15 RX ADMIN — Medication 0.4 MILLIGRAM(S): at 07:01

## 2020-02-15 RX ADMIN — PANTOPRAZOLE SODIUM 40 MILLIGRAM(S): 20 TABLET, DELAYED RELEASE ORAL at 16:54

## 2020-02-15 RX ADMIN — MORPHINE SULFATE 2 MILLIGRAM(S): 50 CAPSULE, EXTENDED RELEASE ORAL at 07:27

## 2020-02-15 RX ADMIN — Medication 0.5 INCH(S): at 23:13

## 2020-02-15 RX ADMIN — Medication 40 MILLIGRAM(S): at 05:15

## 2020-02-15 RX ADMIN — Medication 10 MILLIGRAM(S): at 10:20

## 2020-02-15 RX ADMIN — MORPHINE SULFATE 2 MILLIGRAM(S): 50 CAPSULE, EXTENDED RELEASE ORAL at 12:36

## 2020-02-15 RX ADMIN — PANTOPRAZOLE SODIUM 40 MILLIGRAM(S): 20 TABLET, DELAYED RELEASE ORAL at 05:15

## 2020-02-15 RX ADMIN — Medication 30 MILLILITER(S): at 11:31

## 2020-02-15 RX ADMIN — ENOXAPARIN SODIUM 65 MILLIGRAM(S): 100 INJECTION SUBCUTANEOUS at 23:10

## 2020-02-15 RX ADMIN — CLOPIDOGREL BISULFATE 600 MILLIGRAM(S): 75 TABLET, FILM COATED ORAL at 18:04

## 2020-02-15 RX ADMIN — Medication 0.5 INCH(S): at 11:16

## 2020-02-15 RX ADMIN — Medication 1 TABLET(S): at 10:20

## 2020-02-15 RX ADMIN — Medication 12.5 MILLIGRAM(S): at 13:12

## 2020-02-15 RX ADMIN — PREGABALIN 1000 MICROGRAM(S): 225 CAPSULE ORAL at 10:21

## 2020-02-15 RX ADMIN — MORPHINE SULFATE 2 MILLIGRAM(S): 50 CAPSULE, EXTENDED RELEASE ORAL at 14:13

## 2020-02-15 RX ADMIN — Medication 81 MILLIGRAM(S): at 10:21

## 2020-02-15 RX ADMIN — MORPHINE SULFATE 2 MILLIGRAM(S): 50 CAPSULE, EXTENDED RELEASE ORAL at 07:12

## 2020-02-15 RX ADMIN — Medication 500 MILLIGRAM(S): at 10:20

## 2020-02-15 RX ADMIN — Medication 1000 UNIT(S): at 10:21

## 2020-02-15 RX ADMIN — Medication 12.5 MILLIGRAM(S): at 05:15

## 2020-02-15 NOTE — PROGRESS NOTE ADULT - PROBLEM SELECTOR PLAN 3
HFrEF 26%  BNP 6600  Acute systolic heart failure  Moderately Reduced RV Systolic Function  Volume overloaded on exam  strict I/O, daily weights  increase lasix to 40mg BID IV

## 2020-02-15 NOTE — PROGRESS NOTE ADULT - ASSESSMENT
64 years old female with, 64 years old female with,    1) NSTEMI  - Trops elevated  - ECHO with severe global left ventricle hypokinesis with akinesis of the apex. EF 25-30%.  - Continue FD Lovenox  - Continue Aspirin, Lipitor and Metoprolol (increased to 25 mg BID)  - Cardiology follow up noted: no intervention    2) Acute Systolic Heart Failure  - Continue Lasix 40 mg daily  - Continue Metoprolol  - Will add ACEI once BP improves     3) Oral Mass  - MRI pending  - ENT consult appreciated    4) LE DVT  - Eliquis on hold  - Continue FD Lovenox    5) History of Breast Cancer   - s/p surgery and radiation  - Was on Tamoxifen   - Oncology following    6) Gastroparesis  - Continue Reglan    7) Severe Protein Calorie Malnutrition  - Continue Vitamin C and Multivitamin   - Ensure added  - Nutrition Consult appreciated    8) Dysphagia  - Speech therapist beronica noted  - Diet changed to Puree with thin liquids    DVT Prophylaxis -- Patient is on FD Lovenox    Dispo: Unclear. PT eval pending.

## 2020-02-15 NOTE — DIETITIAN INITIAL EVALUATION ADULT. - OTHER INFO
Patient is a 64 year old female with pmh of CHF, DVT on eliquis, HTN, HLD, and breast cancer on tamoxifen presents from home with daughter with complaints of chest and neck pain, weakness and nausea which started at 4am this morning.  Patient is not the best historian and much of the history was taken from daughter. As per daughter, patient has not been eating well for the past several weeks due to gastroparesis and has lost weight. Patient then had left sided chest pain along with neck pain and family grew concerned and brought her to the hospital. Patient was worked up in the er and found to have an elevated troponin and a neck mass on ct scan.   Pt lethargic during interview, however endorses extremely poor appetite over past few weeks and has difficulty swallowing, Pt agreeable to trying pureed foods. Pt reports losing over 80# in past year and a half and most recently has lost 22# since last admission. Pt reports liking and drinks Chocolate Ensure at home, preferences obtained. NFPE conducted.

## 2020-02-15 NOTE — SWALLOW BEDSIDE ASSESSMENT ADULT - ASR SWALLOW ASPIRATION MONITOR
oral hygiene/position upright (90Y)/gurgly voice/pneumonia/upper respiratory infection/change of breathing pattern/cough/fever/throat clearing

## 2020-02-15 NOTE — DIETITIAN INITIAL EVALUATION ADULT. - PERTINENT LABORATORY DATA
02-15 Na138 mmol/L Glu 138 mg/dL<H> K+ 5.2 mmol/L Cr  0.37 mg/dL<L> BUN 24.0 mg/dL<H> Phos n/a   Alb n/a   PAB n/a

## 2020-02-15 NOTE — PROGRESS NOTE ADULT - PROBLEM SELECTOR PLAN 1
Trop elevated and trending up  active unstable angina CCS4  No ST elevation, EKG unchanged  Echo showing depressed EF w/ wall motion abnormalities  stress showing areas of infarct  Plan currently medical management.   Start plavix 600mg load continued by 75mg daily  continue ASA and full dose lovenox  continue lipitor   increase to metoprolol 50mg BID  continue nitroglycerin paste/sublingual   pain control with percocet/morphine monitor for hypotension  EKG upon c/o of chest pain

## 2020-02-15 NOTE — DIETITIAN INITIAL EVALUATION ADULT. - ETIOLOGY
Related to inability to consume sufficient energy needs with difficulty swallowing in setting of cancer and new finding of neck mass

## 2020-02-15 NOTE — PROGRESS NOTE ADULT - ASSESSMENT
Pt is 63 y/o female with medical history of HFpEF, HTN, breast cancer s/p left breast mastectomy, bilateral lower extremity DVT, who presents to [facility]-ED for neck pain. Pt states that she has been experiencing neck pain radiating down arm and left side of chest for a couple of weeks. Pt describes pain as constant, radiating, 8/10, unable to qualify pain. Pt states that pain worsens with movement, no pain relieving factors identified. Pt also has intermittent associated symptoms of nausea, SOB, and palpitations. Pt was brought to [facility]-ED for work up and found to have ST HR @ with diffuse low voltage on ECG and elevated Trop 0.20.CT Angio revealed no PE but +small bilateral pleural effusions. CT Neck showed presence of heterogenous mass in floor of mouth/tongue measuring 5.6x3.2cm. Pt currently c/o neck pain and chest pain, no associated symptoms at this time. Pt Denies fever, chills, cough, phlegm production, , PND, irregular, fast heart beat, nausea, vomiting, melena, rectal bleed, hematuria, lightheadedness, dizziness, syncope, near syncope.   As of 2/14/2020, serial troponins continue to elevate. Pt states overnight she woke with worsening chest pain felt like "someone sitting on my chest". Pain somewhat relieved with morphine but still constant. Current chest pain 6/10. No cardiac events on monitor.

## 2020-02-15 NOTE — DIETITIAN INITIAL EVALUATION ADULT. - MALNUTRITION
Severe-Chronic  NFPE: Severe muscle mass loss in temple, clavicle, shoulder, scapula regions and severe fat loss in orbit, buccal, triceps, thoracic Severe-Chronic

## 2020-02-15 NOTE — SWALLOW BEDSIDE ASSESSMENT ADULT - ASR SWALLOW REFERRAL
Registered Dietitian/To facilitate adequate daily caloric intake given patient's reported reduced PO intake.

## 2020-02-15 NOTE — SWALLOW BEDSIDE ASSESSMENT ADULT - COMMENTS
Consult received and chart reviewed. The patient was seen at bedside this afternoon for an initial assessment of swallow function, at which time she was A&Ox4. Patient reporting 2/10 pain pre/post today's evaluation, specifically in her neck and chest. RN aware. Patient currently receiving supplemental O2 via NC in place. Upon clinician questioning, the patient is admitting weight loss and reduced PO intake given oral swelling and neck swelling over the past 3-4 months.    Per charting, the patient is a "64 yof with pmh of CHF, DVT on eliquis, HTN, HLD, and breast cancer on tamoxifen presents from home with daughter with complaints of chest and neck pain, weakness and nausea which started at 4am this morning.  Patient is not the best historian and much of the history was taken from daughter. As per daughter, patient has not been eating well for the past several weeks due to gastroparesis and has lost weight. Patient then had left sided chest pain along with neck pain and family grew concerned and brought her to the hospital. Patient was worked up in the er and found to have an elevated troponin and a neck mass on ct scan. Of note,  patient follows with Dr Lozoya and is aware of the neck mass and was due to see the ent physician next week."    CT angio of the chest revealed, "No pulmonary embolism. Small bilateral pleural effusions with compressive atelectasis of the adjacent lung parenchyma." Discussed results and recommendations from this evaluation with the patient, RN, and referring MD.

## 2020-02-15 NOTE — SWALLOW BEDSIDE ASSESSMENT ADULT - SWALLOW EVAL: DIAGNOSIS
1. The patient demonstrated a mild oral dysphagia for puree, nectar thick, and thin liquid textures marked by delayed bolus collection, transfer, and posterior transport. 2. The patient demonstrated a mild pharyngeal dysphagia for puree, nectar thick, and thin liquid textures marked by a delayed pharyngeal swallow trigger with reduced hyolaryngeal elevation upon digital palpation w/o evidence of airway penetration. *Of note: Patient refused chewable solid PO trials during today's evaluation given oral and pharyngeal discomfort.

## 2020-02-15 NOTE — CHART NOTE - NSCHARTNOTEFT_GEN_A_CORE
Upon Nutritional Assessment by the Registered Dietitian your patient was determined to meet criteria / has evidence of the following diagnosis/diagnoses:          [ ]  Mild Protein Calorie Malnutrition        [ ]  Moderate Protein Calorie Malnutrition        [x ] Severe Protein Calorie Malnutrition        [ ] Unspecified Protein Calorie Malnutrition        [ ] Underweight / BMI <19        [ ] Morbid Obesity / BMI > 40      Findings as based on:  •  Comprehensive nutrition assessment and consultation  •  Calorie counts (nutrient intake analysis)  •  Food acceptance and intake status from observations by staff  •  Follow up  •  Patient education  •  Intervention secondary to interdisciplinary rounds  •   concerns      Treatment:    The following diet has been recommended:    Swallow evaluation    Ensure TID    Change diet to puree foods with thin liquids       PROVIDER Section:     By signing this assessment you are acknowledging and agree with the diagnosis/diagnoses assigned by the Registered Dietitian    Comments:

## 2020-02-15 NOTE — PROGRESS NOTE ADULT - SUBJECTIVE AND OBJECTIVE BOX
[facility]                          [address] [place] [address]                       Phone: [phone]. Fax:[phone]                      ________________________________________________           Reason for follow up/Overnight events: Active chest pain - NSTEMI, medical management     HPI:  Patient is a 64 year old female with pmh of CHF, DVT on eliquis, HTN, HLD, and breast cancer on tamoxifen presents from home with daughter with complaints of chest and neck pain, weakness and nausea which started at 4am this morning.  Patient is not the best historian and much of the history was taken from daughter. As per daughter, patient has not been eating well for the past several weeks due to gastroparesis and has lost weight. Patient then had left sided chest pain along with n marnie pain and family grew concerned and brought her to the hospital. Patient was worked up in the er and found to have an elevated troponin and a neck mass on ct scan. Of note,  patient follows with Dr Lozoya and is aware of the neck mass and was due to see the ent physician next week. (2020 14:35)    ROS: All review of systems negative unless indicated otherwise below.                          LAB RESULTS                   COMPLETE BLOOD COUNT( 15 Feb 2020 06:30 )                            12.9 g/dL  14.32 K/uL<H> )---------------( 258 K/uL                        40.4 %      Automated Differential     Auto Basophil # - 0.01 K/uL  Auto Basophil % - 0.1 %  Auto Eosinophil # - 0.00 K/uL  Auto Eosinophil % - 0.0 %  Auto Immature Granulocyte # - X      Auto Immature Granulocyte % - 0.6 %  Auto Lymphocyte # - 1.52 K/uL  Auto Lymphocyte % - 10.6 %<L>  Auto Monocyte # - 0.84 K/uL  Auto Monocyte % - 5.9 %  Auto Neutrophil # - 11.86 K/uL<H>  Auto Neutrophil % - 82.8 %<H>                                  CHEMISTRY                 Basic Metabolic Panel (02-15-20 @ 06:30)    138  |  100  |  24.0<H>  ----------------------------<  138<H>  5.2   |  26.0  |  0.37<L>    Ca    9.1      15 Feb 2020 06:30  Mg     1.9     02-15                    Liver Functions (20 @ 06:03))  TPro  5.4  /  Alb  2.5  /  TBili  0.2  /  DBili  x   /  AST  73  /  ALT  27  /  AlkPhos  184     PT/INR/PTT ( 2020 08:51 )                        :                       :      15.5         :       26.0                  .        .                   .              .           .       1.36        .                                                                 Cardiac Enzymes   ( 2020 06:03 )  Troponin T  0.46<H>,  CPK  X    , CKMB  X    , BNP X        , ( 2020 23:09 )  Troponin T  0.30<H>,  CPK  X    , CKMB  X    , BNP X        , ( 2020 15:29 )  Troponin T  X    ,  CPK  X    , CKMB  X    , BNP 6687<H>                         CARDIOLOGY RESULTS: Official Report/Preliminary Verbal Reports    ECHO:   < from: TTE Echo Complete w/Doppler (20 @ 12:00) >   1. Endocardial visualization was enhanced with intravenous echo contrast.   2. Severely decreased global left ventricular systolic function.   3. Left ventricular ejection fraction, by visual estimation, is 25 to 30%.   4. Mildly increased posterior wall thickness. Severe global left ventricle hypokinesis with akinesis of the apex. Spectral Doppler suggests Grade 2 diastolic dysfunction.   5. Normal right ventricle size with moderately reduced systolic function.   6. Moderately enlarged left atrium.   7. The right atrium appears normal in size.   8. Mild thickening of the anterior and posterior mitral valve leaflets.   9. Sclerotic aortic valve with normal opening.  10. Mild to moderate pulmonic valve regurgitation.  11. Small pericardial effusion.  12. No significant early right ventricle diastolic collapse visualized.  13. Recommend clinical correlation with the above findings.    Homer Mai MD, Electronically signed on 2020 at 2:59:22 PM    < end of copied text >                          CARDIOLOGY REVIEW: Personally visualized and reviewed    EKG: NSR, markedly Low voltage, ST downsloping   Telemetry Last 24h:  to 110 no ectopy                              DAILY WEIGHTS - 48 HOUR TREND   Daily Weight in k (15 Feb 2020 10:26), Weight in k (15 Feb 2020 04:00)                             INTAKE AND OUTPUT - 48 HOUR TREND     20 @ 07:01  -  02-15-20 @ 07:00  --------------------------------------------------------  IN:  Total IN: 0 mL    OUT:    Voided: 50 mL  Total OUT: 50 mL    Total NET: -50 mL      HOME MEDICATIONS:  apixaban 5 mg oral tablet: 2 tab(s) orally every 12 hours (2019 09:18)  ascorbic acid 500 mg oral tablet: 1 tab(s) orally once a day (2019 10:32)  cyanocobalamin 1000 mcg oral tablet: 1 tab(s) orally once a day (2019 10:32)  ibuprofen 400 mg oral tablet: 1 tab(s) orally every 8 hours, As Needed (2019 10:32)  lactulose 10 g/15 mL oral syrup: 30 milliliter(s) orally once a day, As needed, constipation (2019 09:18)  metoclopramide 10 mg oral tablet: 1 tab(s) orally 3 times a day (before meals) (2019 09:39)  MiraLax oral powder for reconstitution: 1 packet(s) orally 2 times a day (2019 09:18)  Multiple Vitamins oral tablet: 1 tab(s) orally once a day (2019 09:39)  omeprazole 20 mg oral delayed release capsule: 1 cap(s) orally 2 times a day (:39)  ondansetron 4 mg oral tablet, disintegratin tab(s) orally 3 times a day, As Needed - for nausea (:39)  senna oral tablet: 2 tab(s) orally once a day (at bedtime), As Needed constipation (2019 13:21)  tamoxifen 20 mg oral tablet: 1 tab(s) orally once a day (2019 09:39)  Tylenol Extra Strength 500 mg oral tablet: 1 tab(s) orally every 6 hours, As Needed - for moderate pain (back pain) (2019 09:39)  Vitamin D3 1000 intl units oral tablet: 1 tab(s) orally once a day (2019 09:39)                             Current Admission Active Medications    acetaminophen   Tablet .. 650 milliGRAM(s) Oral every 6 hours PRN Temp greater or equal to 38C (100.4F), Mild Pain (1 - 3)  aluminum hydroxide/magnesium hydroxide/simethicone Suspension 30 milliLiter(s) Oral every 4 hours PRN Dyspepsia  ascorbic acid 500 milliGRAM(s) Oral daily  aspirin  chewable 81 milliGRAM(s) Oral daily  atorvastatin 40 milliGRAM(s) Oral at bedtime  cholecalciferol 1000 Unit(s) Oral daily  cyanocobalamin 1000 MICROGram(s) Oral daily  enoxaparin Injectable 65 milliGRAM(s) SubCutaneous two times a day  furosemide   Injectable 40 milliGRAM(s) IV Push daily  influenza   Vaccine 0.5 milliLiter(s) IntraMuscular once  metoprolol tartrate 25 milliGRAM(s) Oral two times a day  morphine  - Injectable 2 milliGRAM(s) IV Push every 4 hours PRN Moderate Pain (4 - 6)  multivitamin 1 Tablet(s) Oral daily  nitroglycerin     SubLingual 0.4 milliGRAM(s) SubLingual every 5 minutes PRN Chest Pain  pantoprazole    Tablet 40 milliGRAM(s) Oral two times a day                        PHYSICAL EXAM:    Vital Signs Last 24 Hrs  T(C): 36.3 (15 Feb 2020 15:50), Max: 36.4 (2020 21:49)  T(F): 97.4 (15 Feb 2020 15:50), Max: 97.6 (2020 21:49)  HR: 104 (15 Feb 2020 15:50) (101 - 123)  BP: 92/69 (15 Feb 2020 15:50) (92/69 - 120/86)  BP(mean): --  RR: 18 (15 Feb 2020 15:50) (16 - 18)  SpO2: 95% (15 Feb 2020 15:50) (95% - 97%)    GENERAL: NAD  NECK: Supple, No JVD  NERVOUS SYSTEM:  Alert & Oriented X3, non focal neuro exam.   CHEST/LUNG: bilateral coarse crackles. No rales, rhonchi, wheezing, or rubs  HEART: Regular rate and rhythm; s1 and s2 auscultated, No murmurs, rubs, or gallops  ABDOMEN: Soft, Nontender, Nondistended; Bowel sounds present and normoactive.   EXTREMITIES:  2+ Peripheral Pulses, No clubbing, cyanosis. +3 LE Edema bilaterally

## 2020-02-15 NOTE — SWALLOW BEDSIDE ASSESSMENT ADULT - SWALLOW EVAL: RECOMMENDED FEEDING/EATING TECHNIQUES
oral hygiene/alternate food with liquid/allow for swallow between intakes/crush medication (when feasible)/small sips/bites/check mouth frequently for oral residue/pocketing/hard swallow w/ each bite or sip/position upright (90 degrees)

## 2020-02-15 NOTE — PROGRESS NOTE ADULT - ATTENDING COMMENTS
Patient with symptoms of chest pain. Attempting to manage medically.   Unclear prognosis considering malignancy. needs further delineation of diagnosis.

## 2020-02-15 NOTE — PROGRESS NOTE ADULT - SUBJECTIVE AND OBJECTIVE BOX
Non-ST elevation myocardial infarction (NSTEMI)    HPI:  Patient is a 64 yof with pmh of CHF, DVT on eliquis, HTN, HLD, and breast cancer on tamoxifen presents from home with daughter with complaints of chest and neck pain, weakness and nausea which started at 4am this morning.  Patient is not the best historian and much of the history was taken from daughter. As per daughter, patient has not been eating well for the past several weeks due to gastroparesis and has lost weight. Patient then had left sided chest pain along with n marnie pain and family grew concerned and brought her to the hospital. Patient was worked up in the er and found to have an elevated troponin and a neck mass on ct scan. Of note,  patient follows with Dr Lozoya and is aware of the neck mass and was due to see the ent physician next week. (13 Feb 2020 14:35)    Interval History:  Patient was seen and examined at bedside. Was having chest pain this morning. Morphine and Nitro was given.   Unable to tolerate solid food.     ROS:  As per interval history otherwise unremarkable.    PHYSICAL EXAM:  Vital Signs   T(C): 36.4 (15 Feb 2020 09:42), Max: 36.4 (14 Feb 2020 21:49)  T(F): 97.6 (15 Feb 2020 09:42), Max: 97.6 (14 Feb 2020 21:49)  HR: 101 (15 Feb 2020 13:08) (101 - 123)  BP: 120/75 (15 Feb 2020 13:08) (97/76 - 120/86)  RR: 16 (15 Feb 2020 09:42) (16 - 18)  SpO2: 97% (15 Feb 2020 09:42) (95% - 98%)  General: Elderly female lying in bed comfortably. No acute distress. Debilitated   HEENT: PERRLA. EOMI. Clear conjunctivae. Moist mucus membrane  Neck: Supple.   Chest: CTA bilaterally - no wheezing, rales or rhonchi. No chest wall tenderness.  Heart: S1 & S2 with tachycardia.   Abdomen: Soft. Non-tender. Non-distended. + BS  Ext: 2+ pedal edema (mostly on right side). No calf tenderness   Neuro: AAO x 3. No focal deficit. No speech disorder  Skin: Warm and Dry  Psychiatry: Normal mood and affect    I&O's Summary    14 Feb 2020 07:01  -  15 Feb 2020 07:00  --------------------------------------------------------  IN: 890 mL / OUT: 50 mL / NET: 840 mL      MEDICATIONS  (STANDING):  ascorbic acid 500 milliGRAM(s) Oral daily  aspirin  chewable 81 milliGRAM(s) Oral daily  atorvastatin 40 milliGRAM(s) Oral at bedtime  cholecalciferol 1000 Unit(s) Oral daily  cyanocobalamin 1000 MICROGram(s) Oral daily  enoxaparin Injectable 65 milliGRAM(s) SubCutaneous two times a day  furosemide   Injectable 40 milliGRAM(s) IV Push daily  influenza   Vaccine 0.5 milliLiter(s) IntraMuscular once  metoclopramide Injectable 10 milliGRAM(s) IV Push <User Schedule>  metoprolol tartrate 25 milliGRAM(s) Oral two times a day  multivitamin 1 Tablet(s) Oral daily  pantoprazole    Tablet 40 milliGRAM(s) Oral two times a day    MEDICATIONS  (PRN):  acetaminophen   Tablet .. 650 milliGRAM(s) Oral every 6 hours PRN Temp greater or equal to 38C (100.4F), Mild Pain (1 - 3)  aluminum hydroxide/magnesium hydroxide/simethicone Suspension 30 milliLiter(s) Oral every 4 hours PRN Dyspepsia  morphine  - Injectable 2 milliGRAM(s) IV Push every 4 hours PRN Moderate Pain (4 - 6)  nitroglycerin     SubLingual 0.4 milliGRAM(s) SubLingual every 5 minutes PRN Chest Pain      LABS:                        12.9   14.32 )-----------( 258      ( 15 Feb 2020 06:30 )             40.4     02-15    138  |  100  |  24.0<H>  ----------------------------<  138<H>  5.2   |  26.0  |  0.37<L>    Ca    9.1      15 Feb 2020 06:30  Mg     1.9     02-15    TPro  5.4<L>  /  Alb  2.5<L>  /  TBili  0.2<L>  /  DBili  x   /  AST  73<H>  /  ALT  27  /  AlkPhos  184<H>  02-14      CARDIAC MARKERS ( 14 Feb 2020 06:03 )  x     / 0.46 ng/mL / x     / x     / x      CARDIAC MARKERS ( 13 Feb 2020 23:09 )  x     / 0.30 ng/mL / x     / x     / x          RADIOLOGY & ADDITIONAL STUDIES:  Reviewed

## 2020-02-16 VITALS — DIASTOLIC BLOOD PRESSURE: 55 MMHG | HEART RATE: 104 BPM | SYSTOLIC BLOOD PRESSURE: 90 MMHG

## 2020-02-16 DIAGNOSIS — K31.84 GASTROPARESIS: ICD-10-CM

## 2020-02-16 DIAGNOSIS — I42.9 CARDIOMYOPATHY, UNSPECIFIED: ICD-10-CM

## 2020-02-16 LAB
ALBUMIN SERPL ELPH-MCNC: 2.5 G/DL — LOW (ref 3.3–5.2)
ALP SERPL-CCNC: 200 U/L — HIGH (ref 40–120)
ALT FLD-CCNC: 42 U/L — HIGH
ANION GAP SERPL CALC-SCNC: 10 MMOL/L — SIGNIFICANT CHANGE UP (ref 5–17)
AST SERPL-CCNC: 71 U/L — HIGH
BILIRUB SERPL-MCNC: <0.2 MG/DL — LOW (ref 0.4–2)
BUN SERPL-MCNC: 32 MG/DL — HIGH (ref 8–20)
CALCIUM SERPL-MCNC: 9 MG/DL — SIGNIFICANT CHANGE UP (ref 8.6–10.2)
CHLORIDE SERPL-SCNC: 101 MMOL/L — SIGNIFICANT CHANGE UP (ref 98–107)
CK MB CFR SERPL CALC: 23.6 NG/ML — HIGH (ref 0–6.7)
CK SERPL-CCNC: 231 U/L — HIGH (ref 25–170)
CO2 SERPL-SCNC: 27 MMOL/L — SIGNIFICANT CHANGE UP (ref 22–29)
CREAT SERPL-MCNC: 0.4 MG/DL — LOW (ref 0.5–1.3)
GLUCOSE BLDC GLUCOMTR-MCNC: 102 MG/DL — HIGH (ref 70–99)
GLUCOSE SERPL-MCNC: 133 MG/DL — HIGH (ref 70–99)
HCT VFR BLD CALC: 38.8 % — SIGNIFICANT CHANGE UP (ref 34.5–45)
HGB BLD-MCNC: 12.2 G/DL — SIGNIFICANT CHANGE UP (ref 11.5–15.5)
MCHC RBC-ENTMCNC: 31.4 GM/DL — LOW (ref 32–36)
MCHC RBC-ENTMCNC: 32.4 PG — SIGNIFICANT CHANGE UP (ref 27–34)
MCV RBC AUTO: 102.9 FL — HIGH (ref 80–100)
NT-PROBNP SERPL-SCNC: HIGH PG/ML (ref 0–300)
PLATELET # BLD AUTO: 381 K/UL — SIGNIFICANT CHANGE UP (ref 150–400)
POTASSIUM SERPL-MCNC: 4.7 MMOL/L — SIGNIFICANT CHANGE UP (ref 3.5–5.3)
POTASSIUM SERPL-SCNC: 4.7 MMOL/L — SIGNIFICANT CHANGE UP (ref 3.5–5.3)
PROT SERPL-MCNC: 5.2 G/DL — LOW (ref 6.6–8.7)
RBC # BLD: 3.77 M/UL — LOW (ref 3.8–5.2)
RBC # FLD: 14.2 % — SIGNIFICANT CHANGE UP (ref 10.3–14.5)
SODIUM SERPL-SCNC: 138 MMOL/L — SIGNIFICANT CHANGE UP (ref 135–145)
TROPONIN T SERPL-MCNC: 0.88 NG/ML — HIGH (ref 0–0.06)
WBC # BLD: 15.35 K/UL — HIGH (ref 3.8–10.5)
WBC # FLD AUTO: 15.35 K/UL — HIGH (ref 3.8–10.5)

## 2020-02-16 PROCEDURE — 70543 MRI ORBT/FAC/NCK W/O &W/DYE: CPT

## 2020-02-16 PROCEDURE — 86850 RBC ANTIBODY SCREEN: CPT

## 2020-02-16 PROCEDURE — 70491 CT SOFT TISSUE NECK W/DYE: CPT

## 2020-02-16 PROCEDURE — 70543 MRI ORBT/FAC/NCK W/O &W/DYE: CPT | Mod: 26

## 2020-02-16 PROCEDURE — 82553 CREATINE MB FRACTION: CPT

## 2020-02-16 PROCEDURE — 80048 BASIC METABOLIC PNL TOTAL CA: CPT

## 2020-02-16 PROCEDURE — 80061 LIPID PANEL: CPT

## 2020-02-16 PROCEDURE — 92610 EVALUATE SWALLOWING FUNCTION: CPT

## 2020-02-16 PROCEDURE — 71045 X-RAY EXAM CHEST 1 VIEW: CPT | Mod: 26

## 2020-02-16 PROCEDURE — 96376 TX/PRO/DX INJ SAME DRUG ADON: CPT

## 2020-02-16 PROCEDURE — 96374 THER/PROPH/DIAG INJ IV PUSH: CPT

## 2020-02-16 PROCEDURE — 82550 ASSAY OF CK (CPK): CPT

## 2020-02-16 PROCEDURE — 83735 ASSAY OF MAGNESIUM: CPT

## 2020-02-16 PROCEDURE — 85730 THROMBOPLASTIN TIME PARTIAL: CPT

## 2020-02-16 PROCEDURE — 83880 ASSAY OF NATRIURETIC PEPTIDE: CPT

## 2020-02-16 PROCEDURE — 85610 PROTHROMBIN TIME: CPT

## 2020-02-16 PROCEDURE — 99239 HOSP IP/OBS DSCHRG MGMT >30: CPT

## 2020-02-16 PROCEDURE — 71275 CT ANGIOGRAPHY CHEST: CPT

## 2020-02-16 PROCEDURE — 99285 EMERGENCY DEPT VISIT HI MDM: CPT | Mod: 25

## 2020-02-16 PROCEDURE — 84484 ASSAY OF TROPONIN QUANT: CPT

## 2020-02-16 PROCEDURE — 84443 ASSAY THYROID STIM HORMONE: CPT

## 2020-02-16 PROCEDURE — A9500: CPT

## 2020-02-16 PROCEDURE — 71045 X-RAY EXAM CHEST 1 VIEW: CPT

## 2020-02-16 PROCEDURE — 99233 SBSQ HOSP IP/OBS HIGH 50: CPT

## 2020-02-16 PROCEDURE — 86901 BLOOD TYPING SEROLOGIC RH(D): CPT

## 2020-02-16 PROCEDURE — 96375 TX/PRO/DX INJ NEW DRUG ADDON: CPT

## 2020-02-16 PROCEDURE — 84436 ASSAY OF TOTAL THYROXINE: CPT

## 2020-02-16 PROCEDURE — 93010 ELECTROCARDIOGRAM REPORT: CPT

## 2020-02-16 PROCEDURE — 83036 HEMOGLOBIN GLYCOSYLATED A1C: CPT

## 2020-02-16 PROCEDURE — 80053 COMPREHEN METABOLIC PANEL: CPT

## 2020-02-16 PROCEDURE — 93017 CV STRESS TEST TRACING ONLY: CPT

## 2020-02-16 PROCEDURE — 93306 TTE W/DOPPLER COMPLETE: CPT

## 2020-02-16 PROCEDURE — 86900 BLOOD TYPING SEROLOGIC ABO: CPT

## 2020-02-16 PROCEDURE — 36556 INSERT NON-TUNNEL CV CATH: CPT

## 2020-02-16 PROCEDURE — 93005 ELECTROCARDIOGRAM TRACING: CPT

## 2020-02-16 PROCEDURE — 78452 HT MUSCLE IMAGE SPECT MULT: CPT

## 2020-02-16 PROCEDURE — 85027 COMPLETE CBC AUTOMATED: CPT

## 2020-02-16 PROCEDURE — 31500 INSERT EMERGENCY AIRWAY: CPT

## 2020-02-16 PROCEDURE — 36415 COLL VENOUS BLD VENIPUNCTURE: CPT

## 2020-02-16 PROCEDURE — 82962 GLUCOSE BLOOD TEST: CPT

## 2020-02-16 RX ORDER — METOCLOPRAMIDE HCL 10 MG
10 TABLET ORAL
Refills: 0 | Status: DISCONTINUED | OUTPATIENT
Start: 2020-02-16 | End: 2020-02-16

## 2020-02-16 RX ORDER — KETOROLAC TROMETHAMINE 30 MG/ML
30 SYRINGE (ML) INJECTION ONCE
Refills: 0 | Status: DISCONTINUED | OUTPATIENT
Start: 2020-02-16 | End: 2020-02-16

## 2020-02-16 RX ORDER — FUROSEMIDE 40 MG
40 TABLET ORAL ONCE
Refills: 0 | Status: COMPLETED | OUTPATIENT
Start: 2020-02-16 | End: 2020-02-16

## 2020-02-16 RX ORDER — FUROSEMIDE 40 MG
5 TABLET ORAL
Qty: 500 | Refills: 0 | Status: DISCONTINUED | OUTPATIENT
Start: 2020-02-16 | End: 2020-02-16

## 2020-02-16 RX ORDER — HYDROMORPHONE HYDROCHLORIDE 2 MG/ML
1 INJECTION INTRAMUSCULAR; INTRAVENOUS; SUBCUTANEOUS ONCE
Refills: 0 | Status: DISCONTINUED | OUTPATIENT
Start: 2020-02-16 | End: 2020-02-16

## 2020-02-16 RX ORDER — SODIUM CHLORIDE 9 MG/ML
250 INJECTION INTRAMUSCULAR; INTRAVENOUS; SUBCUTANEOUS ONCE
Refills: 0 | Status: COMPLETED | OUTPATIENT
Start: 2020-02-16 | End: 2020-02-16

## 2020-02-16 RX ADMIN — Medication 2.5 MG/HR: at 15:29

## 2020-02-16 RX ADMIN — Medication 50 MILLIGRAM(S): at 07:05

## 2020-02-16 RX ADMIN — MORPHINE SULFATE 2 MILLIGRAM(S): 50 CAPSULE, EXTENDED RELEASE ORAL at 07:05

## 2020-02-16 RX ADMIN — MORPHINE SULFATE 2 MILLIGRAM(S): 50 CAPSULE, EXTENDED RELEASE ORAL at 00:58

## 2020-02-16 RX ADMIN — MORPHINE SULFATE 2 MILLIGRAM(S): 50 CAPSULE, EXTENDED RELEASE ORAL at 07:20

## 2020-02-16 RX ADMIN — MORPHINE SULFATE 2 MILLIGRAM(S): 50 CAPSULE, EXTENDED RELEASE ORAL at 01:12

## 2020-02-16 RX ADMIN — Medication 40 MILLIGRAM(S): at 13:46

## 2020-02-16 RX ADMIN — Medication 1000 UNIT(S): at 09:47

## 2020-02-16 RX ADMIN — Medication 1 TABLET(S): at 09:47

## 2020-02-16 RX ADMIN — OXYCODONE AND ACETAMINOPHEN 2 TABLET(S): 5; 325 TABLET ORAL at 05:00

## 2020-02-16 RX ADMIN — HYDROMORPHONE HYDROCHLORIDE 1 MILLIGRAM(S): 2 INJECTION INTRAMUSCULAR; INTRAVENOUS; SUBCUTANEOUS at 09:12

## 2020-02-16 RX ADMIN — OXYCODONE AND ACETAMINOPHEN 2 TABLET(S): 5; 325 TABLET ORAL at 04:18

## 2020-02-16 RX ADMIN — Medication 30 MILLIGRAM(S): at 13:45

## 2020-02-16 RX ADMIN — Medication 30 MILLIGRAM(S): at 13:10

## 2020-02-16 RX ADMIN — Medication 0.4 MILLIGRAM(S): at 00:15

## 2020-02-16 RX ADMIN — HYDROMORPHONE HYDROCHLORIDE 1 MILLIGRAM(S): 2 INJECTION INTRAMUSCULAR; INTRAVENOUS; SUBCUTANEOUS at 08:03

## 2020-02-16 RX ADMIN — ENOXAPARIN SODIUM 65 MILLIGRAM(S): 100 INJECTION SUBCUTANEOUS at 07:05

## 2020-02-16 RX ADMIN — Medication 10 MILLIGRAM(S): at 13:10

## 2020-02-16 RX ADMIN — CLOPIDOGREL BISULFATE 75 MILLIGRAM(S): 75 TABLET, FILM COATED ORAL at 09:47

## 2020-02-16 RX ADMIN — HYDROMORPHONE HYDROCHLORIDE 1 MILLIGRAM(S): 2 INJECTION INTRAMUSCULAR; INTRAVENOUS; SUBCUTANEOUS at 09:45

## 2020-02-16 RX ADMIN — HYDROMORPHONE HYDROCHLORIDE 1 MILLIGRAM(S): 2 INJECTION INTRAMUSCULAR; INTRAVENOUS; SUBCUTANEOUS at 10:08

## 2020-02-16 RX ADMIN — SODIUM CHLORIDE 500 MILLILITER(S): 9 INJECTION INTRAMUSCULAR; INTRAVENOUS; SUBCUTANEOUS at 08:05

## 2020-02-16 RX ADMIN — Medication 500 MILLIGRAM(S): at 09:47

## 2020-02-16 RX ADMIN — Medication 81 MILLIGRAM(S): at 09:47

## 2020-02-16 NOTE — PROGRESS NOTE ADULT - ATTENDING COMMENTS
Patient's respiratory status declined since yesterday. Plan for stat lasix dose and drip.   Discussed goals of care with family. They want full code.   Will monitor closely. Patient's respiratory status declined since yesterday. Plan for stat lasix dose and drip.   Discussed goals of care with family. They want full code.   Will monitor closely.    Had discussed high risk nature of invasive procedures with patient's son.

## 2020-02-16 NOTE — PROGRESS NOTE ADULT - SUBJECTIVE AND OBJECTIVE BOX
[facility]  [address], [place] [address]    REASON FOR VISIT:  Follow up on nstemi  UPDATE:  Arrived asked to see patient having chest discomfort  squeezing pain  but also a muscular skeletal component to pain  trop trending upward  EKG with pain Atrial ectopic rhythmn  No ischemic changes  TELEMETRY MONITORING:  Sinus tach    ROS:  Cardiac No palpitations + chest pain  no sob  General:  No fever chills  Resp  no cough no mucus production  Gi  no abd pain no melana  Ext No calf tenderness, no edema  CARDIAC MARKERS ( 16 Feb 2020 06:23 )  x     / 0.88 ng/mL / 231 U/L / x     / 23.6 ng/mL    02-15    138  |  100  |  24.0<H>  ----------------------------<  138<H>  5.2   |  26.0  |  0.37<L>    Ca    9.1      15 Feb 2020 06:30  Mg     1.9     02-15      02-15-20 @ 07:01  -  02-16-20 @ 07:00  --------------------------------------------------------  IN: 240 mL / OUT: 850 mL / NET: -610 mL    MEDICATIONS  (STANDING):  ascorbic acid 500 milliGRAM(s) Oral daily  aspirin  chewable 81 milliGRAM(s) Oral daily  atorvastatin 40 milliGRAM(s) Oral at bedtime  cholecalciferol 1000 Unit(s) Oral daily  clopidogrel Tablet 75 milliGRAM(s) Oral daily  cyanocobalamin 1000 MICROGram(s) Oral daily  enoxaparin Injectable 65 milliGRAM(s) SubCutaneous two times a day  influenza   Vaccine 0.5 milliLiter(s) IntraMuscular once  metoprolol tartrate 50 milliGRAM(s) Oral two times a day  multivitamin 1 Tablet(s) Oral daily  pantoprazole    Tablet 40 milliGRAM(s) Oral two times a day    Vital Signs Last 24 Hrs  T(C): 36.4 (16 Feb 2020 07:02), Max: 36.4 (15 Feb 2020 09:42)  T(F): 97.5 (16 Feb 2020 07:02), Max: 97.6 (15 Feb 2020 09:42)  HR: 115 (16 Feb 2020 07:02) (101 - 125)  BP: 103/64 (16 Feb 2020 07:02) (92/58 - 123/87)  BP(mean): --  RR: 17 (16 Feb 2020 07:02) (16 - 18)  SpO2: 95% (16 Feb 2020 07:02) (95% - 97%)  T(C): 36.4 (02-16-20 @ 07:02), Max: 36.4 (02-15-20 @ 09:42)  HR: 115 (02-16-20 @ 07:02) (101 - 125)  BP: 103/64 (02-16-20 @ 07:02) (92/58 - 123/87)  RR: 17 (02-16-20 @ 07:02) (16 - 18)  SpO2: 95% (02-16-20 @ 07:02) (95% - 97%)    Thin cachectic appearing female  HEENT  Dry oral mucosa  Neck:  No jvd  CV S1&S2 regular   RESP  CTA no rales  GI  Soft active bowel sounds non tender  EXT  No clubbing/Cyanosis /Edema  NEURO  Alert oriented  No gross motor or sensory deficits    < from: Nuclear Stress Test-Pharmacologic (02.14.20 @ 13:20) >  Conclusion:  There are large, moderate defects in anterior, apical  walls that are predominantly fixed, suggestive of infarct.    < end of copied text >  < from: TTE Echo Complete w/Doppler (02.14.20 @ 12:00) >  Summary:   1. Endocardial visualization was enhanced with intravenous echo contrast.   2. Severely decreased global left ventricular systolic function.   3. Left ventricular ejection fraction, by visual estimation, is 25 to 30%.   4. Mildly increased posterior wall thickness. Severe global left ventricle hypokinesis with akinesis of the apex. Spectral Doppler suggests Grade 2 diastolic dysfunction.   5. Normal right ventricle size with moderately reduced systolic function.   6. Moderately enlarged left atrium.   7. The right atrium appears normal in size.   8. Mild thickening of the anterior and posterior mitral valve leaflets.   9. Sclerotic aortic valve with normal opening.  10. Mild to moderate pulmonic valve regurgitation.  11. Small pericardial effusion.  12. No significant early right ventricle diastolic collapse visualized.  13. Recommend clinical correlation with the above findings.    < from: CT Neck Soft Tissue w/ IV Cont (02.13.20 @ 10:41) >   heterogeneous mass in the floor the mouth/base of the tongue on image 140, series 3, measuring 5.6 x 3.2 x 4.4  cm

## 2020-02-16 NOTE — DISCHARGE NOTE FOR THE EXPIRED PATIENT - HOSPITAL COURSE
Patient is a 64 yof with PMhx of CHF, DVT on eliquis, HTN, HLD, and breast cancer on tamoxifen who presented from home with daughter with complaints of chest and neck pain, weakness and nausea. As per daughter, patient had not been eating well for the past several weeks due to gastroparesis and had lost weight. Patient then had left sided chest pain along with neck pain and family grew concerned and brought her to the hospital. Patient was worked up in the ER, found on CT to have a neck mass and found to have an elevated troponin and diagnosed with NSTEMI, seen and assessed by cardiology whose recommendations were to treat with medical management at this time. During admission patient's chest pain became progressively worse, EKG unchanged, pt symptoms managed with lovenex, asa, plavix, metoprolol, sublingual NTG. Pt also became more dyspnic, BNP 3500 and ECHO with EF of 25-30%. Pt placed on lasix drip. Later today at 15:55, pt became hypotensive and soon lost her pulse. PEA on monitor. BS within normal limits. ACLS protocol was started immediately. She was initially bagged prior to intubation. Code ended at 16:26, unable to achieve ROSC, pt . Family at bedside.

## 2020-02-16 NOTE — PROGRESS NOTE ADULT - ASSESSMENT
64 years old female with,    1) NSTEMI  - Trops elevated  - ECHO with severe global left ventricle hypokinesis with akinesis of the apex. EF 25-30%.  - Continue FD Lovenox  - Continue Aspirin, Lipitor and Metoprolol (increased to 25 mg BID)  - Cardiology follow up noted: no intervention.  - code blue and     2) Acute Systolic Heart Failure  - Continue Lasix 40 mg daily  - Continue Metoprolol  - Would add ACEI once BP improves     3) Oral Mass  - MRI pending  - ENT consult appreciated    4) LE DVT  - Eliquis on hold  - Continue FD Lovenox    5) History of Breast Cancer   - s/p surgery and radiation  - Was on Tamoxifen   - Oncology following    6) Gastroparesis  - Continue Reglan    7) Severe Protein Calorie Malnutrition  - Continue Vitamin C and Multivitamin   - Ensure  - Nutrition Consult appreciated    8) Dysphagia  - Speech therapist beronica noted  - Diet changed to Puree with thin liquids    DVT Prophylaxis -- Patient is on FD Lovenox    Dispo:

## 2020-02-16 NOTE — PROGRESS NOTE ADULT - SUBJECTIVE AND OBJECTIVE BOX
Patient with multiple episodes of chest pain overnight. L sided 10/10, non radiating and not associated with any other complaints.   BP: 123/87  HR: 120  RR:18  Afebrile  GENERAL: Laying in bed and NAD  NECK: Supple, No JVD  CHEST: B/L crackles at bases, no rales or wheezing  HEART: S1S2, tachycardic,  no murmurs or rubs  ABDOMEN: Soft, NT, + Bowel sounds present   EXTREMITIES: 2+ Peripheral Pulses, No   NERVOUS SYSTEM:  Alert & Oriented X3, non focal neuro exam   A/P: 63 yo F a/w STEMI (non-ST elevated myocardial infarction)  Trending trops, still with anginal pain, EKG unchanged  Currently treated w/ medical therapy, Plavix 75mg daily, continue ASA   Lipitor and Metoprolol 50mg BID, continue NTG sublingual   Pain control with percocet/morphine   Will sign out to day team to follow.

## 2020-02-16 NOTE — CHART NOTE - NSCHARTNOTEFT_GEN_A_CORE
Called to see patient who is diaphoretic and hypothermic  temp 96 rectally  On my arrival patient was lethargic  rr 5  b/p 80/50  Rapid response/code blue called  ambued immediately on 100% oxygen  Pulse initially present  but then lost  CPR began  Telemetry Sinus  dx -> PEA arrest  Please see code sheet for further details  Patient pronounced  16:26 pm  Family at bedside

## 2020-02-16 NOTE — PROGRESS NOTE ADULT - PROBLEM SELECTOR PLAN 2
Reduce lasix patient not eating and on dry side today  add arb when b/p improves bnp [id number] cxr pending more dyspenic this pm  lasix 40 ivp now followed by lasix drip  Advanced directives addressed with patient and son  full code status

## 2020-02-16 NOTE — PROGRESS NOTE ADULT - ASSESSMENT
Pt is 63 y/o female with medical history of HFpEF, HTN, breast cancer s/p left breast mastectomy, bilateral lower extremity DVT, who presents to [facility]ED for neck pain. Pt states that she has been experiencing neck pain radiating down arm and left side of chest for a couple of weeks.  Ct neck c/w heterogenous mass in the floor of mouth/base of tongue  5.6 x 3.2 x 4.4  cm, Echo with EF 25% Grade 2 DD, NST There are large, moderate defects in anterior, apical  walls that are predominantly fixed, suggestive of infarct.  Patient having continued chest discomfort and treated medically for nstemi

## 2020-02-16 NOTE — PROGRESS NOTE ADULT - SUBJECTIVE AND OBJECTIVE BOX
Dr. Laughlin Hospitalist Progress Note  JOSEY SAMAYOA [id number]    Patient is a 64y old  Female who presents with a chief complaint of NSTEMI (2020 08:33)    interval: patient was seen at am and then PM during  RRT/Code blue. in am reported continuous Chest pain and neck pain same as on admission, not worsening. no SOB/palpitation/dizziness/abd pain/n./v/d/c/headahces son at bedside reported mother is speaking less than usual. patient answered all qs. later patient was code blue and     HPI:  Patient is a 64 yof with pmh of CHF, DVT on eliquis, HTN, HLD, and breast cancer on tamoxifen presents from home with daughter with complaints of chest and neck pain, weakness and nausea which started at 4am this morning.  Patient is not the best historian and much of the history was taken from daughter. As per daughter, patient has not been eating well for the past several weeks due to gastroparesis and has lost weight. Patient then had left sided chest pain along with n marnie pain and family grew concerned and brought her to the hospital. Patient was worked up in the er and found to have an elevated troponin and a neck mass on ct scan. Of note,  patient follows with Dr Lozoya and is aware of the neck mass and was due to see the ent physician next week. (2020 14:35)      ROS:  as per HPI    T(C): 36.2 (20 @ 10:36), Max: 36.4 (20 @ 07:02)  HR: 104 (20 @ 13:43) (104 - 125)  BP: 90/55 (20 @ 13:43) (90/55 - 123/87)  RR: 18 (20 @ 10:36) (17 - 18)  SpO2: 95% (20 @ 10:36) (95% - 97%)    02-15-20 @ 07:01  -  20 @ 07:00  --------------------------------------------------------  IN: 240 mL / OUT: 850 mL / NET: -610 mL    CAPILLARY BLOOD GLUCOSE      POCT Blood Glucose.: 102 mg/dL (2020 15:57)      Physical Exam:  GENERAL: Not in distress. Alert    HEENT:  Normocephalic and atraumatic.   NECK: Supple.  No JVD.    CARDIOVASCULAR: RRR S1, S2. No murmur/rubs/gallop  LUNGS: BLAE+, no rales, no wheezing, no rhonchi.    ABDOMEN: ND. Soft,  NT, no guarding / rebound / rigidity. BS normoactive  EXTREMITIES: no cyanosis, no clubbing, no edema. no leg or calf TP  SKIN: no rash.   NEUROLOGIC: AAO*3.strength is symmetric, sensation intact, speech fluent.    PSYCHIATRIC: Calm.  No agitation.    Labs                        12.2   15.35 )-----------( 381      ( 2020 11:11 )             38.8     02-16    138  |  101  |  32.0<H>  ----------------------------<  133<H>  4.7   |  27.0  |  0.40<L>    Ca    9.0      2020 11:11  Mg     1.9     02-15    TPro  5.2<L>  /  Alb  2.5<L>  /  TBili  <0.2<L>  /  DBili  x   /  AST  71<H>  /  ALT  42<H>  /  AlkPhos  200<H>  02-     MEDICATIONS  (STANDING):  ascorbic acid 500 milliGRAM(s) Oral daily  aspirin  chewable 81 milliGRAM(s) Oral daily  atorvastatin 40 milliGRAM(s) Oral at bedtime  cholecalciferol 1000 Unit(s) Oral daily  clopidogrel Tablet 75 milliGRAM(s) Oral daily  cyanocobalamin 1000 MICROGram(s) Oral daily  enoxaparin Injectable 65 milliGRAM(s) SubCutaneous two times a day  furosemide Infusion 5 mG/Hr (2.5 mL/Hr) IV Continuous <Continuous>  influenza   Vaccine 0.5 milliLiter(s) IntraMuscular once  metoclopramide 10 milliGRAM(s) Oral four times a day before meals  metoprolol tartrate 50 milliGRAM(s) Oral two times a day  multivitamin 1 Tablet(s) Oral daily  pantoprazole    Tablet 40 milliGRAM(s) Oral two times a day    MEDICATIONS  (PRN):  acetaminophen   Tablet .. 650 milliGRAM(s) Oral every 6 hours PRN Temp greater or equal to 38C (100.4F), Mild Pain (1 - 3)  acetaminophen   Tablet .. 650 milliGRAM(s) Oral every 6 hours PRN Mild Pain (1 - 3), Moderate Pain (4 - 6)  aluminum hydroxide/magnesium hydroxide/simethicone Suspension 30 milliLiter(s) Oral every 4 hours PRN Dyspepsia  aluminum hydroxide/magnesium hydroxide/simethicone Suspension 30 milliLiter(s) Oral every 4 hours PRN Dyspepsia  morphine  - Injectable 2 milliGRAM(s) IV Push every 6 hours PRN Severe Pain (7 - 10)  oxycodone    5 mG/acetaminophen 325 mG 2 Tablet(s) Oral every 6 hours PRN Severe Pain (7 - 10)  oxycodone    5 mG/acetaminophen 325 mG 1 Tablet(s) Oral every 6 hours PRN Moderate Pain (4 - 6)

## 2020-02-16 NOTE — PROGRESS NOTE ADULT - PROBLEM SELECTOR PLAN 4
Severe leading to nutritional depletion  add reglan on protonix bid    Lengthy conversation with sister regarding patients care  Her son lives with her downstairs and she lives upstair  Sister is active in caring for patient  she does very little for self needs help with Adls  Eats very poorly  refused antidepressants in the past

## 2020-02-16 NOTE — CHART NOTE - NSCHARTNOTEFT_GEN_A_CORE
RRT called over head which was changed to code blue @ 03:55PM   Pt was initially hypotensive and soon lost her pulse. PEA on monitor. BS within normal limits. ACLS protocol was started immediately. She was initially bagged prior to intubation. A crash L femoral line was also.   Please see code chart for med count  Code was called off @16:26 PM  Son and sister at bedside

## 2020-08-06 NOTE — H&P PST ADULT - NS NEC GEN PE MLT EXAM PC
[facility] - ENT  Otolaryngology (ENT)  [address] [place] [address]  Phone: [phone]  Fax:   Follow Up Time:
detailed exam

## 2020-08-17 NOTE — ED ADULT NURSE NOTE - TOBACCO USE
Pancreatitis Pancreatitis Pancreatitis Pancreatitis Pancreatitis Pancreatitis Pancreatitis Pancreatitis Pancreatitis Never smoker

## 2020-10-14 NOTE — ED PROVIDER NOTE - CPE EDP RESP NORM
HPI:    Patient ID: Yariel Sy is a 45year old female. HPI  Ms. Reyes is a pleasant 64-year-old female with past medical history of hypertension, hyperlipidemia, asthma, history of pneumonia, sleep apnea on CPAP, diabetes mellitus, hypothyroidism, OxyCODONE HCl IR 10 MG Oral Tab Take 1 tablet (10 mg total) by mouth every 4 (four) hours as needed. 12 tablet 0   • ceFAZolin sodium 2 GM/20ML Intravenous Solution Prefilled Syringe Inject 20 mL (2 g total) into the vein every 8 (eight) hours for 28 days. NEBULIZATION 2 (TWO) TIMES DAILY.  120 mL 5   • LEVOTHYROXINE SODIUM 150 MCG Oral Tab LEVOXYL BRAND 150 mcg daily - BRAND 90 tablet 3   • ALBUTEROL SULFATE (2.5 MG/3ML) 0.083% Inhalation Nebu Soltania USE 1 VIAL VIA NEBULIZER (2.5 MG TOTAL) EVERY 6 (SIX) HOURS sooner if with further concerns. No orders of the defined types were placed in this encounter.       Meds This Visit:  Requested Prescriptions      No prescriptions requested or ordered in this encounter       Imaging & Referrals:  None       #[phone] normal...

## 2021-02-11 NOTE — PROGRESS NOTE ADULT - PROVIDER SPECIALTY LIST ADULT
Hospitalist I reviewed pertinent labs and imaging, and discussed /agreed on the plan of care with Dr. Christina Dallas. Pt admitted this morning for abd pain noted to have acute pancreatitis with Lipase of 2000. Repeat lab this morning is Lipase 985 , pt still c/o nausea , will keep npo till seen by GI . States pain not as concerning as BP which was noted at  206/103 when admitted . Discussed with pt likely related to pain . BP now at 157/68 . Pt seen and examined by me , will await recc.        Non billable 25 minutes

## 2021-02-12 NOTE — ED ADULT TRIAGE NOTE - AS O2 DELIVERY
Patient is scheduled for med check on 4/20 and is going to have fasting labs on 4/13.  She is wondering if she is due to get her vit b12 level checked.  If so please enter order and edit appt notes.   room air

## 2021-04-05 NOTE — ED PROVIDER NOTE - RESPIRATORY, MLM
Suzie called again and would like to speak with a nurse in regards to her medication. [phone]   Breath sounds clear and equal bilaterally.

## 2021-04-08 NOTE — ED PROVIDER NOTE - MUSCULOSKELETAL, MLM
Spine appears normal, range of motion is not limited, no muscle or joint tenderness, no focla tenderness of knee or left hip, full rom at all joints 157.48

## 2021-05-18 NOTE — PATIENT PROFILE ADULT - NSTOBACCONEVERSMOKERY/N_GEN_A
Tell her I gave her #30 for this mo, June and July- I think she has a pain contract but can you check and if not send her one??? Tell her to be sure to check w pharmacy and see that they got the 3 mos of rx's as I sent them all today No

## 2021-06-17 NOTE — PATIENT PROFILE ADULT. - TEACHING/LEARNING LEARNING PREFERENCES
[facility]    Progress Note    Patient: Jelena Fair Date: 6/17/2021   female, 71 year old  Admit Date: 6/14/2021   Attending: Wood Dye MD      Subjective:  Jelena Fair is a 71 year old female with past medical history of COPD, chronic hypoxic respiratory failure on home O2, urine retention on self catheterization, who presented to the [facility] ED due to a sore throat.     Apparently patient has noted over the past 4 days that she has had reduced appetite, difficulty swallowing, and a sore throat.  She has chronic issues with rhinorrhea, post nasal drip and cough. She noticed associated nausea but no vomiting, as well as dysuria.  Patient straight caths at baseline. As these symptoms were not improving she decided to come to othe ER.  No worsening shortness of breath or fevers or chills. No abdominal pain. She did have a dental filling about 5 days ago on one of her front top teeth.     Work up in the ED revealed vitals with normal temp of 97.9, BP in the 173/83 to 152/86 range, HR tachycardic in the 121 - 130 range, SpO2 92% - 100% on 2L oxygen.     Labs showed BMP with sodium 134, Potassium 3.6, BUN 10 and creat 0.54.  Cardiac markers with ProBNP 467, Trop <0.02.  CBC with WBC of 9.4, Procalc of <0.05, Lactic 1.0.  UA was borderline, with Leukocytes of 11-25, negative nitrates or bacteria, some leukocyte esterace.     A CT chest was ordered (w/o contrast) and showed atelectasis in the right middle lobe.  Emphysematous changes.  Patient was subsequently admitted for a presumed candidal esophagitis, and UTI.     I meet with the patient. Although the patient is complaining of throat pain and difficulty swallowing, her oral cavity is markedly improved.  She is coughing up a lot of secretions.  I plead with the patient to attempt to eat more, suggested nutritional supplements like ensure.  Nursing is aware.     HR has come down into the 106 - 116 range, though still  having episodes of mid 120s.  Patient appears to have a chronic sinus tachycardia issue, have initiated some IV lopressor until she can swallow pills.    There are no active hospital problems to display for this patient.      Current Medications:       Scheduled Medications:  • fluconazole (DIFLUCAN) IVPB  200 mg Intravenous BID   • metoPROLOL  5 mg Intravenous 4 times per day   • Potassium Standard Replacement Protocol   Does not apply See Admin Instructions   • nystatin   Topical 2 times per day   • clotrimazole   Topical 2 times per day   • nystatin  500,000 Units Swish & Swallow 4x Daily   • budesonide-formoterol  1 puff Inhalation 4x Daily   • gabapentin  100 mg Oral 4x Daily   • [Held by provider] metoPROLOL tartrate  25 mg Oral BID   • pantoprazole  40 mg Intravenous Nightly   • sodium chloride (PF)  2 mL Intracatheter 2 times per day   • enoxaparin  30 mg Subcutaneous Daily     PRN Medications:  acetaminophen, morphine injection, aluminum-magnesium hydroxide-simethicone, albuterol, tiZANidine, metoPROLOL, sodium chloride, sodium chloride, menthol-methyl salicylate analgesic, LORazepam  Current Intravenous Infusions:  • dextrose 5 % / sodium chloride 0.45% infusion 100 mL/hr at 06/17/21 0605       PHYSICAL EXAM    Visit Vitals  /73   Pulse (!) 108   Temp 98.6 °F (37 °C) (Oral)   Resp 18   Ht 4' 11\" (1.499 m)   Wt 39.4 kg   SpO2 100%   BMI 17.54 kg/m²     General:  Currently alert and oriented x3, affect is appropriate, in no obvious distress.  HEENT:  ++significantly improved oral cavity, now seeing patches of red/pink tongue and posterior pharynx.  Still large amounts of thrush but improving.  Cardiovascular:  Regular rate, nontachycardic, with no audible murmurs or rubs.  Chest:  Clear to auscultation bilaterally, no audible wheezing or rales.  Normal respiratory effort with no accessory muscle use.  Abdomen:  Nontender, nondistended with no guarding or rebound.  Normal bowel sounds  verbal instruction/individual instruction/written material throughout.  Extremities:  Range of motion is age appropriate and normal.  Age appropriate strength in all four.  No cyanosis.  No significant edema in upper or lower extremities.  Neurologic: Cranial nerves 2-12 grossly intact.  No focal muscle weakness.      Labs:  Recent Labs   Lab 06/17/21 0423 06/16/21  0503 06/15/21  0428 06/14/21  0950   WBC 3.7* 4.2 4.7 9.4   RBC 3.13* 3.31* 3.03* 3.66*   HGB 11.0* 11.6* 11.0* 13.3   HCT 33.7* 36.1 32.8* 39.1    329 316 396   SEG  --   --  77 80     Recent Labs   Lab 06/17/21  0423 06/16/21  1425 06/16/21  0503 06/15/21  0428 06/14/21  0950   SODIUM 139  --  141 139 134*   POTASSIUM 3.7 3.8 3.5 3.4 3.6   CHLORIDE 103  --  102 101 89*   CO2 30  --  31 28 31   BUN 3*  --  5* 6 10   CREATININE 0.29*  --  0.34* 0.38* 0.54   GLUCOSE 158*  --  83 101* 111*   CALCIUM 7.8*  --  7.9* 7.3* 8.9   ALBUMIN  --   --   --   --  2.6*   AST  --   --   --   --  16   GPT  --   --   --   --  15   BILIRUBIN  --   --   --   --  0.5   ALKPT  --   --   --   --  88         Assessment & Plan:     Symptomatic UTI present on admission  Patient does not meet criteria for UTI, nor sepsis.  No bacteria or nitrites seen on UA.  culture did eventually grow some diptheroids, which should have been treated by the levofloxacin and aztreonam she got on admission.    Could have a yeast UTI.  Patient does self cath.     Severe Thrush with almost certain candidal esophagitis  Started on nystatin swish and swallow on admission, and then IV Fluconazole.  Patient is slowly improving but considering a GI consultation for EGD evaluation if still can't swallow by Friday.     Consult speech therapy. IV PPI ordered for now because of swallow difficulty    Sinus tachycardia  No Afib on monitor, has had similar episodes on previous admissions. ER called Dr. Beasley of the Cardiology service, who thought patient was in sinus tachycardia and recommended treating underlying issue.     Continue home dose of metoprolol  when she can take oral, for now using IV metoprolol.  Treat infections, IV Fluid as needed.  Gradually is improving.     Hypovolemic hyponatremia  Now resolved.  Likely from inability to swallow. Currently volume resuscitating with IVF, will follow levels.     COPD, chronic hypoxic respiratory failure on home O2  Continue home oxygen     DVT Prophylaxis:  Lovenox, SCDs     Disposition:  Patient is currently inpatient given the severity of her condition.    Wood Dye MD  Internal Medicine, Hospitalist  [facility], [facility], [facility]  6/17/2021  6:34 AM

## 2021-09-22 NOTE — ED ADULT NURSE NOTE - NSSUHOSCREENINGYN_ED_ALL_ED
Yes - the patient is able to be screened Melolabial Transposition Flap Text: We discussed various closure modalities with the patient, including healing by second intention, primary closure, skin graft and various flaps.  The location and configuration of the defect indicated that Cheek to nose (Melolabial) Transposition flap would result in the least disturbance of the position and function of the surrounding anatomic structures, and provide the best result.  The technique, its benefits, alternatives and risks were discussed with the patient.  The patient underwent the procedure as follows: The patient was positioned supine on the operating room table.  The area of the defect and the surrounding skin were anesthetized with buffered 1% lidocaine with epinephrine.  The area was washed with chlorhexidine.  Sterile drapes were applied. \\n\\nThe wound edges were debeveled and extensively undermined.  Melolabbial transposition flap was designed, incised, and elevated.  Hemostasis was achieved with spot electrodesiccation.  The flap was transposed into position to cover the primary defect and a key suture was used to secure the flap into place.  Additional buried interrupted sutures were used to close the flap.  The standing cones so created by the design of the flap was removed to fat by triangulation.  Final cutaneous approximation was achieved.  The closure was manually tested and found to be sound for strength and hemostasis.\\n\\nThe defect edges were debeveled with a #15 scalpel blade.  Given the location of the defect and the proximity to free margins a melolabial flap was deemed most appropriate.  Using a sterile surgical marker, an appropriate melolabial transposition flap was drawn incorporating the defect.    The area thus outlined was incised deep to adipose tissue with a #15 scalpel blade.  The skin margins were undermined to an appropriate distance in all directions utilizing iris scissors.

## 2022-01-18 NOTE — ED ADULT TRIAGE NOTE - CADM TRG TX PRIOR TO ARRIVAL
none
No. ALEXANDER screening performed.  STOP BANG Legend: 0-2 = LOW Risk; 3-4 = INTERMEDIATE Risk; 5-8 = HIGH Risk

## 2022-01-28 NOTE — DISCHARGE NOTE ADULT - NS AS DC FOLLOWUP STROKE INST
El Jasmine is a 27 year old male presenting due to stomach pain.    Hurts middle stomach, sometimes to the touch.  Decreased hard alcohol about 2 months ago and now has nausea and the abdominal pain.    Denies known Latex allergy or symptoms of Latex sensitivity.  Medications verified, no changes.  Health Maintenance Due   Topic Date Due   • Pneumococcal Vaccine 0-64 (1 of 2 - PPSV23) Never done   • Influenza Vaccine (1) Never done       Patient is due for topics as listed above but is not proceeding with Immunization(s) Influenza at this time.   Patient would like communication of their results via:        Cell Phone:   Telephone Information:   Mobile [phone]     Okay to leave a message containing results? Yes     Influenza vaccination (VIS Pub Date: August 19, 2014)

## 2022-02-04 NOTE — SWALLOW BEDSIDE ASSESSMENT ADULT - SWALLOW EVAL: MANDIBULAR STRENGTH AND MOBILITY
[FreeTextEntry3] : I, Dr. Babatunde Stephens  have read and attest that all the information, medical decision making and discharge instructions within are true and accurate. I personally performed the evaluation and management (E/M) services for this new patient.  That E/M includes conducting the initial examination, assessing all conditions, and establishing the plan of care.  Today, my ACP, Natalia Carreno PA-C, was here to observe my evaluation and management services for this patient to be followed going forward.\par   [Time Spent: ___ minutes] : I have spent [unfilled] minutes of time on the encounter. impaired

## 2022-02-24 NOTE — PHYSICAL THERAPY INITIAL EVALUATION ADULT - ORIENTATION, REHAB EVAL
Scheduled procedure with Patient at      Telephone Information:   Mobile [phone]      Scheduled Via: Case Request Order for  [facility] [id number]  Procedure date: 5/23/22   Procedure time: TBA ; Did patient request this specific time? No    -If non-ambulatory location, select reason: Medically necessary  -Did patient request a specific facility other than MD's preferred facility? No; If so, which facility? NA  -If a MAC pt is scheduled, pt was notified a family member/friend is need to stay with the patient over night after their procedure: Yes                Notified patient about receiving an animated Lynette program? Yes    Was patient informed of COVID testing Yes 5/21/22 [facility]; The following have been confirmed:  Insurance name confirmed as T19 FORWARD, will be the same at time of procedure?: Yes Ins Accepted at Facility? Yes  Latex Allergy No  Diabetic No  Sleep Apnea No  Diuretic/Water pill No  Defibrillator/Pacemaker No  MRSA hx No  Blood thinners: Coumadin (Warfarin) or Plavix No      Aspirin Yes CONTINUE      Phentermine (diet pill) No  Constipation No;    Pre-Op testing required No, Patient informed No  Prep required? No; Briefly reviewed? No; Prep cost range discussed?  No  If procedure is scheduled 7 days or less, patient was told to  prep letter?: No oriented to person, place, time and situation

## 2022-03-18 NOTE — PATIENT PROFILE ADULT. - SUPPORT PERSON NAME
Rx Refill Note  Requested Prescriptions     Pending Prescriptions Disp Refills   • gabapentin (NEURONTIN) 100 MG capsule 60 capsule 2     Sig: Take 1 capsule by mouth Every 12 (Twelve) Hours.      Last office visit with prescribing clinician: 11/3/2021      Next office visit with prescribing clinician: Visit date not found            Lisbeth Leyva MA  03/18/22, 08:46 EDT   Jaimie Fraser

## 2022-03-23 NOTE — ED ADULT NURSE NOTE - CAS TRG GENERAL AIRWAY, MLM
Left message for patient to call back regarding the Statin Dose Optimization Program.    Derik Sidhu, PharmD  Population Health Clinical Pharmacist  [phone]   Patent

## 2022-03-24 NOTE — PATIENT PROFILE ADULT. - VISION (WITH CORRECTIVE LENSES IF THE PATIENT USUALLY WEARS THEM):
impaired ability to control trunk for mobility
Partially impaired: cannot see medication labels or newsprint, but can see obstacles in path, and the surrounding layout; can count fingers at arm's length

## 2022-04-08 NOTE — BRIEF OPERATIVE NOTE - ESTIMATED BLOOD LOSS
Lovenox   PT eval- Rehab  Dispo- On IV Ertapenem till 4/8 for ESBL pyleo Lovenox   PT eval- Rehab Lovenox   FU PT eval 350 Lovenox   PT eval- Rehab  Dispo- On IV Ertapenem till 4/7 for ESBL pyleo Lovenox   PT eval- Rehab  Dispo- Last day of IV ertapenem today for ESBL pyleo Lovenox   PT eval- Rehab  Dispo- medically optimize for discharge; pending NABEEL Lovenox   PT eval- Rehab  Dispo- medically optimize for discharge; pending NABEEL Lovenox   PT eval- Rehab  Dispo- On IV Ertapenem till 4/7 for ESBL pyleo

## 2022-09-27 NOTE — ED ADULT TRIAGE NOTE - MODE OF ARRIVAL
Birth Control Pills Pregnancy And Lactation Text: This medication should be avoided if pregnant and for the first 30 days post-partum. Isotretinoin Pregnancy And Lactation Text: This medication is Pregnancy Category X and is considered extremely dangerous during pregnancy. It is unknown if it is excreted in breast milk. Doxycycline Pregnancy And Lactation Text: This medication is Pregnancy Category D and not consider safe during pregnancy. It is also excreted in breast milk but is considered safe for shorter treatment courses. Use Enhanced Medication Counseling?: No Topical Sulfur Applications Counseling: Topical Sulfur Counseling: Patient counseled that this medication may cause skin irritation or allergic reactions.  In the event of skin irritation, the patient was advised to reduce the amount of the drug applied or use it less frequently.   The patient verbalized understanding of the proper use and possible adverse effects of topical sulfur application.  All of the patient's questions and concerns were addressed. Tazorac Counseling:  Patient advised that medication is irritating and drying.  Patient may need to apply sparingly and wash off after an hour before eventually leaving it on overnight.  The patient verbalized understanding of the proper use and possible adverse effects of tazorac.  All of the patient's questions and concerns were addressed. Azithromycin Pregnancy And Lactation Text: This medication is considered safe during pregnancy and is also secreted in breast milk. Winlevi Counseling:  I discussed with the patient the risks of topical clascoterone including but not limited to erythema, scaling, itching, and stinging. Patient voiced their understanding. Spironolactone Counseling: Patient advised regarding risks of diarrhea, abdominal pain, hyperkalemia, birth defects (for female patients), liver toxicity and renal toxicity. The patient may need blood work to monitor liver and kidney function and potassium levels while on therapy. The patient verbalized understanding of the proper use and possible adverse effects of spironolactone.  All of the patient's questions and concerns were addressed. Benzoyl Peroxide Counseling: Patient counseled that medicine may cause skin irritation and bleach clothing.  In the event of skin irritation, the patient was advised to reduce the amount of the drug applied or use it less frequently.   The patient verbalized understanding of the proper use and possible adverse effects of benzoyl peroxide.  All of the patient's questions and concerns were addressed. EMS High Dose Vitamin A Counseling: Side effects reviewed, pt to contact office should one occur. Winlevi Pregnancy And Lactation Text: This medication is considered safe during pregnancy and breastfeeding. Dapsone Counseling: I discussed with the patient the risks of dapsone including but not limited to hemolytic anemia, agranulocytosis, rashes, methemoglobinemia, kidney failure, peripheral neuropathy, headaches, GI upset, and liver toxicity.  Patients who start dapsone require monitoring including baseline LFTs and weekly CBCs for the first month, then every month thereafter.  The patient verbalized understanding of the proper use and possible adverse effects of dapsone.  All of the patient's questions and concerns were addressed. Bactrim Counseling:  I discussed with the patient the risks of sulfa antibiotics including but not limited to GI upset, allergic reaction, drug rash, diarrhea, dizziness, photosensitivity, and yeast infections.  Rarely, more serious reactions can occur including but not limited to aplastic anemia, agranulocytosis, methemoglobinemia, blood dyscrasias, liver or kidney failure, lung infiltrates or desquamative/blistering drug rashes. Erythromycin Counseling:  I discussed with the patient the risks of erythromycin including but not limited to GI upset, allergic reaction, drug rash, diarrhea, increase in liver enzymes, and yeast infections. Tazorac Pregnancy And Lactation Text: This medication is not safe during pregnancy. It is unknown if this medication is excreted in breast milk. Benzoyl Peroxide Pregnancy And Lactation Text: This medication is Pregnancy Category C. It is unknown if benzoyl peroxide is excreted in breast milk. Topical Sulfur Applications Pregnancy And Lactation Text: This medication is Pregnancy Category C and has an unknown safety profile during pregnancy. It is unknown if this topical medication is excreted in breast milk. Minocycline Pregnancy And Lactation Text: This medication is Pregnancy Category D and not consider safe during pregnancy. It is also excreted in breast milk. Spironolactone Pregnancy And Lactation Text: This medication can cause feminization of the male fetus and should be avoided during pregnancy. The active metabolite is also found in breast milk. Dapsone Pregnancy And Lactation Text: This medication is Pregnancy Category C and is not considered safe during pregnancy or breast feeding. Detail Level: Detailed Erythromycin Pregnancy And Lactation Text: This medication is Pregnancy Category B and is considered safe during pregnancy. It is also excreted in breast milk. Topical Clindamycin Counseling: Patient counseled that this medication may cause skin irritation or allergic reactions.  In the event of skin irritation, the patient was advised to reduce the amount of the drug applied or use it less frequently.   The patient verbalized understanding of the proper use and possible adverse effects of clindamycin.  All of the patient's questions and concerns were addressed. Bactrim Pregnancy And Lactation Text: This medication is Pregnancy Category D and is known to cause fetal risk.  It is also excreted in breast milk. Azelaic Acid Counseling: Patient counseled that medicine may cause skin irritation and to avoid applying near the eyes.  In the event of skin irritation, the patient was advised to reduce the amount of the drug applied or use it less frequently.   The patient verbalized understanding of the proper use and possible adverse effects of azelaic acid.  All of the patient's questions and concerns were addressed. Sarecycline Counseling: Patient advised regarding possible photosensitivity and discoloration of the teeth, skin, lips, tongue and gums.  Patient instructed to avoid sunlight, if possible.  When exposed to sunlight, patients should wear protective clothing, sunglasses, and sunscreen.  The patient was instructed to call the office immediately if the following severe adverse effects occur:  hearing changes, easy bruising/bleeding, severe headache, or vision changes.  The patient verbalized understanding of the proper use and possible adverse effects of sarecycline.  All of the patient's questions and concerns were addressed. Topical Retinoid counseling:  Patient advised to apply a pea-sized amount only at bedtime and wait 30 minutes after washing their face before applying.  If too drying, patient may add a non-comedogenic moisturizer. The patient verbalized understanding of the proper use and possible adverse effects of retinoids.  All of the patient's questions and concerns were addressed. Tetracycline Counseling: Patient counseled regarding possible photosensitivity and increased risk for sunburn.  Patient instructed to avoid sunlight, if possible.  When exposed to sunlight, patients should wear protective clothing, sunglasses, and sunscreen.  The patient was instructed to call the office immediately if the following severe adverse effects occur:  hearing changes, easy bruising/bleeding, severe headache, or vision changes.  The patient verbalized understanding of the proper use and possible adverse effects of tetracycline.  All of the patient's questions and concerns were addressed. Patient understands to avoid pregnancy while on therapy due to potential birth defects. Minocycline Counseling: Patient advised regarding possible photosensitivity and discoloration of the teeth, skin, lips, tongue and gums.  Patient instructed to avoid sunlight, if possible.  When exposed to sunlight, patients should wear protective clothing, sunglasses, and sunscreen.  The patient was instructed to call the office immediately if the following severe adverse effects occur:  hearing changes, easy bruising/bleeding, severe headache, or vision changes.  The patient verbalized understanding of the proper use and possible adverse effects of minocycline.  All of the patient's questions and concerns were addressed. Isotretinoin Counseling: Patient should get monthly blood tests, not donate blood, not drive at night if vision affected, not share medication, and not undergo elective surgery for 6 months after tx completed. Side effects reviewed, pt to contact office should one occur. Birth Control Pills Counseling: Birth Control Pill Counseling: I discussed with the patient the potential side effects of OCPs including but not limited to increased risk of stroke, heart attack, thrombophlebitis, deep venous thrombosis, hepatic adenomas, breast changes, GI upset, headaches, and depression.  The patient verbalized understanding of the proper use and possible adverse effects of OCPs. All of the patient's questions and concerns were addressed. Doxycycline Counseling:  Patient counseled regarding possible photosensitivity and increased risk for sunburn.  Patient instructed to avoid sunlight, if possible.  When exposed to sunlight, patients should wear protective clothing, sunglasses, and sunscreen.  The patient was instructed to call the office immediately if the following severe adverse effects occur:  hearing changes, easy bruising/bleeding, severe headache, or vision changes.  The patient verbalized understanding of the proper use and possible adverse effects of doxycycline.  All of the patient's questions and concerns were addressed. Topical Retinoid Pregnancy And Lactation Text: This medication is Pregnancy Category C. It is unknown if this medication is excreted in breast milk. Azelaic Acid Pregnancy And Lactation Text: This medication is considered safe during pregnancy and breast feeding. Azithromycin Counseling:  I discussed with the patient the risks of azithromycin including but not limited to GI upset, allergic reaction, drug rash, diarrhea, and yeast infections. Topical Clindamycin Pregnancy And Lactation Text: This medication is Pregnancy Category B and is considered safe during pregnancy. It is unknown if it is excreted in breast milk. High Dose Vitamin A Pregnancy And Lactation Text: High dose vitamin A therapy is contraindicated during pregnancy and breast feeding.

## 2022-10-05 NOTE — ED PROVIDER NOTE - ENMT NEGATIVE STATEMENT, MLM
Responding on MyChart message from today. Ears: no ear pain and no hearing problems.Nose: no nasal congestion and no nasal drainage.Mouth/Throat: no dysphagia, no hoarseness and no throat pain.Neck: no lumps, no pain, no stiffness and no swollen glands.

## 2022-11-16 NOTE — ED PROVIDER NOTE - CONSTITUTIONAL DEVELOPMENT, MLM
Diet, NPO with Tube Feed:   Tube Feeding Modality: Gastrostomy  Glucerna 1.5 Horacio  Total Volume for 24 Hours (mL): 1000  Continuous  Starting Tube Feed Rate {mL per Hour}: 10  Increase Tube Feed Rate by (mL): 10     Every 10 hours  Until Goal Tube Feed Rate (mL per Hour): 50  Tube Feed Duration (in Hours): 20  Tube Feed Start Time: 17:00  Free Water Flush  Pump   Rate (mL per Hour): 25   Frequency: Every Hour    Duration (Hours): 24 (11-14-22 @ 15:57) [Active]      
well developed

## 2023-01-16 NOTE — PHYSICAL THERAPY INITIAL EVALUATION ADULT - ASSISTIVE DEVICE FOR TRANSFER: SIT/STAND, REHAB EVAL
Outpatient Neuroradiology Biopsy Referral    Patient is a 57 y/o male with a PMH of mortons neuroma, obesity, HDL, hypothyroidism, HTN, prostate cancer s/p chemotherapy, RCC s/p radical right nephrecomty, left parotid gland mass with work up for prostate cancer.  Neuroradiology has been asked to biopsy left parotid gland.    MRI 12/6/22 IMPRESSION:   1. Decreased enhancement within the C7 metastasis with decreased  epidural enhancement, consistent with response to therapy.  2. Radiation changes within the adjacent vertebrae.  3. No new metastases.  4. Mild degenerative change.  5. Small mass within the left deep lobe of the left parotid gland with  fluid-fluid level which may represent primary parotid tumor.  6. Slight fullness within the left vallecula which presumably  represents submucosal cyst. Direct visualization could be considered.    Case and imaging MRI 12/6/22 was reviewed with Dr. Mosher from Neuroradiology who recommends follow up imaging in 3-6 months. The lesion does not have a window to access with US and technically quite challenging with CT guidance.    Primary team Dr.Khaja ZAFAR made aware of Neuroradiology recommendations via epic messaging.    LATESHA Leonardo CNP  Interventional Radiology   IR on-call pager: [phone]            rolling walker

## 2023-01-18 NOTE — ED PROVIDER NOTE - NOTES
Pre op  faxed to [facility] [phone]     SAW PT IN ER. LIKELY DEHYDRATION BUT HAS NEW RBBB. CHECK D D ISIDRA AND IF NEG AND BOTH TROPS NEGATIVE CAN DC FOR OUTPT WORK UP

## 2023-02-24 NOTE — OCCUPATIONAL THERAPY INITIAL EVALUATION ADULT - NS ASR OT EQUIP NEEDS DISCH
bathing/dressing Oxybutynin Counseling:  I discussed with the patient the risks of oxybutynin including but not limited to skin rash, drowsiness, dry mouth, difficulty urinating, and blurred vision.

## 2023-03-21 NOTE — PROGRESS NOTE ADULT - PROBLEM SELECTOR PROBLEM 2
Hypomagnesemia Rituxan Pregnancy And Lactation Text: This medication is Pregnancy Category C and it isn't know if it is safe during pregnancy. It is unknown if this medication is excreted in breast milk but similar antibodies are known to be excreted.

## 2023-05-08 NOTE — PHYSICAL THERAPY INITIAL EVALUATION ADULT - ASSISTIVE DEVICE FOR TRANSFER: STAND/SIT, REHAB EVAL
For information on Fall & Injury Prevention, visit: [email or web address] OR  [email or web address] OR  https://www.cdc.gov/steadi/patient.html
rolling walker

## 2023-05-17 NOTE — ED PROVIDER NOTE - CROS ED MUSC ALL NEG
She is obese. She is not ill-appearing. HENT:      Head: Normocephalic and atraumatic. Right Ear: External ear normal.      Left Ear: External ear normal.   Eyes:      Extraocular Movements: Extraocular movements intact. Conjunctiva/sclera: Conjunctivae normal.      Pupils: Pupils are equal, round, and reactive to light. Cardiovascular:      Rate and Rhythm: Normal rate and regular rhythm. Pulses: Normal pulses. Heart sounds: Normal heart sounds. Pulmonary:      Effort: Pulmonary effort is normal.      Breath sounds: Normal breath sounds. Abdominal:      General: Bowel sounds are normal.      Palpations: Abdomen is soft. Musculoskeletal:         General: Normal range of motion. Cervical back: Normal range of motion and neck supple. Skin:     General: Skin is warm and dry. Capillary Refill: Capillary refill takes less than 2 seconds. Neurological:      General: No focal deficit present. Mental Status: She is alert and oriented to person, place, and time. Psychiatric:         Mood and Affect: Mood normal.         Behavior: Behavior normal.         Thought Content: Thought content normal.       ASSESSMENT/PLAN:  Samantha Wagner was seen today for anxiety and annual exam.    Diagnoses and all orders for this visit:    Routine physical examination    BMI 39.0-39.9,adult  -     Semaglutide,0.25 or 0.5MG/DOS, 2 MG/1.5ML SOPN; Inject 0.25 mg into the skin once a week  -     Insulin Pen Needle 29G X 12MM MISC; 1 each by Does not apply route daily    Anxiety and depression  -     sertraline (ZOLOFT) 50 MG tablet; Take 1 tablet by mouth daily    Prediabetes  -     Semaglutide,0.25 or 0.5MG/DOS, 2 MG/1.5ML SOPN; Inject 0.25 mg into the skin once a week  -     Insulin Pen Needle 29G X 12MM MISC; 1 each by Does not apply route daily       We will increase the zoloft  We will try for ozempic at this time     As above.   Call or go to the nearest ED immediately if symptoms worsen or
negative...

## 2023-06-16 NOTE — H&P ADULT - PROBLEM SELECTOR PLAN 1
stretcher clinically pt. has no active cp. will trend trop, echo, one full dose of lovenox ordered. pt. got aspirin 325 mg which will be continued. Dr. Moise has ordered V/Q scan. SL NTG prn.

## 2023-06-17 NOTE — H&P PST ADULT - CONSTITUTIONAL
Patient: Reina Arroyo Date: 2023   : 1942 Attending: Mic Andrews MD   81 year old female      CC: Urothelial carcinoma of the bladder S/p cystoscopy/bilateral retrograde pyelograms, restaging TURBT, small on  by Dr. Hardin. Gross hematuria.    Subjective/Interval History:   Patient is seen resting comfortably in bed. She C/O feeling confused, tired, and having intermittent lower abdominal cramping. Urine is clear cherry in catheter tubing.     Spoke with RN who reports catheter was hand irrigated at 0630 without clots.    Urine culture from  grew MDR E. Coli. She is not currently on abx.    Objective: Reviewed Pertinent: Vitals and labs.     Blood pressure 108/67, pulse 82, temperature 97.2 °F (36.2 °C), temperature source Axillary, resp. rate 16, height 5' 4\" (1.626 m), weight 50.6 kg (111 lb 8 oz), SpO2 98 %.    Physical Exam:      General: No acute distress, well-nourished   Psych/Neuro: Pleasant and appropriate affect, no agitation  HEENT: Normal, cephalic, atraumatic  Pulmonary: No dyspnea or cough, no labored breathing, no accessory muscle use   Abdomen: Soft. +SP tenderness with palpation but bladder does not feel distended.  : As above    Urine, Bacterial Culture (no units)   Date Value   2023 >100,000 CFU/mL Escherichia coli (A)   2023 No Growth.   2023 No Growth 2 Days.     CULTURE (no units)   Date Value   2019 NO GROWTH.   2019 >100,000 CFU/mL ESCHERICHIA COLI (P)   2019 >100,000 CFU/mL ESCHERICHIA COLI (P)   2019 10,000 TO 50,000 CFU/mL ESCHERICHIA COLI (P)   2018 NO GROWTH 5 DAYS.       Assessment   · Urothelial carcinoma of the bladder S/p cystoscopy/bilateral retrograde pyelograms, restaging TURBT, small on  by Dr. Hardin.  · Prior resection by Dr. Hardin on   · Pathology with non-invasive high grade papillary urothelial carcinoma   · Gross hematuria 2/2 above  · MDR E. Coli UTI  · CHF  · PAD  · CAD  · AICD  present  · PAF on Eliquis (currently held)    Plan:  · D/w RN. Obtain bladder scan now to ensure no retained urine given hematuria and patient c/o bladder cramping. Give Levsin and hand irrigate catheter q shift and PRN for worsening hematuria, clots, or non draining catheter  · Monitor urine for worsening hematuria, clots, or non-draining catheter.  Notify urology of the same  · Given current appearance of urine, prefer to continue holding Eliquis at this time until hematuria improves  · Hand irrigate q4h and PRN for worsening hematuria, blood clots or non draining catheter. Flush in 120 mL sterile water/NS in main catheter port then pull back 60 mL, place another 60 mL in catheter and pull back 60 mL, repeat until urine clears  · Levsin prn for bladder spasms  · Encourage fluid intake within any fluid restriction parameters  · Recommend starting abx for UTI given urologic intervention yesterday. Defer to attending team  · Await surgical pathology    Discussed with:   Patient, family, RN, Dr. Rushing. Message sent to Dr. Andrews and MIRELA Torres APNP  [facility]  Office Phone: [phone]  Pager: [phone]    Available Monday-Wednesday by pager [phone]. On Thursday and Friday please page [phone] in my absence.     Please page the answering service with questions between [phone] and on weekends.    ** If you have paged me and have not received a return call in 15 minutes- please page [phone]**   detailed exam

## 2023-08-02 NOTE — ED CLERICAL - DIVISION
"Patient ID: Jessica Barraza is a 40 y.o. female.    Chief Complaint: Annual Exam      Review of patient's allergies indicates:  No Known Allergies            HPI:  The patient is  (SABx2; ectopic x1 treated with medication) here for annual gyn exam. Reports hx of abnormal pap smear in  treated with cryotherapy.LMP 2023.Pt was placed on Sprintec by gyn resident team for heavy menses. States prior to starting OCPs her period would last 10 days and she would change her pad "every 20 mins". Periods are regular on OCPs and she is requesting refills. She had a TL for contraception.  Pt is nonsmoker. Denies any medical hx.   STI hx includes syphilis. Pt and partner were treated in . Pt has never had mammogram. She does not have pcp and is requesting referral. Denies fly hx of breast/ovarian/uterine/colon cancer.   Pt is [place]ian speaking. Used  I-pad #[id number] ([employer])    Review of Systems:   Negative except for findings in HPI     Objective:   /82   Pulse 74   Temp 98.5 °F (36.9 °C)   Resp 18   Ht 5' 3" (1.6 m)   Wt 85.4 kg (188 lb 3.2 oz)   LMP 2023   SpO2 99%   BMI 33.34 kg/m²    Physical Exam:  GENERAL: Pt is aware and alert and  in no acute distress.  BREASTS: Bilateral-No masses, nipple discharge, skin changes, or tenderness.  ABDOMEN: Soft, non tender.  VULVA:  No lesions or skin changes.  URETHRA: No lesions  BLADDER: No tenderness.  VAGINA: Mucosa normal,no discharge; no lesions.  CERVIX:  no CMT, NO discharge; ectropion present; nabothian cyst 11 clock  BIMANUAL EXAM: reveals a 8-week-sized retroverted uterus. The uterus is mobile, nontender, no palpable masses. Jonathan adnexa reveal no evidence of masses; no fullness   SKIN: Warm and Dry  PSYCHIATRIC: Patient is awake and alert. Mood and affect are normal.    Assessment:   Women's annual routine gynecological examination  -     Liquid-Based Pap Smear, Screening Screening  -     Ambulatory referral/consult " [place]... to Family Practice; Future; Expected date: 09/02/2023    Screening mammogram for breast cancer  -     Mammo Digital Screening Bilat w/ Nestor; Future; Expected date: 08/02/2023    Routine screening for STI (sexually transmitted infection)  -     Wet Prep, Genital  -     SYPHILIS ANTIBODY (WITH REFLEX RPR); Future; Expected date: 08/02/2023  -     HIV 1/2 Ag/Ab (4th Gen); Future; Expected date: 08/02/2023  -     Hepatitis C Antibody; Future; Expected date: 08/02/2023  -     Hepatitis B Surface Antigen; Future; Expected date: 08/02/2023    Abnormal uterine bleeding (AUB)    Other orders  -     norgestimate-ethinyl estradioL (ORTHO-CYCLEN) 0.25-35 mg-mcg per tablet; Take 1 tablet by mouth once daily.  Dispense: 30 tablet; Refill: 11            1. Women's annual routine gynecological examination    2. Screening mammogram for breast cancer    3. Routine screening for STI (sexually transmitted infection)    4. Abnormal uterine bleeding (AUB)             -AUB controlled on OCPs; refills provided  Plan:       Follow up in about 1 year (around 8/2/2024).

## 2023-09-27 NOTE — ED ADULT TRIAGE NOTE - NS ED NURSE AMBULANCES
[facility] Anesthesia Type: 1% lidocaine with 1:100,000 epinephrine and a 1:10 solution of 8.4% sodium bicarbonate

## 2023-10-23 NOTE — H&P ADULT - I WAS PHYSICALLY PRESENT FOR THE KEY PORTIONS OF THE EVALUATION AND MANAGEMENT (E/M) SERVICE PROVIDED.  I AGREE WITH THE ABOVE HISTORY, PHYSICAL, AND PLAN WHICH I HAVE REVIEWED AND EDITED WHERE APPROPRIATE
History and Physical    Date of Service:  10/23/2023  Primary Care Provider: Latisha Quintana MD  Source of information: patient, electronic medical record    Chief Complaint: Gout      History of Presenting Illness:   Nicole Tyler is a 59 y.o. male with a pmhx gout, DM II, a-fib, HTN, sleep apnea, and morbid obesity who presents with right knee pain and inability to ambulate. His knee pain after started after consuming large amount of alcohol. He denies fever, chills, nausea, or vomiting. In the ED, BP was elevated to 182/105, he was also intermittently hypoxic during sleep not on cpap. Lab showed Na 133, T bili 2.6, and albumin 2.7. XR right knee showed moderate joint effusion. Ortho was consulted and aspirated the joint sending the fluids for study. REVIEW OF SYSTEMS:  A comprehensive review of systems was negative except for that written in the History of Present Illness. Past Medical History:   Diagnosis Date    Atrial fibrillation ([address])     Diabetes mellitus ([address])     Hypertension       Past Surgical History:   Procedure Laterality Date    CERVICAL FUSION       Prior to Admission medications    Medication Sig Start Date End Date Taking?  Authorizing Provider   indomethacin (INDOCIN) 50 MG capsule Take 1 capsule by mouth 3 times daily (with meals) for 5 days 10/22/23 10/27/23 Yes Babs Rojo DO   promethazine-dextromethorphan (PROMETHAZINE-DM) 6.25-15 MG/5ML syrup Take 5 mLs by mouth 4 times daily as needed for Cough 10/22/23 10/29/23 Yes Babs Rojo DO   sacubitril-valsartan (ENTRESTO) 49-51 MG per tablet Take 1 tablet by mouth 2 times daily 8/2/23   Anirudh Sexton MD   sacubitril-valsartan [facility]) 49-51 MG per tablet Take 1 tablet by mouth 2 times daily 8/2/23   Anirudh Sexton MD   JARDIANCE 10 MG tablet Take 1 tablet by mouth daily 7/12/23   Provider, Historical, MD   FREESTYLE LITE strip Inject 1 each into the skin in the morning and at bedtime 7/12/23   Provider, Statement Selected

## 2023-12-11 NOTE — ED PROVIDER NOTE - NS_EDPROVIDERDISPOUSERTYPE_ED_A_ED
[facility] Hospitalist Progress Note      Subjective     Asked to see patient because patient is VERY anxious and stating that she will not have the procedure done if she does not get any premedication  Patient tearful during my exam- states she is so scared that she will feel the procedure  Patient reassured but patient is tremulous and starting to have a panic attack  Will give her a dose of ativan 0.5 oral  mg now and order 0.5 mg IV on call for procedure if still anxious    Review Of Systems    Patient denies fevers, chills, chest pains, palpitations, increased dyspnea, abdominal pain, nausea, vomiting.        Objective     I/O's         Intake/Output Summary (Last 24 hours) at 12/11/2023 1113  Last data filed at 12/10/2023 1848  Gross per 24 hour   Intake 627.02 ml   Output --   Net 627.02 ml       Last Recorded Vitals    Vitals with min/max:      Vital Last Value 24 Hour Range   Temperature 99 °F (37.2 °C) (12/11/23 0430) Temp  Min: 98.1 °F (36.7 °C)  Max: 99.9 °F (37.7 °C)   Pulse (!) 124 (12/11/23 0430) Pulse  Min: 111  Max: 124   Respiratory 17 (12/11/23 0430) Resp  Min: 17  Max: 18   Non-Invasive  Blood Pressure 105/69 (12/11/23 0430) BP  Min: 100/66  Max: 105/69   Pulse Oximetry 98 % (12/11/23 0430) SpO2  Min: 95 %  Max: 98 %   Arterial   Blood Pressure   No data recorded      PHYSICAL EXAM     Physical Exam  Vitals and nursing note reviewed. Exam conducted with a chaperone present (family, RN).   Constitutional:       General: She is in acute distress (moderately anxious, tearful, tremulous).      Appearance: She is not ill-appearing.   Cardiovascular:      Rate and Rhythm: Normal rate and regular rhythm.      Heart sounds: Normal heart sounds.   Pulmonary:      Effort: Pulmonary effort is normal.      Breath sounds: Normal breath sounds.   Abdominal:      General: Bowel sounds are normal.      Palpations: Abdomen is soft. There is no mass.      Tenderness: There is no abdominal tenderness.   Musculoskeletal:          General: No swelling or deformity.   Neurological:      General: No focal deficit present.      Mental Status: She is alert and oriented to person, place, and time.   Psychiatric:      Comments: Moderately anxious, not using good judgement at this time           LABS     Recent Results (from the past 24 hour(s))   Comprehensive Metabolic Panel    Collection Time: 12/11/23  5:25 AM   Result Value Ref Range    Fasting Status      Sodium 135 135 - 145 mmol/L    Potassium 3.6 3.4 - 5.1 mmol/L    Chloride 104 97 - 110 mmol/L    Carbon Dioxide 24 21 - 32 mmol/L    Anion Gap 11 7 - 19 mmol/L    Glucose 111 (H) 70 - 99 mg/dL    BUN 7 6 - 20 mg/dL    Creatinine 0.47 (L) 0.51 - 0.95 mg/dL    Glomerular Filtration Rate >90 >=60    BUN/Cr 15 7 - 25    Calcium 8.9 8.4 - 10.2 mg/dL    Bilirubin, Total 0.4 0.2 - 1.0 mg/dL    GOT/AST 29 <=37 Units/L    GPT/ALT 21 <64 Units/L    Alkaline Phosphatase 198 (H) 45 - 117 Units/L    Albumin 1.8 (L) 3.6 - 5.1 g/dL    Protein, Total 7.6 6.4 - 8.2 g/dL    Globulin 5.8 (H) 2.0 - 4.0 g/dL    A/G Ratio 0.3 (L) 1.0 - 2.4   CBC No Differential    Collection Time: 12/11/23  5:25 AM   Result Value Ref Range    WBC 8.0 4.2 - 11.0 K/mcL    RBC 3.71 (L) 4.00 - 5.20 mil/mcL    HGB 7.1 (L) 12.0 - 15.5 g/dL    HCT 25.5 (L) 36.0 - 46.5 %    MCV 68.7 (L) 78.0 - 100.0 fl    MCH 19.1 (L) 26.0 - 34.0 pg    MCHC 27.8 (L) 32.0 - 36.5 g/dL     (H) 140 - 450 K/mcL    RDW-CV 19.5 (H) 11.0 - 15.0 %    RDW-SD 48.2 39.0 - 50.0 fL    NRBC 0 <=0 /100 WBC       IMAGING     US VASC EXTREMITY LOWER VENOUS DUPLEX   Final Result   1.  Normal lower extremity venous duplex study.  Specifically, there is no   evidence of acute DVT.         Electronically Signed by: VIK BEARD M.D.    Signed on: 12/9/2023 12:31 PM    Workstation ID: [id number]      [facility] EXTREMITY UPPER VENOUS DUPLEX   Final Result      1.   No evidence for acute bilateral upper extremity deep venous   thrombosis.   2.   There is  however solid flow within the left internal jugular and   subclavian veins.   3.   Redemonstrated supraclavicular lymphadenopathy.      Electronically Signed by: WOOD SARGENT MD    Signed on: 12/9/2023 3:44 AM    Workstation ID: [id number]      CTA CHEST PE AND CT ABD PEL W CONTRAST   Final Result      1.   Multifocal mediastinal, hilar and supraclavicular lymphadenopathy with   a large mediastinal mass measuring up to 14 cm. Findings are most   representative of malignancy with lymphoma and additional anterior   mediastinal masses on the differential. Tissue sampling recommended.   2.   Mass effect upon the vasculature as described with prominent mass   effect upon the SVC, which appears to remain patent at this time.   Nonocclusive filling defect in the right internal jugular vein. The left   internal jugular vein is not well seen and may be occluded. Additional   small volume right lower lobe segmental PE.   3.   Large bilateral pleural effusions with bibasilar atelectasis   4.   Additional lesions in the spleen. Upper abdominal/addy hepatis   lymphadenopathy present. Hepatomegaly.   5.   3.0 right adnexal cyst. Nonemergent pelvic ultrasound suggested.         Findings discussed with Dr. Jasso by Dr. Sargent on 12/9/2023 at 0006 via   telephone.      Electronically Signed by: WOOD SARGENT MD    Signed on: 12/9/2023 9:33 AM    Workstation ID: [id number]      XR CHEST PA AND LATERAL 2 VIEWS   Final Result   1.   Diffuse enlargement of the mediastinum and cardiac silhouette,   strongly concerning for mediastinal mass and/or mediastinal adenopathy.   Recommend further evaluation with CT of the chest with intravenous   contrast.   2.   Moderate left and small right pleural effusions, with left basilar   consolidation and bibasilar airspace opacities.         Electronically Signed by: MARY ALICE RIVERA M.D.    Signed on: 12/8/2023 7:43 PM    Workstation ID: [id number]      US THORACENTESIS    (Results  Pending)   US LYMPH BIOPSY SUPERFICIAL    (Results Pending)        MEDICATIONS    Current Facility-Administered Medications   Medication    calcium carbonate (TUMS) chewable tablet 500 mg    enoxaparin (LOVENOX) injection 60 mg    LORazepam (ATIVAN) tablet 0.5 mg    acetaminophen (TYLENOL) tablet 650 mg    Or    acetaminophen (TYLENOL) suppository 650 mg    ondansetron (ZOFRAN ODT) disintegrating tablet 4 mg    Or    ondansetron (ZOFRAN) injection 4 mg    melatonin tablet 3 mg    Potassium Standard Replacement Protocol (Levels 3.5 and lower)    Magnesium Standard Replacement Protocol    Phosphorus Standard Replacement Protocol    sodium chloride 0.9 % flush bag 25 mL    sodium chloride 0.9 % injection 2 mL    sodium chloride (NORMAL SALINE) 0.9 % bolus 500 mL          Urinary Catheter and Central Lines (listed below if present)           ASSESSMENT AND PLAN    24 year old female with PMH of Former smoker (quit August 2023) presenting to [facility] ED left arm swelling found to have large anterior mediastinal mass     #Anterior mediastinal mass  #B symptoms  #SVC syndrome  #Pulmonary embolism  #Microcytic anemia  #Thrombocytosis  #Family history of lymphoma  #Former smoker  #Belonephobia  -CTA chest abdomen pelvis with mediastinal, hilar and supraclavicular lymphadenopathy with large mediastinal mass measuring up to 14 cm with concern for lymphoma.  Mass effect upon SVC with left IJV not well-seen and may be occluded with large bilateral pleural effusions with additional small volume right lower lobe segmental PE  -UE and LE duplexs pending   -No overt signs of bleeding  -Patient PTT not therapeutic despite 24 hours of appropriately dosed heparin , changed to lovenox  -Patient now willing to stay on the lovenox  -TTE still pending   - Thoracic surgery recommends IR biopsy first      Situational Anxiety  Ativan 0.5 mg oral x1 and 0.5 mg IV on call prior to procedure if the oral ativan is not enough        DVT PPX: lovenox  for PE       Code Status: Full Resuscitation           DISCHARGE DISPOSITION:  Likely in 7 days           DVT PPX: lovenox      Code Status: Full Resuscitation        DISCHARGE DISPOSITION:  Likely in 7 days        Katina Coulter DO, FACOI [facility] Hospitalist  12/11/2023 11:13 AM     Scribe Attestation (For Scribes USE Only)... Scribe Attestation (For Scribes USE Only).../Attending Attestation (For Attendings USE Only)...

## 2024-02-07 NOTE — PATIENT PROFILE ADULT - NSPROPASSIVESMOKEEXPOSURE_GEN_A_NUR
Called pt in regarding biopsy results below by Dr. Cervantes. No answer, but left detailed VM in regarding the normal results. If she has questions, she can give the office a call back.    No

## 2024-03-05 NOTE — ED PROVIDER NOTE - CROS ED RESP ALL NEG
Subjective    Candido Hollins is a 10 m.o. male who presents for feet turn out.   Today he is accompanied by mom who provided history.  Concerned that his feet turn out when sitting or starting to stand. Wants to make sure ok. Also plays with er and had infection and finished medication          Objective   Temp (!) 36.2 °C (97.1 °F)   Wt 10.1 kg          Physical Exam  Alert nontoxic.  Tms clear  EXT- from of hips. Legs straight with no bowing. Legs rotate out at hip with less internal rotation    Assessment/Plan   Out toeing- reassurance. Will follow at well visits. Should outgrow in next 2-3 yrs.   Problem List Items Addressed This Visit    None      
negative...

## 2024-06-17 NOTE — PROVIDER CONTACT NOTE (CRITICAL VALUE NOTIFICATION) - PERSON GIVING RESULT:
How Severe Are Your Spot(S)?: mild Trina Valente What Type Of Note Output Would You Prefer (Optional)?: Bullet Format What Is The Reason For Today's Visit?: Full Body Skin Examination What Is The Reason For Today's Visit? (Being Monitored For X): concerning skin lesions on an annual basis

## 2024-06-17 NOTE — PATIENT PROFILE ADULT - NSPROEDALEARNPREF_GEN_A_NUR
[facility] GERIATRICS  Chief Complaint   Patient presents with    [facility]ulatory    FVP Care Coordination - Health Plan or Product Change     [place] Medical Record Number:  [id number]  Place of Service where encounter took place:  [facility] () [[id number]]    HPI:    Johnson Acosta  is 89 year old (5/22/1931), who is being seen today for a federally mandated E/M visit. Today's concerns are:     Cerebrovascular accident (CVA), unspecified mechanism (H)  Left hemiplegia (H)  Major depressive disorder in partial remission, unspecified whether recurrent (H24)  Itching    Patient seen for follow up, CVA in Jan, changed independent lifestyle to LTC due to support needs, cognitive fairly intact, severe L hemiplegia, changes also causing certain s/s depression, limited motivation, I&O adequate, chronic pruritus on torso abdomen back and arms, scratch marks mild, no s/s infection, increased itchiness when asked about it, appears healthy for age.    ALLERGIES:Seasonal allergies  PAST MEDICAL HISTORY:   Past Medical History:   Diagnosis Date    Cervical vertebral fracture (H)      8/09  Treated non surgically     C1 blow out fx  Vertebral arteries intact    Gastroesophageal reflux disease      PAST SURGICAL HISTORY:   has a past surgical history that includes REMV CATARACT EXTRACAP,INSERT LENS, W/O ECP (5/22/2003) and Laser YAG capsulotomy (7/21/2011).  FAMILY HISTORY: family history is not on file.  SOCIAL HISTORY:      MEDICATIONS:  MED REC REQUIRED  Post Medication Reconciliation Status: medication reconcilation previously completed during another office visit         Review of your medicines            Accurate as of June 17, 2024  2:51 PM. If you have any questions, ask your nurse or doctor.                CONTINUE these medicines which have NOT CHANGED        Dose / Directions   acetaminophen 500 MG tablet  Commonly known as: TYLENOL  Indication: Pain      Dose: 1,000 mg  Take 1,000 mg  by mouth 3 times daily  Refills: 0     albuterol 108 (90 Base) MCG/ACT inhaler  Commonly known as: PROAIR HFA/PROVENTIL HFA/VENTOLIN HFA      Dose: 1-2 puff  Inhale 1-2 puffs into the lungs every 4 hours as needed for shortness of breath, wheezing or cough  Refills: 0     amLODIPine 10 MG tablet  Commonly known as: NORVASC  Indication: High Blood Pressure Disorder      Dose: 10 mg  Take 10 mg by mouth daily  Refills: 0     Calcium Carb-Cholecalciferol 500-5 MG-MCG Tabs  Indication: Low Amount of Calcium in the Blood      Dose: 1 tablet  Take 1 tablet by mouth daily  Refills: 0     fish Oil 1200 MG capsule      Dose: 1,200 mg  Take 1,200 mg by mouth daily  Refills: 0     guaiFENesin 20 mg/mL liquid  Commonly known as: ROBITUSSIN      Dose: 200 mg  Take 200 mg by mouth every 4 hours as needed for cough  Refills: 0     hydrOXYzine HCl 25 MG tablet  Commonly known as: ATARAX  Used for: Itching      Dose: 50 mg  Take 2 tablets (50 mg) by mouth 4 times daily  Refills: 0     Lipitor 20 MG tablet  Indication: High Amount of Fats in the Blood  Generic drug: atorvastatin      Dose: 40 mg  Take 40 mg by mouth daily  Refills: 0     lisinopril 10 MG tablet  Commonly known as: ZESTRIL  Used for: Hypertension, unspecified type      Dose: 10 mg  Take 1 tablet (10 mg) by mouth daily  Refills: 0     metoprolol tartrate 25 MG tablet  Commonly known as: LOPRESSOR  Indication: High Blood Pressure Disorder      Dose: 25 mg  Take 25 mg by mouth 2 times daily  Refills: 0     oxymetazoline 0.05 % nasal spray  Commonly known as: AFRIN  Used for: Epistaxis      Dose: 2 spray  Spray 0.2 mLs (2 sprays) into both nostrils 2 times daily as needed for congestion  Refills: 0     polyethylene glycol 17 GM/Dose powder  Commonly known as: MIRALAX      Dose: 1 Capful  Take 1 Capful by mouth daily as needed for constipation  Refills: 0     rivaroxaban ANTICOAGULANT 15 MG Tabs tablet  Commonly known as: XARELTO  Indication: Atrial Fibrillation Not Caused  By A Heart Valve Problem      Dose: 15 mg  Take 15 mg by mouth daily  Refills: 0     traZODone 50 MG tablet  Commonly known as: DESYREL  Indication: Trouble Sleeping      Dose: 25 mg  Take 25 mg by mouth at bedtime  Refills: 0     Vitamin D3 25 mcg (1000 units) tablet  Commonly known as: CHOLECALCIFEROL  Indication: Vitamin D Deficiency      Dose: 1 tablet  Take 1 tablet by mouth daily  Refills: 0               Case Management:  I have reviewed the care plan and MDS and do agree with the plan. Patient's desire to return to the community is present, but is not able due to care needs . Information reviewed:  Medications, vital signs, orders, and nursing notes.    ROS:  4 point ROS including Respiratory, CV, GI and , other than that noted in the HPI,  is negative    Vitals:  /66   Pulse 76   Temp 97.7  F (36.5  C)   Resp 16   Wt 84.6 kg (186 lb 6.4 oz)   SpO2 95%   BMI 27.53 kg/m    Body mass index is 27.53 kg/m .  Exam:  GENERAL APPEARANCE:  in no distress, appears healthy  ENT:  Mouth and posterior oropharynx normal, moist mucous membranes  RESP:  lungs clear to auscultation , no respiratory distress  CV:  no edema, rate-normal  ABDOMEN:  bowel sounds normal  M/S:   Gait and station abnormal transfer assist  SKIN:  Inspection of skin and subcutaneous tissue baseline  NEURO:   Examination of sensation by touch normal  PSYCH:  affect and mood normal    Lab/Diagnostic data:   Labs done in SNF are in [facility]. Please refer to them using [facility] Everywhere.    ASSESSMENT/PLAN  (I63.9) Cerebrovascular accident (CVA), unspecified mechanism (H)  (primary encounter diagnosis)  (G81.94) Left hemiplegia (H)  Comment: cog intact, severe L hemiplegia  Plan: continue metoprolol, xarelto, statin  -follow up neuro PRN    (F32.4) Major depressive disorder in partial remission, unspecified whether recurrent (H24)  Comment: mild symptoms, intermittent  Plan: ACP to eval/treat  -consider celexa trial pending ACP  silvio    (L29.9) Itching  Comment: torso, arms itchy, moderate control, zyrtec not effective  Plan: continue hydroxyzine 50mg QID  -if itching worse consider gabapentin, prednisone and/or benadryl      Electronically signed by:  BRAYDEN Storey CNP     The health plan new enrollment has happened. I have reviewed the  MDS, the preventative needs,  and facility care plan. The level of care is appropriate. I have reviewed the code status/advanced directives.     written material/individual instruction/verbal instruction

## 2024-06-28 NOTE — PHYSICAL THERAPY INITIAL EVALUATION ADULT - ADDITIONAL COMMENTS
Medication history reviewed with PT at bedside    [facility] is complete per PT reporting    Allergies reviewed.     Patient denies any outpatient antibiotics in the last 30 days.     Patient is taking Eliquis 5mg. Last dose 06/27/2024 in the PM.     Preferred pharmacy for this visit - [facility] on [address] ([phone])                 12 steps 1 rail to enter home, owns and uses a RW

## 2024-07-26 NOTE — PATIENT PROFILE ADULT. - ABILITY TO HEAR (WITH HEARING AID OR HEARING APPLIANCE IF NORMALLY USED):
[FreeTextEntry1] :  I, Yakelin Rose, documented this note as a scribe on behalf of Dr. Lauren Shikowitz-Behr, MD on 07/23/2024. Adequate: hears normal conversation without difficulty

## 2024-08-13 NOTE — ED PROVIDER NOTE - ATTENDING CONTRIBUTION TO CARE
I performed a face to face history and physical exam of the patient and discussed their management with the resident/ACP. I reviewed the resident/ACP's note and agree with the documented findings and plan of care.    Pt states that for the past 4 months she has had worsening pain and swelling to her neck. Pt states that she is scheduled to see an ENT doctor on tuesday but does not know the name.  Pt states that today the pain was worse and was radiating into her chest. no n/v. no fever/chills.    physical - tachy regular.  ctab. abd - soft, nt. no edema. no rash.  neck with firm nonmobile mass midline below the chin.  floor of mouth raised.    plan - labs, ct and reassess. [FreeTextEntry2] : Patient receiving psychotherapy to address symptoms of anxiety and depression related to family stressors [Integrative Therapy] : Integrative Therapy  [de-identified] : Patient presented to session on time with a stable mood, ready to engage in treatment.  Patient  and this writer collaborated on a treatmnet plan review for the patient.  See chart for details.  Session ended with patient emotionally stable and behaviorally in control. [Return in ____ week(s)] : Return in [unfilled] week(s) [FreeTextEntry1] : 1x/weekly psychotherapy and regular psychiatric treatment

## 2024-08-26 NOTE — ED ADULT NURSE NOTE - SUICIDE SCREENING QUESTION 2
Message given to patient that paxlovid was sent to the pharmacy   
Patient states tested positive at home today for covid, she has chills, headache, runny nose, sick to stomach, she has never had covid before but does have MS so a compromised immune system, symptoms started yesterday in morning. She is wondering if  can send the covid medication to [facility].  Please call with 's Will response, ok to  leave detailed message  
No
no

## 2024-09-26 NOTE — H&P PST ADULT - NSANTHBMIRD_ENT_A_CORE
No

## 2024-11-13 NOTE — H&P PST ADULT - GASTROINTESTINAL
Patient: Lewis Walters Date: 2024   : 1964 Attending: Isela Richards MD   60 year old male      Principal Diagnosis:  Sepsis without acute organ dysfunction, due to unspecified organism  (CMD)      Subjective: Patient received alert and awake and in NAD. He is mostly comfortable and denies SOB or chest pain at this time. He continues on O2 per NC at 3L. He denies abdominal pain, nausea, vomiting or changes in his bowel movements. He is tolerating clears and soft food at this time. He does admit right hip pain that has been chronic.        Review of Systems:  Constitutional: no fatigue.  no weight loss, no fever, no chills, no night sweats.  Eyes: no diplopia, no transient or permanent loss of vision, no scotomata.  ENT/mouth: no tinnitus, normal hearing, no epistaxis, no hoarseness, no oral ulcers, no gingival bleeding, no sore throat, no postnasal drip, no nasal drip, no mouth pain, no sinus pain  Cardiovascular: no chest pain, no palpitations, no syncope, no upper extremity edema, no lower extremity edema, no calf discomfort.  Respiratory: no cough, no hemoptysis, no dyspnea, no pleurisy, no wheezing.  Gastrointestinal: no abdominal pain, no nausea, no vomiting, no constipation, no hematochezia, no jaundice, no abdominal cramping, no hematemesis, no diarrhea, no melena, no dyspepsia, no dysphagia.  Musculoskeletal: no muscle pain, no swollen joints, no joint redness, Right hip  bone pain, no spine tenderness.  Skin: no rash, no nodules, no pruritus, no lesions.  Neurologic: no confusion, no seizures, no syncope, no tremor, no speech change, no headache, no hiccups, no abnormal gait, no weakness, no sensory changes.  Psychiatric: no anxiety.  no depression, concentration normal.  Endocrine: no polyuria, no polydipsia, no thyroid disease symptoms, no hot flashes.  Hematologic: no epistaxis, no gingival bleeding, no petechiae, no ecchymosis.  Lymphatic: no lymphadenopathy, no  lymphedema.  Allergy/immunologic: no eczema, no frequent mucous membrane infections, no frequent respiratory infections, no recurrent urticaria, no frequent skin infections.      Vital Last Value 24 Hour Range   Temperature 98.2 °F (36.8 °C) Temp  Min: 97.7 °F (36.5 °C)  Max: 98.2 °F (36.8 °C)   Pulse 77 Pulse  Min: 77  Max: 87   Respiratory 16 Resp  Min: 16  Max: 20   Non-Invasive  Blood Pressure 115/71 (11/12/24 1152) BP  Min: 107/65  Max: 115/71   Pulse Oximetry 95 % SpO2  Min: 91 %  Max: 98 %     Vital Today Admitted   Weight 75 kg (165 lb 5.5 oz) Weight: 79 kg (174 lb 2.6 oz)   Height N/A Height: 6' 5\" (195.6 cm)     Weight over the past 48 Hours:  No data found.     Intake/Output:    Last Stool Occurrence: 200 (11/11/24 1800)    I/O this shift:  In: -   Out: 400 [Urine:400]    I/O last 3 completed shifts:  In: 2698.3 [IV Piggyback:380.5]  Out: 2100 [Urine:2100]      Intake/Output Summary (Last 24 hours) at 11/12/2024 1811  Last data filed at 11/12/2024 1600  Gross per 24 hour   Intake 2698.31 ml   Output 2500 ml   Net 198.31 ml         Physical Exam:  General Appearance: Alert, cooperative, no distress,   Eyes: Conjuctivae clear, no icterus  Throat: Lips, mucosa, and tongue normal; no stomatitis  Heart: Regular rate and rhythm, S1and S2 normal, no murmur, rub or gallop,   Lungs: Clear to auscultation bilaterally, decreased aeration, respirations unlabored, no rhonchi, wheezing or rales  Lymph Nodes: Cervical, supraclavicular, and axillary nodes normal  Abdomen: Soft, non-tender, bowel sounds active all four quadrants, no masses, no hepatomegally or splenomegally  Extremities: Extremities normal, no cyanosis or edema, no signs of a DVT  Musculoskeletal: Right hip pain and decreased ROM  Neurologic: CNII-XII intact, normal strength, normal mentation  Psych:  cooperative  Skin:Skin color, texture, turgor normal, no rash or lesions, no petechiae or ecchymoses    Laboratory Results:    Recent Labs      11/10/24  0544 11/11/24  0601 11/12/24  0637   WBC  --  9.8 12.1*   HGB  --  10.0* 10.6*   HCT  --  30.0* 32.2*   PLT  --  330 374   BUN 16 15 20   CREATININE 0.40* 0.40* 0.40*   SODIUM 133* 135 132*   POTASSIUM 4.4 4.5 5.0   CHLORIDE 100 101 99   CO2 28 29 26   GLUCOSE 148* 129* 113*   MG 2.0 1.8 2.0   PHOS 2.7  --   --        Radiology Results:    CT ABDOMEN PELVIS WO CONTRAST   Final Result   1.   Decreased amount of free intraperitoneal air. No extraluminal contrast   to indicate definite persistent perforation.   2.   Liver lesions compatible with metastases. Additional supra-acetabular   lytic lesion of the right pelvis with nondisplaced pathologic fracture   involving the lateral acetabular roof.   3.   Renal and bladder stones.   4.   Other findings as above.         Electronically Signed by: GARETT JACOB M.D.    Signed on: 11/10/2024 1:56 PM    Workstation ID: [id number]      XR CHEST AP OR PA   Final Result   FINDINGS AND IMPRESSION: Right-sided port catheter tip projects over the   superior vena cava.      Stable cardiomediastinal silhouette. Stable ill-defined opacity in the left   upper lung with a punctate metallic density projected over the center as   characterized on recent CT chest. Generalized increased density of the left   hemithorax suggesting layering pleural effusion also seen on recent CT.   Lungs are otherwise grossly clear. No pneumothorax.      Electronically Signed by: VIKY ABARCA MD    Signed on: 11/7/2024 8:27 AM    Workstation ID: [id number]      CT ABDOMEN PELVIS W CONTRAST   Final Result      1. Findings suspicious for gastric perforation.   2. Multiple hepatic metastases redemonstrated.   3. Metallic bile duct stent in place.   4. Lytic lesion right acetabulum.   5. See separately dictated CT scan of the chest for findings above the   diaphragm.         Perfect Serve message sent with read receipt 11/4/2024 5:01 PM by Anderson Arias MD to Dr. Linda Santiago.          Electronically Signed by: CHUY LANZA M.D.    Signed on: 11/4/2024 5:04 PM    Workstation ID: [id number]      CT CHEST WO CONTRAST   Final Result   1. Free air and small amount of free fluid within the visualized upper   abdomen, new compared with the prior study and concerning for a perforated   viscus. Surgical consultation suggested.   2. Consolidation within the left upper lobe posteriorly abutting the   fissure, overall slightly increased and more dense in appearance compared   with the prior study.   3. Small left pleural effusion, increased in size.   4. Grossly stable areas of irregular and nodular density within the lung   fields as described. Bronchiectasis and bronchial wall thickening within   the right and left lower lobes appears unchanged.      Findings discussed by telephone with Dr. Tobias Disla on 11/4/24 at 2:49   p.m.      Electronically Signed by: SAMMY FORREST M.D.    Signed on: 11/4/2024 2:59 PM    Workstation ID: [id number]      XR CHEST AP OR PA   Final Result   FINDINGS/IMPRESSION:      Right-sided Mediport with tip in superior vena cava unchanged.      Normal heart size. No mediastinal widening or adenopathy.      Airspace opacities left upper and lower lung field with possible slight   worsening in left upper lung field but slight improvement at left lung base   compared to prior study dated 11/3/2024.      2.1 x 2.5 cm central area of relative lucency overlying opacity left upper   lung field with cavitation not excluded.      Scattered ill-defined opacities right upper and lower lung field relatively   unchanged.      New small left effusion.      No pneumothorax.      Findings could represent pneumonia with cavitary changes not excluded.   Progression of malignancy also not excluded.      Recommend follow-up chest x-ray and follow-up CT.                        Electronically Signed by: HOLLY TALAVERA M.D.    Signed on: 11/4/2024 11:16 AM     Workstation ID: [id number]      XR CHEST AP OR PA   Final Result   FINDINGS AND IMPRESSION: Injection port present in the right upper chest.   Tip of its catheter projects onto the superior vena cava..   The lungs are well expanded. Reticulonodular opacities in the left mid and   lower lung zone appear slightly more prominent since the prior study.   Reticular opacities in the right lower lung zone and apparent tubular   bronchiectasis appear unchanged.   Costophrenic angles are sharp.   Vertical linear change in density along the right lateral chest wall   immediately below the injection port most likely represents a skinfold in   the patient's back or anterior chest. Pneumothorax is considered less   likely. Recommend repeating upright chest x-ray.      Cardiomediastinal silhouette is not enlarged. Visualized osseous structures   appear unremarkable. Findings discussed with   Patient's nurse Delgado at 10:35 a.m.      Electronically Signed by: BELINDA BRENNAN M.D.    Signed on: 11/3/2024 9:38 AM    Workstation ID: [id number]      US VASC LOWER EXTREMITY VENOUS DUPLEX BILATERAL   Final Result      No evidence for bilateral lower extremity DVT.      Electronically Signed by: THERESA LONDON M.D.    Signed on: 10/31/2024 1:42 PM    Workstation ID: [id number]      CTA CHEST PULMONARY EMBOLISM   Final Result   Interval development of consolidative change in the left upper lobe, likely   infectious. Prominent mediastinal and bilateral hilar lymph nodes may be   reactive.      Development of a increased size send soft tissue component of left upper   and lower lobe nodules, may be progression of malignancy versus infection.      Trace left pleural effusion.      Stable bilateral lower lobe bronchiectatic changes and additional right   upper and lower lobe nodules.               Electronically Signed by: TAWNYA SINGH M.D.    Signed on: 10/31/2024 8:37 AM    Workstation ID: [id number]      XR CHEST PA OR AP  1 VIEW   Final Result   Findings suggestive of bilateral pneumonia.         Electronically Signed by: DIONICIO KU MD    Signed on: 10/30/2024 9:35 PM    Workstation ID: [id number]        Narrative & Impression  EXAM:   CT ABDOMEN PELVIS WO CONTRAST     CLINICAL INDICATION: Follow-up gastric perforation     COMPARISON: 11/4/2024     TECHNIQUE: Helical CT images through the abdomen and pelvis without  contrast. Automated exposure control employed as radiation dose reduction  strategy on this patient.     FINDINGS:      LOWER CHEST: Bronchiectasis with endobronchial material bilateral lung  bases. Mild bibasilar atelectasis. Small layering left pleural effusion.  LIVER: Scattered low-density lesions better demonstrated on recent  contrast-enhanced exam  BILIARY TREE AND GALLBLADDER: Surgically absent gallbladder. Grossly stable  biliary stent.  SPLEEN: Normal  PANCREAS:Normal  ADRENAL GLANDS:Normal  KIDNEYS AND URETERS: Numerous small nonobstructive intrarenal calculi  bilaterally.  STOMACH/BOWEL: Contrast within the stomach and small bowel except for the  distal most ileum. No extraluminal contrast is present. Otherwise grossly  unremarkable by unenhanced CT.  APPENDIX: Normal  BLADDER: High density layering in the bladder lumen probably small stones.  REPRODUCTIVE ORGANS: Enlarged prostate gland.  PERITONEUM AND RETROPERITONEUM: . Small amount of free air below the  diaphragm, decreased in amount since prior exam. No defined fluid  collection or large amount of free fluid.  LYMPH NODES: None pathologic  VASCULAR STRUCTURES: Normal  BODY WALL:Normal  BONY STRUCTURES: Large lytic lesion of the supra-acetabular ilium on the  right as on prior. Marked rarefaction of the cortex without definite  cortical breakthrough. There is a nondisplaced pathologic fracture in the  lateral roof of the acetabulum extending into this lesion.     IMPRESSION:  1.   Decreased amount of free intraperitoneal air. No extraluminal  contrast  to indicate definite persistent perforation.  2.   Liver lesions compatible with metastases. Additional supra-acetabular  lytic lesion of the right pelvis with nondisplaced pathologic fracture  involving the lateral acetabular roof.  3.   Renal and bladder stones.  4.   Other findings as above.        Electronically Signed by: GARETT JACOB M.D.   Signed on: 11/10/2024 1:56 PM   Workstation ID: [id number]    Scheduled Medications insulin lispro, , 4 times per day  fluticasone-umeclidin-vilanterol, 1 puff, Daily Resp  sodium chloride, 10 mL, 2 times per day  meropenem (MERREM) IVPB, 1 g, 3 times per day  micafungin (MYCAMINE) IVPB, 100 mg, Q24H  [Held by provider] insulin glargine, 8 Units, Daily  pantoprazole, 40 mg, 2 times per day  [Held by provider] magnesium oxide, 400 mg, Daily  sodium chloride, 2 mL, 2 times per day  Potassium Standard Replacement Protocol (Levels 3.5 and lower), , See Admin Instructions  Magnesium Standard Replacement Protocol, , See Admin Instructions  heparin (porcine), 5,000 Units, 3 times per day      PRN Medications sodium chloride, 10 mL, PRN  sodium chloride, 20 mL, PRN  morphine injection, 2 mg, Q2H PRN  ipratropium-albuterol, 3 mL, Q4H Resp PRN  sodium chloride, 10 mL, PRN  acetaminophen (TYLENOL) suppository, 650 mg, Q4H PRN  ondansetron (ZOFRAN) IV/IM, 4 mg, Q12H PRN  dextrose, 25 g, PRN  dextrose, 12.5 g, PRN  glucagon, 1 mg, PRN  dextrose, 15 g, PRN  dextrose, 30 g, PRN  trimethobenzamide, 200 mg, Q6H PRN      Continuous Infusions   Current Facility-Administered Medications   Medication Dose Route Frequency Provider Last Rate Last Admin          Impression / Plan:    1. Pneumonia: On meropenem.  Clinically improved..  Pulmonology /Infectious disease following.  His respiratory status is improving with decreased O2 requirement 3L per NC. ID continues to follow patient. They plan to discharge on antibiotic and fungal coverage for approximately 2 weeks     2.  Pneumoperitoneum likely secondary to gastric perforation:  CT A/P showed findings suspicious for gastric perforation.  Clinically stable.  No abdominal distention or pain.  His TPN is being reduced and and his diet is now being advanced.  He tolerated clears yesterday and is being advanced as tolerated.  Anticipate slow advancement of his diet.  He will go home without TPN.  Dietary to meet with him just to solidify recommendations.     3. Metastatic Lung Cancer: Patient has metastatic squamous cell carcinoma of the lung. Patient completed 4 cycles of Carboplatin, Pembrolizumab, and Abraxane. Currently treated with Abraxane and Pembrolizumab. Patient's last chemotherapy treatment was on 10/18/24. His cancer is stable at this time. Plan to continue surveillance and treatment out patient.    4. Pathological Right supra-acetabular fracture:   On CT obtained yesterday a supra-acetabular large lytic lesion of the right pelvis with nondisplaced pathologic fracture was noted. Orthopedics have been consulted per primary service. Patient reports chronic pain to the right hip for some time.  This a difficult problem as the acetabulum is involved and any surgical stabilization would seem difficult or not even possible.  He is going to go to [facility] to see orthopedic oncology for an opinion.  If no plans for surgical management the area could be radiated in a palliative fashion for pain control as well.  In the meantime, he needs to be minimal weightbearing.     4.  DVT prophylaxis he is on heparin subcutaneously         Prakash Almazan DO  11/12/2024  6:11 PM    Soft, non-tender, no hepatosplenomegaly, normal bowel sounds negative

## 2024-11-26 NOTE — ED ADULT NURSE NOTE - OBJECTIVE STATEMENT
soft/nondistended/normal active bowel sounds/no guarding/no rigidity/no organomegaly/no masses palpable/tender Pt has been having periods of hypotension with dizziness. Was seen at the cardiologist office yesterday and was to follow up within a week. Pt has not taken beta blocker, not on any meds that would cause hypotension. Pt denies any chest pain or sob. Family at the bedside. details…

## 2025-01-15 NOTE — ASU PREOP CHECKLIST - WEIGHT IN LBS
Most likely due to generalized weakness in the setting of acute illness/sepsis  PT/OT recommending rehab   143

## 2025-02-19 NOTE — PATIENT PROFILE ADULT - NSPROSPIRITUALVALUESFT_GEN_A_NUR
Routine internal referral received from Patrick Cueva for Lung Nodules    Comments  Dr Arreaga at [facility] for Flex bronch and bx of lung nodules      70 year old male with past medical history significant for   Dysphagia, Essential hypertension, Malignant neoplasm of base of tongue,   Obstructive sleep apnea on CPAP, Renal insufficiency    Smoking Status:  Never    Family History:    Sister - [place] (unknown)    Recent Testing:    CT Chest WO           02/03/25  CT Chest W              10/23/24  CT Chest WO           07/09/24  PET CT                      07/19/24, 07/08/23, 04/01/23, 11/12/22  XR Chest                  05/19/22    Please review referral and advise, thank you!      denies

## 2025-02-21 NOTE — ED PROVIDER NOTE - CPE EDP EYES NORM
HPI   Chief Complaint   Patient presents with    Rectal Bleeding     Pt complains of rectal bleeding starting around 1200.  Feels weak and dizzy. Takes coumadin.        HPI  This is a 85-year-old male presenting to the emergency department for evaluation of bloody stool.  Patient states this afternoon he was having a bowel movement and he noticed that there was blood in the toilet bowl and he noticed blood in his stool. Total of 3 bloody stools today. He states that at times he will have bloody stool however this time the amount of blood appear to be more than his typical.  Patient states he does have a history of hemorrhoids.  He does take warfarin for chronic A-fib.  Patient denies any rectal pain, rectal bulge, abdominal pain, nausea, vomiting, pain with urination, fever, chills.  Patient does have a history of colectomy 25 years ago.    Please see HPI for pertinent positive and negative ROS.      Patient History   Past Medical History:   Diagnosis Date    Anemia     Anxiety     Arthritis     Atherosclerotic heart disease of native coronary artery with unstable angina pectoris     Coronary artery disease with unstable angina pectoris, unspecified vessel or lesion type, unspecified whether native or transplanted heart    Cancer (Multi)     CHF (congestive heart failure)     COPD (chronic obstructive pulmonary disease) (Multi)     Essential (primary) hypertension 11/05/2022    Hypertension, unspecified type    Personal history of diseases of the blood and blood-forming organs and certain disorders involving the immune mechanism     History of bleeding disorder    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, oral cavity, and pharynx     History of malignant neoplasm of tonsil    Personal history of malignant neoplasm of prostate     History of malignant neoplasm of prostate    Personal history of other malignant neoplasm of large intestine     History of malignant neoplasm of colon     Past Surgical  History:   Procedure Laterality Date    CARDIAC ELECTROPHYSIOLOGY PROCEDURE Left 2024    Procedure: PPM Generator Explant;  Surgeon: Walker Mcdonald MD;  Location: [facility] Cardiac Cath Lab;  Service: Electrophysiology;  Laterality: Left;  NO AUTH NEEDED    CARDIAC ELECTROPHYSIOLOGY PROCEDURE Left 2024    Procedure: ICD BIV Implant;  Surgeon: Walker Mcdonald MD;  Location: [facility] Cardiac Cath Lab;  Service: Electrophysiology;  Laterality: Left;  upgrade dual PPM to CRT-D, Cap current RV lead. CASE# [id number]    CARDIAC ELECTROPHYSIOLOGY PROCEDURE Left 2024    Procedure: ICD Upgrade Biventricular;  Surgeon: Walker Mcdonald MD;  Location: [facility] Cardiac Cath Lab;  Service: Electrophysiology;  Laterality: Left;  upgrade dual PPM to CRT-D (LV lead implant), cap RV lead, CASE#[id number]    OTHER SURGICAL HISTORY  2021    Colonoscopy    OTHER SURGICAL HISTORY  2021    Colon surgery     OTHER SURGICAL HISTORY  2021    Pacemaker insertion    OTHER SURGICAL HISTORY  2021    Coronary artery bypass graft     OTHER SURGICAL HISTORY  2023    Throat surgery     Family History   Problem Relation Name Age of Onset    Heart attack Mother      Heart disease Mother      Other (Car accident) Father       Social History     Tobacco Use    Smoking status: Former     Current packs/day: 0.00     Types: Cigarettes     Quit date: 1984     Years since quittin.6    Smokeless tobacco: Never   Vaping Use    Vaping status: Never Used   Substance Use Topics    Alcohol use: Yes    Drug use: Never       Physical Exam   ED Triage Vitals [25 1555]   Temperature Heart Rate Respirations BP   36.4 °C (97.5 °F) 70 16 169/82      Pulse Ox Temp src Heart Rate Source Patient Position   98 % -- -- --      BP Location FiO2 (%)     -- --       Physical Exam  GENERAL APPEARANCE: Awake and alert. No acute respiratory distress.   VITAL SIGNS: As per the nurses' triage record.  HEENT:  Normocephalic, atraumatic.  NECK: Soft, nontender and supple  CHEST: Nontender to palpation. Clear to auscultation bilaterally. No rales, rhonchi, or wheezing. Symmetric rise and fall of chest wall.   HEART: Clear S1 and S2. Regular rate and rhythm. Strong and equal pulses in the extremities.  ABDOMEN: Soft, nontender, nondistended, positive bowel sounds, no palpable masses.  RECTAL: LAKISHA Vital was chaperone.  Rectal exam shows no active bleeding from rectum.  There is 1 small nonthrombosed external hemorrhoid.  RUPERTO reveals blood on gloved finger that is a deeper red but is not melena.  No internal hemorrhoids appreciated on RUPERTO.  MUSCULOSKELETAL: Full gross active range of motion. Ambulating on own with no acute difficulties  NEUROLOGICAL: Awake, alert and oriented x 3. Motor power intact in the upper and lower extremities. Sensation is intact to light touch in the upper and lower extremities. Patient answering questions appropriately.   IMMUNOLOGICAL: No lymphatic streaking noted  DERMATOLOGIC: Warm and dry   PYSCH: Cooperative with appropriate mood and affect.    ED Course & MDM   Diagnoses as of 02/21/25 2016   Gastrointestinal hemorrhage, unspecified gastrointestinal hemorrhage type                 No data recorded     Florentino Coma Scale Score: 15 (02/21/25 1555 : Gurinder Rojo, EMT)                           Medical Decision Making  Parts of this chart have been completed using voice recognition software. Please excuse any errors of transcription.  My thought process and reason for plan has been formulated from the time that I saw the patient until the time of disposition and is not specific to one specific moment during their visit and furthermore my MDM encompasses this entire chart and not only this text box.      HPI: Detailed above.    Exam: A medically appropriate exam performed, outlined above, given the known history and presentation.    History obtained from: Patient    Medications given during  visit:  Medications   sodium chloride 0.9 % bolus 500 mL (0 mL intravenous Stopped 2/21/25 1840)   pantoprazole (ProtoNix) injection 40 mg (40 mg intravenous Given 2/21/25 1841)   iohexol (OMNIPaque) 350 mg iodine/mL solution 75 mL (75 mL intravenous Given 2/21/25 1829)        Diagnostic/tests  Labs Reviewed   OCCULT BLOOD X1, STOOL - Abnormal       Result Value    Occult Blood, Stool X1 Positive (*)    CBC WITH AUTO DIFFERENTIAL - Abnormal    WBC 6.0      nRBC        RBC 4.20 (*)     Hemoglobin 12.5 (*)     Hematocrit 38.3 (*)     MCV 91      MCH 29.8      MCHC 32.6      RDW 15.2 (*)     Platelets 176      Neutrophils % 68.3      Immature Granulocytes %, Automated 0.3      Lymphocytes % 13.1      Monocytes % 9.4      Eosinophils % 7.9      Basophils % 1.0      Neutrophils Absolute 4.06      Immature Granulocytes Absolute, Automated 0.02      Lymphocytes Absolute 0.78 (*)     Monocytes Absolute 0.56      Eosinophils Absolute 0.47 (*)     Basophils Absolute 0.06     COMPREHENSIVE METABOLIC PANEL - Abnormal    Glucose 109 (*)     Sodium 137      Potassium 4.2      Chloride 101      Bicarbonate 26      Anion Gap 14      Urea Nitrogen 27 (*)     Creatinine 1.12      eGFR 64      Calcium 9.2      Albumin 4.2      Alkaline Phosphatase 83      Total Protein 7.6      AST 22      Bilirubin, Total 0.7      ALT 13     PROTIME-INR - Abnormal    Protime 19.8 (*)     INR 1.9 (*)     Narrative:     INR Therapeutic Range: 2.0-3.5   APTT - Abnormal    aPTT 33.0 (*)       CT abdomen pelvis w IV contrast   Final Result   No acute abdominal or pelvic process.        Slight heterogeneous hepatic parenchyma with ill-defined areas of   hypoattenuation. Findings may relate to phase of imaging and/or   component of hepatic steatosis.        Focal wall thickening in the region of the gastric antrum may relate   to under distention or sequela of gastritis. Correlate with   symptomatology for the need for further evaluation with direct    visualization.        Postsurgical changes of partial sigmoid colectomy and anastomosis. No   evidence for bowel obstruction.        Scattered colonic diverticula without evidence for acute   diverticulitis.        Additional findings as described above.        MACRO:   None        Signed by: Barrington Mitchell 2/21/2025 7:05 PM   Dictation workstation:   [id number]           Considerations/further MDM:  Patient was seen in conjucntion with my supervising physician,  Dr. Resendiz. Please refer to his note.    Differential diagnosis includes was not limited to bleeding hemorrhoid versus GI bleed versus acute anemia versus electrolyte disturbance    CT abdomen pelvis with IV contrast shows no acute process in the abdomen or pelvis.  Findings of possible hepatic steatosis.  Possible gastritis.  Postsurgical changes of the partial sigmoid colectomy anastomosis without evidence of bowel obstruction.  No evidence of diverticulitis.     Hemoglobin is stable based off of previous CBCs.  INR 1.9.  No thrombocytopenia.  Occult blood in the stool is positive.  BUN 4 weeks ago was 34, today 27 so no elevation in BUN.    Due to patient having a occult blood in the stool with evidence of rectal bleeding today and patient taking warfarin plan for admission for overnight observation and trending of hemoglobin.  Patient was agreeable to this plan.  He was admitted in stable condition to Dr. Gomes.  Patient was admitted in stable condition.      Procedure  Procedures     Carito Fajardo PA-C  02/21/25 2017     normal...

## 2025-03-13 NOTE — ED ADULT NURSE REASSESSMENT NOTE - NS ED NURSE REASSESS COMMENT FT1
Requested Prescriptions     Pending Prescriptions Disp Refills    FLUTICASONE PROPIONATE 50 MCG/ACT Nasal Suspension [Pharmacy Med Name: FLUTICASONE 50MCG NASAL SP (120) RX] 16 g 0     Sig: SHAKE LIQUID AND USE 2 SPRAYS IN EACH NOSTRIL DAILY     Signed Prescriptions Disp Refills    LANSOPRAZOLE 30 MG Oral Capsule Delayed Release 90 capsule 0     Sig: TAKE 1 CAPSULE(30 MG) BY MOUTH DAILY     Authorizing Provider: ANTONETTE HARRIS     Ordering User: HEATH GUSTAFSON       Last Refill: 2/20    Last OV: 2/20        
Assumed patient care from LAZARUS Mallory, charting as noted. Received patient lying in bed, a&ox4, able to make needs known. Patient with eyeglasses, right wrist iv, patent, site without redness or swelling. Patient denies any pain or discomfort at this time, denies dizziness, lightheadedness and headache. Applied telebox cm +  on patient, nsr on cm, o2 sat @ 97 % room air. Plan of care and wait time explained, patient verbalized understanding. Patient not in respiratory distress. To continue to monitor.
pt in no apparent distress, resting comfortably in bed, ANANYA Jimenez called and spoke to pt, pt aware she will be dc at around 1300.
Patient A/Ox, hypertensive, tachycardiac, reports having palpations, denies any sort of pain, will continue to monitor.
